# Patient Record
Sex: FEMALE | Race: BLACK OR AFRICAN AMERICAN | Employment: OTHER | URBAN - METROPOLITAN AREA
[De-identification: names, ages, dates, MRNs, and addresses within clinical notes are randomized per-mention and may not be internally consistent; named-entity substitution may affect disease eponyms.]

---

## 2017-07-31 ENCOUNTER — HOSPITAL ENCOUNTER (OUTPATIENT)
Dept: PHYSICAL THERAPY | Age: 72
Discharge: HOME | End: 2017-07-31
Attending: INTERNAL MEDICINE
Payer: MEDICARE

## 2017-07-31 DIAGNOSIS — G89.29 CHRONIC LOW BACK PAIN WITHOUT SCIATICA, UNSPECIFIED BACK PAIN LATERALITY: ICD-10-CM

## 2017-07-31 DIAGNOSIS — M54.50 CHRONIC LOW BACK PAIN WITHOUT SCIATICA, UNSPECIFIED BACK PAIN LATERALITY: ICD-10-CM

## 2017-07-31 PROCEDURE — G8979 MOBILITY GOAL STATUS: HCPCS

## 2017-07-31 PROCEDURE — 97161 PT EVAL LOW COMPLEX 20 MIN: CPT

## 2017-07-31 PROCEDURE — G8978 MOBILITY CURRENT STATUS: HCPCS

## 2017-07-31 NOTE — PROGRESS NOTES
Melba Nguyen  : 1945 [facility]  [address], [address], [place]  [phone]   [phone]          OUTPATIENT PHYSICAL THERAPY:Initial Assessment 2017    ICD-10: Treatment Diagnosis: Low back pain (M54.5)  Precautions/Allergies:   Latex, natural rubber; Acyclovir; Epinephrine; Other medication; Pravastatin; Tagamet [cimetidine]; and Valium [diazepam]   Fall Risk Score: 0 (? 5 = High Risk)  MD Orders: evaluate and treat MEDICAL/REFERRING DIAGNOSIS:  Chronic low back pain without sciatica, unspecified back pain laterality [M54.5, G89.29]   DATE OF ONSET: Chronic, worsening over past 1-2 months  REFERRING PHYSICIAN: José Manuel Paredes MD  RETURN PHYSICIAN APPOINTMENT: 2017     INITIAL ASSESSMENT:  Ms. Lopez Carolina presents in therapy today with chronic lower back pain. Her pain resembles a posterior derangement in the lumbar spine. She seemed hesitant to begin therapy before her x-ray imaging studies, so I suggested she wait to schedule follow-up therapy visits for after her x-ray. She is to call and schedule x-ray imaging. She will benefit from skilled PT in order to improve her lumbar motion and strength for returning to optimum level of function and ADL performance. PROBLEM LIST (Impacting functional limitations):  1. Decreased Strength  2. Decreased ADL/Functional Activities  3. Increased Pain  4. Decreased Activity Tolerance  5. Decreased Flexibility/Joint Mobility  6. Decreased Knowledge of Precautions  7. Decreased [place] with Home Exercise Program INTERVENTIONS PLANNED:  1. Cold  2. Electrical Stimulation  3. Heat  4. Home Exercise Program (HEP)  5. Manual Therapy  6. Neuromuscular Re-education/Strengthening  7. Range of Motion (ROM)  8. Therapeutic Activites  9. Therapeutic Exercise/Strengthening   TREATMENT PLAN:  Effective Dates: 17 TO 17.   Frequency/Duration: 2 times a week for 6 weeks  GOALS: (Goals have been discussed and agreed upon with patient.)     SHORT-TERM FUNCTIONAL GOALS: Time Frame: 3 weeks  1. Patient will be compliant with HEP focused on lumbar stabilization and strength/ROM. 2.  Patient will rate low back pain no greater than 4/10 at worst for improved tolerance to daily and work activities. DISCHARGE GOALS: Time Frame: 6 weeks  1. Patient will be independent with comprehensive HEP focused on lumbar stabilization and core strengthening. 2.  Patient will rate low back pain no greater than 2/10 and which does not significantly interfere with daily or work duties. 3.  Patient will demonstrate lumbar AROM to be [facility] for improved safety with functional mobility. Rehabilitation Potential For Stated Goals: Good  Regarding [place] therapy, I certify that the treatment plan above will be carried out by a therapist or under their direction. Thank you for this referral,  Guille Stoll, PT, DPT     Referring Physician Signature: Edwardo Torres MD              Date                    The information in this section was collected on 7-31-17 (except where otherwise noted). HISTORY:   History of Present Injury/Illness (Reason for Referral):  Patient reports a chronic history of lower back pain. She was receiving regular chiropractor treatments for her neck, which would help/resolve her lower back pain. She states over the past month, it has not seemed to help. She reports x-ray imaging studies were discussed, but never scheduled. Past Medical History/Comorbidities:   Ms. Kristopher Colmenares  has a past medical history of Anxiety; Atypical ductal hyperplasia of breast; Essential hypertension; GERD (gastroesophageal reflux disease); History of diverticulitis; Moderate mitral regurgitation (2015); and Obesity, Class I, BMI 30.0-34.9 (see actual BMI).   Ms. Kristopher Colmenares  has a past surgical history that includes us guided core breast biopsy (Left, 2014); cholecystectomy; breast lumpectomy (Left, 3/14/2014); breast biopsy (3/14/2014); hysterectomy;  section; other surgical (); and colonoscopy (). Social History/Living Environment:     Independent   Prior Level of Function/Work/Activity:  Retired RN  Personal Factors:          Sex:  female        Age:  67 y.o. Current Medications:       Current Outpatient Prescriptions:     losartan (COZAAR) 100 mg tablet, TAKE 1 TABLET BY MOUTH DAILY, Disp: 90 Tab, Rfl: 3    carvedilol (COREG) 6.25 mg tablet, TAKE 2 TABLETS BY MOUTH TWICE A DAY (2 TABS IN THE MORNING & 2 TABS IN THE EVENING), Disp: 360 Tab, Rfl: 1    amLODIPine (NORVASC) 5 mg tablet, TAKE 2 TABLETS BY MOUTH DAILY, Disp: 180 Tab, Rfl: 1    furosemide (LASIX) 40 mg tablet, TAKE 1 TABLET BY MOUTH EVERY DAY AS NEEDED FOR LEG SWELLING, Disp: 90 Tab, Rfl: 1    LORazepam (ATIVAN) 1 mg tablet, Take 1 mg by mouth every eight (8) hours as needed for Anxiety (not to exceeed no more than 3 times per day). , Disp: , Rfl:    Date Last Reviewed:  2017     Number of Personal Factors/Comorbidities that affect the Plan of Care: 1-2: MODERATE COMPLEXITY   EXAMINATION:   Observation/Orthostatic Postural Assessment:          Patient pushes through UEs to rise from chair. Ambulates with slower gait speed, but near normalized pattern and stride. Palpation:          Patient exhibits soreness at lower lumbar spinal segments and at PSIS bilaterally. ROM:          Lumbar flexion 75%, lumbar extension 50%, lumbar side-bend and rotation are WNL bilaterally   Strength:          WNL throughout B/L LEs  Special Tests:          Standing repetitive lumbar extension improved lumbar extension ROM, no change for pain        Body Structures Involved:  1. Nerves  2. Bones  3. Joints  4. Muscles  5. Ligaments Body Functions Affected:  1. Sensory/Pain  2. Movement Related Activities and Participation Affected:  1. General Tasks and Demands  2. Mobility  3. Domestic Life  4. Interpersonal Interactions and Relationships  5.  Community, Social and [place]   Number of elements (examined above) that affect the Plan of Care: 1-2: LOW COMPLEXITY   CLINICAL PRESENTATION:   Presentation: Stable and uncomplicated: LOW COMPLEXITY   CLINICAL DECISION MAKING:   Outcome Measure: Tool Used: Modified Oswestry Low Back Pain Questionnaire  Score:  Initial: 18/50  Most Recent: X/50 (Date: -- )   Interpretation of Score: Each section is scored on a 0-5 scale, 5 representing the greatest disability. The scores of each section are added together for a total score of 50. Score 0 1-10 11-20 21-30 31-40 41-49 50   Modifier CH CI CJ CK CL CM CN     ? Mobility - Walking and Moving Around:     - CURRENT STATUS: CJ - 20%-39% impaired, limited or restricted    - GOAL STATUS: CI - 1%-19% impaired, limited or restricted    - D/C STATUS:  ---------------To be determined---------------      Medical Necessity:   · Patient is expected to demonstrate progress in strength and range of motion to improve safety during functional mobility. Reason for Services/Other Comments:  · Patient continues to require skilled intervention due to not reaching long term goals. Use of outcome tool(s) and clinical judgement create a POC that gives a: Questionable prediction of patient's progress: MODERATE COMPLEXITY            TREATMENT:   (In addition to Assessment/Re-Assessment sessions the following treatments were rendered)  Pre-treatment Symptoms/Complaints:  Patient reports lower back pain, and buttocks pain, rated as 5/10 at worst. She states pain with prolonged walking and activity, while rest and chiropractor can alleviate pain. Pain: Initial:   3/10 Post Session:  0-1/10     THERAPEUTIC EXERCISE: (0 minutes):  Exercises per grid below to improve mobility and strength. Required minimal verbal cues to promote proper body alignment, promote proper body posture and promote proper body mechanics. Progressed complexity of movement as indicated.     Patient received IFC electrical stimulation intersecting at central lower lumbar spine, patient prone, intensity set to patient tolerance, 15 minutes. Patient reported no pain at end of treatment and session. (BILLED AS INDIRECT DUE TO INSURANCE RESTRICTION    Treatment/Session Assessment:    · Response to Treatment:  Patient verbalized understanding of plan of care and HEP consisting of standing lumbar extension. Patient reported no symptoms at end of session after treatment. · Compliance with Program/Exercises: Will assess as treatment progresses. · Recommendations/Intent for next treatment session: \"Next visit will focus on advancements to more challenging activities\".   Total Treatment Duration:  PT Patient Time In/Time Out  Time In: 1345  Time Out: Red 200, PT, DPT

## 2017-07-31 NOTE — PROGRESS NOTES
Ambulatory/Rehab Services H2 Model Falls Risk Assessment    Risk Factor Pts. ·   Confusion/Disorientation/Impulsivity  []    4 ·   Symptomatic Depression  []   2 ·   Altered Elimination  []   1 ·   Dizziness/Vertigo  []   1 ·   Gender (Male)  []   1 ·   Any administered antiepileptics (anticonvulsants):  []   2 ·   Any administered benzodiazepines:  []   1 ·   Visual Impairment (specify):  []   1 ·   Portable Oxygen Use  []   1 ·   Orthostatic ? BP  []   1 ·   History of Recent Falls (within 3 mos.)  []   5     Ability to Rise from Chair (choose one) Pts. ·   Ability to rise in a single movement  [x]   0 ·   Pushes up, successful in one attempt  []   1 ·   Multiple attempts, but successful  []   3 ·   Unable to rise without assistance  []   4   Total: (5 or greater = High Risk) 0     Falls Prevention Plan:   []                Physical Limitations to Exercise (specify):   []                Mobility Assistance Device (type):   []                Exercise/Equipment Adaptation (specify):    ©2010 [facility] [address] [place] [address] Patent #[id number].  Federal Law prohibits the replication, distribution or use without written permission from [facility] [employer]

## 2017-08-01 ENCOUNTER — HOSPITAL ENCOUNTER (OUTPATIENT)
Dept: GENERAL RADIOLOGY | Age: 72
Discharge: HOME | End: 2017-08-01
Payer: MEDICARE

## 2017-08-01 DIAGNOSIS — M54.50 CHRONIC LOW BACK PAIN WITHOUT SCIATICA, UNSPECIFIED BACK PAIN LATERALITY: ICD-10-CM

## 2017-08-01 DIAGNOSIS — G89.29 CHRONIC LOW BACK PAIN WITHOUT SCIATICA, UNSPECIFIED BACK PAIN LATERALITY: ICD-10-CM

## 2017-08-01 PROCEDURE — 72100 X-RAY EXAM L-S SPINE 2/3 VWS: CPT

## 2017-08-02 NOTE — PROGRESS NOTES
Let her know her xray of her back shows arthritis of the spine and also a probable abdominal aortic aneurysm-need to see if there is one by Ultrasound. Do not worry however!

## 2017-08-03 NOTE — PROGRESS NOTES
Spoke to patient, message was relayed and understanding expressed. Patient will go ahead with U/S as recommended.

## 2017-08-09 ENCOUNTER — HOSPITAL ENCOUNTER (OUTPATIENT)
Dept: ULTRASOUND IMAGING | Age: 72
Discharge: HOME | End: 2017-08-09
Attending: INTERNAL MEDICINE
Payer: MEDICARE

## 2017-08-09 DIAGNOSIS — I71.40 ABDOMINAL AORTIC ANEURYSM (AAA) WITHOUT RUPTURE: ICD-10-CM

## 2017-08-09 PROCEDURE — 76775 US EXAM ABDO BACK WALL LIM: CPT

## 2017-08-10 NOTE — PROGRESS NOTES
She does have a small 3 cm abdominal aortic aneurysm. Will need to rechecked in one year. Only when it gets to 5.5 cm is surgery done. May not be an issue for her lifetime?

## 2017-08-31 ENCOUNTER — HOSPITAL ENCOUNTER (OUTPATIENT)
Dept: PHYSICAL THERAPY | Age: 72
Discharge: HOME | End: 2017-08-31
Attending: INTERNAL MEDICINE
Payer: MEDICARE

## 2017-08-31 PROCEDURE — 97110 THERAPEUTIC EXERCISES: CPT

## 2017-08-31 NOTE — PROGRESS NOTES
Stephon Prater  : 1945 [facility]  [address], [address], [place]  [phone]   [phone]          OUTPATIENT PHYSICAL THERAPY:Daily Note 2017    ICD-10: Treatment Diagnosis: Low back pain (M54.5)  Precautions/Allergies:   Latex, natural rubber; Acyclovir; Epinephrine; Other medication; Pravastatin; Tagamet [cimetidine]; and Valium [diazepam]   Fall Risk Score: 0 (? 5 = High Risk)  MD Orders: evaluate and treat MEDICAL/REFERRING DIAGNOSIS:  Back  Pain   DATE OF ONSET: Chronic, worsening over past 1-2 months  REFERRING PHYSICIAN: Randy Jeffries MD  RETURN PHYSICIAN APPOINTMENT: 2017     INITIAL ASSESSMENT:  Ms. Julian Edward presents in therapy today with chronic lower back pain. Her pain resembles a posterior derangement in the lumbar spine. She seemed hesitant to begin therapy before her x-ray imaging studies, so I suggested she wait to schedule follow-up therapy visits for after her x-ray. She is to call and schedule x-ray imaging. She will benefit from skilled PT in order to improve her lumbar motion and strength for returning to optimum level of function and ADL performance. PROBLEM LIST (Impacting functional limitations):  1. Decreased Strength  2. Decreased ADL/Functional Activities  3. Increased Pain  4. Decreased Activity Tolerance  5. Decreased Flexibility/Joint Mobility  6. Decreased Knowledge of Precautions  7. Decreased [place] with Home Exercise Program INTERVENTIONS PLANNED:  1. Cold  2. Electrical Stimulation  3. Heat  4. Home Exercise Program (HEP)  5. Manual Therapy  6. Neuromuscular Re-education/Strengthening  7. Range of Motion (ROM)  8. Therapeutic Activites  9. Therapeutic Exercise/Strengthening   TREATMENT PLAN:  Effective Dates: 17 TO 17.   Frequency/Duration: 2 times a week for 6 weeks  GOALS: (Goals have been discussed and agreed upon with patient.)     SHORT-TERM FUNCTIONAL GOALS: Time Frame: 3 weeks  1. Patient will be compliant with HEP focused on lumbar stabilization and strength/ROM. 2.  Patient will rate low back pain no greater than 4/10 at worst for improved tolerance to daily and work activities. DISCHARGE GOALS: Time Frame: 6 weeks  1. Patient will be independent with comprehensive HEP focused on lumbar stabilization and core strengthening. 2.  Patient will rate low back pain no greater than 2/10 and which does not significantly interfere with daily or work duties. 3.  Patient will demonstrate lumbar AROM to be [facility] for improved safety with functional mobility. Rehabilitation Potential For Stated Goals: Good  Regarding [place] therapy, I certify that the treatment plan above will be carried out by a therapist or under their direction. Thank you for this referral,  Melvin Pizarro PT, DPT     Referring Physician Signature: Stephen Bloom MD              Date                    The information in this section was collected on 17 (except where otherwise noted). HISTORY:   History of Present Injury/Illness (Reason for Referral):  Patient reports a chronic history of lower back pain. She was receiving regular chiropractor treatments for her neck, which would help/resolve her lower back pain. She states over the past month, it has not seemed to help. She reports x-ray imaging studies were discussed, but never scheduled. Past Medical History/Comorbidities:   Ms. Norman Summers  has a past medical history of Anxiety; Atypical ductal hyperplasia of breast; Essential hypertension; GERD (gastroesophageal reflux disease); History of diverticulitis; Moderate mitral regurgitation (); and Obesity, Class I, BMI 30.0-34.9 (see actual BMI).   Ms. Norman Summers  has a past surgical history that includes us guided core breast biopsy (Left, ); cholecystectomy; breast lumpectomy (Left, 3/14/2014); breast biopsy (3/14/2014); hysterectomy;  section; other surgical (); and colonoscopy (2014). Social History/Living Environment:     Independent   Prior Level of Function/Work/Activity:  Retired RN  Personal Factors:          Sex:  female        Age:  67 y.o. Current Medications:       Current Outpatient Prescriptions:     losartan (COZAAR) 100 mg tablet, TAKE 1 TABLET BY MOUTH DAILY, Disp: 90 Tab, Rfl: 3    carvedilol (COREG) 6.25 mg tablet, TAKE 2 TABLETS BY MOUTH TWICE A DAY (2 TABS IN THE MORNING & 2 TABS IN THE EVENING), Disp: 360 Tab, Rfl: 1    amLODIPine (NORVASC) 5 mg tablet, TAKE 2 TABLETS BY MOUTH DAILY, Disp: 180 Tab, Rfl: 1    furosemide (LASIX) 40 mg tablet, TAKE 1 TABLET BY MOUTH EVERY DAY AS NEEDED FOR LEG SWELLING, Disp: 90 Tab, Rfl: 1    LORazepam (ATIVAN) 1 mg tablet, Take 1 mg by mouth every eight (8) hours as needed for Anxiety (not to exceeed no more than 3 times per day). , Disp: , Rfl:    Date Last Reviewed:  8/31/2017     Number of Personal Factors/Comorbidities that affect the Plan of Care: 1-2: MODERATE COMPLEXITY   EXAMINATION:   Observation/Orthostatic Postural Assessment:          Patient pushes through UEs to rise from chair. Ambulates with slower gait speed, but near normalized pattern and stride. Palpation:          Patient exhibits soreness at lower lumbar spinal segments and at PSIS bilaterally. ROM:          Lumbar flexion 75%, lumbar extension 50%, lumbar side-bend and rotation are WNL bilaterally   Strength:          WNL throughout B/L LEs  Special Tests:          Standing repetitive lumbar extension improved lumbar extension ROM, no change for pain        Body Structures Involved:  1. Nerves  2. Bones  3. Joints  4. Muscles  5. Ligaments Body Functions Affected:  1. Sensory/Pain  2. Movement Related Activities and Participation Affected:  1. General Tasks and Demands  2. Mobility  3. Domestic Life  4. Interpersonal Interactions and Relationships  5.  Community, Social and [place]   Number of elements (examined above) that affect the Plan of Care: 1-2: LOW COMPLEXITY   CLINICAL PRESENTATION:   Presentation: Stable and uncomplicated: LOW COMPLEXITY   CLINICAL DECISION MAKING:   Outcome Measure: Tool Used: Modified Oswestry Low Back Pain Questionnaire  Score:  Initial: 18/50  Most Recent: X/50 (Date: -- )   Interpretation of Score: Each section is scored on a 0-5 scale, 5 representing the greatest disability. The scores of each section are added together for a total score of 50. Score 0 1-10 11-20 21-30 31-40 41-49 50   Modifier CH CI CJ CK CL CM CN     ? Mobility - Walking and Moving Around:     - CURRENT STATUS: CJ - 20%-39% impaired, limited or restricted    - GOAL STATUS: CI - 1%-19% impaired, limited or restricted    - D/C STATUS:  ---------------To be determined---------------      Medical Necessity:   · Patient is expected to demonstrate progress in strength and range of motion to improve safety during functional mobility. Reason for Services/Other Comments:  · Patient continues to require skilled intervention due to not reaching long term goals. Use of outcome tool(s) and clinical judgement create a POC that gives a: Questionable prediction of patient's progress: MODERATE COMPLEXITY            TREATMENT:   (In addition to Assessment/Re-Assessment sessions the following treatments were rendered)  Pre-treatment Symptoms/Complaints:  Patient reports that she had x-ray imaging, which demonstrated multi-level DDD. She is here to begin PT now that she has had imaging. Pain: Initial:   3/10 Post Session:  0-1/10     THERAPEUTIC EXERCISE: (40 minutes):  Exercises per grid below to improve mobility and strength. Required minimal verbal cues to promote proper body alignment, promote proper body posture and promote proper body mechanics. Progressed complexity of movement as indicated.    Date:  8-31-17 Date:   Date:     Activity/Exercise Parameters Parameters Parameters   Stepper 10 minutes  Level 1     Long axis distraction Manually  B/L 3x20 sec     Bridge With TA activation  x10     Lower trunk rotation  x10     Hamstring stretch Manual SLR by clinician   3x20 seconds                       Patient received IFC electrical stimulation intersecting at central lower lumbar spine, patient prone, intensity set to patient tolerance, 15 minutes. Patient reported no pain at end of treatment and session. (not today)     Treatment/Session Assessment:    · Response to Treatment:  Patient and I discussed goal of beginning strength, endurance and balance work to progress to independent gym exercises. Will continue to work on this next session. · Compliance with Program/Exercises: Will assess as treatment progresses. · Recommendations/Intent for next treatment session: \"Next visit will focus on advancements to more challenging activities\".   Total Treatment Duration:  PT Patient Time In/Time Out  Time In: 1345  Time Out: Red 200, PT, DPT

## 2017-09-06 ENCOUNTER — HOSPITAL ENCOUNTER (OUTPATIENT)
Dept: PHYSICAL THERAPY | Age: 72
Discharge: HOME | End: 2017-09-06
Attending: INTERNAL MEDICINE
Payer: MEDICARE

## 2017-09-06 PROCEDURE — 97110 THERAPEUTIC EXERCISES: CPT

## 2017-09-06 NOTE — PROGRESS NOTES
Kal Sheikh  : 1945 [facility]  [address] , [address], [place] [address]  [phone]   [phone]          OUTPATIENT PHYSICAL THERAPY:Daily Note 2017    ICD-10: Treatment Diagnosis: Low back pain (M54.5)  Precautions/Allergies:   Latex, natural rubber; Acyclovir; Epinephrine; Other medication; Pravastatin; Tagamet [cimetidine]; and Valium [diazepam]   Fall Risk Score: 0 (? 5 = High Risk)  MD Orders: evaluate and treat MEDICAL/REFERRING DIAGNOSIS:  Back  Pain   DATE OF ONSET: Chronic, worsening over past 1-2 months  REFERRING PHYSICIAN: Edwardo Torres MD  RETURN PHYSICIAN APPOINTMENT: 2017     ASSESSMENT:  Ms. Kristopher Colmenares presents in therapy today with chronic lower back pain. Her pain resembles a posterior derangement in the lumbar spine. She seemed hesitant to begin therapy before her x-ray imaging studies, so I suggested she wait to schedule follow-up therapy visits for after her x-ray. She is to call and schedule x-ray imaging. She will benefit from skilled PT in order to improve her lumbar motion and strength for returning to optimum level of function and ADL performance. PROBLEM LIST (Impacting functional limitations):  1. Decreased Strength  2. Decreased ADL/Functional Activities  3. Increased Pain  4. Decreased Activity Tolerance  5. Decreased Flexibility/Joint Mobility  6. Decreased Knowledge of Precautions  7. Decreased [place] with Home Exercise Program INTERVENTIONS PLANNED:  1. Cold  2. Electrical Stimulation  3. Heat  4. Home Exercise Program (HEP)  5. Manual Therapy  6. Neuromuscular Re-education/Strengthening  7. Range of Motion (ROM)  8. Therapeutic Activites  9. Therapeutic Exercise/Strengthening   TREATMENT PLAN:  Effective Dates: 17 TO 17.   Frequency/Duration: 2 times a week for 6 weeks  GOALS: (Goals have been discussed and agreed upon with patient.)     SHORT-TERM FUNCTIONAL GOALS: Time Frame: 3 weeks  1. Patient will be compliant with HEP focused on lumbar stabilization and strength/ROM. 2.  Patient will rate low back pain no greater than 4/10 at worst for improved tolerance to daily and work activities. DISCHARGE GOALS: Time Frame: 6 weeks  1. Patient will be independent with comprehensive HEP focused on lumbar stabilization and core strengthening. 2.  Patient will rate low back pain no greater than 2/10 and which does not significantly interfere with daily or work duties. 3.  Patient will demonstrate lumbar AROM to be [facility] for improved safety with functional mobility. Rehabilitation Potential For Stated Goals: Good              The information in this section was collected on 17 (except where otherwise noted). HISTORY:   History of Present Injury/Illness (Reason for Referral):  Patient reports a chronic history of lower back pain. She was receiving regular chiropractor treatments for her neck, which would help/resolve her lower back pain. She states over the past month, it has not seemed to help. She reports x-ray imaging studies were discussed, but never scheduled. Past Medical History/Comorbidities:   Ms. Kelly Perera  has a past medical history of Anxiety; Atypical ductal hyperplasia of breast; Essential hypertension; GERD (gastroesophageal reflux disease); History of diverticulitis; Moderate mitral regurgitation (); and Obesity, Class I, BMI 30.0-34.9 (see actual BMI). Ms. Kelly Perera  has a past surgical history that includes us guided core breast biopsy (Left, ); cholecystectomy; breast lumpectomy (Left, 3/14/2014); breast biopsy (3/14/2014); hysterectomy;  section; other surgical (); and colonoscopy (). Social History/Living Environment:     Independent   Prior Level of Function/Work/Activity:  Retired RN  Personal Factors:          Sex:  female        Age:  67 y.o.     Current Medications:       Current Outpatient Prescriptions:     losartan (COZAAR) 100 mg tablet, TAKE 1 TABLET BY MOUTH DAILY, Disp: 90 Tab, Rfl: 3    carvedilol (COREG) 6.25 mg tablet, TAKE 2 TABLETS BY MOUTH TWICE A DAY (2 TABS IN THE MORNING & 2 TABS IN THE EVENING), Disp: 360 Tab, Rfl: 1    amLODIPine (NORVASC) 5 mg tablet, TAKE 2 TABLETS BY MOUTH DAILY, Disp: 180 Tab, Rfl: 1    furosemide (LASIX) 40 mg tablet, TAKE 1 TABLET BY MOUTH EVERY DAY AS NEEDED FOR LEG SWELLING, Disp: 90 Tab, Rfl: 1    LORazepam (ATIVAN) 1 mg tablet, Take 1 mg by mouth every eight (8) hours as needed for Anxiety (not to exceeed no more than 3 times per day). , Disp: , Rfl:    Date Last Reviewed:  9/6/2017     EXAMINATION:   Observation/Orthostatic Postural Assessment:          Patient pushes through UEs to rise from chair. Ambulates with slower gait speed, but near normalized pattern and stride. Palpation:          Patient exhibits soreness at lower lumbar spinal segments and at PSIS bilaterally. ROM:          Lumbar flexion 75%, lumbar extension 50%, lumbar side-bend and rotation are WNL bilaterally   Strength:          WNL throughout B/L LEs  Special Tests:          Standing repetitive lumbar extension improved lumbar extension ROM, no change for pain        Body Structures Involved:  1. Nerves  2. Bones  3. Joints  4. Muscles  5. Ligaments Body Functions Affected:  1. Sensory/Pain  2. Movement Related Activities and Participation Affected:  1. General Tasks and Demands  2. Mobility  3. Domestic Life  4. Interpersonal Interactions and Relationships  5. Community, Social and Civic Life   CLINICAL PRESENTATION:   CLINICAL DECISION MAKING:   Outcome Measure: Tool Used: Modified Oswestry Low Back Pain Questionnaire  Score:  Initial: 18/50  Most Recent: X/50 (Date: -- )   Interpretation of Score: Each section is scored on a 0-5 scale, 5 representing the greatest disability. The scores of each section are added together for a total score of 50.     Score 0 1-10 11-20 21-30 31-40 41-49 50   Modifier CH CI CJ CK CL CM CN ? Mobility - Walking and Moving Around:     - CURRENT STATUS: CJ - 20%-39% impaired, limited or restricted    - GOAL STATUS: CI - 1%-19% impaired, limited or restricted    - D/C STATUS:  ---------------To be determined---------------      Medical Necessity:   · Patient is expected to demonstrate progress in strength and range of motion to improve safety during functional mobility. Reason for Services/Other Comments:  · Patient continues to require skilled intervention due to not reaching long term goals. TREATMENT:   (In addition to Assessment/Re-Assessment sessions the following treatments were rendered)  Pre-treatment Symptoms/Complaints:  Patient reports her R hip is sore today and that she just feels stiff. Pain: Initial:   3/10 Post Session:  0/10     THERAPEUTIC EXERCISE: (45 minutes):  Exercises per grid below to improve mobility and strength. Required minimal verbal cues to promote proper body alignment, promote proper body posture and promote proper body mechanics. Progressed complexity of movement as indicated. Date:  8-31-17 Date:  9/6/17 Date:  9/6/17   Activity/Exercise Parameters Parameters Parameters   Stepper 10 minutes  Level 1 10 mins, level 1.5    Long axis distraction  Manually  B/L 3x20 sec Manually  B/L 3x20 sec    Bridge With TA activation  x10 With TA activation  x10    Lower trunk rotation  x10 x10 each direction    Hamstring stretch Manual SLR by clinician   3x20 seconds Manual SLR by clinician   3x20 seconds    TA march  10x each side    Prayer stretch  4x cramp in L foot    Cat camel  10x    Prone lying  3 minutes - decreased cramp in L foot and decreased pain          Patient received IFC electrical stimulation intersecting at central lower lumbar spine, patient prone, intensity set to patient tolerance, 15 minutes. Patient reported no pain at end of treatment and session.  (not today)     Treatment/Session Assessment:    · Response to Treatment: Patient very stiff and tender in R hip and back. She had L foot cramping w/ lumbar flexion and decreased pain and cramping following prone extension. · Compliance with Program/Exercises: Will assess as treatment progresses. · Recommendations/Intent for next treatment session: \"Next visit will focus on advancements to more challenging activities\".   Total Treatment Duration:  PT Patient Time In/Time Out  Time In: 1015  Time Out: 222 Dez Yeung, PT

## 2017-09-08 ENCOUNTER — HOSPITAL ENCOUNTER (OUTPATIENT)
Dept: PHYSICAL THERAPY | Age: 72
Discharge: HOME | End: 2017-09-08
Attending: INTERNAL MEDICINE
Payer: MEDICARE

## 2017-09-08 PROCEDURE — 97110 THERAPEUTIC EXERCISES: CPT

## 2017-09-08 NOTE — PROGRESS NOTES
Efrain Lizarraga  : 1945 [facility]  [address], [address], [place] [address]  [phone]   [phone]          OUTPATIENT PHYSICAL THERAPY:Daily Note 2017    ICD-10: Treatment Diagnosis: Low back pain (M54.5)  Precautions/Allergies:   Latex, natural rubber; Acyclovir; Epinephrine; Other medication; Pravastatin; Tagamet [cimetidine]; and Valium [diazepam]   Fall Risk Score: 0 (? 5 = High Risk)  MD Orders: evaluate and treat MEDICAL/REFERRING DIAGNOSIS:  Back  Pain   DATE OF ONSET: Chronic, worsening over past 1-2 months  REFERRING PHYSICIAN: Edilia Ponce MD  RETURN PHYSICIAN APPOINTMENT: 2017     ASSESSMENT:  Ms. Gil Hutchins presents in therapy today with chronic lower back pain. Her pain resembles a posterior derangement in the lumbar spine. She seemed hesitant to begin therapy before her x-ray imaging studies, so I suggested she wait to schedule follow-up therapy visits for after her x-ray. She is to call and schedule x-ray imaging. She will benefit from skilled PT in order to improve her lumbar motion and strength for returning to optimum level of function and ADL performance. PROBLEM LIST (Impacting functional limitations):  1. Decreased Strength  2. Decreased ADL/Functional Activities  3. Increased Pain  4. Decreased Activity Tolerance  5. Decreased Flexibility/Joint Mobility  6. Decreased Knowledge of Precautions  7. Decreased [place] with Home Exercise Program INTERVENTIONS PLANNED:  1. Cold  2. Electrical Stimulation  3. Heat  4. Home Exercise Program (HEP)  5. Manual Therapy  6. Neuromuscular Re-education/Strengthening  7. Range of Motion (ROM)  8. Therapeutic Activites  9. Therapeutic Exercise/Strengthening   TREATMENT PLAN:  Effective Dates: 17 TO 17.   Frequency/Duration: 2 times a week for 6 weeks  GOALS: (Goals have been discussed and agreed upon with patient.)     SHORT-TERM FUNCTIONAL GOALS: Time Frame: 3 weeks  1. Patient will be compliant with HEP focused on lumbar stabilization and strength/ROM. 2.  Patient will rate low back pain no greater than 4/10 at worst for improved tolerance to daily and work activities. DISCHARGE GOALS: Time Frame: 6 weeks  1. Patient will be independent with comprehensive HEP focused on lumbar stabilization and core strengthening. 2.  Patient will rate low back pain no greater than 2/10 and which does not significantly interfere with daily or work duties. 3.  Patient will demonstrate lumbar AROM to be [facility] for improved safety with functional mobility. Rehabilitation Potential For Stated Goals: Good              The information in this section was collected on 17 (except where otherwise noted). HISTORY:   History of Present Injury/Illness (Reason for Referral):  Patient reports a chronic history of lower back pain. She was receiving regular chiropractor treatments for her neck, which would help/resolve her lower back pain. She states over the past month, it has not seemed to help. She reports x-ray imaging studies were discussed, but never scheduled. Past Medical History/Comorbidities:   Ms. Anita Monroe  has a past medical history of Anxiety; Atypical ductal hyperplasia of breast; Essential hypertension; GERD (gastroesophageal reflux disease); History of diverticulitis; Moderate mitral regurgitation (); and Obesity, Class I, BMI 30.0-34.9 (see actual BMI). Ms. Anita Monroe  has a past surgical history that includes us guided core breast biopsy (Left, ); cholecystectomy; breast lumpectomy (Left, 3/14/2014); breast biopsy (3/14/2014); hysterectomy;  section; other surgical (); and colonoscopy (). Social History/Living Environment:     Independent   Prior Level of Function/Work/Activity:  Retired RN  Personal Factors:          Sex:  female        Age:  67 y.o.     Current Medications:       Current Outpatient Prescriptions:     losartan (COZAAR) 100 mg tablet, TAKE 1 TABLET BY MOUTH DAILY, Disp: 90 Tab, Rfl: 3    carvedilol (COREG) 6.25 mg tablet, TAKE 2 TABLETS BY MOUTH TWICE A DAY (2 TABS IN THE MORNING & 2 TABS IN THE EVENING), Disp: 360 Tab, Rfl: 1    amLODIPine (NORVASC) 5 mg tablet, TAKE 2 TABLETS BY MOUTH DAILY, Disp: 180 Tab, Rfl: 1    furosemide (LASIX) 40 mg tablet, TAKE 1 TABLET BY MOUTH EVERY DAY AS NEEDED FOR LEG SWELLING, Disp: 90 Tab, Rfl: 1    LORazepam (ATIVAN) 1 mg tablet, Take 1 mg by mouth every eight (8) hours as needed for Anxiety (not to exceeed no more than 3 times per day). , Disp: , Rfl:    Date Last Reviewed:  9/8/2017     EXAMINATION:   Observation/Orthostatic Postural Assessment:          Patient pushes through UEs to rise from chair. Ambulates with slower gait speed, but near normalized pattern and stride. Palpation:          Patient exhibits soreness at lower lumbar spinal segments and at PSIS bilaterally. ROM:          Lumbar flexion 75%, lumbar extension 50%, lumbar side-bend and rotation are WNL bilaterally   Strength:          WNL throughout B/L LEs  Special Tests:          Standing repetitive lumbar extension improved lumbar extension ROM, no change for pain        Body Structures Involved:  1. Nerves  2. Bones  3. Joints  4. Muscles  5. Ligaments Body Functions Affected:  1. Sensory/Pain  2. Movement Related Activities and Participation Affected:  1. General Tasks and Demands  2. Mobility  3. Domestic Life  4. Interpersonal Interactions and Relationships  5. Community, Social and Civic Life   CLINICAL PRESENTATION:   CLINICAL DECISION MAKING:   Outcome Measure: Tool Used: Modified Oswestry Low Back Pain Questionnaire  Score:  Initial: 18/50  Most Recent: X/50 (Date: -- )   Interpretation of Score: Each section is scored on a 0-5 scale, 5 representing the greatest disability. The scores of each section are added together for a total score of 50.     Score 0 1-10 11-20 21-30 31-40 41-49 50   Modifier CH CI CJ CK CL CM CN ? Mobility - Walking and Moving Around:     - CURRENT STATUS: CJ - 20%-39% impaired, limited or restricted    - GOAL STATUS: CI - 1%-19% impaired, limited or restricted    - D/C STATUS:  ---------------To be determined---------------      Medical Necessity:   · Patient is expected to demonstrate progress in strength and range of motion to improve safety during functional mobility. Reason for Services/Other Comments:  · Patient continues to require skilled intervention due to not reaching long term goals. TREATMENT:   (In addition to Assessment/Re-Assessment sessions the following treatments were rendered)  Pre-treatment Symptoms/Complaints:  Patient reports her R hip feels sore, but overall she does not have any pain. Pain: Initial:   0/10 Post Session:  0/10     THERAPEUTIC EXERCISE: (45 minutes):  Exercises per grid below to improve mobility and strength. Required minimal verbal cues to promote proper body alignment, promote proper body posture and promote proper body mechanics. Progressed complexity of movement as indicated.    Date:  8-31-17 Date:  9/6/17 Date:  9/8/17   Activity/Exercise Parameters Parameters Parameters   Stepper 10 minutes  Level 1 10 mins, level 1.5 10 mins, level 2.0   Long axis distraction  Manually  B/L 3x20 sec Manually  B/L 3x20 sec    Bridge With TA activation  x10 With TA activation  x10 With TA activation 2x10   Lower trunk rotation  x10 x10 each direction x10 each side   Hamstring stretch Manual SLR by clinician   3x20 seconds Manual SLR by clinician   3x20 seconds 3x30 sec each side   TA march  10x each side    Prayer stretch  4x cramp in L foot x2    Cat camel  10x 10x   Prone lying  3 minutes - decreased cramp in L foot and decreased pain 1 minute on forearms   Prone press ups   10x   IOANA   4x10   KTC   3x30 sec   Opp knee to chest   3x30 sec         Patient received IFC electrical stimulation intersecting at central lower lumbar spine, patient prone, intensity set to patient tolerance, 15 minutes. Patient reported no pain at end of treatment and session. (not today)     Treatment/Session Assessment:    · Response to Treatment:  Patient very stiff and tender in R hip and back. Pt's cramping decreased w/ extension. · Compliance with Program/Exercises: Will assess as treatment progresses. · Recommendations/Intent for next treatment session: \"Next visit will focus on advancements to more challenging activities\".   Total Treatment Duration:  PT Patient Time In/Time Out  Time In: 1113  Time Out: 200 [facility], PT

## 2017-09-12 ENCOUNTER — HOSPITAL ENCOUNTER (OUTPATIENT)
Dept: PHYSICAL THERAPY | Age: 72
Discharge: HOME | End: 2017-09-12
Attending: INTERNAL MEDICINE
Payer: MEDICARE

## 2017-09-12 PROCEDURE — 97110 THERAPEUTIC EXERCISES: CPT

## 2017-09-12 NOTE — PROGRESS NOTES
Adelina Snellen  : 1945 [facility]  [address], [address], [place]  [phone]   [phone]          OUTPATIENT PHYSICAL THERAPY:Daily Note 2017    ICD-10: Treatment Diagnosis: Low back pain (M54.5)  Precautions/Allergies:   Latex, natural rubber; Acyclovir; Epinephrine; Other medication; Pravastatin; Tagamet [cimetidine]; and Valium [diazepam]   Fall Risk Score: 0 (? 5 = High Risk)  MD Orders: evaluate and treat MEDICAL/REFERRING DIAGNOSIS:  Back  Pain   DATE OF ONSET: Chronic, worsening over past 1-2 months  REFERRING PHYSICIAN: Stephen Bloom MD  RETURN PHYSICIAN APPOINTMENT: 2017     ASSESSMENT:  Ms. Norman Summers presents in therapy today with chronic lower back pain. Her pain resembles a posterior derangement in the lumbar spine. She seemed hesitant to begin therapy before her x-ray imaging studies, so I suggested she wait to schedule follow-up therapy visits for after her x-ray. She is to call and schedule x-ray imaging. She will benefit from skilled PT in order to improve her lumbar motion and strength for returning to optimum level of function and ADL performance. PROBLEM LIST (Impacting functional limitations):  1. Decreased Strength  2. Decreased ADL/Functional Activities  3. Increased Pain  4. Decreased Activity Tolerance  5. Decreased Flexibility/Joint Mobility  6. Decreased Knowledge of Precautions  7. Decreased [place] with Home Exercise Program INTERVENTIONS PLANNED:  1. Cold  2. Electrical Stimulation  3. Heat  4. Home Exercise Program (HEP)  5. Manual Therapy  6. Neuromuscular Re-education/Strengthening  7. Range of Motion (ROM)  8. Therapeutic Activites  9. Therapeutic Exercise/Strengthening   TREATMENT PLAN:  Effective Dates: 17 TO 17.   Frequency/Duration: 2 times a week for 6 weeks  GOALS: (Goals have been discussed and agreed upon with patient.)     SHORT-TERM FUNCTIONAL GOALS: Time Frame: 3 weeks  1. Patient will be compliant with HEP focused on lumbar stabilization and strength/ROM. 2.  Patient will rate low back pain no greater than 4/10 at worst for improved tolerance to daily and work activities. DISCHARGE GOALS: Time Frame: 6 weeks  1. Patient will be independent with comprehensive HEP focused on lumbar stabilization and core strengthening. 2.  Patient will rate low back pain no greater than 2/10 and which does not significantly interfere with daily or work duties. 3.  Patient will demonstrate lumbar AROM to be [facility] for improved safety with functional mobility. Rehabilitation Potential For Stated Goals: Good              The information in this section was collected on 17 (except where otherwise noted). HISTORY:   History of Present Injury/Illness (Reason for Referral):  Patient reports a chronic history of lower back pain. She was receiving regular chiropractor treatments for her neck, which would help/resolve her lower back pain. She states over the past month, it has not seemed to help. She reports x-ray imaging studies were discussed, but never scheduled. Past Medical History/Comorbidities:   Ms. Gil Hutchins  has a past medical history of Anxiety; Atypical ductal hyperplasia of breast; Essential hypertension; GERD (gastroesophageal reflux disease); History of diverticulitis; Moderate mitral regurgitation (); and Obesity, Class I, BMI 30.0-34.9 (see actual BMI). Ms. Gil Hutchins  has a past surgical history that includes us guided core breast biopsy (Left, ); cholecystectomy; breast lumpectomy (Left, 3/14/2014); breast biopsy (3/14/2014); hysterectomy;  section; other surgical (); and colonoscopy (). Social History/Living Environment:     Independent   Prior Level of Function/Work/Activity:  Retired RN  Personal Factors:          Sex:  female        Age:  67 y.o.     Current Medications:       Current Outpatient Prescriptions:     losartan (COZAAR) 100 mg tablet, TAKE 1 TABLET BY MOUTH DAILY, Disp: 90 Tab, Rfl: 3    carvedilol (COREG) 6.25 mg tablet, TAKE 2 TABLETS BY MOUTH TWICE A DAY (2 TABS IN THE MORNING & 2 TABS IN THE EVENING), Disp: 360 Tab, Rfl: 1    amLODIPine (NORVASC) 5 mg tablet, TAKE 2 TABLETS BY MOUTH DAILY, Disp: 180 Tab, Rfl: 1    furosemide (LASIX) 40 mg tablet, TAKE 1 TABLET BY MOUTH EVERY DAY AS NEEDED FOR LEG SWELLING, Disp: 90 Tab, Rfl: 1    LORazepam (ATIVAN) 1 mg tablet, Take 1 mg by mouth every eight (8) hours as needed for Anxiety (not to exceeed no more than 3 times per day). , Disp: , Rfl:    Date Last Reviewed:  9/12/2017     EXAMINATION:   Observation/Orthostatic Postural Assessment:          Patient pushes through UEs to rise from chair. Ambulates with slower gait speed, but near normalized pattern and stride. Palpation:          Patient exhibits soreness at lower lumbar spinal segments and at PSIS bilaterally. ROM:          Lumbar flexion 75%, lumbar extension 50%, lumbar side-bend and rotation are WNL bilaterally   Strength:          WNL throughout B/L LEs  Special Tests:          Standing repetitive lumbar extension improved lumbar extension ROM, no change for pain        Body Structures Involved:  1. Nerves  2. Bones  3. Joints  4. Muscles  5. Ligaments Body Functions Affected:  1. Sensory/Pain  2. Movement Related Activities and Participation Affected:  1. General Tasks and Demands  2. Mobility  3. Domestic Life  4. Interpersonal Interactions and Relationships  5. Community, Social and Civic Life   CLINICAL PRESENTATION:   CLINICAL DECISION MAKING:   Outcome Measure: Tool Used: Modified Oswestry Low Back Pain Questionnaire  Score:  Initial: 18/50  Most Recent: X/50 (Date: -- )   Interpretation of Score: Each section is scored on a 0-5 scale, 5 representing the greatest disability. The scores of each section are added together for a total score of 50.     Score 0 1-10 11-20 21-30 31-40 41-49 50   Modifier CH CI CJ CK CL CM CN     ? Mobility - Walking and Moving Around:     - CURRENT STATUS: CJ - 20%-39% impaired, limited or restricted    - GOAL STATUS: CI - 1%-19% impaired, limited or restricted    - D/C STATUS:  ---------------To be determined---------------      Medical Necessity:   · Patient is expected to demonstrate progress in strength and range of motion to improve safety during functional mobility. Reason for Services/Other Comments:  · Patient continues to require skilled intervention due to not reaching long term goals. TREATMENT:   (In addition to Assessment/Re-Assessment sessions the following treatments were rendered)  Pre-treatment Symptoms/Complaints:  Patient reports her R hip still feels sore, slightly improved from last session, but she states her walking has greatly improved. Pain: Initial:   0/10 Post Session:  0/10     THERAPEUTIC EXERCISE: (45 minutes):  Exercises per grid below to improve mobility and strength. Required minimal verbal cues to promote proper body alignment, promote proper body posture and promote proper body mechanics. Progressed complexity of movement as indicated.    Date:  8-31-17 Date:  9/6/17 Date:  9/8/17 Date  9-12-17   Activity/Exercise Parameters Parameters Parameters    Stepper 10 minutes  Level 1 10 mins, level 1.5 10 mins, level 2.0 10 mins  Level 1   Long axis distraction  Manually  B/L 3x20 sec Manually  B/L 3x20 sec  Manually  B/L 3x20 sec   Bridge With TA activation  x10 With TA activation  x10 With TA activation 2x10 With TA activation 2x10   Lower trunk rotation  x10 x10 each direction x10 each side x10 each side   Piriformis stretch    Manual LE IR with knee to chest  3x30 seconds B/L   Hamstring stretch Manual SLR by clinician   3x20 seconds Manual SLR by clinician   3x20 seconds 3x30 sec each side Manual SLR by clinician   3x20 second   TA march  10x each side     Prayer stretch  4x cramp in L foot x2     Cat camel  10x 10x    Prone lying  3 minutes - decreased cramp in L foot and decreased pain 1 minute on forearms    Prone press ups   10x 10x   IOANA   4x10    KTC   3x30 sec    Opp knee to chest   3x30 sec          Patient received IFC electrical stimulation intersecting at central lower lumbar spine, patient prone, intensity set to patient tolerance, 15 minutes. Patient reported no pain at end of treatment and session. (not today)     Treatment/Session Assessment:    · Response to Treatment:  Patient is progressing well at this point, and reports she feels improvements in her functional mobility. · Compliance with Program/Exercises: Will assess as treatment progresses. · Recommendations/Intent for next treatment session: \"Next visit will focus on advancements to more challenging activities\".   Total Treatment Duration:  PT Patient Time In/Time Out  Time In: 1115  Time Out: Christina 61, PT, DPT

## 2017-09-13 ENCOUNTER — APPOINTMENT (OUTPATIENT)
Dept: PHYSICAL THERAPY | Age: 72
End: 2017-09-13
Attending: INTERNAL MEDICINE
Payer: MEDICARE

## 2017-09-14 ENCOUNTER — HOSPITAL ENCOUNTER (OUTPATIENT)
Dept: PHYSICAL THERAPY | Age: 72
Discharge: HOME | End: 2017-09-14
Attending: INTERNAL MEDICINE
Payer: MEDICARE

## 2017-09-14 PROCEDURE — 97110 THERAPEUTIC EXERCISES: CPT

## 2017-09-14 NOTE — PROGRESS NOTES
Suzanne Barber  : 1945 [facility]  [address], [address], [place]  [phone]   [phone]          OUTPATIENT PHYSICAL THERAPY:Daily Note 2017    ICD-10: Treatment Diagnosis: Low back pain (M54.5)  Precautions/Allergies:   Latex, natural rubber; Acyclovir; Epinephrine; Other medication; Pravastatin; Tagamet [cimetidine]; and Valium [diazepam]   Fall Risk Score: 0 (? 5 = High Risk)  MD Orders: evaluate and treat MEDICAL/REFERRING DIAGNOSIS:  Back  Pain   DATE OF ONSET: Chronic, worsening over past 1-2 months  REFERRING PHYSICIAN: Britney Brannon MD  RETURN PHYSICIAN APPOINTMENT: 2017     ASSESSMENT:  Ms. Chad Gleason presents in therapy today with chronic lower back pain. Her pain resembles a posterior derangement in the lumbar spine. She seemed hesitant to begin therapy before her x-ray imaging studies, so I suggested she wait to schedule follow-up therapy visits for after her x-ray. She is to call and schedule x-ray imaging. She will benefit from skilled PT in order to improve her lumbar motion and strength for returning to optimum level of function and ADL performance. PROBLEM LIST (Impacting functional limitations):  1. Decreased Strength  2. Decreased ADL/Functional Activities  3. Increased Pain  4. Decreased Activity Tolerance  5. Decreased Flexibility/Joint Mobility  6. Decreased Knowledge of Precautions  7. Decreased [place] with Home Exercise Program INTERVENTIONS PLANNED:  1. Cold  2. Electrical Stimulation  3. Heat  4. Home Exercise Program (HEP)  5. Manual Therapy  6. Neuromuscular Re-education/Strengthening  7. Range of Motion (ROM)  8. Therapeutic Activites  9. Therapeutic Exercise/Strengthening   TREATMENT PLAN:  Effective Dates: 17 TO 17.   Frequency/Duration: 2 times a week for 6 weeks  GOALS: (Goals have been discussed and agreed upon with patient.)     SHORT-TERM FUNCTIONAL GOALS: Time Frame: 3 weeks  1. Patient will be compliant with HEP focused on lumbar stabilization and strength/ROM. 2.  Patient will rate low back pain no greater than 4/10 at worst for improved tolerance to daily and work activities. DISCHARGE GOALS: Time Frame: 6 weeks  1. Patient will be independent with comprehensive HEP focused on lumbar stabilization and core strengthening. 2.  Patient will rate low back pain no greater than 2/10 and which does not significantly interfere with daily or work duties. 3.  Patient will demonstrate lumbar AROM to be [facility] for improved safety with functional mobility. Rehabilitation Potential For Stated Goals: Good              The information in this section was collected on 17 (except where otherwise noted). HISTORY:   History of Present Injury/Illness (Reason for Referral):  Patient reports a chronic history of lower back pain. She was receiving regular chiropractor treatments for her neck, which would help/resolve her lower back pain. She states over the past month, it has not seemed to help. She reports x-ray imaging studies were discussed, but never scheduled. Past Medical History/Comorbidities:   Ms. Olman Gibson  has a past medical history of Anxiety; Atypical ductal hyperplasia of breast; Essential hypertension; GERD (gastroesophageal reflux disease); History of diverticulitis; Moderate mitral regurgitation (); and Obesity, Class I, BMI 30.0-34.9 (see actual BMI). Ms. Olman Gibson  has a past surgical history that includes us guided core breast biopsy (Left, ); cholecystectomy; breast lumpectomy (Left, 3/14/2014); breast biopsy (3/14/2014); hysterectomy;  section; other surgical (); and colonoscopy (). Social History/Living Environment:     Independent   Prior Level of Function/Work/Activity:  Retired RN  Personal Factors:          Sex:  female        Age:  67 y.o.     Current Medications:       Current Outpatient Prescriptions:     losartan (COZAAR) 100 mg tablet, TAKE 1 TABLET BY MOUTH DAILY, Disp: 90 Tab, Rfl: 3    carvedilol (COREG) 6.25 mg tablet, TAKE 2 TABLETS BY MOUTH TWICE A DAY (2 TABS IN THE MORNING & 2 TABS IN THE EVENING), Disp: 360 Tab, Rfl: 1    amLODIPine (NORVASC) 5 mg tablet, TAKE 2 TABLETS BY MOUTH DAILY, Disp: 180 Tab, Rfl: 1    furosemide (LASIX) 40 mg tablet, TAKE 1 TABLET BY MOUTH EVERY DAY AS NEEDED FOR LEG SWELLING, Disp: 90 Tab, Rfl: 1    LORazepam (ATIVAN) 1 mg tablet, Take 1 mg by mouth every eight (8) hours as needed for Anxiety (not to exceeed no more than 3 times per day). , Disp: , Rfl:    Date Last Reviewed:  9/14/2017     EXAMINATION:   Observation/Orthostatic Postural Assessment:          Patient pushes through UEs to rise from chair. Ambulates with slower gait speed, but near normalized pattern and stride. Palpation:          Patient exhibits soreness at lower lumbar spinal segments and at PSIS bilaterally. ROM:          Lumbar flexion 75%, lumbar extension 50%, lumbar side-bend and rotation are WNL bilaterally   Strength:          WNL throughout B/L LEs  Special Tests:          Standing repetitive lumbar extension improved lumbar extension ROM, no change for pain        Body Structures Involved:  1. Nerves  2. Bones  3. Joints  4. Muscles  5. Ligaments Body Functions Affected:  1. Sensory/Pain  2. Movement Related Activities and Participation Affected:  1. General Tasks and Demands  2. Mobility  3. Domestic Life  4. Interpersonal Interactions and Relationships  5. Community, Social and Civic Life   CLINICAL PRESENTATION:   CLINICAL DECISION MAKING:   Outcome Measure: Tool Used: Modified Oswestry Low Back Pain Questionnaire  Score:  Initial: 18/50  Most Recent: X/50 (Date: -- )   Interpretation of Score: Each section is scored on a 0-5 scale, 5 representing the greatest disability. The scores of each section are added together for a total score of 50.     Score 0 1-10 11-20 21-30 31-40 41-49 50   Modifier CH CI CJ CK CL CM CN     ? Mobility - Walking and Moving Around:     - CURRENT STATUS: CJ - 20%-39% impaired, limited or restricted    - GOAL STATUS: CI - 1%-19% impaired, limited or restricted    - D/C STATUS:  ---------------To be determined---------------      Medical Necessity:   · Patient is expected to demonstrate progress in strength and range of motion to improve safety during functional mobility. Reason for Services/Other Comments:  · Patient continues to require skilled intervention due to not reaching long term goals. TREATMENT:   (In addition to Assessment/Re-Assessment sessions the following treatments were rendered)  Pre-treatment Symptoms/Complaints:  Patient reports she feels like she is progressing well, but not quite ready for independent exercise without formal PT. Pain: Initial:   0/10 Post Session:  0/10     THERAPEUTIC EXERCISE: (45 minutes):  Exercises per grid below to improve mobility and strength. Required minimal verbal cues to promote proper body alignment, promote proper body posture and promote proper body mechanics. Progressed complexity of movement as indicated.    Date:  8-31-17 Date:  9/6/17 Date:  9/8/17 Date  9-14-17   Activity/Exercise Parameters Parameters Parameters    Stepper 10 minutes  Level 1 10 mins, level 1.5 10 mins, level 2.0 10 mins  Level 1   Long axis distraction  Manually  B/L 3x20 sec Manually  B/L 3x20 sec  Manually  B/L 3x20 sec   Bridge With TA activation  x10 With TA activation  x10 With TA activation 2x10 With TA activation 2x10   Lower trunk rotation  x10 x10 each direction x10 each side x10 each side   Piriformis stretch    Manual LE IR with knee to chest  3x30 seconds B/L   Hamstring stretch Manual SLR by clinician   3x20 seconds Manual SLR by clinician   3x20 seconds 3x30 sec each side Manual SLR by clinician   3x20 second   TA march  10x each side     Prayer stretch  4x cramp in L foot x2     Cat camel  10x 10x    Prone lying  3 minutes - decreased cramp in L foot and decreased pain 1 minute on forearms    Prone press ups   10x 10x   IOANA   4x10    KTC   3x30 sec    Opp knee to chest   3x30 sec          Patient received IFC electrical stimulation intersecting at central lower lumbar spine, patient prone, intensity set to patient tolerance, 15 minutes. Patient reported no pain at end of treatment and session. (not today)     Treatment/Session Assessment:    · Response to Treatment:  Patient is progressing well at this point, and reports she feels improvements in her functional mobility. · Compliance with Program/Exercises: Will assess as treatment progresses. · Recommendations/Intent for next treatment session: \"Next visit will focus on advancements to more challenging activities\".   Total Treatment Duration:  PT Patient Time In/Time Out  Time In: 1345  Time Out: Red 200, PT, DPT

## 2017-09-18 ENCOUNTER — HOSPITAL ENCOUNTER (OUTPATIENT)
Dept: PHYSICAL THERAPY | Age: 72
Discharge: HOME | End: 2017-09-18
Attending: INTERNAL MEDICINE
Payer: MEDICARE

## 2017-09-18 PROCEDURE — 97110 THERAPEUTIC EXERCISES: CPT

## 2017-09-18 NOTE — PROGRESS NOTES
Davey Phelps  : 1945 [facility]  [address], [address], [place]  [phone]   [phone]          OUTPATIENT PHYSICAL THERAPY:Daily Note 2017    ICD-10: Treatment Diagnosis: Low back pain (M54.5)  Precautions/Allergies:   Latex, natural rubber; Acyclovir; Epinephrine; Other medication; Pravastatin; Tagamet [cimetidine]; and Valium [diazepam]   Fall Risk Score: 0 (? 5 = High Risk)  MD Orders: evaluate and treat MEDICAL/REFERRING DIAGNOSIS:  Back  Pain   DATE OF ONSET: Chronic, worsening over past 1-2 months  REFERRING PHYSICIAN: Lachelle Gongora MD  RETURN PHYSICIAN APPOINTMENT: 2017     ASSESSMENT:  Ms. Andressa Multani presents in therapy today with chronic lower back pain. Her pain resembles a posterior derangement in the lumbar spine. She seemed hesitant to begin therapy before her x-ray imaging studies, so I suggested she wait to schedule follow-up therapy visits for after her x-ray. She is to call and schedule x-ray imaging. She will benefit from skilled PT in order to improve her lumbar motion and strength for returning to optimum level of function and ADL performance. PROBLEM LIST (Impacting functional limitations):  1. Decreased Strength  2. Decreased ADL/Functional Activities  3. Increased Pain  4. Decreased Activity Tolerance  5. Decreased Flexibility/Joint Mobility  6. Decreased Knowledge of Precautions  7. Decreased [place] with Home Exercise Program INTERVENTIONS PLANNED:  1. Cold  2. Electrical Stimulation  3. Heat  4. Home Exercise Program (HEP)  5. Manual Therapy  6. Neuromuscular Re-education/Strengthening  7. Range of Motion (ROM)  8. Therapeutic Activites  9. Therapeutic Exercise/Strengthening   TREATMENT PLAN:  Effective Dates: 17 TO 17.   Frequency/Duration: 2 times a week for 6 weeks  GOALS: (Goals have been discussed and agreed upon with patient.)     SHORT-TERM FUNCTIONAL GOALS: Time Frame: 3 weeks  1. Patient will be compliant with HEP focused on lumbar stabilization and strength/ROM. 2.  Patient will rate low back pain no greater than 4/10 at worst for improved tolerance to daily and work activities. DISCHARGE GOALS: Time Frame: 6 weeks  1. Patient will be independent with comprehensive HEP focused on lumbar stabilization and core strengthening. 2.  Patient will rate low back pain no greater than 2/10 and which does not significantly interfere with daily or work duties. 3.  Patient will demonstrate lumbar AROM to be [facility] for improved safety with functional mobility. Rehabilitation Potential For Stated Goals: Good              The information in this section was collected on 17 (except where otherwise noted). HISTORY:   History of Present Injury/Illness (Reason for Referral):  Patient reports a chronic history of lower back pain. She was receiving regular chiropractor treatments for her neck, which would help/resolve her lower back pain. She states over the past month, it has not seemed to help. She reports x-ray imaging studies were discussed, but never scheduled. Past Medical History/Comorbidities:   Ms. Lise Jolly  has a past medical history of Anxiety; Atypical ductal hyperplasia of breast; Essential hypertension; GERD (gastroesophageal reflux disease); History of diverticulitis; Moderate mitral regurgitation (); and Obesity, Class I, BMI 30.0-34.9 (see actual BMI). Ms. Lise Jolly  has a past surgical history that includes us guided core breast biopsy (Left, ); cholecystectomy; breast lumpectomy (Left, 3/14/2014); breast biopsy (3/14/2014); hysterectomy;  section; other surgical (); and colonoscopy (). Social History/Living Environment:     Independent   Prior Level of Function/Work/Activity:  Retired RN  Personal Factors:          Sex:  female        Age:  67 y.o.     Current Medications:       Current Outpatient Prescriptions:     losartan (COZAAR) 100 mg tablet, TAKE 1 TABLET BY MOUTH DAILY, Disp: 90 Tab, Rfl: 3    carvedilol (COREG) 6.25 mg tablet, TAKE 2 TABLETS BY MOUTH TWICE A DAY (2 TABS IN THE MORNING & 2 TABS IN THE EVENING), Disp: 360 Tab, Rfl: 1    amLODIPine (NORVASC) 5 mg tablet, TAKE 2 TABLETS BY MOUTH DAILY, Disp: 180 Tab, Rfl: 1    furosemide (LASIX) 40 mg tablet, TAKE 1 TABLET BY MOUTH EVERY DAY AS NEEDED FOR LEG SWELLING, Disp: 90 Tab, Rfl: 1    LORazepam (ATIVAN) 1 mg tablet, Take 1 mg by mouth every eight (8) hours as needed for Anxiety (not to exceeed no more than 3 times per day). , Disp: , Rfl:    Date Last Reviewed:  9/18/2017     EXAMINATION:   Observation/Orthostatic Postural Assessment:          Patient pushes through UEs to rise from chair. Ambulates with slower gait speed, but near normalized pattern and stride. Palpation:          Patient exhibits soreness at lower lumbar spinal segments and at PSIS bilaterally. ROM:          Lumbar flexion 75%, lumbar extension 50%, lumbar side-bend and rotation are WNL bilaterally   Strength:          WNL throughout B/L LEs  Special Tests:          Standing repetitive lumbar extension improved lumbar extension ROM, no change for pain        Body Structures Involved:  1. Nerves  2. Bones  3. Joints  4. Muscles  5. Ligaments Body Functions Affected:  1. Sensory/Pain  2. Movement Related Activities and Participation Affected:  1. General Tasks and Demands  2. Mobility  3. Domestic Life  4. Interpersonal Interactions and Relationships  5. Community, Social and Civic Life   CLINICAL PRESENTATION:   CLINICAL DECISION MAKING:   Outcome Measure: Tool Used: Modified Oswestry Low Back Pain Questionnaire  Score:  Initial: 18/50  Most Recent: X/50 (Date: -- )   Interpretation of Score: Each section is scored on a 0-5 scale, 5 representing the greatest disability. The scores of each section are added together for a total score of 50.     Score 0 1-10 11-20 21-30 31-40 41-49 50   Modifier CH CI CJ CK CL CM CN     ? Mobility - Walking and Moving Around:     - CURRENT STATUS: CJ - 20%-39% impaired, limited or restricted    - GOAL STATUS: CI - 1%-19% impaired, limited or restricted    - D/C STATUS:  ---------------To be determined---------------      Medical Necessity:   · Patient is expected to demonstrate progress in strength and range of motion to improve safety during functional mobility. Reason for Services/Other Comments:  · Patient continues to require skilled intervention due to not reaching long term goals. TREATMENT:   (In addition to Assessment/Re-Assessment sessions the following treatments were rendered)  Pre-treatment Symptoms/Complaints:  Patient reports she feels like she is walking better and able to tolerate longer bouts of standing activities. Pain: Initial:   0/10 Post Session:  0/10     THERAPEUTIC EXERCISE: (45 minutes):  Exercises per grid below to improve mobility and strength. Required minimal verbal cues to promote proper body alignment, promote proper body posture and promote proper body mechanics. Progressed complexity of movement as indicated.    Date:  8-31-17 Date:  9/6/17 Date:  9/8/17 Date  9-18-17   Activity/Exercise Parameters Parameters Parameters    Stepper 10 minutes  Level 1 10 mins, level 1.5 10 mins, level 2.0 10 mins  Level 1   Long axis distraction  Manually  B/L 3x20 sec Manually  B/L 3x20 sec  Manually  B/L 3x20 sec   Bridge With TA activation  x10 With TA activation  x10 With TA activation 2x10 With TA activation 2x10   Lower trunk rotation  x10 x10 each direction x10 each side x10 each side   Piriformis stretch    Manual LE IR with knee to chest  3x30 seconds B/L   Hamstring stretch Manual SLR by clinician   3x20 seconds Manual SLR by clinician   3x20 seconds 3x30 sec each side Manual SLR by clinician   3x20 second   TA march  10x each side     Prayer stretch  4x cramp in L foot x2     Cat camel  10x 10x    Prone lying  3 minutes - decreased cramp in L foot and decreased pain 1 minute on forearms    Prone press ups   10x 10x   IOANA   4x10    KTC   3x30 sec    Opp knee to chest   3x30 sec          Patient received IFC electrical stimulation intersecting at central lower lumbar spine, patient prone, intensity set to patient tolerance, 15 minutes. Patient reported no pain at end of treatment and session. (not today)     Treatment/Session Assessment:    · Response to Treatment:  Patient is responding very well to current PT program.   · Compliance with Program/Exercises: Will assess as treatment progresses. · Recommendations/Intent for next treatment session: \"Next visit will focus on advancements to more challenging activities\".   Total Treatment Duration:  PT Patient Time In/Time Out  Time In: 1030  Time Out: 1115    Neelam Cancer, PT, DPT

## 2017-09-20 ENCOUNTER — HOSPITAL ENCOUNTER (OUTPATIENT)
Dept: PHYSICAL THERAPY | Age: 72
Discharge: HOME | End: 2017-09-20
Attending: INTERNAL MEDICINE
Payer: MEDICARE

## 2017-09-20 PROCEDURE — 97110 THERAPEUTIC EXERCISES: CPT

## 2017-09-20 NOTE — PROGRESS NOTES
Mamta Fisher  : 1945 [facility]  [address], [address], [place] [address]  [phone]   [phone]          OUTPATIENT PHYSICAL THERAPY:Daily Note 2017    ICD-10: Treatment Diagnosis: Low back pain (M54.5)  Precautions/Allergies:   Latex, natural rubber; Acyclovir; Epinephrine; Other medication; Pravastatin; Tagamet [cimetidine]; and Valium [diazepam]   Fall Risk Score: 0 (? 5 = High Risk)  MD Orders: evaluate and treat MEDICAL/REFERRING DIAGNOSIS:  Back  Pain   DATE OF ONSET: Chronic, worsening over past 1-2 months  REFERRING PHYSICIAN: Aparna Cowart MD  RETURN PHYSICIAN APPOINTMENT: 2017     ASSESSMENT:  Ms. Cristina Sanchez presents in therapy today with chronic lower back pain. Her pain resembles a posterior derangement in the lumbar spine. She seemed hesitant to begin therapy before her x-ray imaging studies, so I suggested she wait to schedule follow-up therapy visits for after her x-ray. She is to call and schedule x-ray imaging. She will benefit from skilled PT in order to improve her lumbar motion and strength for returning to optimum level of function and ADL performance. PROBLEM LIST (Impacting functional limitations):  1. Decreased Strength  2. Decreased ADL/Functional Activities  3. Increased Pain  4. Decreased Activity Tolerance  5. Decreased Flexibility/Joint Mobility  6. Decreased Knowledge of Precautions  7. Decreased [place] with Home Exercise Program INTERVENTIONS PLANNED:  1. Cold  2. Electrical Stimulation  3. Heat  4. Home Exercise Program (HEP)  5. Manual Therapy  6. Neuromuscular Re-education/Strengthening  7. Range of Motion (ROM)  8. Therapeutic Activites  9. Therapeutic Exercise/Strengthening   TREATMENT PLAN:  Effective Dates: 17 TO 17.   Frequency/Duration: 2 times a week for 6 weeks  GOALS: (Goals have been discussed and agreed upon with patient.)     SHORT-TERM FUNCTIONAL GOALS: Time Frame: 3 weeks  1. Patient will be compliant with HEP focused on lumbar stabilization and strength/ROM. 2.  Patient will rate low back pain no greater than 4/10 at worst for improved tolerance to daily and work activities. DISCHARGE GOALS: Time Frame: 6 weeks  1. Patient will be independent with comprehensive HEP focused on lumbar stabilization and core strengthening. 2.  Patient will rate low back pain no greater than 2/10 and which does not significantly interfere with daily or work duties. 3.  Patient will demonstrate lumbar AROM to be [facility] for improved safety with functional mobility. Rehabilitation Potential For Stated Goals: Good              The information in this section was collected on 17 (except where otherwise noted). HISTORY:   History of Present Injury/Illness (Reason for Referral):  Patient reports a chronic history of lower back pain. She was receiving regular chiropractor treatments for her neck, which would help/resolve her lower back pain. She states over the past month, it has not seemed to help. She reports x-ray imaging studies were discussed, but never scheduled. Past Medical History/Comorbidities:   Ms. Lise Jolly  has a past medical history of Anxiety; Atypical ductal hyperplasia of breast; Essential hypertension; GERD (gastroesophageal reflux disease); History of diverticulitis; Moderate mitral regurgitation (); and Obesity, Class I, BMI 30.0-34.9 (see actual BMI). Ms. Lise Jolly  has a past surgical history that includes us guided core breast biopsy (Left, ); cholecystectomy; breast lumpectomy (Left, 3/14/2014); breast biopsy (3/14/2014); hysterectomy;  section; other surgical (); and colonoscopy (). Social History/Living Environment:     Independent   Prior Level of Function/Work/Activity:  Retired RN  Personal Factors:          Sex:  female        Age:  [address] y.o.     Current Medications:       Current Outpatient Prescriptions:     losartan (COZAAR) 100 mg tablet, TAKE 1 TABLET BY MOUTH DAILY, Disp: 90 Tab, Rfl: 3    carvedilol (COREG) 6.25 mg tablet, TAKE 2 TABLETS BY MOUTH TWICE A DAY (2 TABS IN THE MORNING & 2 TABS IN THE EVENING), Disp: 360 Tab, Rfl: 1    amLODIPine (NORVASC) 5 mg tablet, TAKE 2 TABLETS BY MOUTH DAILY, Disp: 180 Tab, Rfl: 1    furosemide (LASIX) 40 mg tablet, TAKE 1 TABLET BY MOUTH EVERY DAY AS NEEDED FOR LEG SWELLING, Disp: 90 Tab, Rfl: 1    LORazepam (ATIVAN) 1 mg tablet, Take 1 mg by mouth every eight (8) hours as needed for Anxiety (not to exceeed no more than 3 times per day). , Disp: , Rfl:    Date Last Reviewed:  9/20/2017     EXAMINATION:   Observation/Orthostatic Postural Assessment:          Patient pushes through UEs to rise from chair. Ambulates with slower gait speed, but near normalized pattern and stride. Palpation:          Patient exhibits soreness at lower lumbar spinal segments and at PSIS bilaterally. ROM:          Lumbar flexion 75%, lumbar extension 50%, lumbar side-bend and rotation are WNL bilaterally   Strength:          WNL throughout B/L LEs  Special Tests:          Standing repetitive lumbar extension improved lumbar extension ROM, no change for pain        Body Structures Involved:  1. Nerves  2. Bones  3. Joints  4. Muscles  5. Ligaments Body Functions Affected:  1. Sensory/Pain  2. Movement Related Activities and Participation Affected:  1. General Tasks and Demands  2. Mobility  3. Domestic Life  4. Interpersonal Interactions and Relationships  5. Community, Social and Civic Life   CLINICAL PRESENTATION:   CLINICAL DECISION MAKING:   Outcome Measure: Tool Used: Modified Oswestry Low Back Pain Questionnaire  Score:  Initial: 18/50  Most Recent: X/50 (Date: -- )   Interpretation of Score: Each section is scored on a 0-5 scale, 5 representing the greatest disability. The scores of each section are added together for a total score of 50.     Score 0 1-10 11-20 21-30 31-40 41-49 50   Modifier CH CI CJ CK CL CM CN     ? Mobility - Walking and Moving Around:     - CURRENT STATUS: CJ - 20%-39% impaired, limited or restricted    - GOAL STATUS: CI - 1%-19% impaired, limited or restricted    - D/C STATUS:  ---------------To be determined---------------      Medical Necessity:   · Patient is expected to demonstrate progress in strength and range of motion to improve safety during functional mobility. Reason for Services/Other Comments:  · Patient continues to require skilled intervention due to not reaching long term goals. TREATMENT:   (In addition to Assessment/Re-Assessment sessions the following treatments were rendered)  Pre-treatment Symptoms/Complaints:  Patient reports she feels like she is walking better and able to tolerate longer bouts of standing activities. Pain: Initial:   0/10 Post Session:  0/10     THERAPEUTIC EXERCISE: (45 minutes):  Exercises per grid below to improve mobility and strength. Required minimal verbal cues to promote proper body alignment, promote proper body posture and promote proper body mechanics. Progressed complexity of movement as indicated.    Date:  8-31-17 Date:  9/6/17 Date:  9/8/17 Date  9-18-17   Activity/Exercise Parameters Parameters Parameters    Stepper 10 minutes  Level 1 10 mins, level 1.5 10 mins, level 2.0 10 mins  Level 1   Long axis distraction  Manually  B/L 3x20 sec Manually  B/L 3x20 sec  Manually  B/L 3x20 sec   Bridge With TA activation  x10 With TA activation  x10 With TA activation 2x10 With TA activation 2x10   Lower trunk rotation  x10 x10 each direction x10 each side x10 each side   Piriformis stretch    Manual LE IR with knee to chest  3x30 seconds B/L   Hamstring stretch Manual SLR by clinician   3x20 seconds Manual SLR by clinician   3x20 seconds 3x30 sec each side Manual SLR by clinician   3x20 second   TA march  10x each side     Prayer stretch  4x cramp in L foot x2     Cat camel  10x 10x    Prone lying  3 minutes - decreased cramp in L foot and decreased pain 1 minute on forearms    Prone press ups   10x 10x   IOANA   4x10    KTC   3x30 sec    Opp knee to chest   3x30 sec          Patient received IFC electrical stimulation intersecting at central lower lumbar spine, patient prone, intensity set to patient tolerance, 15 minutes. Patient reported no pain at end of treatment and session. (not today)     Treatment/Session Assessment:    · Response to Treatment:  Patient is responding very well to current PT program.   · Compliance with Program/Exercises: Will assess as treatment progresses. · Recommendations/Intent for next treatment session: \"Next visit will focus on advancements to more challenging activities\".   Total Treatment Duration:  PT Patient Time In/Time Out  Time In: 1030  Time Out: 1115    David Martin, PT, DPT

## 2017-09-26 ENCOUNTER — HOSPITAL ENCOUNTER (OUTPATIENT)
Dept: PHYSICAL THERAPY | Age: 72
Discharge: HOME | End: 2017-09-26
Attending: INTERNAL MEDICINE
Payer: MEDICARE

## 2017-09-26 PROCEDURE — 97110 THERAPEUTIC EXERCISES: CPT

## 2017-09-26 NOTE — PROGRESS NOTES
Eleno Landin  : 1945 [facility]  [address], [address], [place]  [phone]   [phone]          OUTPATIENT PHYSICAL THERAPY:Daily Note 2017    ICD-10: Treatment Diagnosis: Low back pain (M54.5)  Precautions/Allergies:   Latex, natural rubber; Acyclovir; Epinephrine; Other medication; Pravastatin; Tagamet [cimetidine]; and Valium [diazepam]   Fall Risk Score: 0 (? 5 = High Risk)  MD Orders: evaluate and treat MEDICAL/REFERRING DIAGNOSIS:  Back  Pain   DATE OF ONSET: Chronic, worsening over past 1-2 months  REFERRING PHYSICIAN: Otto Ruffin MD  RETURN PHYSICIAN APPOINTMENT: 2017     ASSESSMENT:  Ms. Oxana Biswas presents in therapy today with chronic lower back pain. Her pain resembles a posterior derangement in the lumbar spine. She seemed hesitant to begin therapy before her x-ray imaging studies, so I suggested she wait to schedule follow-up therapy visits for after her x-ray. She is to call and schedule x-ray imaging. She will benefit from skilled PT in order to improve her lumbar motion and strength for returning to optimum level of function and ADL performance. PROBLEM LIST (Impacting functional limitations):  1. Decreased Strength  2. Decreased ADL/Functional Activities  3. Increased Pain  4. Decreased Activity Tolerance  5. Decreased Flexibility/Joint Mobility  6. Decreased Knowledge of Precautions  7. Decreased [place] with Home Exercise Program INTERVENTIONS PLANNED:  1. Cold  2. Electrical Stimulation  3. Heat  4. Home Exercise Program (HEP)  5. Manual Therapy  6. Neuromuscular Re-education/Strengthening  7. Range of Motion (ROM)  8. Therapeutic Activites  9. Therapeutic Exercise/Strengthening   TREATMENT PLAN:  Effective Dates: 17 TO 17.   Frequency/Duration: 2 times a week for 6 weeks  GOALS: (Goals have been discussed and agreed upon with patient.)     SHORT-TERM FUNCTIONAL GOALS: Time Frame: 3 weeks  1. Patient will be compliant with HEP focused on lumbar stabilization and strength/ROM. 2.  Patient will rate low back pain no greater than 4/10 at worst for improved tolerance to daily and work activities. DISCHARGE GOALS: Time Frame: 6 weeks  1. Patient will be independent with comprehensive HEP focused on lumbar stabilization and core strengthening. 2.  Patient will rate low back pain no greater than 2/10 and which does not significantly interfere with daily or work duties. 3.  Patient will demonstrate lumbar AROM to be [facility] for improved safety with functional mobility. Rehabilitation Potential For Stated Goals: Good              The information in this section was collected on 17 (except where otherwise noted). HISTORY:   History of Present Injury/Illness (Reason for Referral):  Patient reports a chronic history of lower back pain. She was receiving regular chiropractor treatments for her neck, which would help/resolve her lower back pain. She states over the past month, it has not seemed to help. She reports x-ray imaging studies were discussed, but never scheduled. Past Medical History/Comorbidities:   Ms. Dionisio Bocanegra  has a past medical history of Anxiety; Atypical ductal hyperplasia of breast; Essential hypertension; GERD (gastroesophageal reflux disease); History of diverticulitis; Moderate mitral regurgitation (); and Obesity, Class I, BMI 30.0-34.9 (see actual BMI). Ms. Dionisio Bocanegra  has a past surgical history that includes us guided core breast biopsy (Left, ); cholecystectomy; breast lumpectomy (Left, 3/14/2014); breast biopsy (3/14/2014); hysterectomy;  section; other surgical (); and colonoscopy (). Social History/Living Environment:     Independent   Prior Level of Function/Work/Activity:  Retired RN  Personal Factors:          Sex:  female        Age:  67 y.o.     Current Medications:       Current Outpatient Prescriptions:     losartan (COZAAR) 100 mg tablet, TAKE 1 TABLET BY MOUTH DAILY, Disp: 90 Tab, Rfl: 3    carvedilol (COREG) 6.25 mg tablet, TAKE 2 TABLETS BY MOUTH TWICE A DAY (2 TABS IN THE MORNING & 2 TABS IN THE EVENING), Disp: 360 Tab, Rfl: 1    amLODIPine (NORVASC) 5 mg tablet, TAKE 2 TABLETS BY MOUTH DAILY, Disp: 180 Tab, Rfl: 1    furosemide (LASIX) 40 mg tablet, TAKE 1 TABLET BY MOUTH EVERY DAY AS NEEDED FOR LEG SWELLING, Disp: 90 Tab, Rfl: 1    LORazepam (ATIVAN) 1 mg tablet, Take 1 mg by mouth every eight (8) hours as needed for Anxiety (not to exceeed no more than 3 times per day). , Disp: , Rfl:    Date Last Reviewed:  9/26/2017     EXAMINATION:   Observation/Orthostatic Postural Assessment:          Patient pushes through UEs to rise from chair. Ambulates with slower gait speed, but near normalized pattern and stride. Palpation:          Patient exhibits soreness at lower lumbar spinal segments and at PSIS bilaterally. ROM:          Lumbar flexion 75%, lumbar extension 50%, lumbar side-bend and rotation are WNL bilaterally   Strength:          WNL throughout B/L LEs  Special Tests:          Standing repetitive lumbar extension improved lumbar extension ROM, no change for pain        Body Structures Involved:  1. Nerves  2. Bones  3. Joints  4. Muscles  5. Ligaments Body Functions Affected:  1. Sensory/Pain  2. Movement Related Activities and Participation Affected:  1. General Tasks and Demands  2. Mobility  3. Domestic Life  4. Interpersonal Interactions and Relationships  5. Community, Social and Civic Life   CLINICAL PRESENTATION:   CLINICAL DECISION MAKING:   Outcome Measure: Tool Used: Modified Oswestry Low Back Pain Questionnaire  Score:  Initial: 18/50  Most Recent: X/50 (Date: -- )   Interpretation of Score: Each section is scored on a 0-5 scale, 5 representing the greatest disability. The scores of each section are added together for a total score of 50.     Score 0 1-10 11-20 21-30 31-40 41-49 50   Modifier CH CI CJ CK CL CM CN     ? Mobility - Walking and Moving Around:     - CURRENT STATUS: CJ - 20%-39% impaired, limited or restricted    - GOAL STATUS: CI - 1%-19% impaired, limited or restricted    - D/C STATUS:  ---------------To be determined---------------      Medical Necessity:   · Patient is expected to demonstrate progress in strength and range of motion to improve safety during functional mobility. Reason for Services/Other Comments:  · Patient continues to require skilled intervention due to not reaching long term goals. TREATMENT:   (In addition to Assessment/Re-Assessment sessions the following treatments were rendered)  Pre-treatment Symptoms/Complaints:  Patient reports the manual stretching \"is really helping my pain. \"     Pain: Initial:   0/10 Post Session:  0/10     THERAPEUTIC EXERCISE: (45 minutes):  Exercises per grid below to improve mobility and strength. Required minimal verbal cues to promote proper body alignment, promote proper body posture and promote proper body mechanics. Progressed complexity of movement as indicated.    Date:  8-31-17 Date:  9/6/17 Date:  9/8/17 Date  9-18-17 Date  9-26-17   Activity/Exercise Parameters Parameters Parameters     Stepper 10 minutes  Level 1 10 mins, level 1.5 10 mins, level 2.0 10 mins  Level 1 10 mins  Level 1   Long axis distraction  Manually  B/L 3x20 sec Manually  B/L 3x20 sec  Manually  B/L 3x20 sec Manually  B/L 3x20 sec   Bridge With TA activation  x10 With TA activation  x10 With TA activation 2x10 With TA activation 2x10 With TA activation 2x10   Lower trunk rotation  x10 x10 each direction x10 each side x10 each side    Piriformis stretch    Manual LE IR with knee to chest  3x30 seconds B/L Manual LE IR with knee to chest  3x30 seconds B/L   Hamstring stretch Manual SLR by clinician   3x20 seconds Manual SLR by clinician   3x20 seconds 3x30 sec each side Manual SLR by clinician   3x20 second Manual SLR by clinician   3x20 second   TA march  10x each side      Prayer stretch  4x cramp in L foot x2      Cat camel  10x 10x     Prone lying  3 minutes - decreased cramp in L foot and decreased pain 1 minute on forearms     Prone press ups   10x 10x    IOANA   4x10     KTC   3x30 sec     Opp knee to chest   3x30 sec           Patient received IFC electrical stimulation intersecting at central lower lumbar spine, patient prone, intensity set to patient tolerance, 15 minutes. Patient reported no pain at end of treatment and session. (not today)     Treatment/Session Assessment:    · Response to Treatment:  Patient is responding very well to current PT program.   · Compliance with Program/Exercises: Will assess as treatment progresses. · Recommendations/Intent for next treatment session: \"Next visit will focus on advancements to more challenging activities\".   Total Treatment Duration:  PT Patient Time In/Time Out  Time In: 1030  Time Out: 1115    David Martin, PT, DPT

## 2017-09-28 ENCOUNTER — HOSPITAL ENCOUNTER (OUTPATIENT)
Dept: PHYSICAL THERAPY | Age: 72
Discharge: HOME | End: 2017-09-28
Attending: INTERNAL MEDICINE
Payer: MEDICARE

## 2017-09-28 PROCEDURE — G8978 MOBILITY CURRENT STATUS: HCPCS

## 2017-09-28 PROCEDURE — 97110 THERAPEUTIC EXERCISES: CPT

## 2017-09-28 PROCEDURE — G8979 MOBILITY GOAL STATUS: HCPCS

## 2017-09-28 PROCEDURE — G8980 MOBILITY D/C STATUS: HCPCS

## 2017-09-28 PROCEDURE — 97140 MANUAL THERAPY 1/> REGIONS: CPT

## 2017-09-28 NOTE — PROGRESS NOTES
[facility]  : 1945 [facility]  [address], [address], [place]  [phone]   [phone]          OUTPATIENT PHYSICAL THERAPY:Daily Note and Discharge 2017    ICD-10: Treatment Diagnosis: Low back pain (M54.5)  Precautions/Allergies:   Latex, natural rubber; Acyclovir; Epinephrine; Other medication; Pravastatin; Tagamet [cimetidine]; and Valium [diazepam]   Fall Risk Score: 0 (? 5 = High Risk)  MD Orders: evaluate and treat MEDICAL/REFERRING DIAGNOSIS:  Back  Pain   DATE OF ONSET: Chronic, worsening over past 1-2 months  REFERRING PHYSICIAN: Jose Trammell MD  RETURN PHYSICIAN APPOINTMENT: 2017     ASSESSMENT:  Ms. Rebekah Linn presented to PT eval with chronic lower back pain. She has improved her lumbar ROM, mobility, and pain levels. She is independent w/ her HEP and has met 5/5 PT goals at this time. She was introduced and shown around [facility] and desires to start attending classes there to continue her health journey. DC from outpatient PT at this time. TREATMENT PLAN:    GOALS: (Goals have been discussed and agreed upon with patient.)     SHORT-TERM FUNCTIONAL GOALS: Time Frame: 3 weeks  1. Patient will be compliant with HEP focused on lumbar stabilization and strength/ROM. MET  2. Patient will rate low back pain no greater than 4/10 at worst for improved tolerance to daily and work activities. MET  DISCHARGE GOALS: Time Frame: 6 weeks  1. Patient will be independent with comprehensive HEP focused on lumbar stabilization and core strengthening. MET  2. Patient will rate low back pain no greater than 2/10 and which does not significantly interfere with daily or work duties. MET  3. Patient will demonstrate lumbar AROM to be [facility] for improved safety with functional mobility.  MET     Rehabilitation Potential For Stated Goals: Good              The information in this section was collected on 17 (except where otherwise noted). HISTORY:   History of Present Injury/Illness (Reason for Referral):  Patient reports a chronic history of lower back pain. She was receiving regular chiropractor treatments for her neck, which would help/resolve her lower back pain. She states over the past month, it has not seemed to help. She reports x-ray imaging studies were discussed, but never scheduled. Past Medical History/Comorbidities:   Ms. Adele Retana  has a past medical history of Anxiety; Atypical ductal hyperplasia of breast; Essential hypertension; GERD (gastroesophageal reflux disease); History of diverticulitis; Moderate mitral regurgitation (); and Obesity, Class I, BMI 30.0-34.9 (see actual BMI). Ms. Adele Retana  has a past surgical history that includes us guided core breast biopsy (Left, ); cholecystectomy; breast lumpectomy (Left, 3/14/2014); breast biopsy (3/14/2014); hysterectomy;  section; other surgical (); and colonoscopy (). Social History/Living Environment:     Independent   Prior Level of Function/Work/Activity:  Retired RN  Personal Factors:          Sex:  female        Age:  67 y.o. Current Medications:       Current Outpatient Prescriptions:     losartan (COZAAR) 100 mg tablet, TAKE 1 TABLET BY MOUTH DAILY, Disp: 90 Tab, Rfl: 3    carvedilol (COREG) 6.25 mg tablet, TAKE 2 TABLETS BY MOUTH TWICE A DAY (2 TABS IN THE MORNING & 2 TABS IN THE EVENING), Disp: 360 Tab, Rfl: 1    amLODIPine (NORVASC) 5 mg tablet, TAKE 2 TABLETS BY MOUTH DAILY, Disp: 180 Tab, Rfl: 1    furosemide (LASIX) 40 mg tablet, TAKE 1 TABLET BY MOUTH EVERY DAY AS NEEDED FOR LEG SWELLING, Disp: 90 Tab, Rfl: 1    LORazepam (ATIVAN) 1 mg tablet, Take 1 mg by mouth every eight (8) hours as needed for Anxiety (not to exceeed no more than 3 times per day). , Disp: , Rfl:    Date Last Reviewed:  2017     EXAMINATION:   Observation/Orthostatic Postural Assessment:          Patient pushes through UEs to rise from chair. Ambulates with slower gait speed, but near normalized pattern and stride. Palpation:          Patient exhibits soreness at lower lumbar spinal segments and at PSIS bilaterally. ROM:          Lumbar flexion 75%, lumbar extension 50%, lumbar side-bend and rotation are WNL bilaterally   Strength:          WNL throughout B/L LEs  Special Tests:          Standing repetitive lumbar extension improved lumbar extension ROM, no change for pain        Body Structures Involved:  1. Nerves  2. Bones  3. Joints  4. Muscles  5. Ligaments Body Functions Affected:  1. Sensory/Pain  2. Movement Related Activities and Participation Affected:  1. General Tasks and Demands  2. Mobility  3. Domestic Life  4. Interpersonal Interactions and Relationships  5. Community, Social and Civic Life   CLINICAL PRESENTATION:   CLINICAL DECISION MAKING:   Outcome Measure: Tool Used: Modified Oswestry Low Back Pain Questionnaire  Score:  Initial: 18/50  Most Recent: 12/50 (Date: 9/28/17)   Interpretation of Score: Each section is scored on a 0-5 scale, 5 representing the greatest disability. The scores of each section are added together for a total score of 50. Score 0 1-10 11-20 21-30 31-40 41-49 50   Modifier CH CI CJ CK CL CM CN     ? Mobility - Walking and Moving Around:     - CURRENT STATUS: CJ - 20%-39% impaired, limited or restricted    - GOAL STATUS: CI - 1%-19% impaired, limited or restricted    - D/C STATUS:  CJ - 20%-39% impaired, limited or restricted              TREATMENT:   (In addition to Assessment/Re-Assessment sessions the following treatments were rendered)  Pre-treatment Symptoms/Complaints:  Patient reports a little discomfort in her R side of her LB. Pain: Initial:   2/10 Post Session:  0/10     THERAPEUTIC EXERCISE: (35 minutes):  Exercises per grid below to improve mobility and strength.   Required minimal verbal cues to promote proper body alignment, promote proper body posture and promote proper body mechanics. Progressed complexity of movement as indicated. Manual (10 minutes): long axis, lateral, and IR/ER w/ lateral glides to R hip w/ decreased pain and increased motion noted  Modalities: (10 minutes) - hot pack to R hip and LB area to decrease pain and increase mobility. Date:  8-31-17 Date:  9/6/17 Date:  9/8/17 Date  9-18-17 Date  9-26-17 Date:  9/28/17   Activity/Exercise Parameters Parameters Parameters   Parameters   Stepper 10 minutes  Level 1 10 mins, level 1.5 10 mins, level 2.0 10 mins  Level 1 10 mins  Level 1 10 mins, level 1   Long axis distraction  Manually  B/L 3x20 sec Manually  B/L 3x20 sec  Manually  B/L 3x20 sec Manually  B/L 3x20 sec    Bridge With TA activation  x10 With TA activation  x10 With TA activation 2x10 With TA activation 2x10 With TA activation 2x10 With TA activation 2x10   Lower trunk rotation  x10 x10 each direction x10 each side x10 each side  x10 each side   Piriformis stretch    Manual LE IR with knee to chest  3x30 seconds B/L Manual LE IR with knee to chest  3x30 seconds B/L Manual LE IR with knee to chest  3x30 seconds B/L   Hamstring stretch Manual SLR by clinician   3x20 seconds Manual SLR by clinician   3x20 seconds 3x30 sec each side Manual SLR by clinician   3x20 second Manual SLR by clinician   3x20 second Manual SLR by clinician 3x30 sec   TA march  10x each side       Prayer stretch  4x cramp in L foot x2       Cat camel  10x 10x      Prone lying  3 minutes - decreased cramp in L foot and decreased pain 1 minute on forearms      Prone press ups   10x 10x     IOANA   4x10      KTC   3x30 sec   3x30 sec each side   Opp knee to chest   3x30 sec            Patient received IFC electrical stimulation intersecting at central lower lumbar spine, patient prone, intensity set to patient tolerance, 0 minutes. Patient reported no pain at end of treatment and session.  (not today)     Treatment/Session Assessment:    · Response to Treatment:  Patient has met all of her PT goals. She is independent w/ her HEP.    · Compliance with Program/Exercises: Reports independence  Total Treatment Duration:  PT Patient Time In/Time Out  Time In: 1031  Time Out: 15 Danni Tovar PT

## 2017-10-25 ENCOUNTER — HOSPITAL ENCOUNTER (OUTPATIENT)
Dept: MAMMOGRAPHY | Age: 72
Discharge: HOME | End: 2017-10-25
Attending: INTERNAL MEDICINE
Payer: MEDICARE

## 2017-10-25 DIAGNOSIS — Z12.39 BREAST SCREENING: ICD-10-CM

## 2017-10-25 PROCEDURE — 77067 SCR MAMMO BI INCL CAD: CPT

## 2017-12-10 ENCOUNTER — HOSPITAL ENCOUNTER (EMERGENCY)
Age: 72
Discharge: HOME | End: 2017-12-10
Attending: EMERGENCY MEDICINE
Payer: MEDICARE

## 2017-12-10 VITALS
OXYGEN SATURATION: 98 % | BODY MASS INDEX: 31.07 KG/M2 | TEMPERATURE: 98.3 F | WEIGHT: 182 LBS | HEIGHT: 64 IN | RESPIRATION RATE: 16 BRPM | HEART RATE: 86 BPM | DIASTOLIC BLOOD PRESSURE: 71 MMHG | SYSTOLIC BLOOD PRESSURE: 143 MMHG

## 2017-12-10 DIAGNOSIS — F41.1 ANXIETY STATE: ICD-10-CM

## 2017-12-10 DIAGNOSIS — I10 ESSENTIAL HYPERTENSION: Primary | ICD-10-CM

## 2017-12-10 LAB
ALBUMIN SERPL-MCNC: 4 G/DL (ref 3.2–4.6)
ALBUMIN/GLOB SERPL: 0.8 {RATIO} (ref 1.2–3.5)
ALP SERPL-CCNC: 95 U/L (ref 50–136)
ALT SERPL-CCNC: 29 U/L (ref 12–65)
ANION GAP SERPL CALC-SCNC: 12 MMOL/L (ref 7–16)
AST SERPL-CCNC: 27 U/L (ref 15–37)
BASOPHILS # BLD: 0 K/UL (ref 0–0.2)
BASOPHILS NFR BLD: 0 % (ref 0–2)
BILIRUB SERPL-MCNC: 0.4 MG/DL (ref 0.2–1.1)
BUN SERPL-MCNC: 23 MG/DL (ref 8–23)
CALCIUM SERPL-MCNC: 9.1 MG/DL (ref 8.3–10.4)
CHLORIDE SERPL-SCNC: 89 MMOL/L (ref 98–107)
CO2 SERPL-SCNC: 24 MMOL/L (ref 21–32)
CREAT SERPL-MCNC: 1.49 MG/DL (ref 0.6–1)
DIFFERENTIAL METHOD BLD: ABNORMAL
EOSINOPHIL # BLD: 0.1 K/UL (ref 0–0.8)
EOSINOPHIL NFR BLD: 1 % (ref 0.5–7.8)
ERYTHROCYTE [DISTWIDTH] IN BLOOD BY AUTOMATED COUNT: 13.2 % (ref 11.9–14.6)
GLOBULIN SER CALC-MCNC: 5 G/DL (ref 2.3–3.5)
GLUCOSE SERPL-MCNC: 105 MG/DL (ref 65–100)
HCT VFR BLD AUTO: 35.9 % (ref 35.8–46.3)
HGB BLD-MCNC: 11.8 G/DL (ref 11.7–15.4)
IMM GRANULOCYTES # BLD: 0 K/UL (ref 0–0.5)
IMM GRANULOCYTES NFR BLD AUTO: 0 % (ref 0–5)
LYMPHOCYTES # BLD: 1.1 K/UL (ref 0.5–4.6)
LYMPHOCYTES NFR BLD: 15 % (ref 13–44)
MAGNESIUM SERPL-MCNC: 1.7 MG/DL (ref 1.8–2.4)
MCH RBC QN AUTO: 27.2 PG (ref 26.1–32.9)
MCHC RBC AUTO-ENTMCNC: 32.9 G/DL (ref 31.4–35)
MCV RBC AUTO: 82.7 FL (ref 79.6–97.8)
MONOCYTES # BLD: 1.1 K/UL (ref 0.1–1.3)
MONOCYTES NFR BLD: 15 % (ref 4–12)
NEUTS SEG # BLD: 4.9 K/UL (ref 1.7–8.2)
NEUTS SEG NFR BLD: 69 % (ref 43–78)
PLATELET # BLD AUTO: 161 K/UL (ref 150–450)
PMV BLD AUTO: 11.1 FL (ref 10.8–14.1)
POTASSIUM SERPL-SCNC: 4.2 MMOL/L (ref 3.5–5.1)
PROT SERPL-MCNC: 9 G/DL (ref 6.3–8.2)
RBC # BLD AUTO: 4.34 M/UL (ref 4.05–5.25)
SODIUM SERPL-SCNC: 125 MMOL/L (ref 136–145)
WBC # BLD AUTO: 7.1 K/UL (ref 4.3–11.1)

## 2017-12-10 PROCEDURE — 99284 EMERGENCY DEPT VISIT MOD MDM: CPT | Performed by: EMERGENCY MEDICINE

## 2017-12-10 PROCEDURE — 80053 COMPREHEN METABOLIC PANEL: CPT | Performed by: EMERGENCY MEDICINE

## 2017-12-10 PROCEDURE — 83735 ASSAY OF MAGNESIUM: CPT | Performed by: EMERGENCY MEDICINE

## 2017-12-10 PROCEDURE — 85025 COMPLETE CBC W/AUTO DIFF WBC: CPT | Performed by: EMERGENCY MEDICINE

## 2017-12-11 NOTE — ED NOTES
I have reviewed discharge instructions with the patient. The patient verbalized understanding. Patient left ED via Discharge Method: ambulatory to Home with daughter. Opportunity for questions and clarification provided. Patient given 0 scripts. To continue your aftercare when you leave the hospital, you may receive an automated call from our care team to check in on how you are doing. This is a free service and part of our promise to provide the best care and service to meet your aftercare needs.  If you have questions, or wish to unsubscribe from this service please call [phone]. Thank you for Choosing our [address] [place] [address] Emergency Department.

## 2017-12-11 NOTE — ED NOTES
Patient complaining of headache and muscle cramping x1day. Patient was put on diuretic on 12/4 in addition to her BP medication.

## 2017-12-11 NOTE — ED PROVIDER NOTES
HPI Comments: Patient states she has a history of hypertension and just started taking Maxide recently. She has been taking it for about 3 days so far. She states that today she has had a headache off and on all day long. She has has anxiety for which she takes lorazepam.  She took lorazepam this evening and she was feeling much better however she was concerned because her blood pressure was running higher than usual so she came here for evaluation. Elements of this note were created using speech recognition software. As such, errors of speech recognition may be present. Patient is a 67 y.o. female presenting with hypertension. The history is provided by the patient. Hypertension    Pertinent negatives include no nausea and no vomiting.         Past Medical History:   Diagnosis Date    AAA (abdominal aortic aneurysm) (HCC)     Anxiety     Atypical ductal hyperplasia of breast     Essential hypertension     GERD (gastroesophageal reflux disease)     History of diverticulitis     Menopause     Moderate mitral regurgitation     Mild-Moderate    Obesity, Class I, BMI 30.0-34.9 (see actual BMI)        Past Surgical History:   Procedure Laterality Date    HX BREAST BIOPSY  3/14/2014    HX BREAST LUMPECTOMY Left 3/14/2014    fibrocystic mastopathy, intraductal hyperplasia, apocrine metaplasia    HX  SECTION      x 2    HX CHOLECYSTECTOMY      HX COLONOSCOPY      HX HYSTERECTOMY      HX OTHER SURGICAL  2015    Renal artery scan-normal    US GUIDED CORE BREAST BIOPSY Left     Benign         Family History:   Problem Relation Age of Onset    Kidney Disease Mother     Stroke Father     Diabetes Father     Breast Cancer Sister      post 61 yrs old   [employer] Breast Cancer Sister      post 61 yrs old   [employer] Diabetes Sister     Diabetes Sister     Heart Disease Sister        Social History     Social History    Marital status:      Spouse name: N/A    Number of children: 1    Years of education: N/A     Occupational History    Retired Nurse, [email or web address]      Social History Main Topics    Smoking status: Former Smoker     Quit date: 8/17/1999    Smokeless tobacco: Never Used    Alcohol use No    Drug use: No    Sexual activity: Not Currently     Other Topics Concern    Not on file     Social History Narrative         ALLERGIES: Latex, natural rubber; Acyclovir; Epinephrine; Other medication; Pravastatin; Tagamet [cimetidine]; and Valium [diazepam]    Review of Systems   Constitutional: Negative for chills and fever. Gastrointestinal: Negative for nausea and vomiting. All other systems reviewed and are negative. Vitals:    12/10/17 1953 12/10/17 2033   BP: 165/78 157/74   Pulse: 100 93   Resp: 16 16   Temp: 98 °F (36.7 °C) 98.6 °F (37 °C)   SpO2: 100% 98%   Weight: 82.6 kg (182 lb)    Height: 5' 4\" (1.626 m)             Physical Exam   Constitutional: She is oriented to person, place, and time. She appears well-developed and well-nourished. HENT:   Head: Normocephalic and atraumatic. Eyes: Conjunctivae are normal. Pupils are equal, round, and reactive to light. Neck: Normal range of motion. Neck supple. Cardiovascular: Normal rate and regular rhythm. Murmur heard. Pulmonary/Chest: Effort normal and breath sounds normal.   Musculoskeletal: She exhibits no edema or tenderness. Neurological: She is alert and oriented to person, place, and time. Skin: Skin is warm and dry. Psychiatric: She has a normal mood and affect. Her behavior is normal.   Nursing note and vitals reviewed.        MDM  Number of Diagnoses or Management Options  Anxiety state: established and worsening  Essential hypertension:   Diagnosis management comments: differential diagnosis: uncontrolled hypertension, medication noncompliance,  Anxiety  9:47 PM blood pressure has been well controlled while here and she appears comfortable after taking lorazepam before coming here       Amount and/or Complexity of Data Reviewed  Clinical lab tests: ordered and reviewed    Risk of Complications, Morbidity, and/or Mortality  Presenting problems: moderate  Diagnostic procedures: moderate  Management options: moderate    Patient Progress  Patient progress: stable    ED Course       Procedures

## 2017-12-11 NOTE — DISCHARGE INSTRUCTIONS
High Blood Pressure: Care Instructions  Your Care Instructions    If your blood pressure is usually above 140/90, you have high blood pressure, or hypertension. That means the top number is 140 or higher or the bottom number is 90 or higher, or both. Despite what a lot of people think, high blood pressure usually doesn't cause headaches or make you feel dizzy or lightheaded. It usually has no symptoms. But it does increase your risk for heart attack, stroke, and kidney or eye damage. The higher your blood pressure, the more your risk increases. Your doctor will give you a goal for your blood pressure. Your goal will be based on your health and your age. An example of a goal is to keep your blood pressure below 140/90. Lifestyle changes, such as eating healthy and being active, are always important to help lower blood pressure. You might also take medicine to reach your blood pressure goal.  Follow-up care is a key part of your treatment and safety. Be sure to make and go to all appointments, and call your doctor if you are having problems. It's also a good idea to know your test results and keep a list of the medicines you take. How can you care for yourself at home? Medical treatment  · If you stop taking your medicine, your blood pressure will go back up. You may take one or more types of medicine to lower your blood pressure. Be safe with medicines. Take your medicine exactly as prescribed. Call your doctor if you think you are having a problem with your medicine. · Talk to your doctor before you start taking aspirin every day. Aspirin can help certain people lower their risk of a heart attack or stroke. But taking aspirin isn't right for everyone, because it can cause serious bleeding. · See your doctor regularly. You may need to see the doctor more often at first or until your blood pressure comes down.   · If you are taking blood pressure medicine, talk to your doctor before you take decongestants or anti-inflammatory medicine, such as ibuprofen. Some of these medicines can raise blood pressure. · Learn how to check your blood pressure at home. Lifestyle changes  · Stay at a healthy weight. This is especially important if you put on weight around the waist. Losing even 10 pounds can help you lower your blood pressure. · If your doctor recommends it, get more exercise. Walking is a good choice. Bit by bit, increase the amount you walk every day. Try for at least 30 minutes on most days of the week. You also may want to swim, bike, or do other activities. · Avoid or limit alcohol. Talk to your doctor about whether you can drink any alcohol. · Try to limit how much sodium you eat to less than 2,300 milligrams (mg) a day. Your doctor may ask you to try to eat less than 1,500 mg a day. · Eat plenty of fruits (such as bananas and oranges), vegetables, legumes, whole grains, and low-fat dairy products. · Lower the amount of saturated fat in your diet. Saturated fat is found in animal products such as milk, cheese, and meat. Limiting these foods may help you lose weight and also lower your risk for heart disease. · Do not smoke. Smoking increases your risk for heart attack and stroke. If you need help quitting, talk to your doctor about stop-smoking programs and medicines. These can increase your chances of quitting for good. When should you call for help? Call 911 anytime you think you may need emergency care. This may mean having symptoms that suggest that your blood pressure is causing a serious heart or blood vessel problem. Your blood pressure may be over 180/110. ? For example, call 911 if:  ? · You have symptoms of a heart attack. These may include:  ¨ Chest pain or pressure, or a strange feeling in the chest.  ¨ Sweating. ¨ Shortness of breath. ¨ Nausea or vomiting.   ¨ Pain, pressure, or a strange feeling in the back, neck, jaw, or upper belly or in one or both shoulders or arms.  ¨ Lightheadedness or sudden weakness. ¨ A fast or irregular heartbeat. ? · You have symptoms of a stroke. These may include:  ¨ Sudden numbness, tingling, weakness, or loss of movement in your face, arm, or leg, especially on only one side of your body. ¨ Sudden vision changes. ¨ Sudden trouble speaking. ¨ Sudden confusion or trouble understanding simple statements. ¨ Sudden problems with walking or balance. ¨ A sudden, severe headache that is different from past headaches. ? · You have severe back or belly pain. ?Do not wait until your blood pressure comes down on its own. Get help right away. ?Call your doctor now or seek immediate care if:  ? · Your blood pressure is much higher than normal (such as 180/110 or higher), but you don't have symptoms. ? · You think high blood pressure is causing symptoms, such as:  ¨ Severe headache. ¨ Blurry vision. ? Watch closely for changes in your health, and be sure to contact your doctor if:  ? · Your blood pressure measures 140/90 or higher at least 2 times. That means the top number is 140 or higher or the bottom number is 90 or higher, or both. ? · You think you may be having side effects from your blood pressure medicine. ? · Your blood pressure is usually normal, but it goes above normal at least 2 times. Where can you learn more? Go to http://alexandra-laureen.info/. Enter U844 in the search box to learn more about \"High Blood Pressure: Care Instructions. \"  Current as of: September 21, 2016  Content Version: 11.4  © 5815-2691 [employer]. Care instructions adapted under license by [employer] (which disclaims liability or warranty for this information). If you have questions about a medical condition or this instruction, always ask your healthcare professional. [place] [address] any warranty or liability for your use of this information.

## 2017-12-12 ENCOUNTER — HOSPITAL ENCOUNTER (OUTPATIENT)
Dept: PHYSICAL THERAPY | Age: 72
Discharge: HOME | End: 2017-12-12
Attending: INTERNAL MEDICINE
Payer: MEDICARE

## 2017-12-12 DIAGNOSIS — M54.50 CHRONIC LOW BACK PAIN WITHOUT SCIATICA, UNSPECIFIED BACK PAIN LATERALITY: ICD-10-CM

## 2017-12-12 DIAGNOSIS — G89.29 CHRONIC LOW BACK PAIN WITHOUT SCIATICA, UNSPECIFIED BACK PAIN LATERALITY: ICD-10-CM

## 2017-12-12 PROCEDURE — 97161 PT EVAL LOW COMPLEX 20 MIN: CPT

## 2017-12-12 PROCEDURE — G8979 MOBILITY GOAL STATUS: HCPCS

## 2017-12-12 PROCEDURE — G8978 MOBILITY CURRENT STATUS: HCPCS
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
## 2017-12-12 NOTE — PROGRESS NOTES
Ambulatory/Rehab Services H2 Model Falls Risk Assessment    Risk Factor Pts. ·   Confusion/Disorientation/Impulsivity  []    4 ·   Symptomatic Depression  []   2 ·   Altered Elimination  []   1 ·   Dizziness/Vertigo  []   1 ·   Gender (Male)  []   1 ·   Any administered antiepileptics (anticonvulsants):  []   2 ·   Any administered benzodiazepines:  []   1 ·   Visual Impairment (specify):  []   1 ·   Portable Oxygen Use  []   1 ·   Orthostatic ? BP  []   1 ·   History of Recent Falls (within 3 mos.)  []   5     Ability to Rise from Chair (choose one) Pts. ·   Ability to rise in a single movement  [x]   0 ·   Pushes up, successful in one attempt  []   1 ·   Multiple attempts, but successful  []   3 ·   Unable to rise without assistance  []   4   Total: (5 or greater = High Risk) 0     Falls Prevention Plan:   []                Physical Limitations to Exercise (specify):   []                Mobility Assistance Device (type):   []                Exercise/Equipment Adaptation (specify):    ©2010 [facility] [address] Patent #[id number].  Federal Law prohibits the replication, distribution or use without written permission from [facility] [place] [email or web address]

## 2017-12-18 ENCOUNTER — HOSPITAL ENCOUNTER (OUTPATIENT)
Dept: PHYSICAL THERAPY | Age: 72
Discharge: HOME | End: 2017-12-18
Attending: INTERNAL MEDICINE
Payer: MEDICARE

## 2017-12-18 PROCEDURE — 97110 THERAPEUTIC EXERCISES: CPT
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
## 2017-12-20 ENCOUNTER — HOSPITAL ENCOUNTER (OUTPATIENT)
Dept: PHYSICAL THERAPY | Age: 72
Discharge: HOME | End: 2017-12-20
Attending: INTERNAL MEDICINE
Payer: MEDICARE

## 2017-12-20 PROCEDURE — 97110 THERAPEUTIC EXERCISES: CPT
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
## 2017-12-22 ENCOUNTER — HOSPITAL ENCOUNTER (OUTPATIENT)
Dept: PHYSICAL THERAPY | Age: 72
Discharge: HOME | End: 2017-12-22
Attending: INTERNAL MEDICINE
Payer: MEDICARE

## 2017-12-22 PROCEDURE — 97110 THERAPEUTIC EXERCISES: CPT
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
## 2017-12-27 ENCOUNTER — HOSPITAL ENCOUNTER (OUTPATIENT)
Dept: PHYSICAL THERAPY | Age: 72
Discharge: HOME | End: 2017-12-27
Attending: INTERNAL MEDICINE
Payer: MEDICARE

## 2017-12-27 PROCEDURE — 97110 THERAPEUTIC EXERCISES: CPT
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
## 2017-12-29 ENCOUNTER — HOSPITAL ENCOUNTER (OUTPATIENT)
Dept: PHYSICAL THERAPY | Age: 72
Discharge: HOME | End: 2017-12-29
Attending: INTERNAL MEDICINE
Payer: MEDICARE

## 2017-12-29 PROCEDURE — 97110 THERAPEUTIC EXERCISES: CPT
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
## 2018-01-02 ENCOUNTER — HOSPITAL ENCOUNTER (OUTPATIENT)
Dept: PHYSICAL THERAPY | Age: 73
Discharge: HOME | End: 2018-01-02
Attending: INTERNAL MEDICINE
Payer: MEDICARE

## 2018-01-02 PROCEDURE — 97110 THERAPEUTIC EXERCISES: CPT
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
## 2018-01-04 ENCOUNTER — HOSPITAL ENCOUNTER (OUTPATIENT)
Dept: PHYSICAL THERAPY | Age: 73
Discharge: HOME | End: 2018-01-04
Attending: INTERNAL MEDICINE
Payer: MEDICARE

## 2018-01-04 NOTE — PROGRESS NOTES
Radha Mercado   (:1945) [address]  at   [facility]  [address], [address], [place] [address]  [phone]   [phone]       OUTPATIENT DAILY NOTE    NAME/AGE/GENDER: Radha Mercado is a 67 y.o. female. DATE: 2018    Patient called to cancel appointment today. Will plan to follow up on next scheduled visit.     BRENNON [place]

## 2018-01-08 ENCOUNTER — HOSPITAL ENCOUNTER (OUTPATIENT)
Dept: PHYSICAL THERAPY | Age: 73
Discharge: HOME | End: 2018-01-08
Attending: INTERNAL MEDICINE
Payer: MEDICARE

## 2018-01-08 PROCEDURE — 97110 THERAPEUTIC EXERCISES: CPT
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
## 2018-01-10 ENCOUNTER — HOSPITAL ENCOUNTER (OUTPATIENT)
Dept: PHYSICAL THERAPY | Age: 73
Discharge: HOME | End: 2018-01-10
Attending: INTERNAL MEDICINE
Payer: MEDICARE

## 2018-01-10 PROCEDURE — 97110 THERAPEUTIC EXERCISES: CPT
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
## 2018-03-12 ENCOUNTER — HOSPITAL ENCOUNTER (OUTPATIENT)
Dept: NON INVASIVE DIAGNOSTICS | Age: 73
Discharge: HOME | End: 2018-03-12
Attending: INTERNAL MEDICINE
Payer: MEDICARE

## 2018-03-12 DIAGNOSIS — R01.1 SYSTOLIC MURMUR: ICD-10-CM

## 2018-03-12 DIAGNOSIS — I34.0 MITRAL VALVE INSUFFICIENCY, UNSPECIFIED ETIOLOGY: ICD-10-CM

## 2018-03-12 PROCEDURE — 93306 TTE W/DOPPLER COMPLETE: CPT

## 2018-04-18 NOTE — PROGRESS NOTES
Rani Bravo  : 1945  [place] / [place] / Product Type: Medicare /  [address]  at [address]  [address], [address], [place] [address]  [phone]   [phone]            OUTPATIENT PHYSICAL THERAPY:Discontinuation Summary 2018    ICD-10: Treatment Diagnosis: Low back pain (M54.5)  Precautions/Allergies:   Latex, natural rubber; Maxzide [triamterene-hydrochlorothiazid]; Acyclovir; Epinephrine; Hydrochlorothiazide; Maxidex [dexamethasone]; Other medication; Pravastatin; Tagamet [cimetidine]; and Valium [diazepam]   Fall Risk Score: 0 (? 5 = High Risk)  MD Orders: Evaluate and Treat MEDICAL/REFERRING DIAGNOSIS:  Low back pain    DATE OF ONSET: Chronic  REFERRING PHYSICIAN: Bob Vee MD  RETURN PHYSICIAN APPOINTMENT: 2017     Rani Bravo has been seen in physical therapy from 17 to 1-10-18 for 10 visits. Treatment has been discontinued at this time due to patient failing to return for additional treatment. Thank you for this referral.        GOALS: (Goals have been discussed and agreed upon with patient.)  Short Term Goals Goals Not Met 2018    1. Pt will be independent with comprehensive HEP to centralize and reduce low back pain  2. Pt will participate in LE stretching program to increase flexibility  3. Pt will participate in core stabilization exercises to help with stabilization during ADLs  4. Pt will participate in LE strengthening program with weights as appropriate to help with gait and elevations  5. Pt will participate in static and dynamic balance activities to decrease the risk for falls  6. Pt will tolerate manual therapy/joint mobilizations/soft tissue to increase ROM and decrease pain  7. Pt will report being able to stand and walk for >10 minutes without increased tightness in low back  8.  Pt will demonstrate a 5 point decreased on the Modified Oswestry Low Back Disability Questionnaire to show improvement in function  Long Term Goals  1. Pt will demonstrate a 10 point decrease in the Modified Oswestry Low Back Disability Questionnaire to show improvement in function  2. Pt will report being able to stand and walk for >30 minutes without increased tightness in low back  3.  Pt will be able to flex and extend lumbar spine without peripheralization of symptoms to demonstrate reduced derangement     Signed By: Ifeoma Manriquez DPT     April 18, 2018

## 2018-11-07 ENCOUNTER — HOSPITAL ENCOUNTER (OUTPATIENT)
Dept: MAMMOGRAPHY | Age: 73
Discharge: HOME | End: 2018-11-07
Attending: INTERNAL MEDICINE
Payer: MEDICARE

## 2018-11-07 DIAGNOSIS — Z12.31 VISIT FOR SCREENING MAMMOGRAM: ICD-10-CM

## 2018-11-07 PROCEDURE — 77067 SCR MAMMO BI INCL CAD: CPT

## 2018-11-15 ENCOUNTER — HOSPITAL ENCOUNTER (OUTPATIENT)
Dept: CT IMAGING | Age: 73
Discharge: HOME | End: 2018-11-15
Attending: INTERNAL MEDICINE
Payer: MEDICARE

## 2018-11-15 DIAGNOSIS — R10.31 BILATERAL LOWER ABDOMINAL DISCOMFORT: ICD-10-CM

## 2018-11-15 DIAGNOSIS — R10.32 BILATERAL LOWER ABDOMINAL DISCOMFORT: ICD-10-CM

## 2018-11-15 PROCEDURE — 74011000258 HC RX REV CODE- 258

## 2018-11-15 PROCEDURE — 74011636320 HC RX REV CODE- 636/320

## 2018-11-15 PROCEDURE — 74177 CT ABD & PELVIS W/CONTRAST: CPT

## 2018-11-15 RX ORDER — SODIUM CHLORIDE 0.9 % (FLUSH) 0.9 %
10 SYRINGE (ML) INJECTION
Status: COMPLETED | OUTPATIENT
Start: 2018-11-15 | End: 2018-11-15

## 2018-11-15 RX ADMIN — SODIUM CHLORIDE 100 ML: 900 INJECTION, SOLUTION INTRAVENOUS at 11:14

## 2018-11-15 RX ADMIN — Medication 10 ML: at 11:14

## 2018-11-15 RX ADMIN — DIATRIZOATE MEGLUMINE AND DIATRIZOATE SODIUM 15 ML: 660; 100 LIQUID ORAL; RECTAL at 11:14

## 2018-11-15 RX ADMIN — IOPAMIDOL 100 ML: 755 INJECTION, SOLUTION INTRAVENOUS at 11:14

## 2018-11-15 NOTE — PROGRESS NOTES
CT scan is normal other than some mildly enlarged lymph nodes of right inguinal region and the right external iliac region. The radiologist says these may be reactive or possibly abnormal. I would like Mrs. Roni webber to return for a blood count (CBC). I will likely refer her to a lymph node specialist (Hematology-Oncology) for their assistance.  Her findings may not be anything but it would be precautious to get input from a specialist.

## 2018-11-26 NOTE — PROGRESS NOTES
Spoke to patient, detailed message was relayed and understanding expressed. Patient is having surgery tomorrow and will not be able to return for a CBC before her appointment on Dec.6th with oncology-hematology.

## 2018-12-04 ENCOUNTER — HOSPITAL ENCOUNTER (OUTPATIENT)
Dept: LAB | Age: 73
Discharge: HOME | End: 2018-12-04
Payer: MEDICARE

## 2018-12-04 DIAGNOSIS — R59.0 INGUINAL ADENOPATHY: ICD-10-CM

## 2018-12-04 LAB
ALBUMIN SERPL-MCNC: 3.8 G/DL (ref 3.2–4.6)
ALBUMIN/GLOB SERPL: 0.7 {RATIO} (ref 1.2–3.5)
ALP SERPL-CCNC: 89 U/L (ref 50–136)
ALT SERPL-CCNC: 48 U/L (ref 12–65)
ANION GAP SERPL CALC-SCNC: 3 MMOL/L (ref 7–16)
AST SERPL-CCNC: 25 U/L (ref 15–37)
BASOPHILS # BLD: 0 K/UL (ref 0–0.2)
BASOPHILS NFR BLD: 0 % (ref 0–2)
BILIRUB SERPL-MCNC: 0.2 MG/DL (ref 0.2–1.1)
BUN SERPL-MCNC: 10 MG/DL (ref 8–23)
CALCIUM SERPL-MCNC: 9.8 MG/DL (ref 8.3–10.4)
CHLORIDE SERPL-SCNC: 107 MMOL/L (ref 98–107)
CO2 SERPL-SCNC: 26 MMOL/L (ref 21–32)
CREAT SERPL-MCNC: 0.83 MG/DL (ref 0.6–1)
DIFFERENTIAL METHOD BLD: ABNORMAL
EOSINOPHIL # BLD: 0.1 K/UL (ref 0–0.8)
EOSINOPHIL NFR BLD: 1 % (ref 0.5–7.8)
ERYTHROCYTE [DISTWIDTH] IN BLOOD BY AUTOMATED COUNT: 13.4 % (ref 11.9–14.6)
GLOBULIN SER CALC-MCNC: 5.2 G/DL (ref 2.3–3.5)
GLUCOSE SERPL-MCNC: 101 MG/DL (ref 65–100)
HCT VFR BLD AUTO: 35.8 % (ref 35.8–46.3)
HGB BLD-MCNC: 11.2 G/DL (ref 11.7–15.4)
IMM GRANULOCYTES # BLD: 0 K/UL (ref 0–0.5)
IMM GRANULOCYTES NFR BLD AUTO: 0 % (ref 0–5)
LDH SERPL L TO P-CCNC: 164 U/L (ref 110–210)
LYMPHOCYTES # BLD: 1.2 K/UL (ref 0.5–4.6)
LYMPHOCYTES NFR BLD: 29 % (ref 13–44)
MCH RBC QN AUTO: 27.1 PG (ref 26.1–32.9)
MCHC RBC AUTO-ENTMCNC: 31.3 G/DL (ref 31.4–35)
MCV RBC AUTO: 86.7 FL (ref 79.6–97.8)
MONOCYTES # BLD: 0.7 K/UL (ref 0.1–1.3)
MONOCYTES NFR BLD: 16 % (ref 4–12)
NEUTS SEG # BLD: 2.3 K/UL (ref 1.7–8.2)
NEUTS SEG NFR BLD: 54 % (ref 43–78)
NRBC # BLD: 0 K/UL (ref 0–0.2)
PLATELET # BLD AUTO: 149 K/UL (ref 150–450)
PMV BLD AUTO: 11 FL (ref 9.4–12.3)
POTASSIUM SERPL-SCNC: 4.1 MMOL/L (ref 3.5–5.1)
PROT SERPL-MCNC: 9 G/DL (ref 6.3–8.2)
RBC # BLD AUTO: 4.13 M/UL (ref 4.05–5.25)
SODIUM SERPL-SCNC: 136 MMOL/L (ref 136–145)
URATE SERPL-MCNC: 4.1 MG/DL (ref 2.6–6)
WBC # BLD AUTO: 4.2 K/UL (ref 4.3–11.1)

## 2018-12-04 PROCEDURE — 86334 IMMUNOFIX E-PHORESIS SERUM: CPT

## 2018-12-04 PROCEDURE — 83615 LACTATE (LD) (LDH) ENZYME: CPT

## 2018-12-04 PROCEDURE — 36415 COLL VENOUS BLD VENIPUNCTURE: CPT

## 2018-12-04 PROCEDURE — 85025 COMPLETE CBC W/AUTO DIFF WBC: CPT

## 2018-12-04 PROCEDURE — 84550 ASSAY OF BLOOD/URIC ACID: CPT

## 2018-12-04 PROCEDURE — 80053 COMPREHEN METABOLIC PANEL: CPT

## 2018-12-04 PROCEDURE — 82784 ASSAY IGA/IGD/IGG/IGM EACH: CPT

## 2018-12-05 LAB
ALBUMIN SERPL ELPH-MCNC: 3.97 G/DL (ref 3.2–5.6)
ALBUMIN/GLOB SERPL: 0.9 {RATIO}
ALPHA1 GLOB SERPL ELPH-MCNC: 0.22 G/DL (ref 0.1–0.4)
ALPHA2 GLOB SERPL ELPH-MCNC: 0.92 G/DL (ref 0.4–1.2)
B-GLOBULIN SERPL QL ELPH: 1.27 G/DL (ref 0.6–1.3)
GAMMA GLOB MFR SERPL ELPH: 2.22 G/DL (ref 0.5–1.6)
IGA SERPL-MCNC: 410 MG/DL (ref 85–499)
IGG SERPL-MCNC: 1953 MG/DL (ref 610–1616)
IGM SERPL-MCNC: 161 MG/DL (ref 35–242)
M PROTEIN SERPL ELPH-MCNC: ABNORMAL G/DL
PATH REV BLD -IMP: NORMAL
PROT PATTERN SERPL ELPH-IMP: ABNORMAL
PROT PATTERN SPEC IFE-IMP: ABNORMAL
PROT SERPL-MCNC: 8.6 G/DL (ref 6.3–8.2)

## 2019-02-11 ENCOUNTER — HOSPITAL ENCOUNTER (OUTPATIENT)
Dept: CT IMAGING | Age: 74
Discharge: HOME | End: 2019-02-11
Attending: INTERNAL MEDICINE
Payer: MEDICARE

## 2019-02-11 DIAGNOSIS — R59.0 INGUINAL ADENOPATHY: ICD-10-CM

## 2019-02-11 LAB — CREAT BLD-MCNC: 0.7 MG/DL (ref 0.8–1.5)

## 2019-02-11 PROCEDURE — 82565 ASSAY OF CREATININE: CPT

## 2019-02-11 RX ORDER — SODIUM CHLORIDE 0.9 % (FLUSH) 0.9 %
10 SYRINGE (ML) INJECTION
Status: COMPLETED | OUTPATIENT
Start: 2019-02-11 | End: 2019-02-11

## 2019-02-11 RX ADMIN — Medication 10 ML: at 11:42

## 2019-02-12 ENCOUNTER — HOSPITAL ENCOUNTER (OUTPATIENT)
Dept: LAB | Age: 74
Discharge: HOME | End: 2019-02-12
Payer: MEDICARE

## 2019-02-12 DIAGNOSIS — R59.1 LYMPHADENOPATHY: ICD-10-CM

## 2019-02-12 LAB
ALBUMIN SERPL-MCNC: 4 G/DL (ref 3.2–4.6)
ALBUMIN/GLOB SERPL: 0.8 {RATIO} (ref 1.2–3.5)
ALP SERPL-CCNC: 89 U/L (ref 50–136)
ALT SERPL-CCNC: 39 U/L (ref 12–65)
ANION GAP SERPL CALC-SCNC: 3 MMOL/L (ref 7–16)
AST SERPL-CCNC: 17 U/L (ref 15–37)
BASOPHILS # BLD: 0 K/UL (ref 0–0.2)
BASOPHILS NFR BLD: 0 % (ref 0–2)
BILIRUB SERPL-MCNC: 0.1 MG/DL (ref 0.2–1.1)
BUN SERPL-MCNC: 12 MG/DL (ref 8–23)
CALCIUM SERPL-MCNC: 9.3 MG/DL (ref 8.3–10.4)
CHLORIDE SERPL-SCNC: 103 MMOL/L (ref 98–107)
CO2 SERPL-SCNC: 28 MMOL/L (ref 21–32)
CREAT SERPL-MCNC: 0.82 MG/DL (ref 0.6–1)
DIFFERENTIAL METHOD BLD: ABNORMAL
EOSINOPHIL # BLD: 0.4 K/UL (ref 0–0.8)
EOSINOPHIL NFR BLD: 8 % (ref 0.5–7.8)
ERYTHROCYTE [DISTWIDTH] IN BLOOD BY AUTOMATED COUNT: 13.9 % (ref 11.9–14.6)
GLOBULIN SER CALC-MCNC: 5.3 G/DL (ref 2.3–3.5)
GLUCOSE SERPL-MCNC: 86 MG/DL (ref 65–100)
HCT VFR BLD AUTO: 36.8 % (ref 35.8–46.3)
HGB BLD-MCNC: 11.7 G/DL (ref 11.7–15.4)
IMM GRANULOCYTES # BLD AUTO: 0 K/UL (ref 0–0.5)
IMM GRANULOCYTES NFR BLD AUTO: 0 % (ref 0–5)
LYMPHOCYTES # BLD: 1.3 K/UL (ref 0.5–4.6)
LYMPHOCYTES NFR BLD: 26 % (ref 13–44)
MCH RBC QN AUTO: 27.6 PG (ref 26.1–32.9)
MCHC RBC AUTO-ENTMCNC: 31.8 G/DL (ref 31.4–35)
MCV RBC AUTO: 86.8 FL (ref 79.6–97.8)
MONOCYTES # BLD: 0.7 K/UL (ref 0.1–1.3)
MONOCYTES NFR BLD: 15 % (ref 4–12)
NEUTS SEG # BLD: 2.4 K/UL (ref 1.7–8.2)
NEUTS SEG NFR BLD: 50 % (ref 43–78)
NRBC # BLD: 0 K/UL (ref 0–0.2)
PLATELET # BLD AUTO: 141 K/UL (ref 150–450)
PMV BLD AUTO: 11.5 FL (ref 9.4–12.3)
POTASSIUM SERPL-SCNC: 3.9 MMOL/L (ref 3.5–5.1)
PROT SERPL-MCNC: 9.3 G/DL (ref 6.3–8.2)
RBC # BLD AUTO: 4.24 M/UL (ref 4.05–5.25)
SODIUM SERPL-SCNC: 134 MMOL/L (ref 136–145)
WBC # BLD AUTO: 4.9 K/UL (ref 4.3–11.1)

## 2019-02-12 PROCEDURE — 36415 COLL VENOUS BLD VENIPUNCTURE: CPT

## 2019-02-12 PROCEDURE — 80053 COMPREHEN METABOLIC PANEL: CPT

## 2019-02-12 PROCEDURE — 85025 COMPLETE CBC W/AUTO DIFF WBC: CPT

## 2019-07-23 PROCEDURE — 88305 TISSUE EXAM BY PATHOLOGIST: CPT

## 2019-07-23 PROCEDURE — 88312 SPECIAL STAINS GROUP 1: CPT

## 2019-07-24 ENCOUNTER — HOSPITAL ENCOUNTER (OUTPATIENT)
Dept: LAB | Age: 74
Discharge: HOME | End: 2019-07-24

## 2020-03-11 ENCOUNTER — HOSPITAL ENCOUNTER (OUTPATIENT)
Dept: MAMMOGRAPHY | Age: 75
Discharge: HOME | End: 2020-03-11
Attending: INTERNAL MEDICINE
Payer: MEDICARE

## 2020-03-11 DIAGNOSIS — Z12.31 VISIT FOR SCREENING MAMMOGRAM: ICD-10-CM

## 2020-03-11 PROCEDURE — 77067 SCR MAMMO BI INCL CAD: CPT

## 2020-12-07 ENCOUNTER — HOSPITAL ENCOUNTER (EMERGENCY)
Age: 75
Discharge: HOME | End: 2020-12-07
Attending: EMERGENCY MEDICINE
Payer: MEDICARE

## 2020-12-07 VITALS
RESPIRATION RATE: 16 BRPM | OXYGEN SATURATION: 99 % | SYSTOLIC BLOOD PRESSURE: 144 MMHG | BODY MASS INDEX: 32.78 KG/M2 | WEIGHT: 185 LBS | HEIGHT: 63 IN | DIASTOLIC BLOOD PRESSURE: 77 MMHG | TEMPERATURE: 97.8 F | HEART RATE: 79 BPM

## 2020-12-07 DIAGNOSIS — L03.213 PRESEPTAL CELLULITIS OF LEFT UPPER EYELID: Primary | ICD-10-CM

## 2020-12-07 PROCEDURE — 99283 EMERGENCY DEPT VISIT LOW MDM: CPT

## 2020-12-07 PROCEDURE — 74011000250 HC RX REV CODE- 250: Performed by: EMERGENCY MEDICINE

## 2020-12-07 RX ORDER — CEPHALEXIN 500 MG/1
500 CAPSULE ORAL 4 TIMES DAILY
Qty: 40 CAP | Refills: 0 | Status: SHIPPED | OUTPATIENT
Start: 2020-12-07 | End: 2020-12-17

## 2020-12-07 RX ORDER — FLUCONAZOLE 150 MG/1
150 TABLET ORAL DAILY
Qty: 2 TAB | Refills: 0 | Status: SHIPPED | OUTPATIENT
Start: 2020-12-07 | End: 2021-06-10 | Stop reason: DRUGHIGH

## 2020-12-07 RX ADMIN — FLUORESCEIN SODIUM 1 STRIP: 1 STRIP OPHTHALMIC at 16:46

## 2020-12-07 NOTE — ED PROVIDER NOTES
60-year-old female presents with left eye pain. States that she scratched her with a mascara brush several days ago. Has been using hydrating eyedrops and cool compresses with some relief but her symptoms have really been persistent and she has noticed increased swelling to the upper eyelid. She has had no fever nausea vomiting or diarrhea no similar episodes in the past.  States that her vision is normal    The history is provided by the patient. Eye Pain    This is a new problem. The current episode started more than 2 days ago. The problem occurs constantly. The problem has been gradually worsening. The left eye is affected. The injury mechanism was a direct trauma. The pain is moderate. There is history of trauma to the eye. There is no known exposure to pink eye. She does not wear contacts. Associated symptoms include foreign body sensation, eye redness and pain. Pertinent negatives include no discharge, no photophobia, no vomiting, no tingling, no weakness and no fever. She has tried eye drops for the symptoms. The treatment provided mild relief.         Past Medical History:   Diagnosis Date    AAA (abdominal aortic aneurysm) (HCC)     infrarenal 3.2 cm    Anxiety     Chronic fatigue     Chronic gastritis 2019    with intestinal metaplasia, repeat EGD 2022    Colon polyps     Repeat colonoscopy 2022    Diverticulosis     Essential hypertension     GERD (gastroesophageal reflux disease)     Mild aortic stenosis     Obesity, Class I, BMI 30.0-34.9 (see actual BMI)     Prediabetes        Past Surgical History:   Procedure Laterality Date    HX BREAST BIOPSY  3/14/2014    HX BREAST LUMPECTOMY Left 3/14/2014    fibrocystic mastopathy, intraductal hyperplasia, apocrine metaplasia    HX CATARACT REMOVAL Bilateral 2018    HX CATARACT REMOVAL Bilateral 2018    HX  SECTION      x 2    HX CHOLECYSTECTOMY      HX COLONOSCOPY  2019    HX HYSTERECTOMY      HX OTHER SURGICAL  2015    Renal artery scan-normal    US GUIDED CORE BREAST BIOPSY Left 2014    Benign         Family History:   Problem Relation Age of Onset    Kidney Disease Mother     Stroke Father     Diabetes Father     Breast Cancer Sister 68    Breast Cancer Sister 76    Diabetes Sister     Diabetes Sister     Heart Disease Sister        Social History     Socioeconomic History    Marital status:      Spouse name: Not on file    Number of children: 1    Years of education: Not on file    Highest education level: Not on file   Occupational History    Occupation: Retired Nurse, [employer]   Social Needs    Financial resource strain: Not on file    Food insecurity     Worry: Not on file     Inability: Not on file   [employer] needs     Medical: Not on file     Non-medical: Not on file   Tobacco Use    Smoking status: Former Smoker     Packs/day: 0.75     Years: 10.00     Pack years: 7.50     Last attempt to quit: 1999     Years since quittin.3    Smokeless tobacco: Never Used   Substance and Sexual Activity    Alcohol use: No     Alcohol/week: 0.0 standard drinks    Drug use: No    Sexual activity: Not Currently   Lifestyle    Physical activity     Days per week: Not on file     Minutes per session: Not on file    Stress: Not on file   Relationships    Social connections     Talks on phone: Not on file     Gets together: Not on file     Attends Episcopal service: Not on file     Active member of club or organization: Not on file     Attends meetings of clubs or organizations: Not on file     Relationship status: Not on file    Intimate partner violence     Fear of current or ex partner: Not on file     Emotionally abused: Not on file     Physically abused: Not on file     Forced sexual activity: Not on file   Other Topics Concern    Not on file   Social History Narrative    Lives with daughter. ALLERGIES: Latex;  Latex, natural rubber; Maxzide [triamterene-hydrochlorothiazid]; Acyclovir; Citalopram hydrobromide; Epinephrine; Hydrochlorothiazide; Maxidex [dexamethasone]; Other medication; Pravastatin; Statins-hmg-coa reductase inhibitors; Tagamet [cimetidine]; and Valium [diazepam]    Review of Systems   Constitutional: Negative for chills and fever. HENT: Negative for congestion, ear pain and rhinorrhea. Eyes: Positive for pain and redness. Negative for photophobia and discharge. Respiratory: Negative for cough and shortness of breath. Cardiovascular: Negative for chest pain and palpitations. Gastrointestinal: Negative for abdominal pain, constipation, diarrhea and vomiting. Endocrine: Negative for cold intolerance and heat intolerance. Genitourinary: Negative for dysuria and flank pain. Musculoskeletal: Negative for arthralgias, myalgias and neck pain. Skin: Negative for rash and wound. Allergic/Immunologic: Negative for environmental allergies and food allergies. Neurological: Negative for tingling, syncope, weakness and headaches. Hematological: Negative for adenopathy. Does not bruise/bleed easily. Psychiatric/Behavioral: Negative for dysphoric mood. The patient is not nervous/anxious. All other systems reviewed and are negative. Vitals:    12/07/20 1449   BP: (!) 144/77   Pulse: 79   Resp: 16   Temp: 97.8 °F (36.6 °C)   SpO2: 99%   Weight: 83.9 kg (185 lb)   Height: 5' 3\" (1.6 m)            Physical Exam  Vitals signs and nursing note reviewed. Constitutional:       General: She is in acute distress. Appearance: Normal appearance. She is well-developed and normal weight. HENT:      Head: Normocephalic and atraumatic. Right Ear: External ear normal.      Left Ear: External ear normal.      Nose: Nose normal.      Mouth/Throat:      Pharynx: No oropharyngeal exudate. Eyes:      General:         Right eye: No discharge. Left eye: No discharge.       Extraocular Movements: Extraocular movements intact. Right eye: Normal extraocular motion. Left eye: Normal extraocular motion. Conjunctiva/sclera: Conjunctivae normal.      Pupils: Pupils are equal, round, and reactive to light. Neck:      Musculoskeletal: Normal range of motion and neck supple. Vascular: No JVD. Cardiovascular:      Rate and Rhythm: Normal rate and regular rhythm. Heart sounds: Normal heart sounds. No murmur. No friction rub. No gallop. Pulmonary:      Effort: Pulmonary effort is normal.      Breath sounds: Normal breath sounds. Abdominal:      General: Bowel sounds are normal. There is no distension. Palpations: Abdomen is soft. There is no mass. Tenderness: There is no abdominal tenderness. Musculoskeletal: Normal range of motion. General: No deformity. Skin:     General: Skin is warm and dry. Capillary Refill: Capillary refill takes less than 2 seconds. Findings: No rash. Neurological:      Mental Status: She is alert and oriented to person, place, and time. Cranial Nerves: No cranial nerve deficit. Sensory: No sensory deficit. Gait: Gait normal.   Psychiatric:         Speech: Speech normal.         Behavior: Behavior normal.         Thought Content: Thought content normal.         Judgment: Judgment normal.          MDM  Number of Diagnoses or Management Options  Preseptal cellulitis of left upper eyelid: new and requires workup  Diagnosis management comments: Visual acuities reveal left eye at 20/40 right eye at 20/20           Amount and/or Complexity of Data Reviewed  Tests in the medicine section of CPT®: reviewed and ordered  Review and summarize past medical records: yes    Risk of Complications, Morbidity, and/or Mortality  Presenting problems: moderate  Diagnostic procedures: low  Management options: low  General comments: Elements of this note have been dictated via voice recognition software.   Text and phrases may be limited by the accuracy of the software. The chart has been reviewed, but errors may still be present. Patient Progress  Patient progress: stable         Eye Stain      Date/Time: 12/7/2020 5:11 PM    Performed by: attending        Corneal abrasion was not present on eyelid eversion. Cornea is clear. Patient tolerance: Patient tolerated the procedure well with no immediate complications  My total time at bedside, performing this procedure was 1-15 minutes.

## 2020-12-07 NOTE — ED TRIAGE NOTES
Pt c/o left eye pain x2 days after hitting inner eye with paras laureano. Pt states she has been using eye drops for dry eyes and cold compresses but continues to have pain. Pt states she had some blurry vision last night. Pt denies any blurry vision today. Pt masked upon arrival to the ED.

## 2020-12-07 NOTE — ED NOTES
I have reviewed discharge instructions with the patient. The patient verbalized understanding. Patient left ED via Discharge Method: ambulatory to Home with self. Opportunity for questions and clarification provided. Patient given 2 scripts. To continue your aftercare when you leave the hospital, you may receive an automated call from our care team to check in on how you are doing. This is a free service and part of our promise to provide the best care and service to meet your aftercare needs.  If you have questions, or wish to unsubscribe from this service please call [phone]. Thank you for Choosing our [facility].

## 2020-12-07 NOTE — DISCHARGE INSTRUCTIONS
Complete all antibiotics  Use Diflucan tablet as we discussed  Call the ophthalmologist listed for follow-up visit  Return to ER for any worsening symptoms or new problems which may arise
178

## 2021-05-11 PROBLEM — E78.5 DYSLIPIDEMIA: Status: ACTIVE | Noted: 2021-05-11

## 2021-05-17 ENCOUNTER — HOSPITAL ENCOUNTER (OUTPATIENT)
Dept: CT IMAGING | Age: 76
Discharge: HOME | End: 2021-05-17
Attending: NURSE PRACTITIONER
Payer: MEDICARE

## 2021-05-17 DIAGNOSIS — I71.40 ABDOMINAL AORTIC ANEURYSM (AAA) WITHOUT RUPTURE: ICD-10-CM

## 2021-05-17 PROCEDURE — 74176 CT ABD & PELVIS W/O CONTRAST: CPT

## 2021-05-17 NOTE — PROGRESS NOTES
CT abd showed slight increase of infrarenal abdominal aortic aneurysm from 3.2 to 3.6 cm. Ensure to have good BP control, continue BP meds as prescribed and monitor BP at home. Advised to monitor and report to ER for any chest pains, palpitations or shortness of breath.

## 2021-06-17 ENCOUNTER — HOSPITAL ENCOUNTER (OUTPATIENT)
Dept: MAMMOGRAPHY | Age: 76
Discharge: HOME | End: 2021-06-17
Attending: NURSE PRACTITIONER
Payer: MEDICARE

## 2021-06-17 DIAGNOSIS — Z12.31 ENCOUNTER FOR SCREENING MAMMOGRAM FOR MALIGNANT NEOPLASM OF BREAST: ICD-10-CM

## 2021-06-17 DIAGNOSIS — M81.8 OTHER OSTEOPOROSIS WITHOUT CURRENT PATHOLOGICAL FRACTURE: ICD-10-CM

## 2021-06-17 PROCEDURE — 77067 SCR MAMMO BI INCL CAD: CPT

## 2021-06-17 PROCEDURE — 77080 DXA BONE DENSITY AXIAL: CPT

## 2021-10-11 PROBLEM — F41.1 GAD (GENERALIZED ANXIETY DISORDER): Status: ACTIVE | Noted: 2021-10-11

## 2022-01-31 PROBLEM — M25.512 ACUTE PAIN OF LEFT SHOULDER: Status: ACTIVE | Noted: 2022-01-31

## 2022-02-07 ENCOUNTER — HOSPITAL ENCOUNTER (OUTPATIENT)
Dept: PHYSICAL THERAPY | Age: 77
Discharge: HOME | End: 2022-02-07
Payer: MEDICARE

## 2022-02-07 DIAGNOSIS — M25.512 ACUTE PAIN OF LEFT SHOULDER: ICD-10-CM

## 2022-02-07 PROCEDURE — 97161 PT EVAL LOW COMPLEX 20 MIN: CPT

## 2022-02-07 PROCEDURE — 97140 MANUAL THERAPY 1/> REGIONS: CPT

## 2022-02-07 NOTE — PROGRESS NOTES
Mindy Angelucci  : 1945  [place]  Secondary: [address] [facility] at [facility]  [facility] [address], [place] [address]  [phone]   [phone]      Visit Count:  1    OUTPATIENT PHYSICAL THERAPY: Daily Treatment Note 2022  ICD-10: Treatment Diagnosis: Pain in left shoulder (M25.512), Pain in right shoulder (M25.511)  Precautions/Allergies:   Latex; Latex, natural rubber; Maxzide [triamterene-hydrochlorothiazid]; Acyclovir; Citalopram hydrobromide; Epinephrine; Hydrochlorothiazide; Maxidex [dexamethasone]; Other medication; Pravastatin; Statins-hmg-coa reductase inhibitors; Tagamet [cimetidine]; and Valium [diazepam]   TREATMENT PLAN:  Effective Dates: 2022 TO 2022 (90 days). Frequency/Duration: 2 times a week for 90 Day(s) MEDICAL/REFERRING DIAGNOSIS:  Acute pain of left shoulder [M25.512]   DATE OF ONSET: a few weeks prior  REFERRING PHYSICIAN: Noman Lyle NP MD Orders: evaluate and treat  Return MD Appointment: --       Pre-treatment Subjective Report: Moderate pain with movement  Pain: Initial:     moderate/10 Post Session:   Pain:  moderate/10  Other:    Medications Last Reviewed:  2022    Updated Objective Findings:  See evaluation note from today   TREATMENT:   THERAPEUTIC EXERCISE: ( minutes):     Date:   Date:   Date:     Activity/Exercise Parameters Parameters Parameters         B ER otb x 10     Wand flexion Standing x 10                                 MANUAL THERAPY: ( 10 minutes):     Manual Therapy technique and location Date:   Date:   Date:      Parameters Parameters Parameters    - PROM to B shoulders  - inferior GH mobilizations B       MODALITIES ( minutes):    [employer] Portal    Treatment/Session Summary:    · Response to Treatment: Pt reported understanding of exercises, reported soreness with exercises but no increased pain.    · Communication/Consultation:  None today  · Equipment provided today:  None today  · Recommendations/Intent for next treatment session: Next visit will focus on shoulder motion, strength, pain reduction.   Total Treatment Billable Duration:  20 min eval, 10 min manual therapy  PT Patient Time In/Time Out  Time In: 1125  Time Out: 1205    Future Appointments   Date Time Provider Bang Villareal   2/9/2022  1:45 PM Deepthi Contreras, PT [facility]   2/14/2022  1:00 PM Shante Mendes, PT [facility]   2/16/2022  1:45 PM Kale Whitmore, PT, DPT [facility]   2/21/2022  1:45 PM Kale Whitmore, PT, DPT [facility]   2/23/2022  1:45 PM Paola Mendes, PT [facility]   2/28/2022  1:45 PM Kale Whitmore, PT, DPT [facility]   3/2/2022  1:45 PM Kale Blas, PT, DPT [facility]   3/7/2022  1:45 PM Kale Blas, PT, DPT [facility]   3/9/2022  1:45 PM Kale Whitmore, PT, DPT [facility]   4/1/2022  9:40 AM Ana Hoover NP [facility]       Tami Baldwin, PT, DPT

## 2022-02-07 NOTE — THERAPY EVALUATION
Lida Liam  : 1945  [place] / [place] / Product Type: Medicare /  [address]  at [facility]  [address] , [address], [place] [address]  [phone]   [phone]             Marc 52 Assessment 2022   ICD-10: Treatment Diagnosis: Pain in left shoulder (M25.512), Pain in right shoulder (M25.511)  Precautions/Allergies:   Latex; Latex, natural rubber; Maxzide [triamterene-hydrochlorothiazid]; Acyclovir; Citalopram hydrobromide; Epinephrine; Hydrochlorothiazide; Maxidex [dexamethasone]; Other medication; Pravastatin; Statins-hmg-coa reductase inhibitors; Tagamet [cimetidine]; and Valium [diazepam]   TREATMENT PLAN:  Effective Dates: 2022 TO 2022 (90 days). Frequency/Duration: 2 times a week for 90 Day(s) MEDICAL/REFERRING DIAGNOSIS:  Acute pain of left shoulder [M25.512]   DATE OF ONSET: a few weeks prior  REFERRING PHYSICIAN: Lily Peterson NP MD Orders: evaluate and treat  Return MD Appointment: --     INITIAL ASSESSMENT:  Ms. Peggye Cabot presents with bilateral shoulder pain, worse on the left, and associated motion, strength, functional deficits. Pt will benefit from skilled physical therapy to address pain and dysfunctions. PROBLEM LIST (Impacting functional limitations):  1. Decreased Strength  2. Decreased ADL/Functional Activities  3. Increased Pain  4. Decreased Flexibility/Joint Mobility  5. Decreased [place] with Home Exercise Program INTERVENTIONS PLANNED: (Treatment may consist of any combination of the following)  1. Home Exercise Program (HEP)  2. Manual Therapy including soft tissue and spinal manipulation and mobilization; and dry needling  3. Neuromuscular Re-education/Strengthening  4. Range of Motion (ROM)  5. Therapeutic Activites  6. Therapeutic Exercise/Strengthening  7.  Modalities including heat/cold application, electrical stimulation, vasopneumatic compression, ultrasound     GOALS: (Goals have been discussed and agreed upon with patient.)  Short-Term Functional Goals: Time Frame: 5 weeks     1. Pt will demonstrate independence with > or = 2 exercises in HEP. 2. Pt will demonstrate functional AROM B shoulders. Discharge Goals: Time Frame: 10 weeks  1. Pt will demonstrate independence with > or = 4 exercises in HEP. 2. Pt will demonstrate functional strength B shoulders. 3. Pt will be able to comb hair normally. OUTCOME MEASURE:   Tool Used: Disabilities of the Arm, Shoulder and Hand (DASH) Questionnaire - Quick Version  Score:  Initial: 30/55  Most Recent: X/55 (Date: -- )   Interpretation of Score: The DASH is designed to measure the activities of daily living in person's with upper extremity dysfunction or pain. Each section is scored on a 1-5 scale, 5 representing the greatest disability. The scores of each section are added together for a total score of 55. MEDICAL NECESSITY:   · Skilled intervention continues to be required due to decreased function. REASON FOR SERVICES/OTHER COMMENTS:  · Patient continues to require skilled intervention due to decreased function. Total Duration:  PT Patient Time In/Time Out  Time In: 1125  Time Out: 1205    Rehabilitation Potential For Stated Goals: Good  Regarding Conchis Duran's therapy, I certify that the treatment plan above will be carried out by a therapist or under their direction. Thank you for this referral,  Neelam Kiran, PT, DPT     Referring Physician Signature: Ariella Rothman NP _______________________________ Date _____________     HISTORY:   History of Injury/Illness (Reason for Referral):  Pt reports shoulder pain began acutely a few weeks ago, unknown onset. Went to doctor, x-ray revealed OA. Difficulty sleeping, combing hair. Anti-inflammatories help feel better.    Past Medical History/Comorbidities:   Ms. Martinez Silverman  has a past medical history of AAA (abdominal aortic aneurysm) ([facility] [address] Anxiety, Chronic fatigue, Chronic gastritis (2019), Colon polyps, Diverticulosis, Essential hypertension, GERD (gastroesophageal reflux disease), Hyperlipidemia, Mild aortic stenosis, Prediabetes, and Vitamin D deficiency. Ms. Yumiko Desai  has a past surgical history that includes us guided core breast biopsy (Left, ); hx cholecystectomy; hx breast biopsy (3/14/2014); hx hysterectomy; hx  section; hx other surgical (); hx cataract removal (Bilateral, 2018); hx cataract removal (Bilateral, 2018); hx colonoscopy (2019); and hx breast lumpectomy (Left, 3/14/2014). Social History/Living Environment:     house  Prior Level of Function/Work/Activity:  Not working  Dominant Side:         RIGHT   Ambulatory/Rehab Services H2 Model Falls Risk Assessment   Risk Factors:       No Risk Factors Identified Ability to Rise from Chair:       (0)  Ability to rise in a single movement   Falls Prevention Plan:       No modifications necessary   Total: (5 or greater = High Risk): 0    [facility] of [employer]. All Rights Reserved. [employer] Patent #[id number]. Federal Law prohibits the replication, distribution or use without written permission from [facility] [employer]   Current Medications:       Current Outpatient Medications:     diclofenac potassium (CATAFLAM) 50 mg tablet, Take 1 Tablet by mouth two (2) times daily as needed for Pain., Disp: 30 Tablet, Rfl: 1    carvediloL (COREG) 6.25 mg tablet, TAKE 2 TABLETS BY MOUTH IN THE MORNING AND 2 TABLETS BY MOUTH IN THE EVENING, Disp: 360 Tablet, Rfl: 1    losartan (COZAAR) 100 mg tablet, TAKE 1 TABLET BY MOUTH DAILY, Disp: 90 Tablet, Rfl: 1    amLODIPine (NORVASC) 5 mg tablet, Take 1 Tablet by mouth daily. , Disp: 90 Tablet, Rfl: 1    LORazepam (ATIVAN) 1 mg tablet, Take 1 mg by mouth every twelve (12) hours as needed for Anxiety (not to exceeed no more than 3 times per day). , Disp: , Rfl:    Date Last Reviewed:  2022 Number of Personal Factors/Comorbidities that affect the Plan of Care: 0: LOW COMPLEXITY   EXAMINATION: from Initial Evaluation unless otherwise noted   Observation:       Standing- no significant deficits       Gait- no deficits    Strength:  Muscle/Movement Strength at Eval Reassessment Date:    Shoulder flexion R 3-/5  L 3-/5    Shoulder abduction R 3-/5  L 3-/5    Shoulder ER R 3-/5  L 3/5    Shoulder IR R 3/5  L 3/5                  Range of Motion:   Movement/Joint ROM at eval Reassessment Date:    Shoulder flexion R 75% of normal  L 75% of normal    Shoulder abduction R 75% of normal  L 75% of normal    Shoulder flex/ER R unable to touch back of head  L able to touch side of head                     Palpation:  Increased tone and/or tenderness in B shoulders all soft tissue structures. Body Structures Involved:  1. Bones  2. Joints  3. Muscles Body Functions Affected:  1. Sensory/Pain  2. Neuromusculoskeletal  3. Movement Related Activities and Participation Affected:  1. General Tasks and Demands  2. Domestic Life  3.  Community, Social and [place]   Number of elements (examined above) that affect the Plan of Care: 1-2: LOW COMPLEXITY   CLINICAL PRESENTATION:   Presentation: Stable and uncomplicated: LOW COMPLEXITY   CLINICAL DECISION MAKING:   Use of outcome tool(s) and clinical judgement create a POC that gives a: Clear prediction of patient's progress: LOW COMPLEXITY       Total Evaluation Duration:  PT Patient Time In/Time Out  Time In: 1125  Time Out: 1205    Future Appointments   Date Time Provider Bang Villareal   2/9/2022  1:45 PM Heber Lopez, PT [facility]   2/14/2022  1:00 PM Heber Lopez, PT [facility]   2/16/2022  1:45 PM Radha Whitmore, PT, DPT [facility]   2/21/2022  1:45 PM Radha Whitmore, PT, DPT [facility]   2/23/2022  1:45 PM Deion Mendes PT [facility]   2/28/2022  1:45 PM Liban Yoon H, PT, DPT [facility]   3/2/2022  1:45 PM Lito Whitmore, PT, DPT [facility]   3/7/2022  1:45 PM Sal Prasad, Lito Gonzalez, PT, DPT [facility]   3/9/2022  1:45 PM Sal Backbone, Lito Gonzalez, PT, DPT [facility]   4/1/2022  9:40 AM Tamra Bravo NP [facility]       Esther Skinner, PT, DPT

## 2022-02-09 ENCOUNTER — HOSPITAL ENCOUNTER (OUTPATIENT)
Dept: PHYSICAL THERAPY | Age: 77
Discharge: HOME | End: 2022-02-09
Payer: MEDICARE

## 2022-02-09 NOTE — PROGRESS NOTES
Rudy Galvez  : 1945  [place]  Secondary: [address] at [facility]  [address], [address], [place] [address]  [phone]   [phone]      OUTPATIENT DAILY NOTE    NAME/AGE/GENDER: Rudy Galvez is a 67 y.o. female. DATE: 2022    Ms. Singleton Ran for today's appointment due to illness.     Lacie Hidalgo, PT   Future Appointments   Date Time Provider Bang Bradyi   2022  1:45 PM Ayaz Ceron, PT [facility]   2022  1:00 PM Shante Mendes, PT [facility]   2022  1:45 PM Chana Whitmore Girt, PT, DPT [facility]   2022  1:45 PM Chana Whitmore Girt, PT, DPT [facility]   2022  1:45 PM Alexia Mendes, PT [facility]   2022  1:45 PM Chana Whitmore Girt, PT, DPT [facility]   3/2/2022  1:45 PM Chana Solis Girt, PT, DPT [facility]   3/7/2022  1:45 PM Chana Solis Girt, PT, DPT [facility]   3/9/2022  1:45 PM Chana Solis Girt, PT, DPT [facility]   2022  9:40 AM Candido Perez NP [facility]

## 2022-02-14 ENCOUNTER — HOSPITAL ENCOUNTER (OUTPATIENT)
Dept: PHYSICAL THERAPY | Age: 77
Discharge: HOME | End: 2022-02-14
Payer: MEDICARE

## 2022-02-14 PROCEDURE — 97110 THERAPEUTIC EXERCISES: CPT

## 2022-02-16 ENCOUNTER — HOSPITAL ENCOUNTER (OUTPATIENT)
Dept: PHYSICAL THERAPY | Age: 77
Discharge: HOME | End: 2022-02-16
Payer: MEDICARE

## 2022-02-16 PROCEDURE — 97110 THERAPEUTIC EXERCISES: CPT

## 2022-02-16 PROCEDURE — 97140 MANUAL THERAPY 1/> REGIONS: CPT

## 2022-02-16 NOTE — PROGRESS NOTES
Jennifer Graff  : 1945  [place]  Secondary: [address] [facility] at [facility], [address], [place] [address]  [phone]   [phone]      Visit Count:  3    OUTPATIENT PHYSICAL THERAPY: Daily Treatment Note 2022  ICD-10: Treatment Diagnosis: Pain in left shoulder (M25.512), Pain in right shoulder (M25.511)  Precautions/Allergies:   Latex; Latex, natural rubber; Maxzide [triamterene-hydrochlorothiazid]; Acyclovir; Citalopram hydrobromide; Epinephrine; Hydrochlorothiazide; Maxidex [dexamethasone]; Other medication; Pravastatin; Statins-hmg-coa reductase inhibitors; Tagamet [cimetidine]; and Valium [diazepam]   TREATMENT PLAN:  Effective Dates: 2022 TO 2022 (90 days). Frequency/Duration: 2 times a week for 90 Day(s) MEDICAL/REFERRING DIAGNOSIS:  Pain in left shoulder [M25.512]   DATE OF ONSET: a few weeks prior  REFERRING PHYSICIAN: Roslyn Beatty NP MD Orders: evaluate and treat  Return MD Appointment: --       Pre-treatment Subjective Report: Moderate pain with reaching. Reported she felt very good after ice from last treatment.    Pain: Initial:    /10 Post Session:   Pain:  1/10  Other:    Medications Last Reviewed:  2022    Updated Objective Findings:  None Today   TREATMENT:   THERAPEUTIC EXERCISE: (25 minutes):     Date:   Date:   Date:     Activity/Exercise Parameters Parameters Parameters         B ER otb x 10     Wand flexion Standing x 10 Supine x 10 Supine x 10   Recumbent stepper   6 min level 1   UBE  3' L1 fwd    PROM  Flex, ER    Rhythmic stabilization  Flex at 90  ER/IR neutral    Shoulder ER  SL 10x ytb standing x 10   Shoulder IR   ytb standing x 10   Shoulder abd   SL short arc 10x: L Standing x 10   Pendulums  15x; B    Scapular retraction  Isometric walk out by PT 10x RTB        MANUAL THERAPY: ( 15 minutes):     Manual Therapy technique and location Date:   Date:  2/14 Date:  2/16    Parameters Parameters Parameters    - PROM to B shoulders  - inferior GH mobilizations B -PROM to left shoulder  -Inferior, lateral and posterior glides grade III  -gentle subscapular release left -PROM to left shoulder  -Inferior, lateral and posterior glides grade III  -gentle subscapular release left     MODALITIES (10 minutes): vaso pnuematic compression of B for pain and analgesia(no charge)    [employer] Portal    Treatment/Session Summary:    · Response to Treatment: Pt had increased soreness with exercises. · Communication/Consultation:  None today  · Equipment provided today:  None today  · Recommendations/Intent for next treatment session: Next visit will focus on shoulder motion, strength, pain reduction.   Total Treatment Billable Duration:  25 min TE, 15 min manual therapy  PT Patient Time In/Time Out  Time In: 1345  Time Out: 1435    Future Appointments   Date Time Provider Bang Villareal   2/21/2022  1:45 PM Silvia Bass, PT, DPT [facility]   2/23/2022  1:45 PM Shante Mendes, PT [facility]   2/28/2022  1:45 PM Ezekiel Whitmore, PT, DPT [facility]   3/2/2022  1:45 PM Ezekiel Banda, PT, DPT [facility]   3/7/2022  1:45 PM Ezekiel Banda, PT, DPT [facility]   3/9/2022  1:45 PM Ezekiel Whitmore, PT, DPT [facility]   4/1/2022  9:40 AM Virgil Galo NP [facility]       Christina Andrews, PT, DPT

## 2022-02-21 ENCOUNTER — HOSPITAL ENCOUNTER (OUTPATIENT)
Dept: PHYSICAL THERAPY | Age: 77
Discharge: HOME | End: 2022-02-21
Payer: MEDICARE

## 2022-02-21 PROCEDURE — 97140 MANUAL THERAPY 1/> REGIONS: CPT

## 2022-02-21 PROCEDURE — 97110 THERAPEUTIC EXERCISES: CPT

## 2022-02-21 NOTE — PROGRESS NOTES
Rudy Galvez  : 1945  [place]  Secondary: [address] at [facility]  [address], [address], [place] [address]  [phone]   [phone]      Visit Count:  4    OUTPATIENT PHYSICAL THERAPY: Daily Treatment Note 2022  ICD-10: Treatment Diagnosis: Pain in left shoulder (M25.512), Pain in right shoulder (M25.511)  Precautions/Allergies:   Latex; Latex, natural rubber; Maxzide [triamterene-hydrochlorothiazid]; Acyclovir; Citalopram hydrobromide; Epinephrine; Hydrochlorothiazide; Maxidex [dexamethasone]; Other medication; Pravastatin; Statins-hmg-coa reductase inhibitors; Tagamet [cimetidine]; and Valium [diazepam]   TREATMENT PLAN:  Effective Dates: 2022 TO 2022 (90 days). Frequency/Duration: 2 times a week for 90 Day(s) MEDICAL/REFERRING DIAGNOSIS:  Pain in left shoulder [M25.512]   DATE OF ONSET: a few weeks prior  REFERRING PHYSICIAN: Candido Perez NP MD Orders: evaluate and treat  Return MD Appointment: --       Pre-treatment Subjective Report:  Pt reports able to reach things in her cupboard, but doesn't have the strength to lift them. But definitely improving.    Pain: Initial:    /10 Post Session:   Pain:  1/10  Other:    Medications Last Reviewed:  2022    Updated Objective Findings:  None Today   TREATMENT:   THERAPEUTIC EXERCISE: (25 minutes):     Date:   Date:   Date:     Activity/Exercise Parameters Parameters Parameters           B ER otb x 10      Wand flexion Standing x 10 Supine x 10 Supine x 10 Supine x 10  Standing x 10   Recumbent stepper   6 min level 1 8 min level 1   UBE  3' L1 fwd     PROM  Flex, ER     Rhythmic stabilization  Flex at 90  ER/IR neutral     Shoulder ER  SL 10x ytb standing x 10 gtb standing x 10   Shoulder IR   ytb standing x 10    Shoulder abd   SL short arc 10x: L Standing x 10    Shoulder scaption    1# x 10   Pendulums  15x; B     Scapular retraction  Isometric walk out by PT 10x RTB     Low row    gtb x 10   row    gtb x 10              MANUAL THERAPY: ( 15 minutes):     Manual Therapy technique and location Date:  2/7 Date:  2/14 Date:  2/16 2/21    Parameters Parameters Parameters     - PROM to B shoulders  - inferior GH mobilizations B -PROM to left shoulder  -Inferior, lateral and posterior glides grade III  -gentle subscapular release left -PROM to left shoulder  -Inferior, lateral and posterior glides grade III  -gentle subscapular release left -PROM to left shoulder  -Inferior, lateral and posterior glides grade III     MODALITIES (10 minutes): vaso pnuematic compression of B for pain and analgesia(no charge)    [employer] Portal    Treatment/Session Summary:    · Response to Treatment: Pt demonstrates good AAROM into flexion, began working on strengthening  · Communication/Consultation:  None today  · Equipment provided today:  None today  · Recommendations/Intent for next treatment session: Next visit will focus on shoulder motion, strength, pain reduction.   Total Treatment Billable Duration:  25 min TE, 15 min manual therapy  PT Patient Time In/Time Out  Time In: 1345  Time Out: 1435    Future Appointments   Date Time Provider Bang Villareal   2/23/2022  1:45 PM Kenneth Burk, PT [facility]   2/28/2022  1:45 PM Zoila Whitmore, PT, DPT [facility]   3/2/2022  1:45 PM Zoila Whitmore, PT, DPT [facility]   3/7/2022  1:45 PM Zoila Gallegos, PT, DPT [facility]   3/9/2022  1:45 PM Zoila Whitmore, PT, DPT [facility]   4/1/2022  9:40 AM Tere Becerril NP [facility]       Juan Pavon, PT, DPT

## 2022-02-23 ENCOUNTER — HOSPITAL ENCOUNTER (OUTPATIENT)
Dept: PHYSICAL THERAPY | Age: 77
Discharge: HOME | End: 2022-02-23
Payer: MEDICARE

## 2022-02-23 NOTE — PROGRESS NOTES
Vinnie Garza  : 1945  [place]  Secondary: [address] at [facility]  [address], [address], [place] [address]  [phone]   [phone]      OUTPATIENT DAILY NOTE    NAME/AGE/GENDER: Vinnie Garza is a 67 y.o. female. DATE: 2022    Ms. Lauran Pallas for today's appointment due to sick.     Dee Poon, PT   Future Appointments   Date Time Provider Bang Villareal   2022  1:45 PM Paty Escobedo, [place] [facility]   2022  1:45 PM Ajay Chance PT, DPT [facility]   3/2/2022  1:45 PM Mariana Howard, PT, DPT [facility]   3/7/2022  1:45 PM Mariana Howard PT, DPT [facility]   3/9/2022  1:45 PM Mariana Howard PT, DPT [facility]   2022  9:40 AM Sridhar Fermin NP [facility]

## 2022-02-28 ENCOUNTER — HOSPITAL ENCOUNTER (OUTPATIENT)
Dept: PHYSICAL THERAPY | Age: 77
Discharge: HOME | End: 2022-02-28
Payer: MEDICARE

## 2022-02-28 PROCEDURE — 97110 THERAPEUTIC EXERCISES: CPT

## 2022-02-28 PROCEDURE — 97140 MANUAL THERAPY 1/> REGIONS: CPT

## 2022-02-28 NOTE — PROGRESS NOTES
Neal [facility]  : 1945  [place]  Secondary: [address] [facility] at [facility]  [facility] [address], [place] [address]  [phone]   [phone]      Visit Count:  5    OUTPATIENT PHYSICAL THERAPY: Daily Treatment Note 2022  ICD-10: Treatment Diagnosis: Pain in left shoulder (M25.512), Pain in right shoulder (M25.511)  Precautions/Allergies:   Latex; Latex, natural rubber; Maxzide [triamterene-hydrochlorothiazid]; Acyclovir; Citalopram hydrobromide; Epinephrine; Hydrochlorothiazide; Maxidex [dexamethasone]; Other medication; Pravastatin; Statins-hmg-coa reductase inhibitors; Tagamet [cimetidine]; and Valium [diazepam]   TREATMENT PLAN:  Effective Dates: 2022 TO 2022 (90 days). Frequency/Duration: 2 times a week for 90 Day(s) MEDICAL/REFERRING DIAGNOSIS:  Pain in left shoulder [M25.512]   DATE OF ONSET: a few weeks prior  REFERRING PHYSICIAN: Anu Santiago NP MD Orders: evaluate and treat  Return MD Appointment: --       Pre-treatment Subjective Report:  Pt arrives late to appointment due to oversleeping a nap. Continuing to improve movement, still lacking strength to get items from cupboard.     Pain: Initial:    /10 Post Session:   Pain:  1/10  Other:    Medications Last Reviewed:  2022    Updated Objective Findings:  None Today   TREATMENT:   THERAPEUTIC EXERCISE: (25 minutes):     Date:   Date:   Date:     Activity/Exercise Parameters Parameters Parameters             B ER otb x 10       Wand flexion Standing x 10 Supine x 10 Supine x 10 Supine x 10  Standing x 10 Standing x 10   Recumbent stepper   6 min level 1 8 min level 1    UBE  3' L1 fwd   3/3 level 1   PROM  Flex, ER      Rhythmic stabilization  Flex at 90  ER/IR neutral      Shoulder ER  SL 10x ytb standing x 10 gtb standing x 10 gtb standing x 10   Shoulder IR   ytb standing x 10     Shoulder abd   SL short arc 10x: L Standing x 10     Shoulder scaption    1# x 10    Pendulums  15x; B      Scapular retraction  Isometric walk out by PT 10x RTB      Low row    gtb x 10 gtb x 10   row    gtb x 10 gtb x 10   scaption     1# x 10   abduction     1# x 10                       MANUAL THERAPY: ( 15 minutes):     Manual Therapy technique and location Date:  2/7 Date:  2/14 Date:  2/16 2/21 2/28    Parameters Parameters Parameters      - PROM to B shoulders  - inferior GH mobilizations B -PROM to left shoulder  -Inferior, lateral and posterior glides grade III  -gentle subscapular release left -PROM to left shoulder  -Inferior, lateral and posterior glides grade III  -gentle subscapular release left -PROM to left shoulder  -Inferior, lateral and posterior glides grade III -PROM to left shoulder  -Inferior, lateral and posterior glides grade III     MODALITIES (10 minutes): vaso pnuematic compression of B for pain and analgesia(no charge)    [employer] Portal    Treatment/Session Summary:    · Response to Treatment: Pt demonstrates good AAROM into flexion, working on strengthening  · Communication/Consultation:  None today  · Equipment provided today:  None today  · Recommendations/Intent for next treatment session: Next visit will focus on shoulder motion, strength, pain reduction.   Total Treatment Billable Duration:  25 min TE, 15 min manual therapy  PT Patient Time In/Time Out  Time In: (P) 1410  Time Out: (P) 1500    Future Appointments   Date Time Provider Bang Villareal   3/2/2022  1:45 PM Ana Luisa Fragoso, PT, DPT [facility]   3/7/2022  1:45 PM Anurag Whitmore, PT, DPT [facility]   3/9/2022  1:45 PM Anurag Whitmore, PT, DPT [facility]   4/1/2022  9:40 AM Casey Maciel NP [facility]       Douglas Barba, PT, DPT

## 2022-03-02 ENCOUNTER — HOSPITAL ENCOUNTER (OUTPATIENT)
Dept: PHYSICAL THERAPY | Age: 77
Discharge: HOME | End: 2022-03-02
Payer: MEDICARE

## 2022-03-02 PROCEDURE — 97140 MANUAL THERAPY 1/> REGIONS: CPT

## 2022-03-02 PROCEDURE — 97110 THERAPEUTIC EXERCISES: CPT

## 2022-03-02 NOTE — PROGRESS NOTES
Perri To  : 1945  [place]  Secondary: [address] [facility] at [facility]  [facility], [place] [address]  [phone]   [phone]      Visit Count:  6    OUTPATIENT PHYSICAL THERAPY: Daily Treatment Note 3/2/2022  ICD-10: Treatment Diagnosis: Pain in left shoulder (M25.512), Pain in right shoulder (M25.511)  Precautions/Allergies:   Latex; Latex, natural rubber; Maxzide [triamterene-hydrochlorothiazid]; Acyclovir; Citalopram hydrobromide; Epinephrine; Hydrochlorothiazide; Maxidex [dexamethasone]; Other medication; Pravastatin; Statins-hmg-coa reductase inhibitors; Tagamet [cimetidine]; and Valium [diazepam]   TREATMENT PLAN:  Effective Dates: 2022 TO 2022 (90 days). Frequency/Duration: 2 times a week for 90 Day(s) MEDICAL/REFERRING DIAGNOSIS:  Pain in left shoulder [M25.512]   DATE OF ONSET: a few weeks prior  REFERRING PHYSICIAN: Shira Suarez NP MD Orders: evaluate and treat  Return MD Appointment: --       Pre-treatment Subjective Report:  Pt reports continuing improvements in ROM, but still trouble lifting items.    Pain: Initial:    /10 Post Session:   Pain:  1/10  Other:    Medications Last Reviewed:  3/2/2022    Updated Objective Findings:  None Today   TREATMENT:   THERAPEUTIC EXERCISE: (30 minutes):     Date:   Date:   Date:  2/16 2/21 2/28 3/2   Activity/Exercise Parameters Parameters Parameters               B ER otb x 10        Wand flexion Standing x 10 Supine x 10 Supine x 10 Supine x 10  Standing x 10 Standing x 10 Standing x 10   Recumbent stepper   6 min level 1 8 min level 1     UBE  3' L1 fwd   3/3 level 1 4/4 level 1   PROM  Flex, ER       Rhythmic stabilization  Flex at 90  ER/IR neutral       Shoulder ER  SL 10x ytb standing x 10 gtb standing x 10 gtb standing x 10 gtb standing x 10   Shoulder IR   ytb standing x 10   Gtb standing x 10   Shoulder abd   SL short arc 10x: L Standing x 10      Shoulder scaption    1# x 10     Pendulums  15x; B       Scapular retraction  Isometric walk out by PT 10x RTB       Low row    gtb x 10 gtb x 10 gb x 10   row    gtb x 10 gtb x 10 gtb x 10   scaption     1# x 10 1# x 10   abduction     1# x 10 1# x 10   Forward punch      gtb x 10                MANUAL THERAPY: ( 15 minutes):     Manual Therapy technique and location Date:  2/14 Date:  2/16 2/21 2/28 3/2    Parameters Parameters       -PROM to left shoulder  -Inferior, lateral and posterior glides grade III  -gentle subscapular release left -PROM to left shoulder  -Inferior, lateral and posterior glides grade III  -gentle subscapular release left -PROM to left shoulder  -Inferior, lateral and posterior glides grade III -PROM to left shoulder  -Inferior, lateral and posterior glides grade III -PROM to left shoulder  -Inferior, lateral and posterior glides grade III     MODALITIES (10 minutes): vaso pnuematic compression of B for pain and analgesia(no charge)    [employer] Portal    Treatment/Session Summary:    · Response to Treatment: Pt demonstrates good AAROM into flexion, working on strengthening  · Communication/Consultation:  None today  · Equipment provided today:  None today  · Recommendations/Intent for next treatment session: Next visit will focus on shoulder motion, strength, pain reduction.   Total Treatment Billable Duration:  30 min TE, 15 min manual therapy  PT Patient Time In/Time Out  Time In: 9889  Time Out: 1445    Future Appointments   Date Time Provider Bang Villareal   3/7/2022  1:45 PM [facility]   3/9/2022  1:45 PM [facility]   4/1/2022  9:40 AM Candido Perez NP [facility]       Cristobal Crain, PT, DPT

## 2022-03-07 ENCOUNTER — HOSPITAL ENCOUNTER (OUTPATIENT)
Dept: PHYSICAL THERAPY | Age: 77
Discharge: HOME | End: 2022-03-07
Payer: MEDICARE

## 2022-03-07 PROCEDURE — 97110 THERAPEUTIC EXERCISES: CPT

## 2022-03-07 PROCEDURE — 97140 MANUAL THERAPY 1/> REGIONS: CPT

## 2022-03-07 NOTE — PROGRESS NOTES
Shawn Faster  : 1945  [place]  Secondary: [address] [facility] at [facility]  [facility] [address], [place] [address]  [phone]   [phone]      Visit Count:  7    OUTPATIENT PHYSICAL THERAPY: Daily Treatment Note 3/7/2022  ICD-10: Treatment Diagnosis: Pain in left shoulder (M25.512), Pain in right shoulder (M25.511)  Precautions/Allergies:   Latex; Latex, natural rubber; Maxzide [triamterene-hydrochlorothiazid]; Acyclovir; Citalopram hydrobromide; Epinephrine; Hydrochlorothiazide; Maxidex [dexamethasone]; Other medication; Pravastatin; Statins-hmg-coa reductase inhibitors; Tagamet [cimetidine]; and Valium [diazepam]   TREATMENT PLAN:  Effective Dates: 2022 TO 2022 (90 days). Frequency/Duration: 2 times a week for 90 Day(s) MEDICAL/REFERRING DIAGNOSIS:  Pain in left shoulder [M25.512]   DATE OF ONSET: a few weeks prior  REFERRING PHYSICIAN: Matthew Joya NP MD Orders: evaluate and treat  Return MD Appointment: --       Pre-treatment Subjective Report:  Pt reports continuing improvements in ROM, but still trouble lifting items.    Pain: Initial:    /10 Post Session:   Pain:  1/10  Other:    Medications Last Reviewed:  3/7/2022    Updated Objective Findings:  None Today   TREATMENT:   THERAPEUTIC EXERCISE: (30 minutes):     Date:   Date:  2/16 2/21 2/28 3/2 3/7   Activity/Exercise Parameters Parameters                B ER         Wand flexion Supine x 10 Supine x 10 Supine x 10  Standing x 10 Standing x 10 Standing x 10 Standing x 10   Recumbent stepper  6 min level 1 8 min level 1      UBE 3' L1 fwd   3/3 level 1 4/4 level 1 4/4 level 1   PROM Flex, ER        Rhythmic stabilization Flex at 90  ER/IR neutral        Shoulder ER SL 10x ytb standing x 10 gtb standing x 10 gtb standing x 10 gtb standing x 10 gtb standing x 10   Shoulder IR  ytb standing x 10   Gtb standing x 10 gtb standing x 10   Shoulder abd  SL short arc 10x: L Standing x 10       Shoulder scaption   1# x 10      Pendulums 15x; B        Scapular retraction Isometric walk out by PT 10x RTB        Low row   gtb x 10 gtb x 10 gb x 10 gtb x 10   row   gtb x 10 gtb x 10 gtb x 10 gtb x 10   scaption    1# x 10 1# x 10    abduction    1# x 10 1# x 10    Forward punch     gtb x 10    Supine shoulder flexion      X 10 2#   Supine shoulder rhythmic stab      5 sec hold x 10                         MANUAL THERAPY: ( 15 minutes):     Manual Therapy technique and location Date:  2/16 2/21 2/28 3/2 3/7    Parameters        -PROM to left shoulder  -Inferior, lateral and posterior glides grade III  -gentle subscapular release left -PROM to left shoulder  -Inferior, lateral and posterior glides grade III -PROM to left shoulder  -Inferior, lateral and posterior glides grade III -PROM to left shoulder  -Inferior, lateral and posterior glides grade III -PROM to left shoulder  -Inferior, lateral and posterior glides grade III  - lat release L     MODALITIES ( minutes): vaso pnuematic compression of B for pain and analgesia(not today)    [employer] Portal    Treatment/Session Summary:    · Response to Treatment: Pt continues to improve with ROM, strength slower to come. Trigger point and tightness in L lat. · Communication/Consultation:  None today  · Equipment provided today:  None today  · Recommendations/Intent for next treatment session: Next visit will focus on shoulder motion, strength, pain reduction.   Total Treatment Billable Duration:  30 min TE, 15 min manual therapy  PT Patient Time In/Time Out  Time In: 1345  Time Out: 1430    Future Appointments   Date Time Provider Bang Villareal   3/9/2022  1:45 PM Tacos Rondon PT, DPT [facility]   3/15/2022  4:00 PM Shante Mendes PT [facility]   3/21/2022  3:15 PM Shante Mendes PT [facility]   3/23/2022  3:15 PM Sh[facility] Mendes [facility] 3/28/2022  1:45 PM Vinny Whitomre, PT, DPT [facility]   3/31/2022  4:30 PM Vinny Vo PT, DPT [facility]   4/1/2022  9:40 AM Alina Weber NP [facility]   4/4/2022  1:45 PM Vinny Vo PT, DPT [facility]   4/6/2022  1:45 PM Mathew Neri PT, DPT [facility]   4/11/2022  1:00 PM Mathew Neri PT, DPT [facility]   4/13/2022  1:00 PM Vinny Whitmore, PT, DPT [facility]       Brennan Hurtado, PT, DPT

## 2022-03-09 ENCOUNTER — HOSPITAL ENCOUNTER (OUTPATIENT)
Dept: PHYSICAL THERAPY | Age: 77
Discharge: HOME | End: 2022-03-09
Payer: MEDICARE

## 2022-03-09 PROCEDURE — 97110 THERAPEUTIC EXERCISES: CPT

## 2022-03-09 PROCEDURE — 97140 MANUAL THERAPY 1/> REGIONS: CPT

## 2022-03-09 NOTE — PROGRESS NOTES
Joselyn First  : 1945  [place]  Secondary: [address] [facility] at [facility]  [facility] [address], [place] [address]  [phone]   [phone]      Visit Count:  8    OUTPATIENT PHYSICAL THERAPY: Daily Treatment Note 3/9/2022  ICD-10: Treatment Diagnosis: Pain in left shoulder (M25.512), Pain in right shoulder (M25.511)  Precautions/Allergies:   Latex; Latex, natural rubber; Maxzide [triamterene-hydrochlorothiazid]; Acyclovir; Citalopram hydrobromide; Epinephrine; Hydrochlorothiazide; Maxidex [dexamethasone]; Other medication; Pravastatin; Statins-hmg-coa reductase inhibitors; Tagamet [cimetidine]; and Valium [diazepam]   TREATMENT PLAN:  Effective Dates: 2022 TO 2022 (90 days). Frequency/Duration: 2 times a week for 90 Day(s) MEDICAL/REFERRING DIAGNOSIS:  Pain in left shoulder [M25.512]   DATE OF ONSET: a few weeks prior  REFERRING PHYSICIAN: Garrick Weber NP MD Orders: evaluate and treat  Return MD Appointment: --       Pre-treatment Subjective Report:  Pt reports continuing improvements in ROM, but still trouble lifting items.    Pain: Initial:    /10 Post Session:   Pain:  1/10  Other:    Medications Last Reviewed:  3/9/2022    Updated Objective Findings:  None Today   TREATMENT:   THERAPEUTIC EXERCISE: (30 minutes):     2/21 2/28 3/2 3/7 3   Activity/Exercise                B ER        Wand flexion Supine x 10  Standing x 10 Standing x 10 Standing x 10 Standing x 10 Standing x 10   Recumbent stepper 8 min level 1       UBE  3/3 level 1 4/4 level 1 4/4 level 1 4/4 level 1   PROM        Rhythmic stabilization        Shoulder ER gtb standing x 10 gtb standing x 10 gtb standing x 10 gtb standing x 10 ytb standing x 10   Shoulder IR   Gtb standing x 10 gtb standing x 10 gtb standing x 10   Shoulder abd         Shoulder scaption 1# x 10       Pendulums        Scapular retraction        Low row gtb x 10 gtb x 10 gb x 10 gtb x 10 gtb x 10   row gtb x 10 gtb x 10 gtb x 10 gtb x 10 gtb x 10   scaption  1# x 10 1# x 10  1# x 10   abduction  1# x 10 1# x 10  1# x 10   Forward punch   gtb x 10  gtb x 10   Supine shoulder flexion    X 10 2# X 10   Supine shoulder rhythmic stab    5 sec hold x 10 5 sec hold x 10                       MANUAL THERAPY: ( 15 minutes):     Manual Therapy technique and location Date:  2/16 2/21 2/28 3/2 3/7 3/9    Parameters         -PROM to left shoulder  -Inferior, lateral and posterior glides grade III  -gentle subscapular release left -PROM to left shoulder  -Inferior, lateral and posterior glides grade III -PROM to left shoulder  -Inferior, lateral and posterior glides grade III -PROM to left shoulder  -Inferior, lateral and posterior glides grade III -PROM to left shoulder  -Inferior, lateral and posterior glides grade III  - lat release L - mfr to L lat and infraspinatus  - prom to L shoulder with glides     MODALITIES ( 15 minutes): vaso pnuematic compression of B for pain and analgesia    [facility] Portal    Treatment/Session Summary:    · Response to Treatment: Pt continues to improve with ROM, strength slower to come. Trigger point and tightness in L lat. · Communication/Consultation:  None today  · Equipment provided today:  None today  · Recommendations/Intent for next treatment session: Next visit will focus on shoulder motion, strength, pain reduction.   Total Treatment Billable Duration:  30 min TE, 15 min manual therapy  PT Patient Time In/Time Out  Time In: 9490  Time Out: 1445    Future Appointments   Date Time Provider Bang Villareal   3/15/2022  4:00 PM Saul Villasenor, PT [facility]   3/21/2022  3:15 PM [facility]   3/23/2022  3:15 PM [facility]   3/28/2022  1:45 PM Devin Whitmore, PT, DPT [facility]   3/31/2022  4:30 PM Devin Parsons, PT, DPT [facility]   4/1/2022 9:40 AM Richard Mayo NP [facility]   4/4/2022  1:45 PM Everett Whitmore, PT, [facility]   4/6/2022  1:45 PM Danny Bobby, PT, DPT [facility]   4/11/2022  1:00 PM Danny Bobby, PT, [facility]   4/13/2022  1:00 PM Everett Whitmore, PT, [facility]       Mickey Mar, PT, DPT

## 2022-03-15 ENCOUNTER — HOSPITAL ENCOUNTER (OUTPATIENT)
Dept: PHYSICAL THERAPY | Age: 77
Discharge: HOME | End: 2022-03-15
Payer: MEDICARE

## 2022-03-15 PROCEDURE — 97110 THERAPEUTIC EXERCISES: CPT

## 2022-03-15 PROCEDURE — 97140 MANUAL THERAPY 1/> REGIONS: CPT

## 2022-03-15 NOTE — PROGRESS NOTES
Mk John  : 1945  [place]  Secondary: [facility]  [address] , [address], [place] [address]  [phone]   [phone]      Visit Count:  9    OUTPATIENT PHYSICAL THERAPY: Daily Treatment Note 3/15/2022  ICD-10: Treatment Diagnosis: Pain in left shoulder (M25.512), Pain in right shoulder (M25.511)  Precautions/Allergies:   Latex; Latex, natural rubber; Maxzide [triamterene-hydrochlorothiazid]; Acyclovir; Citalopram hydrobromide; Epinephrine; Hydrochlorothiazide; Maxidex [dexamethasone]; Other medication; Pravastatin; Statins-hmg-coa reductase inhibitors; Tagamet [cimetidine]; and Valium [diazepam]   TREATMENT PLAN:  Effective Dates: 2022 TO 2022 (90 days).   Frequency/Duration: 2 times a week for 90 Day(s) MEDICAL/REFERRING DIAGNOSIS:  Pain in left shoulder [M25.512]   DATE OF ONSET: a few weeks prior  REFERRING PHYSICIAN: Jovita Chen NP MD Orders: evaluate and treat  Return MD Appointment: --       Pre-treatment Subjective Report:  Pt reports her shoulder feels better today  Pain: Initial:   Pain Intensity 1: 0/10 Post Session:   Pain:  1/10  Other:    Medications Last Reviewed:  3/15/2022    Updated Objective Findings:  None Today   TREATMENT:   THERAPEUTIC EXERCISE: (30 minutes):     2/21 2/28 3/2 3/7 3/9 3/15   Activity/Exercise                  B ER         Wand flexion Supine x 10  Standing x 10 Standing x 10 Standing x 10 Standing x 10 Standing x 10 standing 5 x   Recumbent stepper 8 min level 1        UBE  3/3 level 1 4/4 level 1 4/4 level 1 4/4 level 1 4/4 level 1   PROM         Rhythmic stabilization         Shoulder ER gtb standing x 10 gtb standing x 10 gtb standing x 10 gtb standing x 10 ytb standing x 10 gtb standing x 10   Shoulder IR   Gtb standing x 10 gtb standing x 10 gtb standing x 10 gtb standing x 10   Shoulder abd          Shoulder scaption 1# x 10        Pendulums Scapular retraction         Low row gtb x 10 gtb x 10 gb x 10 gtb x 10 gtb x 10 gtb x 10   row gtb x 10 gtb x 10 gtb x 10 gtb x 10 gtb x 10 gtb x 10   scaption  1# x 10 1# x 10  1# x 10 10x    abduction  1# x 10 1# x 10  1# x 10 10x to 90 deg   Forward punch   gtb x 10  gtb x 10 gtb x 10   Supine shoulder flexion    X 10 2# X 10 x5   Supine shoulder rhythmic stab    5 sec hold x 10 5 sec hold x 10 5 sec hold x 10                         MANUAL THERAPY: ( 15 minutes):     Manual Therapy technique and location Date:  2/16 2/21 2/28 3/2 3/7 3/9    Parameters         -PROM to left shoulder  -Inferior, lateral and posterior glides grade III  -gentle subscapular release left -PROM to left shoulder  -Inferior, lateral and posterior glides grade III -PROM to left shoulder  -Inferior, lateral and posterior glides grade III -PROM to left shoulder  -Inferior, lateral and posterior glides grade III -PROM to left shoulder  -Inferior, lateral and posterior glides grade III  - lat release L - mfr to L lat and infraspinatus  - prom to L shoulder with glides     MODALITIES ( 10 minutes): vaso pnuematic compression of L for pain and analgesia (no charge)    [employer] Portal    Treatment/Session Summary:    · Response to Treatment: Pt did well with exercise activity   but towards the end of session began to have some shoulder soreness   · Communication/Consultation:  None today  · Equipment provided today:  None today  · Recommendations/Intent for next treatment session: Next visit will focus on shoulder motion, strength, pain reduction.   Total Treatment Billable Duration:  30 min TE, 15 min manual therapy  PT Patient Time In/Time Out  Time In: 1600  Time Out: 1700    Future Appointments   Date Time Provider Bang Villareal   3/21/2022  3:15 PM Geo Mcclelland PT [facility]   3/23/2022  3:15 PM Geo Mcclelland PT [facility]   3/28/2022  1:45 PM Grisel Whitmore, PT, DPT [facility] 3/31/2022  4:30 PM Zoila Whitmore, PT, DPT [facility]   4/1/2022  9:40 AM Tere Becerril NP [facility]   4/4/2022  1:45 PM Zoila Whitmore, PT, DPT [facility]   4/6/2022  1:45 PM Zoila Gallegos PT, DPT [facility]   4/11/2022  1:00 PM Mekhi Preston PT, DPT [facility]   4/13/2022  1:00 PM Zoila Whitmore, PT, DPT [facility]       Naif Arshad PT

## 2022-03-19 PROBLEM — F41.1 GAD (GENERALIZED ANXIETY DISORDER): Status: ACTIVE | Noted: 2021-10-11

## 2022-03-19 PROBLEM — E78.5 DYSLIPIDEMIA: Status: ACTIVE | Noted: 2021-05-11

## 2022-03-20 PROBLEM — M25.512 ACUTE PAIN OF LEFT SHOULDER: Status: ACTIVE | Noted: 2022-01-31

## 2022-03-21 ENCOUNTER — HOSPITAL ENCOUNTER (OUTPATIENT)
Dept: PHYSICAL THERAPY | Age: 77
Discharge: HOME | End: 2022-03-21
Payer: MEDICARE

## 2022-03-21 PROCEDURE — 97140 MANUAL THERAPY 1/> REGIONS: CPT

## 2022-03-21 PROCEDURE — 97110 THERAPEUTIC EXERCISES: CPT

## 2022-03-21 NOTE — THERAPY RECERTIFICATION
Shawn Genao  : 1945  [place] / Product Type: Medicare /  [address]  at [facility]  [address], [address], [place] [address]  [phone]   [phone]             OUTPATIENT PHYSICAL THERAPY:Recertification    ICD-10: Treatment Diagnosis: Pain in left shoulder (M25.512), Pain in right shoulder (M25.511)  Precautions/Allergies:   Latex; Latex, natural rubber; Maxzide [triamterene-hydrochlorothiazid]; Acyclovir; Citalopram hydrobromide; Epinephrine; Hydrochlorothiazide; Maxidex [dexamethasone]; Other medication; Pravastatin; Statins-hmg-coa reductase inhibitors; Tagamet [cimetidine]; and Valium [diazepam]   TREATMENT PLAN:  Effective Dates: 2022 TO 2022 (90 days). Frequency/Duration: 2 times a week for 90 Day(s) MEDICAL/REFERRING DIAGNOSIS:  Pain in left shoulder [M25.512]   DATE OF ONSET: a few weeks prior  REFERRING PHYSICIAN: Matthew Joya NP MD Orders: evaluate and treat  Return MD Appointment: 22   As of 3/21/2022, Shawn Genao has attended 10 out of 12 scheduled visits, with 2 cancellation(s) and 0 no shows. CURRENT ASSESSMENT:  Ms. Divina Olvera presents now with complaints of left  shoulder pain and stiffness, worse than right dififculty combing her hair and getting dress and carrying or putting away groceries. Reports improvement overall especially her posture, and on examination has shown improvement in ROM and strength, but functional deficits remain. Pt will benefit from skilled physical therapy to address pain and dysfunctions. Pain in right shoulder  Is 5-6/10,  left 3/10. PROBLEM LIST (Impacting functional limitations):  1. Decreased Strength  2. Decreased ADL/Functional Activities  3. Increased Pain  4. Decreased Flexibility/Joint Mobility  5.  Decreased Scheller with Home Exercise Program INTERVENTIONS PLANNED: (Treatment may consist of any combination of the following)  1. Home Exercise Program (HEP)  2. Manual Therapy including soft tissue and spinal manipulation and mobilization; and dry needling  3. Neuromuscular Re-education/Strengthening  4. Range of Motion (ROM)  5. Therapeutic Activites  6. Therapeutic Exercise/Strengthening  7. Modalities including heat/cold application, electrical stimulation, vasopneumatic compression, ultrasound     GOALS: (Goals have been discussed and agreed upon with patient.)  Short-Term Functional Goals: Time Frame: 5 weeks     1. Pt will demonstrate independence with > or = 2 exercises in HEP.(MET)   2. Pt will demonstrate functional AROM B shoulders. (ONGOING)  Discharge Goals: Time Frame: 10 weeks  1. Pt will demonstrate independence with > or = 4 exercises in HEP.(ONGOING)  2. Pt will demonstrate functional strength B shoulders. (ONGOING)  3. Pt will be able to comb hair normally. (ONGOING)    OUTCOME MEASURE:   Tool Used: Disabilities of the Arm, Shoulder and Hand (DASH) Questionnaire - Quick Version  Score:  Initial: 30/55  Most Recent: 31/55 (3/21/22)   Interpretation of Score: The DASH is designed to measure the activities of daily living in person's with upper extremity dysfunction or pain. Each section is scored on a 1-5 scale, 5 representing the greatest disability. The scores of each section are added together for a total score of 55.       UPDATED OBJECTIVE FINDINGS:    Observation:       Standing- no significant deficits       Gait- no deficits    Strength:  Muscle/Movement Strength at Eval Reassessment Date:3/21/22   Shoulder flexion R 3-/5  L 3-/5 R: 4-  L 4-/5   Shoulder abduction R 3-/5  L 3-/5 R: 4-  L:4-   Shoulder ER R 3-/5  L 3/5 R3-/5 *  L:4-/5   Shoulder IR R 3/5  L 3/5 R: 4-  L 4-/5          * pain with resistance       Range of Motion:   Movement/Joint ROM at eval Reassessment Date: 3.21.22   Shoulder flexion R 75% of normal  L 75% of normal R: 158  L: 155   Shoulder abduction R 75% of normal  L 75% of normal R; 140  L; 145   Shoulder flex/ER R unable to touch back of head  L able to touch side of head R: 10   Reaches back of head in stages  L; 35 reaches back of head with ease                    Palpation:   Good tone and mild tenderness in B shoulders all soft tissue structures. MEDICAL NECESSITY:   · Skilled intervention continues to be required due to decreased function. REASON FOR SERVICES/OTHER COMMENTS:  · Patient continues to require skilled intervention due to decreased function. ·   Total Duration: 5 minutes measurement  PT Patient Time In/Time Out  Time In: 1515  Time Out: 1600    Rehabilitation Potential For Stated Goals: Good  Regarding Halina Duran's therapy, I certify that the treatment plan above will be carried out by a therapist or under their direction.   Thank you for this referral,  Liberty Najera, PT     Referring Physician Signature: Tamra Bravo NP _______________________________ Date _____________

## 2022-03-21 NOTE — PROGRESS NOTES
Ky Done  : 1945  [place]  Secondary: [address] [facility] at [facility]  [facility] [address], [place] [address]  [phone]   [phone]      Visit Count:  10    OUTPATIENT PHYSICAL THERAPY: Daily Treatment Note 3/22/2022  ICD-10: Treatment Diagnosis: Pain in left shoulder (M25.512), Pain in right shoulder (M25.511)  Precautions/Allergies:   Latex; Latex, natural rubber; Maxzide [triamterene-hydrochlorothiazid]; Acyclovir; Citalopram hydrobromide; Epinephrine; Hydrochlorothiazide; Maxidex [dexamethasone]; Other medication; Pravastatin; Statins-hmg-coa reductase inhibitors; Tagamet [cimetidine]; and Valium [diazepam]   TREATMENT PLAN:  Effective Dates: 2022 TO 2022 (90 days).   Frequency/Duration: 2 times a week for 90 Day(s) MEDICAL/REFERRING DIAGNOSIS:  Pain in left shoulder [M25.512]   DATE OF ONSET: a few weeks prior  REFERRING PHYSICIAN: Brenden Tatum NP MD Orders: evaluate and treat  Return MD Appointment: --       Pre-treatment Subjective Report:  Pt reports her right shoulder has been more painful and difficulty combing her hair as a result  Pain: Initial:   Pain Intensity 1: 4  Pain Location 1: Shoulder  Pain Orientation 1: Right/10 Post Session:   Pain:  3/10  Other:    Medications Last Reviewed:  3/21/2022    Updated Objective Findings:  See evaluation note from today   TREATMENT:   THERAPEUTIC EXERCISE: (30 minutes):      3 3 3/9 3/15 3/21/22   Activity/Exercise                    B ER          Wand flexion Supine x 10  Standing x 10 Standing x 10 Standing x 10 Standing x 10 Standing x 10 standing 5 x    Recumbent stepper 8 min level 1         UBE  3/3 level 1 4/4 level 1 4/4 level 1 4/4 level 1 4/4 level 1 4/4/level 1   PROM          Rhythmic stabilization          Shoulder ER gtb standing x 10 gtb standing x 10 gtb standing x 10 gtb standing x 10 ytb standing x 10 gtb standing x 10 rtb standing x 10   Shoulder IR   Gtb standing x 10 gtb standing x 10 gtb standing x 10 gtb standing x 10 rtb standing x 10   Shoulder abd           Shoulder scaption 1# x 10         Pendulums          Scapular retraction          Low row gtb x 10 gtb x 10 gb x 10 gtb x 10 gtb x 10 gtb x 10 gtb x 10   row gtb x 10 gtb x 10 gtb x 10 gtb x 10 gtb x 10 gtb x 10 gtb x 10   scaption  1# x 10 1# x 10  1# x 10 10x  10x   abduction  1# x 10 1# x 10  1# x 10 10x to 90 deg    Forward punch   gtb x 10  gtb x 10 gtb x 10 Supine 10x: B   Supine shoulder flexion    X 10 2# X 10 x5    Supine shoulder rhythmic stab    5 sec hold x 10 5 sec hold x 10 5 sec hold x 10 5 sec hold x 10                           MANUAL THERAPY: ( 10 minutes)  Manual Therapy technique and location 2/28 3/2 3/7 3/9 3/21            -PROM to left shoulder  -Inferior, lateral and posterior glides grade III -PROM to left shoulder   -PROM to left shoulder  -Inferior, lateral and posterior glides grade III  - lat release L - mfr to L lat and infraspinatus  - prom to L shoulder with glides -Inferior, lateral and posterior glides grade II-III  - prom to L shoulder with glides   -Inferior, lateral and posterior glides grade III  MODALITIES ( 10 minutes): vaso pnuematic compression of L for pain and analgesia (no charge)    [facility] Portal    Treatment/Session Summary:    · Response to Treatment: Tolerated treatment fairly well today, though did have more complaints of right shoulder pain and difficulty with ADL's as result  · Communication/Consultation:  None today  · Equipment provided today:  None today  · Recommendations/Intent for next treatment session: Next visit will focus on shoulder motion, strength, pain reduction.   Total Treatment Billable Duration:  30 min TE, 10 min manual therapy 5 minutes measurements  PT Patient Time In/Time Out  Time In: 1515  Time Out: 1600    Future Appointments   Date Time Provider Bang Villareal   3/23/2022  3:15 PM Deepthi Contreras, INES [facility]   3/28/2022  1:45 PM Kale Whitmore, PT, DPT [facility]   3/31/2022  4:30 PM Nadir Rubio, PT, DPT [facility]   4/1/2022  9:40 AM Ana Hoover NP [facility]   4/4/2022  1:45 PM Kale Blas, PT, DPT [facility]   4/6/2022  1:45 PM Pedro Andre PT, DPT [facility]   4/11/2022  1:00 PM Pedro Andre PT, DPT [facility]   4/13/2022  1:00 PM Kale Whitmore, PT, DPT [facility]       Miquel Mirza, PT

## 2022-03-23 ENCOUNTER — HOSPITAL ENCOUNTER (OUTPATIENT)
Dept: PHYSICAL THERAPY | Age: 77
Discharge: HOME | End: 2022-03-23
Payer: MEDICARE

## 2022-03-23 PROCEDURE — 97110 THERAPEUTIC EXERCISES: CPT

## 2022-03-23 PROCEDURE — 97140 MANUAL THERAPY 1/> REGIONS: CPT

## 2022-03-23 NOTE — PROGRESS NOTES
Jermaine Ramirez  : 1945  [place]  Secondary: [address] [facility] at [facility]  [facility], [address], [place] [address]  [phone]   [phone]      Visit Count:  11    OUTPATIENT PHYSICAL THERAPY: Daily Treatment Note 3/23/2022  ICD-10: Treatment Diagnosis: Pain in left shoulder (M25.512), Pain in right shoulder (M25.511)  Precautions/Allergies:   Latex; Latex, natural rubber; Maxzide [triamterene-hydrochlorothiazid]; Acyclovir; Citalopram hydrobromide; Epinephrine; Hydrochlorothiazide; Maxidex [dexamethasone]; Other medication; Pravastatin; Statins-hmg-coa reductase inhibitors; Tagamet [cimetidine]; and Valium [diazepam]   TREATMENT PLAN:  Effective Dates: 2022 TO 2022 (90 days).   Frequency/Duration: 2 times a week for 90 Day(s) MEDICAL/REFERRING DIAGNOSIS:  Pain in left shoulder [M25.512]   DATE OF ONSET: a few weeks prior  REFERRING PHYSICIAN: Crissy Lozano NP MD Orders: evaluate and treat  Return MD Appointment: TBD       Pre-treatment Subjective Report:  Pt reports having a lot of neck shoulder tightness on right, but pain in both shoulders  Pain: Initial:   Pain Intensity 1: 2  Pain Location 1: Shoulder  Pain Orientation 1: Right/10 Post Session:   Pain:  3/10  Other:    Medications Last Reviewed:  3/23/2022    Updated Objective Findings:  See evaluation note from today   TREATMENT:   THERAPEUTIC EXERCISE: (30 minutes):     2/28 3/2 3/7 3/9 3/15 3/21/22 3/23/22   Activity/Exercise                    B ER          Wand flexion Standing x 10 Standing x 10 Standing x 10 Standing x 10 standing 5 x  Supine 10x 1#   Recumbent stepper          UBE 3/3 level 1 4/4 level 1 4/4 level 1 4/4 level 1 4/4 level 1 4/4/level 1 4/4 level 1   PROM          Shoulder ER gtb standing x 10 gtb standing x 10 gtb standing x 10 ytb standing x 10 gtb standing x 10 rtb standing x 10 Supine LTB bilat 10x   Shoulder IR  Gtb standing x 10 gtb standing x 10 gtb standing x 10 gtb standing x 10 rtb standing x 10    Shoulder abd           Shoulder scaption          Pendulums          Scapular retraction          Low row gtb x 10 gb x 10 gtb x 10 gtb x 10 gtb x 10 gtb x 10 gtb x 20   row gtb x 10 gtb x 10 gtb x 10 gtb x 10 gtb x 10 gtb x 10 rtb x 20   scaption 1# x 10 1# x 10  1# x 10 10x  10x 10x 1#; B   abduction 1# x 10 1# x 10  1# x 10 10x to 90 deg     Forward punch  gtb x 10  gtb x 10 gtb x 10 Supine 10x: B    Supine shoulder flexion   X 10 2# X 10 x5     Supine shoulder rhythmic stab   5 sec hold x 10 5 sec hold x 10 5 sec hold x 10 5 sec hold x 10 ER/IR , flex at 90 Bilat 5'                           MANUAL THERAPY: ( 15 minutes)  Manual Therapy technique and location 2/28 3/2 3/7 3/9 3/21 3/23             -PROM to left shoulder  -Inferior, lateral and posterior glides grade III -PROM to left shoulder   -PROM to left shoulder  -Inferior, lateral and posterior glides grade III  - lat release L - mfr to L lat and infraspinatus  - prom to L shoulder with glides -Inferior, lateral and posterior glides grade II-III  - prom to L shoulder with glides STM/MFR B upper traps, right subscap releases  PROM B shoulder   -Inferior, lateral and posterior glides grade III  MODALITIES ( 0 minutes): vaso pnuematic compression of L for pain and analgesia (no charge)    [facility] Portal    Treatment/Session Summary:    · Response to Treatment: Tolerated treatment fairly well today; able to increase reps with scapular stabilization exercises without dificulty  · Communication/Consultation:  None today  · Equipment provided today:  None today  · Recommendations/Intent for next treatment session: Next visit will focus on shoulder motion, strength, pain reduction.   Total Treatment Billable Duration:  30 min TE, 15 min manual therapy  PT Patient Time In/Time Out  Time In: 1515  Time Out: 1600    Future Appointments   Date Time Provider Bang Villareal   3/28/2022  1:45 PM Anrdeia Whitmore, PT, DPT [facility]   3/31/2022  4:30 PM Drew Epley Sarina Heaps, PT, DPT [facility]   4/1/2022  9:40 AM Shira Suarez NP [facility]   4/4/2022  1:45 PM Andreia Gaming, PT, DPT [facility]   4/6/2022  1:45 PM Andreia Gaming, PT, DPT [facility]   4/11/2022  1:00 PM Tatiana Sevilla, PT, DPT [facility]   4/13/2022  1:00 PM Andreia Whitmore, PT, DPT [facility]       Hiren Ha, PT

## 2022-03-28 ENCOUNTER — HOSPITAL ENCOUNTER (OUTPATIENT)
Dept: PHYSICAL THERAPY | Age: 77
Discharge: HOME | End: 2022-03-28
Payer: MEDICARE

## 2022-03-28 PROCEDURE — 97140 MANUAL THERAPY 1/> REGIONS: CPT

## 2022-03-28 PROCEDURE — 97110 THERAPEUTIC EXERCISES: CPT

## 2022-03-28 NOTE — PROGRESS NOTES
Piper Ruff  : 1945  [place]  Secondary: [address] [facility]-[address] at [facility]  [facility] [address], [place] [address]  [phone]   [phone]      Visit Count:  12    OUTPATIENT PHYSICAL THERAPY: Daily Treatment Note 3/28/2022  ICD-10: Treatment Diagnosis: Pain in left shoulder (M25.512), Pain in right shoulder (M25.511)  Precautions/Allergies:   Latex; Latex, natural rubber; Maxzide [triamterene-hydrochlorothiazid]; Acyclovir; Citalopram hydrobromide; Epinephrine; Hydrochlorothiazide; Maxidex [dexamethasone]; Other medication; Pravastatin; Statins-hmg-coa reductase inhibitors; Tagamet [cimetidine]; and Valium [diazepam]   TREATMENT PLAN:  Effective Dates: 2022 TO 2022 (90 days).   Frequency/Duration: 2 times a week for 90 Day(s) MEDICAL/REFERRING DIAGNOSIS:  Pain in left shoulder [M25.512]   DATE OF ONSET: a few weeks prior  REFERRING PHYSICIAN: Renate Cano NP MD Orders: evaluate and treat  Return MD Appointment: TBD       Pre-treatment Subjective Report:  Pt reports improving pain, still having difficulty reaching things in cupboards  Pain: Initial:    /10 Post Session:   Pain:  310  Other:    Medications Last Reviewed:  3/28/2022    Updated Objective Findings:  None Today   TREATMENT:   THERAPEUTIC EXERCISE: (30 minutes):     3/7 3/9 3/15 3/21/22 3/23/22 3/28   Activity/Exercise                  B ER         Wand flexion Standing x 10 Standing x 10 standing 5 x  Supine 10x 1# Supine 10x 1#   Recumbent stepper         UBE 4/4 level 1 4/4 level 1 4/4 level 1 4/4/level 1 4/4 level 1 4/4 level 1   PROM         Shoulder ER gtb standing x 10 ytb standing x 10 gtb standing x 10 rtb standing x 10 Supine LTB bilat 10x ytb standing x 10   Shoulder IR gtb standing x 10 gtb standing x 10 gtb standing x 10 rtb standing x 10  ytb standing x 10   Shoulder abd       1# x 10   Shoulder scaption      1# x 10   Pendulums Scapular retraction         Low row gtb x 10 gtb x 10 gtb x 10 gtb x 10 gtb x 20 gtb x 10   row gtb x 10 gtb x 10 gtb x 10 gtb x 10 rtb x 20 gtb x 10   scaption  1# x 10 10x  10x 10x 1#; B    abduction  1# x 10 10x to 90 deg      Forward punch  gtb x 10 gtb x 10 Supine 10x: B     Supine shoulder flexion X 10 2# X 10 x5      Supine shoulder rhythmic stab 5 sec hold x 10 5 sec hold x 10 5 sec hold x 10 5 sec hold x 10 ER/IR , flex at 90 Bilat 5'    Shoulder press      1# x 10                MANUAL THERAPY: ( 15 minutes)  Manual Therapy technique and location 3/7 3/9 3/21 3/23 3/28            -PROM to left shoulder  -Inferior, lateral and posterior glides grade III  - lat release L - mfr to L lat and infraspinatus  - prom to L shoulder with glides -Inferior, lateral and posterior glides grade II-III  - prom to L shoulder with glides STM/MFR B upper traps, right subscap releases  PROM B shoulder - PROM B shoulder  - GH joint glides B shoulder     MODALITIES ( 10 minutes): vaso pnuematic compression of L for pain and analgesia (no charge)     [email or web address] Portal    Treatment/Session Summary:    · Response to Treatment: Pt demonstrated improved ROM, difficulty lifting weight above 90 deg without shoulder shrug. · Communication/Consultation:  None today  · Equipment provided today:  None today  · Recommendations/Intent for next treatment session: Next visit will focus on shoulder motion, strength, pain reduction.   Total Treatment Billable Duration:  30 min TE, 15 min manual therapy  PT Patient Time In/Time Out  Time In: 1335  Time Out: 1435    Future Appointments   Date Time Provider Bang Villareal   3/31/2022  4:30 PM Zoe Watts, PT, [facility]PT [facility]   4/1/2022  9:40 AM Renate Cano NP [facility]   4/4/2022  1:45 PM Nelson Whitmore, PT, DPT [facility]   4/6/2022  1:45 PM Nelson Whitmore, PT, DPT [facility]   4/11/2022  2:30 PM Shante Mendes, PT [facility]   4/13/2022 1:00 PM Horace Whitmore, PT, DPT [facility]       Danie Valladares PT, DPT

## 2022-03-31 ENCOUNTER — APPOINTMENT (OUTPATIENT)
Dept: PHYSICAL THERAPY | Age: 77
End: 2022-03-31
Payer: MEDICARE

## 2022-04-04 ENCOUNTER — HOSPITAL ENCOUNTER (OUTPATIENT)
Dept: PHYSICAL THERAPY | Age: 77
Discharge: HOME | End: 2022-04-04
Payer: MEDICARE

## 2022-04-04 PROCEDURE — 97110 THERAPEUTIC EXERCISES: CPT

## 2022-04-04 PROCEDURE — 97140 MANUAL THERAPY 1/> REGIONS: CPT

## 2022-04-04 NOTE — PROGRESS NOTES
Diego Beauchamp  : 1945  [place]  Secondary: [address] [facility]-[address] at [facility]  [facility], [address], [place] [address]  [phone]   [phone]      Visit Count:  13    OUTPATIENT PHYSICAL THERAPY: Daily Treatment Note 2022  ICD-10: Treatment Diagnosis: Pain in left shoulder (M25.512), Pain in right shoulder (M25.511)  Precautions/Allergies:   Latex; Latex, natural rubber; Maxzide [triamterene-hydrochlorothiazid]; Acyclovir; Citalopram hydrobromide; Epinephrine; Hydrochlorothiazide; Maxidex [dexamethasone]; Other medication; Pravastatin; Statins-hmg-coa reductase inhibitors; Tagamet [cimetidine]; and Valium [diazepam]   TREATMENT PLAN:  Effective Dates: 2022 TO 2022 (90 days). Frequency/Duration: 2 times a week for 90 Day(s) MEDICAL/REFERRING DIAGNOSIS:  Pain in left shoulder [M25.512]   DATE OF ONSET: a few weeks prior  REFERRING PHYSICIAN: Calos Abel NP MD Orders: evaluate and treat  Return MD Appointment: TBD       Pre-treatment Subjective Report:  Pt reports improving pain, still having difficulty taking things out of cupboards, but can reach cupboards easier.    Pain: Initial:    /10 Post Session:   Pain:  310  Other:    Medications Last Reviewed:  2022    Updated Objective Findings:  None Today   TREATMENT:   THERAPEUTIC EXERCISE: (25 minutes):     3/9 3/15 3/21/22 3/23/22 3/28 4/4   Activity/Exercise                  B ER         Wand flexion Standing x 10 standing 5 x  Supine 10x 1# Supine 10x 1#    Supine flexion      X 10   Recumbent stepper         UBE  level 1 / level 1 /4/level 1  level 1  level 1  level 1   PROM         Shoulder ER ytb standing x 10 gtb standing x 10 rtb standing x 10 Supine LTB bilat 10x ytb standing x 10 ytb standing x 10   Shoulder IR gtb standing x 10 gtb standing x 10 rtb standing x 10  ytb standing x 10 ytb standing x 10   Shoulder abd      1# x 10 2# x 10   Shoulder scaption     1# x 10 2# x 10   Pendulums         Scapular retraction         Low row gtb x 10 gtb x 10 gtb x 10 gtb x 20 gtb x 10 gtb x 10   row gtb x 10 gtb x 10 gtb x 10 rtb x 20 gtb x 10 gtb x 10   scaption 1# x 10 10x  10x 10x 1#; B     abduction 1# x 10 10x to 90 deg       Forward punch gtb x 10 gtb x 10 Supine 10x: B      Supine shoulder flexion X 10 x5       Supine shoulder rhythmic stab 5 sec hold x 10 5 sec hold x 10 5 sec hold x 10 ER/IR , flex at 90 Bilat 5'     Shoulder press     1# x 10    Reach up to cupboard height      2# x 10 B   Wall slide      X 10   Wall [place]      X 10                                  MANUAL THERAPY: ( 15 minutes)  Manual Therapy technique and location 3/9 3/21 3/23 3/28 4/4            - mfr to L lat and infraspinatus  - prom to L shoulder with glides -Inferior, lateral and posterior glides grade II-III  - prom to L shoulder with glides STM/MFR B upper traps, right subscap releases  PROM B shoulder - PROM B shoulder  - GH joint glides B shoulder - PROM B shoulder  - GH joint glides B shoulder  - gentle pressure to lat     MODALITIES ( 10 minutes): vaso pnuematic compression of L for pain and analgesia (no charge)     [facility] Portal    Treatment/Session Summary:    · Response to Treatment: Pt demonstrated improvements in strength and motion. · Communication/Consultation:  None today  · Equipment provided today:  None today  · Recommendations/Intent for next treatment session: Next visit will focus on shoulder motion, strength, pain reduction.   Total Treatment Billable Duration:  25 min TE, 15 min manual therapy  PT Patient Time In/Time Out  Time In: 6753  Time Out: 1435    Future Appointments   Date Time Provider Bang Villareal   4/6/2022  1:45 PM Karlynn Hammans, PT, DPT [facility]   4/11/2022  2:30 PM Olvin Paulino, INES [facility]   4/12/2022 11:20 AM Cyndee Abraham, [facility]   4/13/2022  1:00 PM Michelle Whitmore, PT, DPT [address], PT, DPT

## 2022-04-06 ENCOUNTER — HOSPITAL ENCOUNTER (OUTPATIENT)
Dept: PHYSICAL THERAPY | Age: 77
Discharge: HOME | End: 2022-04-06
Payer: MEDICARE

## 2022-04-06 PROCEDURE — 97140 MANUAL THERAPY 1/> REGIONS: CPT

## 2022-04-06 PROCEDURE — 97110 THERAPEUTIC EXERCISES: CPT

## 2022-04-06 NOTE — PROGRESS NOTES
Shreya Mullen  : 1945  [place]  Secondary: [address] [facility] at [facility]  [facility], [address], [place] [address]  [phone]   [phone]      Visit Count:  14    OUTPATIENT PHYSICAL THERAPY: Daily Treatment Note 2022  ICD-10: Treatment Diagnosis: Pain in left shoulder (M25.512), Pain in right shoulder (M25.511)  Precautions/Allergies:   Latex; Latex, natural rubber; Maxzide [triamterene-hydrochlorothiazid]; Acyclovir; Citalopram hydrobromide; Epinephrine; Hydrochlorothiazide; Maxidex [dexamethasone]; Other medication; Pravastatin; Statins-hmg-coa reductase inhibitors; Tagamet [cimetidine]; and Valium [diazepam]   TREATMENT PLAN:  Effective Dates: 2022 TO 2022 (90 days). Frequency/Duration: 2 times a week for 90 Day(s) MEDICAL/REFERRING DIAGNOSIS:  Pain in left shoulder [M25.512]   DATE OF ONSET: a few weeks prior  REFERRING PHYSICIAN: Catarina Mallory NP MD Orders: evaluate and treat  Return MD Appointment: TBD       Pre-treatment Subjective Report:  Pt reports continuing improvements in pain and motion, strength still less than desired.     Pain: Initial:    10 Post Session:   Pain:  3/10  Other:    Medications Last Reviewed:  2022    Updated Objective Findings:  None Today   TREATMENT:   THERAPEUTIC EXERCISE: (30 minutes):     3/15 3/21/22 3/23/22 3/28 4/4 4/6   Activity/Exercise                  B ER         Wand flexion standing 5 x  Supine 10x 1# Supine 10x 1#     Supine flexion     X 10 Supine x 10   2#   Recumbent stepper         UBE 4/4 level 1 4/4/level 1 4/4 level 1 4/4 level 1 /4 level 1 4/4 level 1   PROM         Shoulder ER gtb standing x 10 rtb standing x 10 Supine LTB bilat 10x ytb standing x 10 ytb standing x 10 ytb standing x 10   Shoulder IR gtb standing x 10 rtb standing x 10  ytb standing x 10 ytb standing x 10 ytb standing x 10    Shoulder abd     1# x 10 2# x 10 2# x 10   Shoulder scaption    1# x 10 2# x 10 2# x 10   Pendulums         Scapular retraction         Low row gtb x 10 gtb x 10 gtb x 20 gtb x 10 gtb x 10 gtb x 10   row gtb x 10 gtb x 10 rtb x 20 gtb x 10 gtb x 10 gtb x 10   scaption 10x  10x 10x 1#; B      abduction 10x to 90 deg        Forward punch gtb x 10 Supine 10x: B       Supine shoulder flexion x5        Supine shoulder rhythmic stab 5 sec hold x 10 5 sec hold x 10 ER/IR , flex at 90 Bilat 5'      Shoulder press    1# x 10     Reach up to cupboard height     2# x 10 B 2# x 10 B   Wall slide     X 10 X 10   Wall [place]     X 10                                   MANUAL THERAPY: ( 15 minutes)  Manual Therapy technique and location 3/21 3/23 3/28 4/4 4/6            -Inferior, lateral and posterior glides grade II-III  - prom to L shoulder with glides STM/MFR B upper traps, right subscap releases  PROM B shoulder - PROM B shoulder  - GH joint glides B shoulder - PROM B shoulder  - GH joint glides B shoulder  - gentle pressure to lat - PROM B shoulder  - GH joint glides B shoulder  - gentle pressure to lat     MODALITIES (  minutes): vaso pnuematic compression of L for pain and analgesia (no charge)     [employer] Portal    Treatment/Session Summary:    · Response to Treatment: Worked more on lifting strength today. Tolerated well with fatigue. · Communication/Consultation:  None today  · Equipment provided today:  None today  · Recommendations/Intent for next treatment session: Next visit will focus on shoulder motion, strength, pain reduction.   Total Treatment Billable Duration:  30 min TE, 15 min manual therapy  PT Patient Time In/Time Out  Time In: 1345  Time Out: 1430    Future Appointments   Date Time Provider Bang Villareal   4/11/2022  2:30 PM [facility]ook, [facility]   4/12/2022 11:20 AM Amado Hart NP [facility]   4/13/2022  1:00 PM Florecita Mckinnon, PT, DPT [facility]   4/18/2022  2:30 PM Roberta Whitmore, PT, DPT [facility]   4/20/2022  1:45 PM Horace Whitmore, PT, DPT [facility]   4/25/2022  2:30 PM Maree Boxer, Merriam Chambers, PT, DPKWABENA [facility]   4/27/2022  1:45 PM Maree Boxer, Merriam Chambers, PT, DPT [facility]   5/2/2022  1:45 PM Marlena Tao, PT, DPT [facility]   5/4/2022  1:45 PM Horace Whitmore, PT, DPT [facility]       Danie Valladares, PT, DPT

## 2022-04-11 ENCOUNTER — HOSPITAL ENCOUNTER (OUTPATIENT)
Dept: PHYSICAL THERAPY | Age: 77
Discharge: HOME | End: 2022-04-11
Payer: MEDICARE

## 2022-04-11 PROCEDURE — 97110 THERAPEUTIC EXERCISES: CPT

## 2022-04-11 PROCEDURE — 97140 MANUAL THERAPY 1/> REGIONS: CPT

## 2022-04-11 NOTE — PROGRESS NOTES
Luis Angel Dorantes  : 1945  [place]  Secondary: [facility] [place] [address] at [facility], [address], [place] [address]  [phone]   [phone]      Visit Count:  15    OUTPATIENT PHYSICAL THERAPY: Daily Treatment Note 2022  ICD-10: Treatment Diagnosis: Pain in left shoulder (M25.512), Pain in right shoulder (M25.511)  Precautions/Allergies:   Latex; Latex, natural rubber; Maxzide [triamterene-hydrochlorothiazid]; Acyclovir; Citalopram hydrobromide; Epinephrine; Hydrochlorothiazide; Maxidex [dexamethasone]; Other medication; Pravastatin; Statins-hmg-coa reductase inhibitors; Tagamet [cimetidine]; and Valium [diazepam]   TREATMENT PLAN:  Effective Dates: 2022 TO 2022 (90 days).   Frequency/Duration: 2 times a week for 90 Day(s) MEDICAL/REFERRING DIAGNOSIS:  Pain in left shoulder [M25.512]   DATE OF ONSET: a few weeks prior  REFERRING PHYSICIAN: Lacy Keith NP MD Orders: evaluate and treat  Return MD Appointment: TBD       Pre-treatment Subjective Report:  Pt reports continued stiffness of her shoulder and general soreness  Pain: Initial:   Pain Intensity 1: 4/10 Post Session:   Pain:  2/10  Other:    Medications Last Reviewed:  2022    Updated Objective Findings:  None Today   TREATMENT:   THERAPEUTIC EXERCISE: (30 minutes):     3/15 3/21/22 3/23/22 3/28 4/4 4/6 4/11/22   Activity/Exercise                    B ER          Wand flexion standing 5 x  Supine 10x 1# Supine 10x 1#   Supine 10x UE [place]   Supine flexion     X 10 Supine x 10   2# Supine x 5 w/o wgt   Recumbent stepper          UBE / level 1 /4/level 1  level 1  level 1  level 1  level 1 Level 1 8'    PROM          Shoulder ER gtb standing x 10 rtb standing x 10 Supine LTB bilat 10x ytb standing x 10 ytb standing x 10 ytb standing x 10 ytb standing x 10   Shoulder IR gtb standing x 10 rtb standing x 10  ytb standing x 10 ytb standing x 10 ytb standing x 10  ytb standing x 10   Shoulder abd     1# x 10 2# x 10 2# x 10 10x w/o wgt   Shoulder scaption    1# x 10 2# x 10 2# x 10 10x with cane   Pendulums          Scapular retraction          Low row gtb x 10 gtb x 10 gtb x 20 gtb x 10 gtb x 10 gtb x 10 2 x 10 gtb   row gtb x 10 gtb x 10 rtb x 20 gtb x 10 gtb x 10 gtb x 10 2 x 10 rtb   scaption 10x  10x 10x 1#; B       abduction 10x to 90 deg         Forward punch gtb x 10 Supine 10x: B        Supine shoulder flexion x5         Supine shoulder rhythmic stab 5 sec hold x 10 5 sec hold x 10 ER/IR , flex at 90 Bilat 5'       Shoulder press    1# x 10      Reach up to cupboard height     2# x 10 B 2# x 10 B 1# x 5; B   Wall slide     X 10 X 10    Wall [place]     X 10                                       MANUAL THERAPY: ( 15 minutes)  Manual Therapy technique and location 3/21 3/23 3/28 4/4 4/6 4/11             -Inferior, lateral and posterior glides grade II-III  - prom to L shoulder with glides STM/MFR B upper traps, right subscap releases  PROM B shoulder - PROM B shoulder  - GH joint glides B shoulder - PROM B shoulder  - GH joint glides B shoulder  - gentle pressure to lat - PROM B shoulder  - GH joint glides B shoulder  - gentle pressure to lat - PROM B shoulder  - GH joint glides B shoulder  _gentle subscap release     MODALITIES (  minutes): vaso pnuematic compression of L for pain and analgesia (no charge)     [facility] Portal    Treatment/Session Summary:    · Response to Treatment: tolerated treatment, though had some difficulty with weighted supine flexion  · Communication/Consultation:  None today  · Equipment provided today:  None today  · Recommendations/Intent for next treatment session: Next visit will focus on shoulder motion, strength, pain reduction.   Total Treatment Billable Duration:  30 min TE, 15 min manual therapy  PT Patient Time In/Time Out  Time In: 1430  Time Out: 1335    Future Appointments   Date Time Provider Bang Villareal   4/12/2022 11:20 AM Karyn Dinero NP [facility]   4/13/2022  1:00 PM Hami, Mónica Onofre, PT, DPT [facility]   4/18/2022  2:30 PM Irina Mt, Cindia Fass, PT, DPT [facility]   4/20/2022  1:45 PM Irina Mt, Cinkailasha Fass, PT, DPT [facility]   4/25/2022  2:30 PM Irina Mt, Cinkailasha Fass, PT, DPT [facility]   4/27/2022  1:45 PM Irina Mt, Evelynea Jemima, PT, DPT [facility]   5/2/2022  1:45 PM Irina Mt, Cindia Fass, PT, DPT [facility]   5/4/2022  1:45 PM Mónica Whitmore, PT, DPT [facility]       Shelby Rey PT

## 2022-04-13 ENCOUNTER — HOSPITAL ENCOUNTER (OUTPATIENT)
Dept: PHYSICAL THERAPY | Age: 77
Discharge: HOME | End: 2022-04-13
Payer: MEDICARE

## 2022-04-13 PROCEDURE — 97140 MANUAL THERAPY 1/> REGIONS: CPT

## 2022-04-13 PROCEDURE — 97110 THERAPEUTIC EXERCISES: CPT

## 2022-04-13 NOTE — PROGRESS NOTES
Georgia Hurt  : 1945  [place]  Secondary: [facility] at [facility]  [facility], [address], [place] [address]  [phone]   [phone]      Visit Count:  16    OUTPATIENT PHYSICAL THERAPY: Daily Treatment Note 2022  ICD-10: Treatment Diagnosis: Pain in left shoulder (M25.512), Pain in right shoulder (M25.511)  Precautions/Allergies:   Latex; Latex, natural rubber; Maxzide [triamterene-hydrochlorothiazid]; Acyclovir; Citalopram hydrobromide; Epinephrine; Hydrochlorothiazide; Maxidex [dexamethasone]; Other medication; Pravastatin; Statins-hmg-coa reductase inhibitors; Tagamet [cimetidine]; and Valium [diazepam]   TREATMENT PLAN:  Effective Dates: 2022 TO 2022 (90 days). Frequency/Duration: 2 times a week for 90 Day(s) MEDICAL/REFERRING DIAGNOSIS:  Pain in left shoulder [M25.512]   DATE OF ONSET: a few weeks prior  REFERRING PHYSICIAN: Zoë Hogan NP MD Orders: evaluate and treat  Return MD Appointment: TBD       Pre-treatment Subjective Report:  Pt reports improvements, able to lift things off of top cabinet, but not top shelf of fridge.    Pain: Initial:    /10 Post Session:   Pain:  2/10  Other:    Medications Last Reviewed:  2022    Updated Objective Findings:  None Today   TREATMENT:   THERAPEUTIC EXERCISE: (30 minutes):     3/28 4/4 4/6 4/11/22 4/13   Activity/Exercise                B ER        Wand flexion Supine 10x 1#   Supine 10x UE [place]    Supine flexion  X 10 Supine x 10   2# Supine x 5 w/o wgt Supine x 10, 1#    Recumbent stepper        UBE  level 1  level 1  level 1 Level 1  Level 1    PROM        Shoulder ER ytb standing x 10 ytb standing x 10 ytb standing x 10 ytb standing x 10 ytb standing x 10   Shoulder IR ytb standing x 10 ytb standing x 10 ytb standing x 10  ytb standing x 10 ytb standing x 10   Shoulder abd  1# x 10 2# x 10 2# x 10 10x w/o wgt Shoulder scaption 1# x 10 2# x 10 2# x 10 10x with cane    Pendulums        Scapular retraction        Low row gtb x 10 gtb x 10 gtb x 10 2 x 10 gtb X 20 gtb   row gtb x 10 gtb x 10 gtb x 10 2 x 10 rtb X 20 gtb   scaption        abduction        Forward punch        Supine shoulder flexion        Supine shoulder rhythmic stab        Shoulder press 1# x 10       Reach up to cupboard height  2# x 10 B 2# x 10 B 1# x 5; B 2# x 10 B   Wall slide  X 10 X 10  X 10   Wall [place]  X 10                                  MANUAL THERAPY: ( 15 minutes)  Manual Therapy technique and location 3/28 4/4 4/6 4/11 4/13            - PROM B shoulder  - GH joint glides B shoulder - PROM B shoulder  - GH joint glides B shoulder  - gentle pressure to lat - PROM B shoulder  - GH joint glides B shoulder  - gentle pressure to lat - PROM B shoulder  - GH joint glides B shoulder  _gentle subscap release - PROM B shoulder  - GH joint glides B shoulder  - gentle pressure to lat     MODALITIES ( 10  minutes): vaso pnuematic compression of L for pain and analgesia (no charge)     [facility] Portal    Treatment/Session Summary:    · Response to Treatment: pt still has difficulty reaching back of head with right hand, is something she wants to get back into doing  · Communication/Consultation:  None today  · Equipment provided today:  None today  · Recommendations/Intent for next treatment session: Next visit will focus on shoulder motion, strength, pain reduction.   Total Treatment Billable Duration:  30 min TE, 15 min manual therapy  PT Patient Time In/Time Out  Time In: 1300  Time Out: 4450    Future Appointments   Date Time Provider Bang Villareal   4/18/2022  2:30 PM Amalia Channel, PT, DPT [facility]   4/20/2022  1:45 PM Cuffey, Cierra Gearing, PT, DPT [facility]   4/25/2022  2:30 PM Cuffey, Cierra Gearing, PT, DPT [facility]   4/27/2022  1:45 PM Joyice Ring, Cierra Gearing, PT, DPT [facility]   5/2/2022  8:00 AM [facility]   5/2/2022  1:45 PM [facility] Whitmore[facility]   5/4/2022  1:45 PM Jia Fuchs, PT, [facility]   10/11/2022  1:20 PM Amira Sargent NP [facility]       Francois Sawant, PT, DPT

## 2022-04-18 ENCOUNTER — HOSPITAL ENCOUNTER (OUTPATIENT)
Dept: PHYSICAL THERAPY | Age: 77
Discharge: HOME | End: 2022-04-18
Payer: MEDICARE

## 2022-04-18 NOTE — PROGRESS NOTES
Truharry Webb  : 1945  [place]  Secondary: [facility] [place] [address] at [place] [address], [address], [place] [address]  [phone]   [phone]      Tru Webb cancelled PT appointment today due to taxes.      Jossie Henry, PT, DPT

## 2022-04-20 ENCOUNTER — HOSPITAL ENCOUNTER (OUTPATIENT)
Dept: PHYSICAL THERAPY | Age: 77
Discharge: HOME | End: 2022-04-20
Payer: MEDICARE

## 2022-04-20 PROCEDURE — 97110 THERAPEUTIC EXERCISES: CPT

## 2022-04-20 PROCEDURE — 97140 MANUAL THERAPY 1/> REGIONS: CPT

## 2022-04-20 NOTE — PROGRESS NOTES
[facility] 1945  [place]  Secondary: [facility] at [facility]  [facility], [address], [place] [address]  [phone]   [phone]      Visit Count:  17    OUTPATIENT PHYSICAL THERAPY: Daily Treatment Note 2022  ICD-10: Treatment Diagnosis: Pain in left shoulder (M25.512), Pain in right shoulder (M25.511)  Precautions/Allergies:   Latex; Latex, natural rubber; Maxzide [triamterene-hydrochlorothiazid]; Acyclovir; Citalopram hydrobromide; Epinephrine; Hydrochlorothiazide; Maxidex [dexamethasone]; Other medication; Pravastatin; Statins-hmg-coa reductase inhibitors; Tagamet [cimetidine]; and Valium [diazepam]   TREATMENT PLAN:  Effective Dates: 2022 TO 2022 (90 days).   Frequency/Duration: 2 times a week for 90 Day(s) MEDICAL/REFERRING DIAGNOSIS:  Pain in left shoulder [M25.512]   DATE OF ONSET: a few weeks prior  REFERRING PHYSICIAN: Opal Romero NP MD Orders: evaluate and treat  Return MD Appointment: TBD       Pre-treatment Subjective Report:  Pt reports improving, but slowly, and some increased soreness in L shoulder   Pain: Initial:    /10 Post Session:   Pain:  2/10  Other:    Medications Last Reviewed:  2022    Updated Objective Findings:  None Today   TREATMENT:   THERAPEUTIC EXERCISE: (15 minutes):     3/28 4/4 4/6 4/11/22 4/13 4/20   Activity/Exercise                  B ER         Wand flexion Supine 10x 1#   Supine 10x UE [place]     Supine flexion  X 10 Supine x 10   2# Supine x 5 w/o wgt Supine x 10, 1#     Recumbent stepper         UBE  level 1  level 1  level 1 Level 1 8'  Level 1  Level 2,    PROM         Shoulder ER ytb standing x 10 ytb standing x 10 ytb standing x 10 ytb standing x 10 ytb standing x 10    Shoulder IR ytb standing x 10 ytb standing x 10 ytb standing x 10  ytb standing x 10 ytb standing x 10    Shoulder abd  1# x 10 2# x 10 2# x 10 10x w/o wgt Shoulder scaption 1# x 10 2# x 10 2# x 10 10x with cane     Pendulums         Scapular retraction         Low row gtb x 10 gtb x 10 gtb x 10 2 x 10 gtb X 20 gtb    row gtb x 10 gtb x 10 gtb x 10 2 x 10 rtb X 20 gtb    scaption         abduction         Forward punch         Supine shoulder flexion         Supine shoulder rhythmic stab         Shoulder press 1# x 10        Reach up to cupboard height  2# x 10 B 2# x 10 B 1# x 5; B 2# x 10 B    Wall slide  X 10 X 10  X 10 X 10   Wall [place]  X 10                                      MANUAL THERAPY: ( 25 minutes)  Manual Therapy technique and location 4/4 4/6 4/11 4/13 4/20            - PROM B shoulder  - GH joint glides B shoulder  - gentle pressure to lat - PROM B shoulder  - GH joint glides B shoulder  - gentle pressure to lat - PROM B shoulder  - GH joint glides B shoulder  _gentle subscap release - PROM B shoulder  - GH joint glides B shoulder  - gentle pressure to lat - PROM B shoulder  - GH joint glides B shoulder  - gentle pressure to lat     MODALITIES (   minutes): vaso pnuematic compression of L for pain and analgesia (no charge)     Dry Needling (5 min):  - trigger point needling to L latissimus, L infraspinatus; verbal consent given  [facility] Portal    Treatment/Session Summary:    · Response to Treatment: pt agreeable to dry needling, assess for long term effects. · Communication/Consultation:  None today  · Equipment provided today:  None today  · Recommendations/Intent for next treatment session: Next visit will focus on shoulder motion, strength, pain reduction.   Total Treatment Billable Duration:  15 min TE, 25 min manual therapy  PT Patient Time In/Time Out  Time In: 1345  Time Out: 1430    Future Appointments   Date Time Provider Bang Villareal   4/25/2022  2:30 PM Jer[facility]imbrough, PT, [facility]   4/27/2022  1:45 PM Mariaa Whitmore, PT, [facility]   5/2/2022  8:00 AM [facility] LAB [facility]   5/2/2022  1:45 PM Haim Vipul Sanchez, PT, DPT [facility]   5/4/2022  1:45 PM Vipul Landry, PT, DPT [facility]   10/11/2022  1:20 PM Areli Barajas NP [facility]       Gurdeep Salazar, PT, DPT

## 2022-04-25 ENCOUNTER — HOSPITAL ENCOUNTER (OUTPATIENT)
Dept: PHYSICAL THERAPY | Age: 77
Discharge: HOME | End: 2022-04-25
Payer: MEDICARE

## 2022-04-25 PROCEDURE — 97110 THERAPEUTIC EXERCISES: CPT

## 2022-04-25 PROCEDURE — 97140 MANUAL THERAPY 1/> REGIONS: CPT

## 2022-04-25 NOTE — PROGRESS NOTES
Lorri Adames  : 1945  [place]  Secondary: [address] [facility] at [facility]  [facility], [address], [place] [address]  [phone]   [phone]      Visit Count:  18    OUTPATIENT PHYSICAL THERAPY: Daily Treatment Note 2022  ICD-10: Treatment Diagnosis: Pain in left shoulder (M25.512), Pain in right shoulder (M25.511)  Precautions/Allergies:   Latex; Latex, natural rubber; Maxzide [triamterene-hydrochlorothiazid]; Acyclovir; Citalopram hydrobromide; Epinephrine; Hydrochlorothiazide; Maxidex [dexamethasone]; Other medication; Pravastatin; Statins-hmg-coa reductase inhibitors; Tagamet [cimetidine]; and Valium [diazepam]   TREATMENT PLAN:  Effective Dates: 2022 TO 2022 (90 days). Frequency/Duration: 2 times a week for 90 Day(s) MEDICAL/REFERRING DIAGNOSIS:  Pain in left shoulder [M25.512]   DATE OF ONSET: a few weeks prior  REFERRING PHYSICIAN: Joselito Wilson NP MD Orders: evaluate and treat  Return MD Appointment: TBD       Pre-treatment Subjective Report:  Pt reports improvements since last treatment, felt some pain Saturday night otherwise pain levels much reduced.    Pain: Initial:    /10 Post Session:   Pain:  2/10  Other:    Medications Last Reviewed:  2022    Updated Objective Findings:  None Today   TREATMENT:   THERAPEUTIC EXERCISE: (25 minutes):     22   Activity/Exercise                B ER        Wand flexion  Supine 10x UE [place]      Supine flexion Supine x 10   2# Supine x 5 w/o wgt Supine x 10, 1#   Supine x 10, 2#   Recumbent stepper        UBE  level 1 Level 1 8 Level 1  Level 2,  Level 2,    PROM        Shoulder ER ytb standing x 10 ytb standing x 10 ytb standing x 10  gtb standing x 10   Shoulder IR ytb standing x 10  ytb standing x 10 ytb standing x 10  gtb standing x 10   Shoulder abd  2# x 10 10x w/o wgt      Shoulder scaption 2# x 10 10x with cane      Pendulums        Scapular retraction        Low row gtb x 10 2 x 10 gtb X 20 gtb  X 15 gtb   row gtb x 10 2 x 10 rtb X 20 gtb  X 15 gtb   scaption        abduction        Forward punch        Supine shoulder flexion        Supine shoulder rhythmic stab        Shoulder press        Reach up to cupboard height 2# x 10 B 1# x 5; B 2# x 10 B  2# x 10 B   Wall slide X 10  X 10 X 10 X 10   Wall [place]     X 10                               MANUAL THERAPY: ( 10 minutes)  Manual Therapy technique and location 4/6 4/11 4/13 4/20 4/25            - PROM B shoulder  - GH joint glides B shoulder  - gentle pressure to lat - PROM B shoulder  - GH joint glides B shoulder  _gentle subscap release - PROM B shoulder  - GH joint glides B shoulder  - gentle pressure to lat - PROM B shoulder  - GH joint glides B shoulder  - gentle pressure to lat - PROM B shoulder  - GH joint glides B shoulder  - gentle pressure to lat     MODALITIES (  10  minutes): vaso pnuematic compression of L for pain and analgesia (no charge)     Dry Needling ( min):  - trigger point needling to L latissimus, L infraspinatus; verbal consent given  [facility] Portal    Treatment/Session Summary:    · Response to Treatment: pt tolerated activities better than previously, improving strength. · Communication/Consultation:  None today  · Equipment provided today:  None today  · Recommendations/Intent for next treatment session: Next visit will focus on shoulder motion, strength, pain reduction.   Total Treatment Billable Duration:  25 min TE, 10 min manual therapy  PT Patient Time In/Time Out  Time In: 1430  Time Out: 1520    Future Appointments   Date Time Provider Bang Villareal   4/27/2022  1:45 PM Cherie Amaro PT, DPT [facility]   5/2/2022  8:00 AM [facility]   5/2/2022  1:45 PM Alex Whitmorep, PT, D[facility]   5/4/2022  1:45 PM Alex Whitmore [place], PT, DPT [facility]   10/11/2022  1:20 PM Cande Jenkins NP [facility] Jona Belle [address], PT, DPT

## 2022-04-27 ENCOUNTER — HOSPITAL ENCOUNTER (OUTPATIENT)
Dept: PHYSICAL THERAPY | Age: 77
Discharge: HOME | End: 2022-04-27
Payer: MEDICARE

## 2022-04-27 PROCEDURE — 97110 THERAPEUTIC EXERCISES: CPT

## 2022-04-27 PROCEDURE — 97140 MANUAL THERAPY 1/> REGIONS: CPT

## 2022-04-27 NOTE — PROGRESS NOTES
Lorri Adames  : 1945  [place]  Secondary: [address] [facility] at [facility]  [place] , [address], [place] [address]  [phone]   [phone]      Visit Count:  19    OUTPATIENT PHYSICAL THERAPY: Daily Treatment Note 2022  ICD-10: Treatment Diagnosis: Pain in left shoulder (M25.512), Pain in right shoulder (M25.511)  Precautions/Allergies:   Latex; Latex, natural rubber; Maxzide [triamterene-hydrochlorothiazid]; Acyclovir; Citalopram hydrobromide; Epinephrine; Hydrochlorothiazide; Maxidex [dexamethasone]; Other medication; Pravastatin; Statins-hmg-coa reductase inhibitors; Tagamet [cimetidine]; and Valium [diazepam]   TREATMENT PLAN:  Effective Dates: 2022 TO 2022 (90 days). Frequency/Duration: 2 times a week for 90 Day(s) MEDICAL/REFERRING DIAGNOSIS:  Pain in left shoulder [M25.512]   DATE OF ONSET: a few weeks prior  REFERRING PHYSICIAN: Joselito Wilson NP MD Orders: evaluate and treat  Return MD Appointment: TBD       Pre-treatment Subjective Report:  Pt reports was able to do hair today without pain, pleased with progress.    Pain: Initial:    10 Post Session:   Pain:  210  Other:    Medications Last Reviewed:  2022    Updated Objective Findings:  None Today   TREATMENT:   THERAPEUTIC EXERCISE: (15 minutes):     22   Activity/Exercise                  B ER         Wand flexion  Supine 10x UE [place]       Supine flexion Supine x 10   2# Supine x 5 w/o wgt Supine x 10, 1#   Supine x 10, 2#    Recumbent stepper         UBE  level 1 Level 1 8 Level 1  Level 2,  Level 2,  Level 2,    PROM         Shoulder ER ytb standing x 10 ytb standing x 10 ytb standing x 10  gtb standing x 10    Shoulder IR ytb standing x 10  ytb standing x 10 ytb standing x 10  gtb standing x 10    Shoulder abd  2# x 10 10x w/o wgt       Shoulder scaption 2# x 10 10x with cane Pendulums         Scapular retraction         Low row gtb x 10 2 x 10 gtb X 20 gtb  X 15 gtb    row gtb x 10 2 x 10 rtb X 20 gtb  X 15 gtb    scaption         abduction         Forward punch         Supine shoulder flexion         Supine shoulder rhythmic stab         Shoulder press         Reach up to cupboard height 2# x 10 B 1# x 5; B 2# x 10 B  2# x 10 B    Wall slide X 10  X 10 X 10 X 10 X 10   Wall [place]     X 10 X 10                                  MANUAL THERAPY: ( 25 minutes)  Manual Therapy technique and location 4/6 4/11 4/13 4/20 4/25 4/27             - PROM B shoulder  - GH joint glides B shoulder  - gentle pressure to lat - PROM B shoulder  - GH joint glides B shoulder  _gentle subscap release - PROM B shoulder  - GH joint glides B shoulder  - gentle pressure to lat - PROM B shoulder  - GH joint glides B shoulder  - gentle pressure to lat - PROM B shoulder  - GH joint glides B shoulder  - gentle pressure to lat - PROM B shoulder  - GH joint glides B shoulder  - gentle pr     MODALITIES (  5  minutes):   - estim in conjunction with dry needling     Dry Needling ( 5 min):  - trigger point needling to B latissimus, B infraspinatus, B [place]; verbal consent given  [facility] Portal    Treatment/Session Summary:    · Response to Treatment: pt tolerated activities better than previously, improving strength. · Communication/Consultation:  None today  · Equipment provided today:  None today  · Recommendations/Intent for next treatment session: Next visit will focus on shoulder motion, strength, pain reduction.   Total Treatment Billable Duration:  15 min TE, 25 min manual therapy  PT Patient Time In/Time Out  Time In: 1345  Time Out: 1430    Future Appointments   Date Time Provider Bang Villareal   5/2/2022  8:00 AM [facility] LAB [facility]   5/2/2022  1:45 PM Nelson Whitmore, PT, DPT [facility]   5/4/2022  1:45 PM Nelson Capps, PT, DPT [facility]   5/9/2022  2:30 PM Nelson Whitmore, PT, DPT [facility]   5/11/2022  2:30 PM Mirna Whitmore, PT, [facility]   5/16/2022  2:30 PM Camilo Cross, [facility]   5/18/2022  2:30 PM Анна Gonzalez, PT, [facility]   10/11/2022  1:20 PM Haylee Green NP [facility]       Robyn Batres, PT, DPT

## 2022-05-02 ENCOUNTER — HOSPITAL ENCOUNTER (OUTPATIENT)
Dept: PHYSICAL THERAPY | Age: 77
Discharge: HOME | End: 2022-05-02
Payer: MEDICARE

## 2022-05-02 ENCOUNTER — HOSPITAL ENCOUNTER (OUTPATIENT)
Dept: PHYSICAL THERAPY | Age: 77
Setting detail: RECURRING SERIES
Discharge: HOME | End: 2022-05-05
Payer: MEDICARE

## 2022-05-02 PROCEDURE — 97110 THERAPEUTIC EXERCISES: CPT

## 2022-05-02 PROCEDURE — 97140 MANUAL THERAPY 1/> REGIONS: CPT

## 2022-05-02 PROCEDURE — 9990 CHARGE CONVERSION

## 2022-05-02 NOTE — PROGRESS NOTES
Guero Bustillo  : 1945  [place]  Secondary: [address] at [facility]  [address] , [address], [place] [address]  [phone]   [phone]      Visit Count:  20    OUTPATIENT PHYSICAL THERAPY: Daily Treatment Note 2022  ICD-10: Treatment Diagnosis: Pain in left shoulder (M25.512), Pain in right shoulder (M25.511)  Precautions/Allergies:   Latex; Latex, natural rubber; Maxzide [triamterene-hydrochlorothiazid]; Acyclovir; Citalopram hydrobromide; Epinephrine; Hydrochlorothiazide; Maxidex [dexamethasone]; Other medication; Pravastatin; Statins-hmg-coa reductase inhibitors; Tagamet [cimetidine]; and Valium [diazepam]   TREATMENT PLAN:  Effective Dates: 2022 TO 2022 (90 days). Frequency/Duration: 2 times a week for 90 Day(s) MEDICAL/REFERRING DIAGNOSIS:  Pain in left shoulder [M25.512]   DATE OF ONSET: a few weeks prior  REFERRING PHYSICIAN: Jennifer Logan NP MD Orders: evaluate and treat  Return MD Appointment: TBD       Pre-treatment Subjective Report:  Pt reports continuing improvements.  .   Pain: Initial:    /10 Post Session:   Pain:  2/10  Other:    Medications Last Reviewed:  2022    Updated Objective Findings:  None Today   TREATMENT:   THERAPEUTIC EXERCISE: (30 minutes):     22   Activity/Exercise                  B ER         Wand flexion Supine 10x UE [place]        Supine flexion Supine x 5 w/o wgt Supine x 10, 1#   Supine x 10, 2#  Supine x 10, 2#   Recumbent stepper         UBE Level 1 8'  Level 1 8'  Level 2,  Level 2,  Level 2,  Level 2, 4.4   PROM         Shoulder ER ytb standing x 10 ytb standing x 10  gtb standing x 10  ptb standing x 10   Shoulder IR ytb standing x 10 ytb standing x 10  gtb standing x 10  ptb standing x 10   Shoulder abd  10x w/o wgt     ytb x 10   Shoulder scaption 10x with cane        Pendulums         Scapular retraction Low row 2 x 10 gtb X 20 gtb  X 15 gtb  X 15 gtb   row 2 x 10 rtb X 20 gtb  X 15 gtb  X 15 gtb   scaption         abduction         Forward punch         Supine shoulder flexion         Supine shoulder rhythmic stab         Shoulder press         Reach up to cupboard height 1# x 5; B 2# x 10 B  2# x 10 B  2# x 10 B   Wall slide  X 10 X 10 X 10 X 10 X 10   Wall [place]    X 10 X 10 X 10   Bicep curl      4# x 10                         MANUAL THERAPY: ( 15 minutes)  Manual Therapy technique and location 4/20 4/25 4/27 5/2           - PROM B shoulder  - GH joint glides B shoulder  - gentle pressure to lat - PROM B shoulder  - GH joint glides B shoulder  - gentle pressure to lat - PROM B shoulder  - GH joint glides B shoulder  - gentle pr - PROM B shoulder  - GH joint glides B shoulder     MODALITIES (    minutes):   - estim in conjunction with dry needling     Dry Needling (  min):  - trigger point needling to B latissimus, B infraspinatus, B UT; verbal consent given  [employer] Portal    Treatment/Session Summary:    · Response to Treatment: pt tolerated activities better than previously, improving strength. · Communication/Consultation:  None today  · Equipment provided today:  None today  · Recommendations/Intent for next treatment session: Next visit will focus on shoulder motion, strength, pain reduction.   Total Treatment Billable Duration:  30 min TE, 15 min manual therapy  PT Patient Time In/Time Out  Time In: 1345  Time Out: 1445    Future Appointments   Date Time Provider Bang Villareal   5/4/2022  1:45 PM Flor Wilson, PT, DPT [facility]   5/9/2022  2:30 PM Graham Whitmore, PT, DPT [facility]   5/11/2022  2:30 PM Graham Whitmore, PT, DPT [facility]   5/16/2022  2:30 PM Jitendra Mosher, PT [facility]   5/18/2022  2:30 PM Graham Doshi, PT, DPT [facility]   10/11/2022  1:20 PM Harpal Nurse, NP [facility]       Chela Allen, PT, DPT

## 2022-05-03 PROBLEM — D69.6 THROMBOCYTOPENIA (HCC): Status: ACTIVE | Noted: 2022-05-03

## 2022-05-03 PROCEDURE — 96374 THER/PROPH/DIAG INJ IV PUSH: CPT

## 2022-05-03 PROCEDURE — 99284 EMERGENCY DEPT VISIT MOD MDM: CPT

## 2022-05-04 ENCOUNTER — HOSPITAL ENCOUNTER (EMERGENCY)
Age: 77
Discharge: HOME | End: 2022-05-04
Attending: EMERGENCY MEDICINE
Payer: MEDICARE

## 2022-05-04 ENCOUNTER — HOSPITAL ENCOUNTER (OUTPATIENT)
Dept: PHYSICAL THERAPY | Age: 77
Discharge: HOME | End: 2022-05-04
Payer: MEDICARE

## 2022-05-04 VITALS
OXYGEN SATURATION: 99 % | HEART RATE: 88 BPM | HEIGHT: 63 IN | TEMPERATURE: 98.6 F | BODY MASS INDEX: 31.71 KG/M2 | WEIGHT: 179 LBS | RESPIRATION RATE: 17 BRPM | DIASTOLIC BLOOD PRESSURE: 80 MMHG | SYSTOLIC BLOOD PRESSURE: 168 MMHG

## 2022-05-04 DIAGNOSIS — T67.9XXA HEAT EXPOSURE, INITIAL ENCOUNTER: ICD-10-CM

## 2022-05-04 DIAGNOSIS — F41.1 ANXIETY STATE: ICD-10-CM

## 2022-05-04 DIAGNOSIS — G44.209 ACUTE NON INTRACTABLE TENSION-TYPE HEADACHE: Primary | ICD-10-CM

## 2022-05-04 LAB
ANION GAP SERPL CALC-SCNC: 7 MMOL/L (ref 7–16)
BUN SERPL-MCNC: 17 MG/DL (ref 8–23)
CALCIUM SERPL-MCNC: 9.5 MG/DL (ref 8.3–10.4)
CHLORIDE SERPL-SCNC: 99 MMOL/L (ref 98–107)
CK SERPL-CCNC: 99 U/L (ref 21–215)
CO2 SERPL-SCNC: 28 MMOL/L (ref 21–32)
CREAT SERPL-MCNC: 0.73 MG/DL (ref 0.6–1)
GLUCOSE SERPL-MCNC: 123 MG/DL (ref 65–100)
MAGNESIUM SERPL-MCNC: 2 MG/DL (ref 1.8–2.4)
POTASSIUM SERPL-SCNC: 3.8 MMOL/L (ref 3.5–5.1)
SODIUM SERPL-SCNC: 134 MMOL/L (ref 136–145)

## 2022-05-04 PROCEDURE — 74011250637 HC RX REV CODE- 250/637: Performed by: EMERGENCY MEDICINE

## 2022-05-04 PROCEDURE — 97140 MANUAL THERAPY 1/> REGIONS: CPT

## 2022-05-04 PROCEDURE — 80048 BASIC METABOLIC PNL TOTAL CA: CPT

## 2022-05-04 PROCEDURE — 74011250636 HC RX REV CODE- 250/636: Performed by: EMERGENCY MEDICINE

## 2022-05-04 PROCEDURE — 83735 ASSAY OF MAGNESIUM: CPT

## 2022-05-04 PROCEDURE — 82550 ASSAY OF CK (CPK): CPT

## 2022-05-04 PROCEDURE — 9990 CHARGE CONVERSION

## 2022-05-04 RX ORDER — HYDRALAZINE HYDROCHLORIDE 20 MG/ML
20 INJECTION INTRAMUSCULAR; INTRAVENOUS ONCE
Status: COMPLETED | OUTPATIENT
Start: 2022-05-04 | End: 2022-05-04

## 2022-05-04 RX ORDER — ACETAMINOPHEN 500 MG
1000 TABLET ORAL
Status: COMPLETED | OUTPATIENT
Start: 2022-05-04 | End: 2022-05-04

## 2022-05-04 RX ADMIN — HYDRALAZINE HYDROCHLORIDE 20 MG: 20 INJECTION INTRAMUSCULAR; INTRAVENOUS at 00:47

## 2022-05-04 RX ADMIN — ACETAMINOPHEN 500 MG: 500 TABLET, FILM COATED ORAL at 00:47

## 2022-05-04 NOTE — PROGRESS NOTES
Reji Hernandez  : 1945  [place]  Secondary: [address] [facility] at [facility]  [address] , [address], [place] [address]  [phone]   [phone]      Visit Count:  21    OUTPATIENT PHYSICAL THERAPY: Daily Treatment Note 2022  ICD-10: Treatment Diagnosis: Pain in left shoulder (M25.512), Pain in right shoulder (M25.511)  Precautions/Allergies:   Latex; Latex, natural rubber; Maxzide [triamterene-hydrochlorothiazid]; Acyclovir; Citalopram hydrobromide; Epinephrine; Hydrochlorothiazide; Maxidex [dexamethasone]; Other medication; Pravastatin; Statins-hmg-coa reductase inhibitors; Tagamet [cimetidine]; and Valium [diazepam]   TREATMENT PLAN:  Effective Dates: 2022 TO 2022 (90 days). Frequency/Duration: 2 times a week for 90 Day(s) MEDICAL/REFERRING DIAGNOSIS:  Pain in left shoulder [M25.512]   DATE OF ONSET: a few weeks prior  REFERRING PHYSICIAN: Gus Jackson NP MD Orders: evaluate and treat  Return MD Appointment: TBD       Pre-treatment Subjective Report:  Pt spent a lot of last night in ER, was overheated from [facility] unit going out at home. Today feeling fatigued.     Pain: Initial:    /10 Post Session:   Pain:  2/10  Other:    Medications Last Reviewed:  2022    Updated Objective Findings:  None Today   TREATMENT:   THERAPEUTIC EXERCISE: ( minutes):     22 5   Activity/Exercise                  B ER         Wand flexion Supine 10x UE [place]        Supine flexion Supine x 5 w/o wgt Supine x 10, 1#   Supine x 10, 2#  Supine x 10, 2#   Recumbent stepper         UBE Level 1 8'  Level 1 8'  Level 2,  Level 2,  Level 2,  Level 2, 4.4   PROM         Shoulder ER ytb standing x 10 ytb standing x 10  gtb standing x 10  ptb standing x 10   Shoulder IR ytb standing x 10 ytb standing x 10  gtb standing x 10  ptb standing x 10   Shoulder abd  10x w/o wgt     ytb x 10   Shoulder scaption 10x with cane        Pendulums         Scapular retraction         Low row 2 x 10 gtb X 20 gtb  X 15 gtb  X 15 gtb   row 2 x 10 rtb X 20 gtb  X 15 gtb  X 15 gtb   scaption         abduction         Forward punch         Supine shoulder flexion         Supine shoulder rhythmic stab         Shoulder press         Reach up to cupboard height 1# x 5; B 2# x 10 B  2# x 10 B  2# x 10 B   Wall slide  X 10 X 10 X 10 X 10 X 10   Wall [place]    X 10 X 10 X 10   Bicep curl      4# x 10                         MANUAL THERAPY: ( 30 minutes)  Manual Therapy technique and location 4/20 4/25 4/27 5/2 5/4            - PROM B shoulder  - GH joint glides B shoulder  - gentle pressure to lat - PROM B shoulder  - GH joint glides B shoulder  - gentle pressure to lat - PROM B shoulder  - GH joint glides B shoulder  - gentle pr - PROM B shoulder  - GH joint glides B shoulder - PROM B shoulder  - GH joint glides B shoulder     MODALITIES (    minutes):   - estim in conjunction with dry needling     Dry Needling (  min):  - trigger point needling to B latissimus, B infraspinatus, B UT; verbal consent given  [facility] Portal    Treatment/Session Summary:    · Response to Treatment: pt was too tired for exercises today, focused on passive stretches. · Communication/Consultation:  None today  · Equipment provided today:  None today  · Recommendations/Intent for next treatment session: Next visit will focus on shoulder motion, strength, pain reduction.   Total Treatment Billable Duration:  30  manual therapy  PT Patient Time In/Time Out  Time In: 1350  Time Out: 1052    Future Appointments   Date Time Provider Bang Villareal   5/9/2022  2:30 PM [facility]   5/11/2022  2:30 PM Cuffey, Severiano Roch, PT, [facility]   5/16/2022  2:30 PM [facility]   5/17/2022  3:00 PM Roberto Mcguire NP [facility]   5/18/2022  2:30 PM Cuffey, Severiano Roch, PT, DPT [facility]   6/7/2022  1:45 PM Alvarez Rosales MD [facility]   10/11/2022  1:20 PM [facility]arden [facility]

## 2022-05-04 NOTE — ED NOTES
I have reviewed discharge instructions with the patient. The patient verbalized understanding. Patient left ED via Discharge Method: ambulatory to Home with self. Opportunity for questions and clarification provided. Patient given 0 scripts. To continue your aftercare when you leave the hospital, you may receive an automated call from our care team to check in on how you are doing. This is a free service and part of our promise to provide the best care and service to meet your aftercare needs.  If you have questions, or wish to unsubscribe from this service please call [phone]. Thank you for Choosing our [address] [place] [address] Emergency Department.

## 2022-05-04 NOTE — ED TRIAGE NOTES
Pt states she started experiencing a headache while at home after her [facility] went out today and the thermostat read 88 degrees in her home and she states she felt \"overheated\".

## 2022-05-04 NOTE — DISCHARGE INSTRUCTIONS
Stay somewhere cool tonight  Take your Ativan once you are done driving for the evening  Follow-up with your family doctor  Return to the ER if worse in any way

## 2022-05-04 NOTE — ED PROVIDER NOTES
Chief complaint : Headache    HISTORY OF PRESENT ILLNESS :  Location : Diffuse    Quality : Throbbing    Quantity : Constant    Timing : 1 to 2 hours ago    Severity : Nearly resolved since arriving here    Context : Laid down to go to sleep, close the windows in her house and trying to run the air conditioner but it was not working  Patient awoke to house temperature of 88 degrees which worried her    Alleviating / exacerbating factors : Heat  Patient is already taken her blood pressure pills for the day    Associated Symptoms : Anxious but no chest pain or dyspnea             Past Medical History:   Diagnosis Date    AAA (abdominal aortic aneurysm) (HCC)     infrarenal 3.2 cm    Anxiety     Chronic fatigue     Chronic gastritis 2019    with intestinal metaplasia, repeat EGD 2022    Colon polyps     Repeat colonoscopy 2022    Diverticulosis     Essential hypertension     GERD (gastroesophageal reflux disease)     Hyperlipidemia     Mild aortic stenosis     Prediabetes     Vitamin D deficiency        Past Surgical History:   Procedure Laterality Date    HX BREAST BIOPSY  3/14/2014    HX BREAST LUMPECTOMY Left 3/14/2014    fibrocystic mastopathy, intraductal hyperplasia, apocrine metaplasia    HX CATARACT REMOVAL Bilateral 2018    HX CATARACT REMOVAL Bilateral 2018    HX  SECTION      x 2    HX CHOLECYSTECTOMY      HX COLONOSCOPY  2019    HX HYSTERECTOMY      HX OTHER SURGICAL  2015    Renal artery scan-normal    US GUIDED CORE BREAST BIOPSY Left     Benign         Family History:   Problem Relation Age of Onset    Kidney Disease Mother     Stroke Father     Diabetes Father     Breast Cancer Sister 68    Breast Cancer Sister 76    Diabetes Sister     Diabetes Sister     Heart Disease Sister        Social History     Socioeconomic History    Marital status:      Spouse name: Not on file    Number of children: 1    Years of education: Not on file  Highest education level: Not on file   Occupational History    Occupation: Retired Nurse, [employer]   Tobacco Use    Smoking status: Former Smoker     Packs/day: 0.75     Years: 10.00     Pack years: 7.50     Types: Cigarettes     Quit date: 1999     Years since quittin.7    Smokeless tobacco: Never Used   Vaping Use    Vaping Use: Never used   Substance and Sexual Activity    Alcohol use: No     Alcohol/week: 0.0 standard drinks    Drug use: No    Sexual activity: Not Currently   Other Topics Concern    Not on file   Social History Narrative    Currently lives alone (daughter moved to [place], professor at [employer], 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 Social Determinants of Health     Financial Resource Strain:     Difficulty of Paying Living Expenses: Not on file   Food Insecurity:     Worried About Running Out of Food in the Last Year: Not on file    Claire of Food in the Last Year: Not on file   Transportation Needs:     Lack of Transportation (Medical): Not on file    Lack of Transportation (Non-Medical):  Not on file   Physical Activity:     Days of Exercise per Week: Not on file    Minutes of Exercise per Session: Not on file   Stress:     Feeling of Stress : Not on file   Social Connections:     Frequency of Communication with Friends and Family: Not on file    Frequency of Social Gatherings with Friends and Family: Not on file    Attends Jehovah's witness Services: Not on file    Active Member of [address] [place] [address] or Organizations: Not on file    Attends Club or Organization Meetings: Not on file    Marital Status: Not on file   Intimate Partner Violence:     Fear of Current or Ex-Partner: Not on file    Emotionally Abused: Not on file    Physically Abused: Not on file    Sexually Abused: Not on file   Housing Stability:     Unable to Pay for Housing in the Last Year: Not on file    Number of [place] in the Last Year: Not on file    Unstable Housing in the Last Year: Not on file ALLERGIES: Latex; Latex, natural rubber; Maxzide [triamterene-hydrochlorothiazid]; Acyclovir; Citalopram hydrobromide; Epinephrine; Hydrochlorothiazide; Maxidex [dexamethasone]; Other medication; Pravastatin; Statins-hmg-coa reductase inhibitors; Tagamet [cimetidine]; and Valium [diazepam]    Review of Systems   Constitutional: Negative for chills and fever. HENT: Negative for rhinorrhea and sore throat. Eyes: Negative for discharge and redness. Respiratory: Negative for cough and shortness of breath. Cardiovascular: Negative for chest pain and palpitations. Gastrointestinal: Negative for abdominal pain, diarrhea, nausea and vomiting. Genitourinary: Negative for difficulty urinating and dysuria. Musculoskeletal: Negative for arthralgias and back pain. Skin: Negative for rash. Neurological: Positive for headaches. Negative for dizziness. Psychiatric/Behavioral: The patient is nervous/anxious. All other systems reviewed and are negative. Vitals:    05/04/22 0006 05/04/22 0009 05/04/22 0049 05/04/22 0110   BP: (!) 196/93  (!) 182/83 (!) 168/73   Pulse: 85  76 91   Resp: 17      Temp: 98.6 °F (37 °C)      SpO2: 100% 99% 98% 100%   Weight: 81.2 kg (179 lb)      Height: 5' 3\" (1.6 m)               Physical Exam  Vitals and nursing note reviewed. Constitutional:       General: She is not in acute distress. Appearance: Normal appearance. She is well-developed. She is not ill-appearing, toxic-appearing or diaphoretic. HENT:      Head: Normocephalic and atraumatic. Right Ear: External ear normal.      Left Ear: External ear normal.      Mouth/Throat:      Mouth: Mucous membranes are moist.      Pharynx: Oropharynx is clear. No oropharyngeal exudate or posterior oropharyngeal erythema. Eyes:      General: No scleral icterus. Right eye: No discharge. Left eye: No discharge. Extraocular Movements: Extraocular movements intact.       Conjunctiva/sclera: Conjunctivae normal.      Pupils: Pupils are equal, round, and reactive to light. Neck:      Thyroid: No thyromegaly. Trachea: Trachea normal.   Cardiovascular:      Rate and Rhythm: Normal rate and regular rhythm. Heart sounds: Murmur heard. No gallop. Pulmonary:      Effort: Pulmonary effort is normal. No respiratory distress. Breath sounds: Normal breath sounds. No wheezing or rales. Abdominal:      General: Bowel sounds are normal.      Palpations: Abdomen is soft. There is no hepatomegaly, splenomegaly or pulsatile mass. Tenderness: There is no abdominal tenderness. There is no guarding. Musculoskeletal:         General: Normal range of motion. Cervical back: Normal range of motion and neck supple. Normal range of motion. Lymphadenopathy:      Cervical: No cervical adenopathy. Skin:     General: Skin is warm and dry. Neurological:      General: No focal deficit present. Mental Status: She is alert and oriented to person, place, and time. Mental status is at baseline. Motor: No abnormal muscle tone. Comments: cni 2-12 grossly   Psychiatric:         Mood and Affect: Mood normal.         Behavior: Behavior normal.          MDM  Number of Diagnoses or Management Options  Acute non intractable tension-type headache  Anxiety state  Heat exposure, initial encounter  Diagnosis management comments: Medical decision making note:  Initially improving headache after an anxiety spell triggered by her hot house with a dysfunctional air-conditioning  Feeling better since arriving to the ER, labs look good to include CPK, electrolytes, kidney functions  Patient is going to get a hotel room tonight and take her Ativan for the evening once she gets there  This concludes the \"medical decision making note\" part of this emergency department visit note.          Amount and/or Complexity of Data Reviewed  Clinical lab tests: reviewed and ordered (Results Include:    Recent Results (from the past 24 hour(s))  -MAGNESIUM:   Collection Time: 05/04/22 12:19 AM       Result                      Value             Ref Range           Magnesium                   2.0               1.8 - 2.4 mg*  -METABOLIC PANEL, BASIC:   Collection Time: 05/04/22 12:19 AM       Result                      Value             Ref Range           Sodium                      134 (L)           136 - 145 mm*       Potassium                   3.8               3.5 - 5.1 mm*       Chloride                    99                98 - 107 mmo*       CO2                         28                21 - 32 mmol*       Anion gap                   7                 7 - 16 mmol/L       Glucose                     123 (H)           65 - 100 mg/*       BUN                         17                8 - 23 MG/DL        Creatinine                  0.73              0.6 - 1.0 MG*       GFR est AA                  >60               >60 ml/min/1*       GFR est non-AA              >60               >60 ml/min/1*       Calcium                     9.5               8.3 - 10.4 M*  -CK:   Collection Time: 05/04/22 12:19 AM       Result                      Value             Ref Range           CK                          99                21 - 215 U/L   )  Decide to obtain previous medical records or to obtain history from someone other than the patient: yes    Risk of Complications, Morbidity, and/or Mortality  Presenting problems: moderate  Diagnostic procedures: low  Management options: low    Patient Progress  Patient progress: improved         Procedures

## 2022-05-09 ENCOUNTER — HOSPITAL ENCOUNTER (OUTPATIENT)
Dept: PHYSICAL THERAPY | Age: 77
Discharge: HOME | End: 2022-05-09
Payer: MEDICARE

## 2022-05-09 PROCEDURE — 97140 MANUAL THERAPY 1/> REGIONS: CPT

## 2022-05-09 PROCEDURE — 97110 THERAPEUTIC EXERCISES: CPT

## 2022-05-09 PROCEDURE — 9990 CHARGE CONVERSION

## 2022-05-09 NOTE — PROGRESS NOTES
Diego Beauchamp  : 1945  [place]  Secondary: [facility] at [address] , [address], [place] [address]  [phone]   [phone]      Visit Count:  22    OUTPATIENT PHYSICAL THERAPY: Daily Treatment Note 2022  ICD-10: Treatment Diagnosis: Pain in left shoulder (M25.512), Pain in right shoulder (M25.511)  Precautions/Allergies:   Latex; Latex, natural rubber; Maxzide [triamterene-hydrochlorothiazid]; Acyclovir; Citalopram hydrobromide; Epinephrine; Hydrochlorothiazide; Maxidex [dexamethasone]; Other medication; Pravastatin; Statins-hmg-coa reductase inhibitors; Tagamet [cimetidine]; and Valium [diazepam]   TREATMENT PLAN:  Effective Dates: 2022 TO 2022 (90 days). Frequency/Duration: 2 times a week for 90 Day(s) MEDICAL/REFERRING DIAGNOSIS:  Pain in left shoulder [M25.512]   DATE OF ONSET: a few weeks prior  REFERRING PHYSICIAN: Calos Abel NP MD Orders: evaluate and treat  Return MD Appointment: TBD       Pre-treatment Subjective Report:  Pt continues to improve symptoms.    Pain: Initial:    /10 Post Session:   Pain:  2/10  Other:    Medications Last Reviewed:  2022    Updated Objective Findings:  None Today   TREATMENT:   THERAPEUTIC EXERCISE: (30 minutes):        Activity/Exercise                  B ER         Wand flexion         Supine flexion Supine x 10, 1#   Supine x 10, 2#  Supine x 10, 2# Supine x 10, 2#   Recumbent stepper         UBE Level 1 8'  Level 2, / Level 2,  Level 2,  Level 2, 4.4 Level 2,    PROM         Shoulder ER ytb standing x 10  gtb standing x 10  ptb standing x 10 ptb standing x 20   Shoulder IR ytb standing x 10  gtb standing x 10  ptb standing x 10 ptb standing x 20   Shoulder abd      ytb x 10    Shoulder scaption         Pendulums         Scapular retraction         Low row X 20 gtb  X 15 gtb  X 15 gtb X 20 gtb   row X 20 gtb  X 15 gtb  X 15 gtb X 20 gtb   scaption         abduction         Forward punch         Supine shoulder flexion         Supine shoulder rhythmic stab         Shoulder press         Reach up to cupboard height 2# x 10 B  2# x 10 B  2# x 10 B 2# x 5 B   Wall slide X 10 X 10 X 10 X 10 X 10 X 10   Wall [place]   X 10 X 10 X 10 X 10   Bicep curl     4# x 10 3# x 10                         MANUAL THERAPY: ( 15 minutes)  Manual Therapy technique and location 4/25 4/27 5/2 5/4 5/9            - PROM B shoulder  - GH joint glides B shoulder  - gentle pressure to lat - PROM B shoulder  - GH joint glides B shoulder  - gentle pr - PROM B shoulder  - GH joint glides B shoulder - PROM B shoulder  - GH joint glides B shoulder - PROM B shoulder  - GH joint glides B shoulder     MODALITIES (    minutes):   - estim in conjunction with dry needling     Dry Needling (  min):  - trigger point needling to B latissimus, B infraspinatus, B UT; verbal consent given  [facility] Portal    Treatment/Session Summary:    · Response to Treatment: pt is improving in activities tolerated. · Communication/Consultation:  None today  · Equipment provided today:  None today  · Recommendations/Intent for next treatment session: Next visit will focus on shoulder motion, strength, pain reduction.   Total Treatment Billable Duration:  15  manual therapy, 30 min therex  PT Patient Time In/Time Out  Time In: 1430  Time Out: 9740    Future Appointments   Date Time Provider Bang Villareal   5/11/2022  2:30 PM [facility]   5/16/2022  2:30 PM Bret [facility]ulton, [facility]   5/17/2022  3:00 PM Lacy Keith NP [facility]   5/18/2022  2:30 PM Braydon Whitmore, PT, [facility]       Harshad Gandara, PT, DPT

## 2022-05-11 ENCOUNTER — HOSPITAL ENCOUNTER (OUTPATIENT)
Dept: PHYSICAL THERAPY | Age: 77
Discharge: HOME | End: 2022-05-11
Payer: MEDICARE

## 2022-05-11 NOTE — PROGRESS NOTES
Kashmirjohnson Rosangela  : 1945  [place]  Secondary: [address] at [facility] [address], [address] [address], [facility] [address]  [phone]   [phone]      Lorri Adames cancelled PT appointment today.     Destinee Leo, PT, DPT

## 2022-05-14 ENCOUNTER — HOSPITAL ENCOUNTER (EMERGENCY)
Age: 77
Discharge: HOME | End: 2022-05-14
Attending: EMERGENCY MEDICINE
Payer: MEDICARE

## 2022-05-14 VITALS
DIASTOLIC BLOOD PRESSURE: 74 MMHG | HEIGHT: 62 IN | SYSTOLIC BLOOD PRESSURE: 149 MMHG | RESPIRATION RATE: 16 BRPM | TEMPERATURE: 97.3 F | OXYGEN SATURATION: 99 % | HEART RATE: 77 BPM | BODY MASS INDEX: 32.94 KG/M2 | WEIGHT: 179 LBS

## 2022-05-14 DIAGNOSIS — L50.9 URTICARIA: Primary | ICD-10-CM

## 2022-05-14 PROCEDURE — 96374 THER/PROPH/DIAG INJ IV PUSH: CPT

## 2022-05-14 PROCEDURE — 96375 TX/PRO/DX INJ NEW DRUG ADDON: CPT

## 2022-05-14 PROCEDURE — 74011250636 HC RX REV CODE- 250/636

## 2022-05-14 PROCEDURE — 74011000250 HC RX REV CODE- 250

## 2022-05-14 PROCEDURE — 99284 EMERGENCY DEPT VISIT MOD MDM: CPT

## 2022-05-14 RX ORDER — HYDROCORTISONE 25 MG/ML
LOTION TOPICAL 2 TIMES DAILY
Qty: 60 ML | Refills: 0 | Status: SHIPPED | OUTPATIENT
Start: 2022-05-14 | End: 2022-05-21

## 2022-05-14 RX ORDER — SODIUM CHLORIDE 0.9 % (FLUSH) 0.9 %
5-40 SYRINGE (ML) INJECTION EVERY 8 HOURS
Status: DISCONTINUED | OUTPATIENT
Start: 2022-05-14 | End: 2022-05-15 | Stop reason: HOSPADM

## 2022-05-14 RX ORDER — DIPHENHYDRAMINE HYDROCHLORIDE 50 MG/ML
12.5 INJECTION, SOLUTION INTRAMUSCULAR; INTRAVENOUS
Status: COMPLETED | OUTPATIENT
Start: 2022-05-14 | End: 2022-05-14

## 2022-05-14 RX ORDER — SODIUM CHLORIDE 0.9 % (FLUSH) 0.9 %
5-40 SYRINGE (ML) INJECTION AS NEEDED
Status: DISCONTINUED | OUTPATIENT
Start: 2022-05-14 | End: 2022-05-15 | Stop reason: HOSPADM

## 2022-05-14 RX ORDER — LABETALOL HYDROCHLORIDE 5 MG/ML
10 INJECTION, SOLUTION INTRAVENOUS ONCE
Status: COMPLETED | OUTPATIENT
Start: 2022-05-14 | End: 2022-05-14

## 2022-05-14 RX ADMIN — FAMOTIDINE 20 MG: 10 INJECTION, SOLUTION INTRAVENOUS at 18:03

## 2022-05-14 RX ADMIN — DIPHENHYDRAMINE HYDROCHLORIDE 12.5 MG: 50 INJECTION, SOLUTION INTRAMUSCULAR; INTRAVENOUS at 18:03

## 2022-05-14 RX ADMIN — SODIUM CHLORIDE, PRESERVATIVE FREE 5 ML: 5 INJECTION INTRAVENOUS at 17:50

## 2022-05-14 RX ADMIN — LABETALOL HYDROCHLORIDE 10 MG: 5 INJECTION INTRAVENOUS at 20:37

## 2022-05-14 RX ADMIN — SODIUM CHLORIDE 1000 ML: 900 INJECTION, SOLUTION INTRAVENOUS at 18:07

## 2022-05-14 NOTE — ED PROVIDER NOTES
69-year-old female history of anxiety, hypertension, AAA, dyslipidemia presents emergency department with chief complaint of itching all over that began at 0530 this morning. States she woke up this morning and had itching that started upper arms and back and spread all over her body. Patient has been rubbing topical Benadryl on itchy spots with 0 relief. Patient denies swelling of tongue, lips, face. Patient denies shortness of breath, chest pain, wheezing, stridor, nausea/vomiting/diarrhea. Patient states she has not had any changes to laundry detergent, shampoos, lotions. States she did eat at a different restaurant yesterday and had some fried cod. The history is provided by the patient. No  was used. Allergic Reaction  This is a new problem. The current episode started 6 to 12 hours ago. The problem occurs constantly. The problem has not changed since onset. Pertinent negatives include no chest pain, no abdominal pain, no headaches and no shortness of breath. Nothing aggravates the symptoms. Nothing relieves the symptoms. Treatments tried: Topical Benadryl. The treatment provided no relief.         Past Medical History:   Diagnosis Date    AAA (abdominal aortic aneurysm) (HCC)     infrarenal 3.2 cm    Anxiety     Chronic fatigue     Chronic gastritis 2019    with intestinal metaplasia, repeat EGD 2022    Colon polyps     Repeat colonoscopy 2022    Diverticulosis     Essential hypertension     GERD (gastroesophageal reflux disease)     Hyperlipidemia     Mild aortic stenosis     Prediabetes     Vitamin D deficiency        Past Surgical History:   Procedure Laterality Date    HX BREAST BIOPSY  3/14/2014    HX BREAST LUMPECTOMY Left 3/14/2014    fibrocystic mastopathy, intraductal hyperplasia, apocrine metaplasia    HX CATARACT REMOVAL Bilateral 2018    HX CATARACT REMOVAL Bilateral 2018    HX  SECTION      x 2    HX CHOLECYSTECTOMY      HX COLONOSCOPY  2019    HX HYSTERECTOMY      HX OTHER SURGICAL  2015    Renal artery scan-normal    US GUIDED CORE BREAST BIOPSY Left 2014    Benign         Family History:   Problem Relation Age of Onset    Kidney Disease Mother     Stroke Father     Diabetes Father     Breast Cancer Sister 68    Breast Cancer Sister 76    Diabetes Sister     Diabetes Sister     Heart Disease Sister        Social History     Socioeconomic History    Marital status:      Spouse name: Not on file    Number of children: 1    Years of education: Not on file    Highest education level: Not on file   Occupational History    Occupation: Retired Nurse, [employer]   Tobacco Use    Smoking status: Former Smoker     Packs/day: 0.75     Years: 10.00     Pack years: 7.50     Types: Cigarettes     Quit date: 1999     Years since quittin.7    Smokeless tobacco: Never Used   Vaping Use    Vaping Use: Never used   Substance and Sexual Activity    Alcohol use: No     Alcohol/week: 0.0 standard drinks    Drug use: No    Sexual activity: Not Currently   Other Topics Concern    Not on file   Social History Narrative    Currently lives alone (daughter moved to [place], professor at [employer], teaching [employer] students) 2021. Social Determinants of Health     Financial Resource Strain:     Difficulty of Paying Living Expenses: Not on file   Food Insecurity:     Worried About Running Out of Food in the Last Year: Not on file    Claire of Food in the Last Year: Not on file   Transportation Needs:     Lack of Transportation (Medical): Not on file    Lack of Transportation (Non-Medical):  Not on file   Physical Activity:     Days of Exercise per Week: Not on file    Minutes of Exercise per Session: Not on file   Stress:     Feeling of Stress : Not on file   Social Connections:     Frequency of Communication with Friends and Family: Not on file    Frequency of Social Gatherings with Friends and Family: Not on file    Attends Gnosticism Services: Not on file    Active Member of Clubs or Organizations: Not on file    Attends Club or Organization Meetings: Not on file    Marital Status: Not on file   Intimate Partner Violence:     Fear of Current or Ex-Partner: Not on file    Emotionally Abused: Not on file    Physically Abused: Not on file    Sexually Abused: Not on file   Housing Stability:     Unable to Pay for Housing in the Last Year: Not on file    Number of [place] in the Last Year: Not on file    Unstable Housing in the Last Year: Not on file         ALLERGIES: Latex; Latex, natural rubber; Maxzide [triamterene-hydrochlorothiazid]; Acyclovir; Citalopram hydrobromide; Epinephrine; Hydrochlorothiazide; Maxidex [dexamethasone]; Other medication; Pravastatin; Statins-hmg-coa reductase inhibitors; Tagamet [cimetidine]; and Valium [diazepam]    Review of Systems   Constitutional: Negative for chills and fever. Respiratory: Negative for shortness of breath. Cardiovascular: Negative for chest pain. Gastrointestinal: Negative for abdominal pain, diarrhea, nausea and vomiting. Genitourinary: Negative for genital sores. Skin: Positive for rash. Itching all over body   Neurological: Negative for headaches. All other systems reviewed and are negative. Vitals:    05/14/22 1712   BP: (!) 182/96   Pulse: 74   Resp: 16   Temp: 97.3 °F (36.3 °C)   SpO2: 100%   Weight: 81.2 kg (179 lb)   Height: 5' 2\" (1.575 m)            Physical Exam  Vitals and nursing note reviewed. Constitutional:       General: She is not in acute distress. Appearance: Normal appearance. She is obese. She is not ill-appearing, toxic-appearing or diaphoretic. Comments: Patient is anxious   HENT:      Head: Normocephalic and atraumatic. Nose: Nose normal.      Mouth/Throat:      Lips: Pink. Mouth: Mucous membranes are moist. No angioedema. Pharynx: Oropharynx is clear. Uvula midline.  No pharyngeal swelling, oropharyngeal exudate, posterior oropharyngeal erythema or uvula swelling. Eyes:      General: No scleral icterus. Extraocular Movements: Extraocular movements intact. Conjunctiva/sclera: Conjunctivae normal.      Pupils: Pupils are equal, round, and reactive to light. Cardiovascular:      Rate and Rhythm: Normal rate. Pulses: Normal pulses. Pulmonary:      Effort: Pulmonary effort is normal.      Breath sounds: Normal breath sounds. Abdominal:      General: Bowel sounds are normal.      Palpations: Abdomen is soft. Tenderness: There is no abdominal tenderness. Musculoskeletal:         General: No swelling, tenderness, deformity or signs of injury. Normal range of motion. Cervical back: Normal range of motion and neck supple. Skin:     General: Skin is warm and dry. Capillary Refill: Capillary refill takes less than 2 seconds. Coloration: Skin is not jaundiced or pale. Findings: Rash (Diffuse urticaria upper extremities, back abdomen, thighs and buttocks) present. No bruising, erythema or lesion. Neurological:      General: No focal deficit present. Mental Status: She is alert and oriented to person, place, and time. Psychiatric:         Mood and Affect: Mood normal.         Behavior: Behavior normal.         Thought Content: Thought content normal.         Judgment: Judgment normal.          MDM  Number of Diagnoses or Management Options  Urticaria  Diagnosis management comments: Vital signs reviewed, patient stable, NAD, afebrile, O2 saturation 100% on room air    On physical exam patient has diffuse urticaria over her back, chest bilateral upper extremities, bilateral thighs and buttocks  No swelling of lips or tongue, no stridor or wheezing, lung sounds clear to auscultation bilaterally    Based on history and physical exam patient likely having allergic reaction. States that she did not change any detergent or soaps.   Patient states she did eat at a new restaurant yesterday evening for dinner and then woke up this morning at 530 with diffuse itching. Patient was administered IV Benadryl and Pepcid. Itching completely resolved. There is still some wheals present although less prominent. Patient's blood pressures been elevated throughout her course at the ER. Patient very concerned about her blood pressure due to AAA. Patient took her regular blood pressure medication at 7 PM and blood pressure still elevated at 177/78 at 8:32 PM administered 10 mg of labetalol and recheck blood pressure. Recheck of blood pressure 149/74. Patient states that she is feeling much improved and is ready to go home at this time. Her son is going to drive her home    I discussed physical exam findings, treatment options and follow-up with the patient. I answered any questions that the patient had. Patient verbalized that she understood and was in agreement with treatment plan. Discussed with patient the signs and symptoms that would warrant a prompt return to the emergency department. I included these signs and symptoms on patient's discharge paperwork. Pt Verbalized that theyunderstood. Patient discharged home in improved condition. Her son is going to drive her home since she is groggy due to IV Benadryl. To follow-up with her primary care provider. Wrote patient a prescription for topical hydrocortisone for any possible residual itching that may occur. Informed patient that if she has repeat diffuse itching she can take oral Benadryl. Samantha Medina; 5/14/2022 @8:51 PM Voice dictation software was used during the making of this note. This software is not perfect and grammatical and other typographical errors may be present.   This note has not been proofread for errors.  ===================================================================          Risk of Complications, Morbidity, and/or Mortality  Presenting problems: low  Diagnostic procedures: low  Management options: low    Patient Progress  Patient progress: improved    ED Course as of 05/14/22 2049   Sat May 14, 2022   1840 Patient states itching has subsided completely. States she is groggy after administration of Benadryl [JG]   1940 Patient states she took 12.5 mg of carvedilol and 5 mg of Norvasc, her normal blood pressure medication at 7:00. Elevated at this time and is worried about this. Inform patient that I would like to give her medication a little time to work before giving her anything for her blood pressure at this time in the ER. Patient states she is agreeable to this. Patient's son and granddaughter are currently accompanying her in the ER and her son will drive her home today. [JG]   2016 Blood pressure still elevated at 177/78. Patient still concerned about the elevated blood pressure. Instructed patient I will administer 10 mg of labetalol and we will recheck blood pressure. Patient is happy at that time we will discharge her home. Her son is going to drive the patient home. [JG]   2049 Patient's blood pressure greatly improved. Currently 149/74. Patient ready to go home at this time.  [JG]      ED Course User Index  [JG] Logan Christensen, [address]       Procedures

## 2022-05-14 NOTE — ED TRIAGE NOTES
Pt c/o hives to trunk, both legs and back that started today. Pt states she has used benadryl cream, denies taking oral benadryl. Pt states she slept in a new gown last night, pt states she washed it prior to wearing it. Pt denies new detergent, soap or lotion.

## 2022-05-15 NOTE — DISCHARGE INSTRUCTIONS
You were evaluated in the emergency department today for diffuse itching (hives)  This is likely a reaction to something that you ate. Is encouraging that itching was relieved after IV Benadryl and Pepcid. I have written you prescription for hydrocortisone cream for any areas of itching that may occur over the next day or 2.     Please follow-up with your primary care provider    Emergency department if you have swelling of your lips, tongue, wheezing, shortness of breath, chest pain, general worsening of your condition, signs and symptoms of stroke as listed in your discharge paperwork

## 2022-05-15 NOTE — ED NOTES
I have reviewed discharge instructions with the patient. The patient verbalized understanding. Patient left ED via Discharge Method: ambulatory to Home with son. Opportunity for questions and clarification provided. Patient given 1 scripts. To continue your aftercare when you leave the hospital, you may receive an automated call from our care team to check in on how you are doing. This is a free service and part of our promise to provide the best care and service to meet your aftercare needs.  If you have questions, or wish to unsubscribe from this service please call [phone]. Thank you for Choosing our [facility] Emergency Department.

## 2022-05-16 ENCOUNTER — HOSPITAL ENCOUNTER (OUTPATIENT)
Dept: PHYSICAL THERAPY | Age: 77
Discharge: HOME | End: 2022-05-16
Payer: MEDICARE

## 2022-05-16 NOTE — PROGRESS NOTES
Georgia Hurt  : 1945  [place]  Secondary: [address] [facility] [place] [address], [address], [address]  [phone]   [phone]      OUTPATIENT DAILY NOTE    NAME/AGE/GENDER: [place] Hurt is a 67 y.o. female. DATE: 2022    Ms. Albina Fields for today's appointment due to having AC fixed.     Florence Fiore, PT   Future Appointments   Date Time Provider [place]   2022  2:30 PM Alexei Vasquez, [facility]   2022  3:00 PM Zoë Hogan NP [facility]   2022  2:30 PM Vinny Whitmore, PT, DPT [facility]

## 2022-05-17 PROBLEM — K29.50 CHRONIC GASTRITIS WITHOUT BLEEDING: Status: ACTIVE | Noted: 2022-05-17

## 2022-05-25 NOTE — PROGRESS NOTES
Tamiko Blocker  : 1945  Primary: Medicare Part [place]  [facility]  [address] [facility] [address]  Phone: [phone]  Fax: [phone] No data recorded  No data recorded    PT Visit Info:    No data recorded    OUTPATIENT PHYSICAL THERAPY:OP NOTE TYPE: Treatment Note 2022       Episode   Appt Desk       Treatment Diagnosis:  Pain in Left Shoulder (M25.512)  Pain in Right Shoulder (M25.511)  Medical/Referring Diagnosis:  Pain in left shoulder [M25.512]  Referring Physician:  CYNTHIA Crawley MD Orders:  PT Eval and Treat   Date of Onset:  No data recorded   Allergies:  Latex, Hydrochlorothiazide w-triamterene, Acyclovir, Cimetidine, Citalopram hydrobromide, Dexamethasone, Epinephrine, Hydrochlorothiazide, Pravastatin, and Diazepam  Restrictions/Precautions:    No data recordedNo data recorded   Interventions Planned (Treatment may consist of any combination of the following):    No data recorded   Subjective Comments: pt reports low levels of pain recently but shoulders have felt stiff. Difficult combing hair with R UE. Unable to attend PT recently due to air conditioning being out at the house. Initial:      /10 Post Session:      /10  Medications Last Reviewed:  2022  Updated Objective Findings:  None Today  Treatment   THERAPEUTIC EXERCISE: (20 minutes):    Exercises per grid below to improve mobility and strength. Date:   Date:   Date:     Activity/Exercise Parameters Parameters Parameters   ube 4/4 level 1     Supine flexion 2# x 10     row gtb x 10     Low row Gtb x 10     t's gtb x 10     Bicep curl 5# x 10     Wall slide into flexion X 10     Wall circles X 10     Supine hand behind head X 5             MANUAL THERAPY: (25 minutes):   Joint mobilization and Soft tissue mobilization was utilized and necessary because of the patient's restricted joint motion and restricted motion of soft tissue.    - prom B shoulder  - inferior and posterior GH glides B     Modalities (10 min): for pain reduction  - Gameready L shoulder medium compression    Treatment/Session Summary:    · Treatment Assessment: pt demonstrated improved movement following manual therapy     · Communication/Consultation:  None today  · Equipment provided today:  None  · Recommendations/Intent for next treatment session: Next visit will focus on shoulder stretching, strengthening. Total Treatment Billable Duration:  55 minutes  Time In: 6854  Time Out: 420 Song Hayes PT       Post Session Pain  Charge Capture  [employer] Portal  MD Guidelines  Scanned Media  Benefits  MyChart

## 2022-05-26 ENCOUNTER — HOSPITAL ENCOUNTER (OUTPATIENT)
Dept: PHYSICAL THERAPY | Age: 77
Setting detail: RECURRING SERIES
Discharge: HOME | End: 2022-05-29
Payer: MEDICARE

## 2022-05-26 PROCEDURE — 97110 THERAPEUTIC EXERCISES: CPT

## 2022-05-26 PROCEDURE — 97140 MANUAL THERAPY 1/> REGIONS: CPT

## 2022-05-27 NOTE — PROGRESS NOTES
I am accessing Ms. Sanchez's chart as a part of our department's internal chart auditing process. I certify that Ms. Andrew Abbasi is, or was, a patient in our department.   Thank you,  Chalo Abad, PT  5/27/2022

## 2022-05-31 ENCOUNTER — HOSPITAL ENCOUNTER (OUTPATIENT)
Dept: PHYSICAL THERAPY | Age: 77
Setting detail: RECURRING SERIES
Discharge: HOME | End: 2022-06-03
Payer: MEDICARE

## 2022-05-31 PROCEDURE — 97140 MANUAL THERAPY 1/> REGIONS: CPT

## 2022-05-31 PROCEDURE — 97110 THERAPEUTIC EXERCISES: CPT

## 2022-05-31 ASSESSMENT — PAIN SCALES - GENERAL: PAINLEVEL_OUTOF10: 1

## 2022-05-31 NOTE — PROGRESS NOTES
10: B    Wall slide into flexion X 10 10x; B    Wall circles X 10 10x    Supine hand behind head X 5 5x left    shrugs  5# x 10; B    Isometric ER/IR  -Manual 10x  -GTB walkout ER 10x; B      MANUAL THERAPY: (10 minutes):   Joint mobilization and Soft tissue mobilization was utilized and necessary because of the patient's restricted joint motion and restricted motion of soft tissue. - prom B shoulder  - inferior and posterior GH glides B     Modalities (15 min): for pain reduction  - Gameready L shoulder medium compression    Treatment/Session Summary:    · Treatment Assessment:   Patient unable to reach hand behind head with RUE, pain persists in her left shoulder. Decreased strength noted with right ER > left with theraband walkouts. Discussed following up with MD and benefits of an orthopedic consult  · Communication/Consultation:  None today  · Equipment provided today:  None  · Recommendations/Intent for next treatment session: Next visit will focus on shoulder stretching, strengthening.     Total Treatment Billable Duration:  40 minutes (30 minutes TE, 10 minutes manual)  Time In: 1115  Time Out: 7532    Laila Gil PT       Post Session Pain  Charge Capture  [employer] Portal  MD Guidelines  Scanned Media  Benefits  MyChart

## 2022-06-02 ENCOUNTER — HOSPITAL ENCOUNTER (OUTPATIENT)
Dept: PHYSICAL THERAPY | Age: 77
Setting detail: RECURRING SERIES
Discharge: HOME | End: 2022-06-05
Payer: MEDICARE

## 2022-06-02 PROCEDURE — 97110 THERAPEUTIC EXERCISES: CPT

## 2022-06-02 PROCEDURE — 97140 MANUAL THERAPY 1/> REGIONS: CPT

## 2022-06-02 NOTE — PROGRESS NOTES
Kaye Morales  : 1945  Primary: Medicare Part [place] B  Secondary: [address]  [facility]  [address] [facility] [place] [address]  Phone: [phone]  Fax: [phone] No data recorded  No data recorded    PT Visit Info:    No data recorded    OUTPATIENT PHYSICAL THERAPY:OP NOTE TYPE: Treatment Note 2022       Episode   Appt Desk       Treatment Diagnosis:  Pain in Left Shoulder (M25.512)  Pain in Right Shoulder (M25.511)  Medical/Referring Diagnosis:  Pain in left shoulder [M25.512]  Referring Physician:  CYNTHIA Gomez MD Orders:  PT Eval and Treat   Date of Onset:  No data recorded   Allergies:  Latex, Hydrochlorothiazide w-triamterene, Acyclovir, Cimetidine, Citalopram hydrobromide, Dexamethasone, Epinephrine, Hydrochlorothiazide, Pravastatin, and Diazepam  Restrictions/Precautions:    No data recordedNo data recorded   Interventions Planned (Treatment may consist of any combination of the following):    No data recorded   Subjective Comments: pt reports that she feels like she lost some ground being out from PT due to [facility] being out, most trouble lifting and carrying groceries. Turned in bed and felt a pop in L shoulder which relieved some pain. Initial:      /10 Post Session:      /10  Medications Last Reviewed:  2022  Updated Objective Findings:  None Today  Treatment   THERAPEUTIC EXERCISE: (30 minutes):    Exercises per grid below to improve mobility and strength. Date:   Date:  22 Date:     Activity/Exercise Parameters Parameters Parameters   ube 4/4 level 1 4/4 level 1 44 level 2   Supine flexion 2# x 10 10x GTB 2# x 10   row gtb x 10 10x GTB gtb x 10   Low row Gtb x 10 10x GTB gtb x 10   t's gtb x 10 10x GTB gtb x 10   Bicep curl 5# x 10 5# x 10: B 5# x 10, B   Wall slide into flexion X 10 10x; B X 10 B   Wall circles X 10 10x X 10 B   Supine hand behind head X 5 5x left X 5 R   shrugs  5# x 10;  B    Isometric ER/IR  -Manual 10x  -GTB walkout ER 10x; B - gtb walkout x 10 B     MANUAL THERAPY: (15 minutes):   Joint mobilization and Soft tissue mobilization was utilized and necessary because of the patient's restricted joint motion and restricted motion of soft tissue. - prom B shoulder  - inferior and posterior GH glides B   - pressure to B lats    Modalities ( min): for pain reduction  - Gameready L shoulder medium compression    Treatment/Session Summary:    · Treatment Assessment: pt demonstrated improved ability to touch R hand behind head after stretches. · Communication/Consultation:  None today  · Equipment provided today:  None  · Recommendations/Intent for next treatment session: Next visit will focus on shoulder stretching, strengthening. Total Treatment Billable Duration:  45 minutes (30 minutes TE, 15 minutes manual)  Time In: 9265  Time Out: [address] [facility].  KERWIN Hayes       Post Session Pain  Charge Capture  [employer] Portal  MD Guidelines  Scanned Media  Benefits  MyChart

## 2022-06-06 ENCOUNTER — HOSPITAL ENCOUNTER (OUTPATIENT)
Dept: PHYSICAL THERAPY | Age: 77
Setting detail: RECURRING SERIES
Discharge: HOME | End: 2022-06-09
Payer: MEDICARE

## 2022-06-06 PROCEDURE — 97140 MANUAL THERAPY 1/> REGIONS: CPT

## 2022-06-06 PROCEDURE — 97110 THERAPEUTIC EXERCISES: CPT

## 2022-06-06 ASSESSMENT — PAIN SCALES - GENERAL: PAINLEVEL_OUTOF10: 0

## 2022-06-06 NOTE — PROGRESS NOTES
[address] [place] [address] Hematology and Oncology: Office Visit New Patient H & P    Chief Complaint:    Chief Complaint   Patient presents with    New Patient         History of Present Illness:  Ms. Monica May is a 67 y.o. female who presents today for evaluation regarding thrombocytopenia. This was noted by her PCP, gradualy decreasing since 2019, most recently 127k in May 2022. That said, she did have a platelet count down to 123k as far back at 2009, with a few values occasionally below 150k since that time. Currently, she has mild anemia at 11.4 and her WBC is normal, with a normal differential.  She endorses a strong family history of anemia, including her son (who has ESRD) and her daughter (who received infusions, presumably iron). She is fatigued and believes this may be related to her Hgb. No bleeding noted. Of note, we also saw her in 2019 for inguinal adenopathy, serial imaging showed this was stable and biopsy was not attempted. Review of Systems:  Constitutional: Positive for fatigue. HENT: Negative. Eyes: Negative. Respiratory: Negative. Cardiovascular: Negative. Gastrointestinal: Negative. Genitourinary: Negative. Musculoskeletal: Negative. Skin: Negative. Neurological: Negative. Endo/Heme/Allergies: Negative. Psychiatric/Behavioral: Negative. All other systems reviewed and are negative.      Allergies   Allergen Reactions    Latex Other (See Comments)    Hydrochlorothiazide W-Triamterene Other (See Comments)     Severe dehydration    Acyclovir Other (See Comments)    Cimetidine Other (See Comments)    Citalopram Hydrobromide Other (See Comments)    Dexamethasone Other (See Comments)     Severe dehydration    Epinephrine Other (See Comments)     \" shoots BP up \"    Hydrochlorothiazide Other (See Comments)     Hypokalemia, Hyponatremia    Pravastatin Other (See Comments)     \" GI upset \" \" just about put a hole in my gut \"    Diazepam Anxiety     Past Medical History: Diagnosis Date    AAA (abdominal aortic aneurysm) (HCC)     infrarenal 3.2 cm    Anxiety     Chronic fatigue     Chronic gastritis 2019    with intestinal metaplasia, repeat EGD 2022    Colon polyps     Repeat colonoscopy 2022    Diverticulosis     Diverticulosis 2019    Seen on colonoscopy    Essential hypertension     GERD (gastroesophageal reflux disease)     Hemorrhoids 2019    Colonoscopy    Hyperlipidemia     Mild aortic stenosis     Prediabetes     Vitamin D deficiency      Past Surgical History:   Procedure Laterality Date    BREAST BIOPSY  3/14/2014    BREAST LUMPECTOMY Left 3/14/2014    fibrocystic mastopathy, intraductal hyperplasia, apocrine metaplasia    CATARACT REMOVAL Bilateral 2018    CATARACT REMOVAL Bilateral 2018     SECTION      x 2    CHOLECYSTECTOMY      COLONOSCOPY  2019    HYSTERECTOMY      OTHER SURGICAL HISTORY      Renal artery scan-normal    US GUIDED CORE BREAST BIOPSY Left     Benign     Family History   Problem Relation Age of Onset    Diabetes Sister     Diabetes Sister     Breast Cancer Sister 76    Breast Cancer Sister 68    Heart Disease Sister     Kidney Disease Mother     Stroke Father     Diabetes Father      Social History     Socioeconomic History    Marital status:       Spouse name: Not on file    Number of children: Not on file    Years of education: Not on file    Highest education level: Not on file   Occupational History    Not on file   Tobacco Use    Smoking status: Former Smoker     Packs/day: 0.75     Quit date: 1999     Years since quittin.8    Smokeless tobacco: Never Used   Substance and Sexual Activity    Alcohol use: No     Alcohol/week: 0.0 standard drinks    Drug use: No    Sexual activity: Not on file   Other Topics Concern    Not on file   Social History Narrative    Currently lives alone (daughter moved to [place], professor at [employer], teaching international students) 4/13/2021. Social Determinants of Health     Financial Resource Strain:     Difficulty of Paying Living Expenses: Not on file   Food Insecurity:     Worried About Running Out of Food in the Last Year: Not on file    Isamar of Food in the Last Year: Not on file   Transportation Needs:     Lack of Transportation (Medical): Not on file    Lack of Transportation (Non-Medical): Not on file   Physical Activity:     Days of Exercise per Week: Not on file    Minutes of Exercise per Session: Not on file   Stress:     Feeling of Stress : Not on file   Social Connections:     Frequency of Communication with Friends and Family: Not on file    Frequency of Social Gatherings with Friends and Family: Not on file    Attends Jainism Services: Not on file    Active Member of [address] [place] [address] [employer] or Organizations: Not on file    Attends Club or Organization Meetings: Not on file    Marital Status: Not on file   Intimate Partner Violence:     Fear of Current or Ex-Partner: Not on file    Emotionally Abused: Not on file    Physically Abused: Not on file    Sexually Abused: Not on file   Housing Stability:     Unable to Pay for Housing in the Last Year: Not on file    Number of [place] in the Last Year: Not on file    Unstable Housing in the Last Year: Not on file     Current Outpatient Medications   Medication Sig Dispense Refill    amLODIPine (NORVASC) 5 MG tablet Take 5 mg by mouth daily      carvedilol (COREG) 6.25 MG tablet TAKE 2 TABLETS BY MOUTH IN THE MORNING AND 2 TABLETS BY MOUTH IN THE EVENING      LORazepam (ATIVAN) 1 MG tablet Take 1 mg by mouth.  losartan (COZAAR) 100 MG tablet TAKE 1 TABLET BY MOUTH DAILY      diclofenac (CATAFLAM) 50 MG tablet Take 50 mg by mouth 2 times daily as needed (Patient not taking: Reported on 6/7/2022)       No current facility-administered medications for this visit.        OBJECTIVE:  BP (!) 160/78   Pulse 70   Temp 99.5 °F (37.5 °C) (Oral)   Resp 14 Ht 5' 2\" (1.575 m)   Wt 177 lb (80.3 kg)   SpO2 99%   BMI 32.37 kg/m²     Physical Exam:  Constitutional: Well developed, well nourished female in no acute distress, sitting comfortably on the examination table. HEENT: Normocephalic and atraumatic. Sclerae anicteric. Neck supple without JVD. No thyromegaly present. Lymph node   No palpable submandibular, cervical, supraclavicular, axillary lymph nodes. Skin Warm and dry. No bruising and no rash noted. No erythema. No pallor. Respiratory Lungs are clear to auscultation bilaterally without wheezes, rales or rhonchi, normal air exchange without accessory muscle use. CVS Normal rate, regular rhythm and normal S1 and S2. No murmurs, gallops, or rubs. Abdomen Soft, nontender and nondistended, normoactive bowel sounds. No palpable mass. No hepatosplenomegaly. Neuro Grossly nonfocal with no obvious sensory or motor deficits. MSK Normal range of motion in general.  No edema and no tenderness. Psych Appropriate mood and affect. Labs:  No results found for this or any previous visit (from the past 24 hour(s)). Imaging:  No results found. ASSESSMENT:   Diagnosis Orders   1. Thrombocytopenia (HCC)  CBC with Auto Differential    Ferritin    Transferrin Saturation    Miscellaneous Sendout    Lactate Dehydrogenase    Reticulocytes    Vitamin B12    Folate    Hemoglobinopathy Evaluation    Path Review, Smear    Platelet Count   2. Anemia, unspecified type       Patient Active Problem List   Diagnosis    History of diverticulitis    Prediabetes    Dyslipidemia    Essential hypertension    Anxiety    AYUSH (generalized anxiety disorder)    Acute pain of left shoulder    Mild aortic stenosis    AAA (abdominal aortic aneurysm) (HCC)    Thrombocytopenia (HCC)    Chronic gastritis without bleeding           PLAN:  Lab studies were personally reviewed. Pertinent old records were reviewed.     Thrombocytopenia: mild with platelets 887R, Hgb is low normal, MCV normal, WBC low normal as well with normal diff. We will check citrated platelet count, B80/GECAAU, LDH, retic count, IPF and peripheral smear, but in all likelihood we will end up observing since her thrombocytopenia is mild and she is asymptomatic. For her anemia, we will check iron studies and Hb fractionation as well, she thinks this could be contributing to her symptoms but with the mild degree and the stability of the counts I doubt this is the primary reason for her fatigue. All questions were asked and answered to the best of my ability. F/u in a few weeks to review labs and develop a plan of care.             Jeri Yuan MD, MD  [facility]  [address] [place] [address]  Office : [phone]  Fax : [phone]

## 2022-06-06 NOTE — PROGRESS NOTES
Katey Юлия  : 1945  Primary: Medicare Part A And B  Secondary: [employer] [facility]  [address] [facility] [address]  Phone: [phone]  Fax: [phone] No data recorded  No data recorded    PT Visit Info:    No data recorded    OUTPATIENT PHYSICAL THERAPY:OP NOTE TYPE: Treatment Note 2022       Episode   Appt Desk       Treatment Diagnosis:  Pain in Left Shoulder (M25.512)  Pain in Right Shoulder (M25.511)  Medical/Referring Diagnosis:  Pain in left shoulder [M25.512]  Referring Physician:  CYNTHIA Mendez MD Orders:  PT Eval and Treat   Date of Onset:  No data recorded   Allergies:  Latex, Hydrochlorothiazide w-triamterene, Acyclovir, Cimetidine, Citalopram hydrobromide, Dexamethasone, Epinephrine, Hydrochlorothiazide, Pravastatin, and Diazepam  Restrictions/Precautions:    No data recordedNo data recorded   Interventions Planned (Treatment may consist of any combination of the following):    No data recorded   Subjective Comments:   Patient reprots sorenss and stiffness witha ctivities persists. States that she scheduled appointment with her chiropractor. Also with intermittent  numbness/tingling in her fingers  Initial:     0/10 Post Session:     1/10  Medications Last Reviewed:  2022  Updated Objective Findings:  None Today  Treatment   THERAPEUTIC EXERCISE: (30 minutes):    Exercises per grid below to improve mobility and strength. Date:   Date:  22 Date:   Date:  22   Activity/Exercise Parameters Parameters Parameters    ube 4/4 level 1 4/4 level 1 4/4 level 2 4/4 level 2   Supine flexion 2# x 10 10x GTB 2# x 10 2# x 10; B   row gtb x 10 10x GTB gtb x 10 gtb x 10   Low row Gtb x 10 10x GTB gtb x 10 gtb x 10   t's gtb x 10 10x GTB gtb x 10 gtb x 10   Bicep curl 5# x 10 5# x 10: B 5# x 10, B 5# x 10:  B   Wall slide into flexion X 10 10x; B X 10 B    Wall circles X 10 10x X 10 B    Supine hand behind head X 5 5x left X 5 R    shrugs  5# x 10; B     Isometric ER/IR  -Manual 10x  -GTB walkout ER 10x; B - gtb walkout x 10 B -manual 10 x  -gtb walkout ER   Supine punch    2# x 10; B     MANUAL THERAPY: (15 minutes):   Joint mobilization and Soft tissue mobilization was utilized and necessary because of the patient's restricted joint motion and restricted motion of soft tissue. - prom B shoulder  - inferior and posterior GH glides B   - pressure to B subscap    Modalities (15 min): for pain reduction (no charge)  - Gameready L shoulder medium compression    Treatment/Session Summary:    · Treatment Assessment:   tolerated treatment well and resisitance with exercises. Some difficulty initially reported with supine punches  · Communication/Consultation:  None today  · Equipment provided today:  None  · Recommendations/Intent for next treatment session: Next visit will focus on shoulder stretching, strengthening.     Total Treatment Billable Duration:  45 minutes (30 minutes TE, 15 minutes manual)  Time In: 1345  Time Out: 1445    Laila Croft PT       Post Session Pain  Charge Capture  [employer] Portal  MD Guidelines  Scanned Media  Benefits  MyChart

## 2022-06-07 ENCOUNTER — OFFICE VISIT (OUTPATIENT)
Dept: ONCOLOGY | Age: 77
End: 2022-06-07
Payer: MEDICARE

## 2022-06-07 ENCOUNTER — HOSPITAL ENCOUNTER (OUTPATIENT)
Dept: LAB | Age: 77
Discharge: HOME | End: 2022-06-10
Payer: MEDICARE

## 2022-06-07 VITALS
OXYGEN SATURATION: 99 % | HEART RATE: 70 BPM | SYSTOLIC BLOOD PRESSURE: 160 MMHG | RESPIRATION RATE: 14 BRPM | DIASTOLIC BLOOD PRESSURE: 78 MMHG | TEMPERATURE: 99.5 F | HEIGHT: 62 IN | BODY MASS INDEX: 32.57 KG/M2 | WEIGHT: 177 LBS

## 2022-06-07 DIAGNOSIS — D69.6 THROMBOCYTOPENIA (HCC): ICD-10-CM

## 2022-06-07 DIAGNOSIS — D69.6 THROMBOCYTOPENIA (HCC): Primary | ICD-10-CM

## 2022-06-07 DIAGNOSIS — D64.9 ANEMIA, UNSPECIFIED TYPE: ICD-10-CM

## 2022-06-07 LAB
BASOPHILS # BLD: 0 K/UL (ref 0–0.2)
BASOPHILS NFR BLD: 1 % (ref 0–2)
DIFFERENTIAL METHOD BLD: ABNORMAL
EOSINOPHIL # BLD: 0.1 K/UL (ref 0–0.8)
EOSINOPHIL NFR BLD: 3 % (ref 0.5–7.8)
ERYTHROCYTE [DISTWIDTH] IN BLOOD BY AUTOMATED COUNT: 13.5 % (ref 11.9–14.6)
FERRITIN SERPL-MCNC: 88 NG/ML (ref 8–388)
FOLATE SERPL-MCNC: 8.2 NG/ML (ref 3.1–17.5)
HCT VFR BLD AUTO: 36.4 % (ref 35.8–46.3)
HGB BLD-MCNC: 11.4 G/DL (ref 11.7–15.4)
HGB RETIC QN AUTO: 32 PG (ref 29–35)
IMM GRANULOCYTES # BLD AUTO: 0 K/UL (ref 0–0.5)
IMM GRANULOCYTES NFR BLD AUTO: 0 % (ref 0–5)
IMM RETICS NFR: 10.5 % (ref 3–15.9)
IRON SATN MFR SERPL: 36 %
IRON SERPL-MCNC: 108 UG/DL (ref 35–150)
LDH SERPL L TO P-CCNC: 165 U/L (ref 110–210)
LYMPHOCYTES # BLD: 1.5 K/UL (ref 0.5–4.6)
LYMPHOCYTES NFR BLD: 34 % (ref 13–44)
MCH RBC QN AUTO: 27.5 PG (ref 26.1–32.9)
MCHC RBC AUTO-ENTMCNC: 31.3 G/DL (ref 31.4–35)
MCV RBC AUTO: 87.7 FL (ref 79.6–97.8)
MONOCYTES # BLD: 0.6 K/UL (ref 0.1–1.3)
MONOCYTES NFR BLD: 13 % (ref 4–12)
NEUTS SEG # BLD: 2.1 K/UL (ref 1.7–8.2)
NEUTS SEG NFR BLD: 49 % (ref 43–78)
NRBC # BLD: 0 K/UL (ref 0–0.2)
PLATELET # BLD AUTO: 113 K/UL (ref 150–450)
PLATELET # BLD AUTO: 126 K/UL (ref 150–450)
PLATELETS.RETICULATED NFR BLD AUTO: 8.3 % (ref 1.8–7.9)
PMV BLD AUTO: 12.5 FL (ref 9.4–12.3)
RBC # BLD AUTO: 4.15 M/UL (ref 4.05–5.2)
RETICS # AUTO: 0.06 M/UL (ref 0.03–0.1)
RETICS/RBC NFR AUTO: 1.4 % (ref 0.3–2)
TIBC SERPL-MCNC: 297 UG/DL (ref 250–450)
VIT B12 SERPL-MCNC: 583 PG/ML (ref 193–986)
WBC # BLD AUTO: 4.3 K/UL (ref 4.3–11.1)

## 2022-06-07 PROCEDURE — 99204 OFFICE O/P NEW MOD 45 MIN: CPT | Performed by: INTERNAL MEDICINE

## 2022-06-07 PROCEDURE — 82607 VITAMIN B-12: CPT

## 2022-06-07 PROCEDURE — 82728 ASSAY OF FERRITIN: CPT

## 2022-06-07 PROCEDURE — G8428 CUR MEDS NOT DOCUMENT: HCPCS | Performed by: INTERNAL MEDICINE

## 2022-06-07 PROCEDURE — 83615 LACTATE (LD) (LDH) ENZYME: CPT

## 2022-06-07 PROCEDURE — 85046 RETICYTE/HGB CONCENTRATE: CPT

## 2022-06-07 PROCEDURE — 1123F ACP DISCUSS/DSCN MKR DOCD: CPT | Performed by: INTERNAL MEDICINE

## 2022-06-07 PROCEDURE — 83540 ASSAY OF IRON: CPT

## 2022-06-07 PROCEDURE — G8399 PT W/DXA RESULTS DOCUMENT: HCPCS | Performed by: INTERNAL MEDICINE

## 2022-06-07 PROCEDURE — 1036F TOBACCO NON-USER: CPT | Performed by: INTERNAL MEDICINE

## 2022-06-07 PROCEDURE — 83020 HEMOGLOBIN ELECTROPHORESIS: CPT

## 2022-06-07 PROCEDURE — 1090F PRES/ABSN URINE INCON ASSESS: CPT | Performed by: INTERNAL MEDICINE

## 2022-06-07 PROCEDURE — 85055 RETICULATED PLATELET ASSAY: CPT

## 2022-06-07 PROCEDURE — 85025 COMPLETE CBC W/AUTO DIFF WBC: CPT

## 2022-06-07 PROCEDURE — 85049 AUTOMATED PLATELET COUNT: CPT

## 2022-06-07 PROCEDURE — G8417 CALC BMI ABV UP PARAM F/U: HCPCS | Performed by: INTERNAL MEDICINE

## 2022-06-07 PROCEDURE — 82746 ASSAY OF FOLIC ACID SERUM: CPT

## 2022-06-07 PROCEDURE — 36415 COLL VENOUS BLD VENIPUNCTURE: CPT

## 2022-06-07 NOTE — PATIENT INSTRUCTIONS
Patient Instructions from Today's Visit    Reason for Visit:  New Patient Visit - Thrombocytopenia      Plan:  - Review of your records indicate that there is documentation of mildly low platelets going back to 2009. - Reviewed platelets and what they do, your platelets are not at a dangerous level or concerning.  - we will get some labs today to rule out hematologic causes of your fatigue and look for a cause for the thrombocytopenia. Follow Up:  - 3-4 weeks    Treatment Summary has been discussed and given to patient: n/a        -------------------------------------------------------------------------------------------------------------------  Please call our office at [phone] if you have any  of the following symptoms:   · Fever of 100.5 or greater  · Chills  · Shortness of breath  · Swelling or pain in one leg    After office hours an answering service is available and will contact a provider for emergencies or if you are experiencing any of the above symptoms.  Patient has My Chart. My Chart log in information explained on the after visit summary printout at the [address] [address] desk.     Zully Candelario RN

## 2022-06-07 NOTE — PROGRESS NOTES
Toño Chapin Abstract      Reason for Referral:  Thrombocytopenia    Referring Provider:  Ryan Soni NP, [facility]     Primary Care Provider:  Ryan Soni NP, [facility]    Family History of Cancer/Hematologic Disorders: Two sisters with breast cancer - age 68 and 76. Presenting Symptoms:  Persistent thrombocytopenia on labs    Narrative with recent with Results/Procedures/Biopsies and Dates completed: Ms. Toño Chapin is a 57-year-old [facility] female with a medical history of HTN, GERD, hyperlipidemia, AAA, diverticulosis, colon polyps, and chronic gastritis. Surgical history includes bilateral cataract removal,  section x 2, hysterectomy, and left breast lumpectomy (intraductal hyperplasia). Family history of heart disease, stroke, and diabetes. She is a former smoker but quit in [address] [place] [address]. Denies alcohol or drug use. She is a retired nurse. In May patient had 2 visits to [facility] ED. One for headache and elevated blood pressure and the other for an allergic reaction. On 22 patient was seen by PCP for routine follow up. Platelet count was 750, 6 months prior was 148. No documented easy bruising or bleeding issues. Referral to hematology for evaluation and recommendations. 2022 [facility]              Notes from Referring Provider:  22 progress note PCP  Thrombocytopenia [facility])  Assessment & Plan:  Has upcoming appt with hematologist Dr. Anali Weinberg    Other Pertinent Information:  Covid vaccination 10/23/21 and 21    Seen by Dr. Anali Weinberg 19 for inguinal adenopathy. Presented at Tumor Board:   No

## 2022-06-08 ENCOUNTER — HOSPITAL ENCOUNTER (OUTPATIENT)
Dept: PHYSICAL THERAPY | Age: 77
Setting detail: RECURRING SERIES
End: 2022-06-08
Payer: MEDICARE

## 2022-06-08 LAB — PATH REV BLD -IMP: NORMAL

## 2022-06-08 NOTE — PROGRESS NOTES
Nano Hilary  : 1945  Primary: Medicare Part A And B  Secondary: [address] [facility] [address] [place] [address], [address], [facility]  [phone]   [phone]      Nano Hilary cancelled PT appointment this afternoon. Joanna Barbosa.  Abigail, PT

## 2022-06-09 LAB
HGB A MFR BLD: 97.7 % (ref 96.4–98.8)
HGB A2 MFR BLD COLUMN CHROM: 2.3 % (ref 1.8–3.2)
HGB F MFR BLD: 0 % (ref 0–2)
HGB FRACT BLD-IMP: NORMAL
HGB S MFR BLD: 0 %

## 2022-06-10 ENCOUNTER — APPOINTMENT (OUTPATIENT)
Dept: PHYSICAL THERAPY | Age: 77
End: 2022-06-10
Payer: MEDICARE

## 2022-06-13 ENCOUNTER — HOSPITAL ENCOUNTER (OUTPATIENT)
Dept: PHYSICAL THERAPY | Age: 77
Setting detail: RECURRING SERIES
Discharge: HOME | End: 2022-06-16
Payer: MEDICARE

## 2022-06-13 PROCEDURE — 97140 MANUAL THERAPY 1/> REGIONS: CPT

## 2022-06-13 PROCEDURE — 97110 THERAPEUTIC EXERCISES: CPT

## 2022-06-13 NOTE — PROGRESS NOTES
Kerri Estrada  : 1945  Primary: Medicare Part A And B  Secondary: [employer]  [address]    [address] [facility] [address]  Phone: [phone]  Fax: [phone] No data recorded  No data recorded    PT Visit Info:    No data recorded    OUTPATIENT PHYSICAL THERAPY:OP NOTE TYPE: Treatment Note 2022       Episode   Appt Desk       Treatment Diagnosis:  Pain in Left Shoulder (M25.512)  Pain in Right Shoulder (M25.511)  Medical/Referring Diagnosis:  Pain in left shoulder [M25.512]  Referring Physician:  CYNTHIA Gomez MD Orders:  PT Eval and Treat   Date of Onset:  No data recorded   Allergies:  Latex, Hydrochlorothiazide w-triamterene, Acyclovir, Cimetidine, Citalopram hydrobromide, Dexamethasone, Epinephrine, Hydrochlorothiazide, Pravastatin, and Diazepam  Restrictions/Precautions:    No data recordedNo data recorded   Interventions Planned (Treatment may consist of any combination of the following):    No data recorded   Subjective Comments: pt continues to report most difficulty with groceries- getting them off of shelf, carrying them in bags; and putting in fridge. But movement continues to improve. Initial:      /10 Post Session:      /10  Medications Last Reviewed:  2022  Updated Objective Findings:  None Today  Treatment   THERAPEUTIC EXERCISE: (30 minutes):    Exercises per grid below to improve mobility and strength.        Date:   Date:  22 Date:   Date:  22   Activity/Exercise Parameters Parameters Parameters     ube 4/4 level 1 4/4 level 1 4/4 level 2 4/4 level 2 4/4 level 2   Supine flexion 2# x 10 10x GTB 2# x 10 2# x 10; B 2# x 10, B   row gtb x 10 10x GTB gtb x 10 gtb x 10 gtb x 10   Low row Gtb x 10 10x GTB gtb x 10 gtb x 10 gtb x 10   t's gtb x 10 10x GTB gtb x 10 gtb x 10    Bicep curl 5# x 10 5# x 10: B 5# x 10, B 5# x 10: B 5# 2 x 10, B   Wall slide into flexion X 10 10x; B X 10 B     Wall circles X 10 10x X 10 B     Supine hand behind head X 5 5x left X 5 R     shrugs  5# x 10; B      Isometric ER/IR  -Manual 10x  -GTB walkout ER 10x; B - gtb walkout x 10 B -manual 10 x  -gtb walkout ER    ER/IR     [place] TB x 10 B   scaption     ytb x 10   Supine punch    2# x 10; B      MANUAL THERAPY: (15 minutes):   Joint mobilization and Soft tissue mobilization was utilized and necessary because of the patient's restricted joint motion and restricted motion of soft tissue. - prom B shoulder  - inferior and posterior GH glides B   - pressure to B subscap    Modalities ( min): for pain reduction (not today)  - Gameready L shoulder medium compression    Treatment/Session Summary:    · Treatment Assessment: pt reported fatigue with exercises. Encouraged her to use cans of soup for bicep curl and shoulder elevation with resistance activities. · Communication/Consultation:  None today  · Equipment provided today:  None  · Recommendations/Intent for next treatment session: Next visit will focus on shoulder stretching, strengthening. Total Treatment Billable Duration:  45 minutes (30 minutes TE, 15 minutes manual)  Time In: 3262  Time Out: 104 [address]  KERWIN Hayes       Post Session Pain  Charge Capture  [employer] Portal  MD Guidelines  Scanned Media  Benefits  MyChart

## 2022-06-15 ENCOUNTER — HOSPITAL ENCOUNTER (OUTPATIENT)
Dept: PHYSICAL THERAPY | Age: 77
Setting detail: RECURRING SERIES
Discharge: HOME | End: 2022-06-18
Payer: MEDICARE

## 2022-06-15 PROCEDURE — 97110 THERAPEUTIC EXERCISES: CPT

## 2022-06-15 PROCEDURE — 97140 MANUAL THERAPY 1/> REGIONS: CPT

## 2022-06-15 NOTE — PROGRESS NOTES
Lilly Wray  : 1945  Primary: Medicare Part A And B  [facility]  [address] [place] [address]  Phone: [phone]  Fax: [phone] No data recorded  No data recorded    PT Visit Info:    No data recorded    OUTPATIENT PHYSICAL THERAPY:OP NOTE TYPE: Treatment Note 6/15/2022       Episode   Appt Desk       Treatment Diagnosis:  Pain in Left Shoulder (M25.512)  Pain in Right Shoulder (M25.511)  Medical/Referring Diagnosis:  Pain in left shoulder [M25.512]  Referring Physician:  CYNTHIA Short MD Orders:  PT Eval and Treat   Date of Onset:  No data recorded   Allergies:  Latex, Hydrochlorothiazide w-triamterene, Acyclovir, Cimetidine, Citalopram hydrobromide, Dexamethasone, Epinephrine, Hydrochlorothiazide, Pravastatin, and Diazepam  Restrictions/Precautions:    No data recordedNo data recorded   Interventions Planned (Treatment may consist of any combination of the following):    No data recorded   Subjective Comments: pt did her hair today without shoulder pain, pt pleased, shoulders moving better. Initial:      /10 Post Session:      /10  Medications Last Reviewed:  6/15/2022  Updated Objective Findings:  None Today  Treatment   THERAPEUTIC EXERCISE: (30 minutes):    Exercises per grid below to improve mobility and strength.        Date:   Date:  22 Date:   Date:  6/6/22 6/13 6/15   Activity/Exercise Parameters Parameters Parameters      ube 4/4 level 1 4/4 level 1 4/4 level 2 4/4 level 2 4/4 level 2 4/4 level 2   Supine flexion 2# x 10 10x GTB 2# x 10 2# x 10; B 2# x 10, B 2# x 10 B   row gtb x 10 10x GTB gtb x 10 gtb x 10 gtb x 10 gtb x 20   Low row Gtb x 10 10x GTB gtb x 10 gtb x 10 gtb x 10 gtb x 20   t's gtb x 10 10x GTB gtb x 10 gtb x 10     Bicep curl 5# x 10 5# x 10: B 5# x 10, B 5# x 10: B 5# 2 x 10, B 5# x 15, B   Wall slide into flexion X 10 10x; B X 10 B      Wall circles X 10 10x X 10 B      Supine hand behind head X 5 5x left X 5 R      shrugs  5# x 10; B       Isometric ER/IR  -Manual 10x  -GTB walkout ER 10x; B - gtb walkout x 10 B -manual 10 x  -gtb walkout ER     ER/IR     [place] TB x 10 B Pink TB x 10 B   scaption     ytb x 10 ytb x 10 B   Supine punch    2# x 10; B       MANUAL THERAPY: (15 minutes):   Joint mobilization and Soft tissue mobilization was utilized and necessary because of the patient's restricted joint motion and restricted motion of soft tissue. - prom B shoulder  - inferior and posterior GH glides B   - pressure to B subscap    Modalities ( min): for pain reduction (not today)  - Gameready L shoulder medium compression    Treatment/Session Summary:    · Treatment Assessment: pt reported fatigue with exercises, but tolerated better than previous treatments. · Communication/Consultation:  None today  · Equipment provided today:  None  · Recommendations/Intent for next treatment session: Next visit will focus on shoulder stretching, strengthening. Total Treatment Billable Duration:  45 minutes (30 minutes TE, 15 minutes manual)  Time In: 1520  Time Out: DENZEL [address].  KERWIN Hayes       Post Session Pain  Charge Capture  [employer] Portal  MD Guidelines  Scanned Media  Benefits  MyChart

## 2022-06-20 ENCOUNTER — HOSPITAL ENCOUNTER (OUTPATIENT)
Dept: PHYSICAL THERAPY | Age: 77
Setting detail: RECURRING SERIES
Discharge: HOME | End: 2022-06-23
Payer: MEDICARE

## 2022-06-20 PROCEDURE — 97140 MANUAL THERAPY 1/> REGIONS: CPT

## 2022-06-20 PROCEDURE — 97110 THERAPEUTIC EXERCISES: CPT

## 2022-06-20 NOTE — PROGRESS NOTES
Oscar Diane  : 1945  Primary: Medicare Part A And B  [facility]: [facility]  [address]    [address] [place] [address]  Phone: [phone]  Fax: [phone] No data recorded  No data recorded    PT Visit Info:    No data recorded    OUTPATIENT PHYSICAL THERAPY:OP NOTE TYPE: Treatment Note 2022       Episode   Appt Desk       Treatment Diagnosis:  Pain in Left Shoulder (M25.512)  Pain in Right Shoulder (M25.511)  Medical/Referring Diagnosis:  Pain in left shoulder [M25.512]  Referring Physician:  CYNTHIA Moseley MD Orders:  PT Eval and Treat   Date of Onset:  No data recorded   Allergies:  Latex, Hydrochlorothiazide w-triamterene, Acyclovir, Cimetidine, Citalopram hydrobromide, Dexamethasone, Epinephrine, Hydrochlorothiazide, Pravastatin, and Diazepam  Restrictions/Precautions:    No data recordedNo data recorded   Interventions Planned (Treatment may consist of any combination of the following):    No data recorded   Subjective Comments: pt reports continuing to improve in movement, still difficulty with strength activities. Initial:      /10 Post Session:      /10  Medications Last Reviewed:  2022  Updated Objective Findings:  None Today  Treatment   THERAPEUTIC EXERCISE: (30 minutes):    Exercises per grid below to improve mobility and strength.        Date:  22 Date:   Date:  6/6/22 6/13 6/15 6/20   Activity/Exercise Parameters Parameters       ube 4/4 level 1 4/4 level 2 4/4 level 2 4/4 level 2 4/4 level 2 4/4 level 2   Supine flexion 10x GTB 2# x 10 2# x 10; B 2# x 10, B 2# x 10 B 2# x 10 B   row 10x GTB gtb x 10 gtb x 10 gtb x 10 gtb x 20 gtb x 20   Low row 10x GTB gtb x 10 gtb x 10 gtb x 10 gtb x 20 gtb x 20   t's 10x GTB gtb x 10 gtb x 10      Bicep curl 5# x 10: B 5# x 10, B 5# x 10: B 5# 2 x 10, B 5# x 15, B 4# x 15, B   Wall slide into flexion 10x; B X 10 B    X 10   Wall circles 10x X 10 B    X 10   Supine hand behind head 5x left X 5 R       shrugs 5# x 10; B        Isometric ER/IR -Manual 10x  -GTB walkout ER 10x; B - gtb walkout x 10 B -manual 10 x  -gtb walkout ER      ER/IR    [employer] TB x 10 B Pink TB x 10 B Pink tb x 10 B   scaption    ytb x 10 ytb x 10 B    Supine punch   2# x 10; B        MANUAL THERAPY: (15 minutes):   Joint mobilization and Soft tissue mobilization was utilized and necessary because of the patient's restricted joint motion and restricted motion of soft tissue. - prom B shoulder  - inferior and posterior GH glides B   - pressure to B subscap    Modalities ( min): for pain reduction (not today)  - Gameready L shoulder medium compression    Treatment/Session Summary:    · Treatment Assessment: pt reported fatigue with exercises, but continues to improve. · Communication/Consultation:  None today  · Equipment provided today:  None  · Recommendations/Intent for next treatment session: Next visit will focus on shoulder stretching, strengthening. Total Treatment Billable Duration:  45 minutes (30 minutes TE, 15 minutes manual)  Time In: 1515  Time Out: [facility].  KERWIN Hayes       Post Session Pain  Charge Capture  [employer] Portal  MD Guidelines  Scanned Media  Benefits  MyChart

## 2022-06-21 ENCOUNTER — HOSPITAL ENCOUNTER (OUTPATIENT)
Dept: MAMMOGRAPHY | Age: 77
Discharge: HOME | End: 2022-06-24
Payer: MEDICARE

## 2022-06-21 DIAGNOSIS — Z12.31 VISIT FOR SCREENING MAMMOGRAM: ICD-10-CM

## 2022-06-21 PROCEDURE — 77067 SCR MAMMO BI INCL CAD: CPT

## 2022-06-22 ENCOUNTER — HOSPITAL ENCOUNTER (OUTPATIENT)
Dept: PHYSICAL THERAPY | Age: 77
Setting detail: RECURRING SERIES
End: 2022-06-22
Payer: MEDICARE

## 2022-06-22 NOTE — PROGRESS NOTES
Patito Gordon  : 1945  Primary: Medicare Part A And B  Secondary: [address] [facility]  [address] [place] [address]  Phone: [phone]  Fax: [phone] No data recorded  No data recorded    PT Visit Info:  No data recorded       OUTPATIENT PHYSICAL THERAPY 2022     Appt Desk   Episode   MyChart      Ms. Kam Apo was a cancellation today  Ludy Mcgowan, PT    Future Appointments   Date Time Provider Kylah Green   2022  7:00 PM Ludy Mcgowan, PT [facility]   2022  7:00 PM Laila Croft, PT [facility]   2022 10:15 AM Johan Menezes MD [facility]   2022  7:00 PM Laila Croft, PT [facility]   2022 10:15 AM Simran Villalpando, PT [facility]   10/11/2022  1:20 PM Jesi Addison, APRN - CNP [address] [facility] AMB   2023  2:00 PM Betty Johnston APRN - CNP [facility] AMB

## 2022-06-24 NOTE — PATIENT INSTRUCTIONS
Patient Instructions from Today's Visit    Reason for Visit:  Follow up- Thrombocytopenia      Plan:  - reviewed previous labs, no concerns noted. - we will continue to monitor    Follow Up:  - 1 year      Treatment Summary has been discussed and given to patient: n/a        -------------------------------------------------------------------------------------------------------------------  Please call our office at [phone] if you have any  of the following symptoms:   · Fever of 100.5 or greater  · Chills  · Shortness of breath  · Swelling or pain in one leg    After office hours an answering service is available and will contact a provider for emergencies or if you are experiencing any of the above symptoms.  Patient has My Chart. My Chart log in information explained on the after visit summary printout at the [facility] desk.     Anitha Mendez RN

## 2022-06-27 ENCOUNTER — APPOINTMENT (OUTPATIENT)
Dept: PHYSICAL THERAPY | Age: 77
End: 2022-06-27
Payer: MEDICARE

## 2022-06-28 ENCOUNTER — OFFICE VISIT (OUTPATIENT)
Dept: ONCOLOGY | Age: 77
End: 2022-06-28
Payer: MEDICARE

## 2022-06-28 VITALS
HEART RATE: 80 BPM | OXYGEN SATURATION: 99 % | TEMPERATURE: 98.3 F | BODY MASS INDEX: 32.44 KG/M2 | WEIGHT: 176.3 LBS | SYSTOLIC BLOOD PRESSURE: 148 MMHG | RESPIRATION RATE: 16 BRPM | HEIGHT: 62 IN | DIASTOLIC BLOOD PRESSURE: 77 MMHG

## 2022-06-28 DIAGNOSIS — D64.9 ANEMIA, UNSPECIFIED TYPE: ICD-10-CM

## 2022-06-28 DIAGNOSIS — D69.6 THROMBOCYTOPENIA (HCC): Primary | ICD-10-CM

## 2022-06-28 PROCEDURE — 1123F ACP DISCUSS/DSCN MKR DOCD: CPT | Performed by: INTERNAL MEDICINE

## 2022-06-28 PROCEDURE — 99213 OFFICE O/P EST LOW 20 MIN: CPT | Performed by: INTERNAL MEDICINE

## 2022-06-28 PROCEDURE — G8417 CALC BMI ABV UP PARAM F/U: HCPCS | Performed by: INTERNAL MEDICINE

## 2022-06-28 PROCEDURE — 1036F TOBACCO NON-USER: CPT | Performed by: INTERNAL MEDICINE

## 2022-06-28 PROCEDURE — 1090F PRES/ABSN URINE INCON ASSESS: CPT | Performed by: INTERNAL MEDICINE

## 2022-06-28 PROCEDURE — G8399 PT W/DXA RESULTS DOCUMENT: HCPCS | Performed by: INTERNAL MEDICINE

## 2022-06-28 PROCEDURE — G8428 CUR MEDS NOT DOCUMENT: HCPCS | Performed by: INTERNAL MEDICINE

## 2022-06-28 ASSESSMENT — PATIENT HEALTH QUESTIONNAIRE - PHQ9
SUM OF ALL RESPONSES TO PHQ QUESTIONS 1-9: 0
SUM OF ALL RESPONSES TO PHQ QUESTIONS 1-9: 0
1. LITTLE INTEREST OR PLEASURE IN DOING THINGS: 0
2. FEELING DOWN, DEPRESSED OR HOPELESS: 0
SUM OF ALL RESPONSES TO PHQ QUESTIONS 1-9: 0
SUM OF ALL RESPONSES TO PHQ QUESTIONS 1-9: 0
1. LITTLE INTEREST OR PLEASURE IN DOING THINGS: 0
SUM OF ALL RESPONSES TO PHQ QUESTIONS 1-9: 0
2. FEELING DOWN, DEPRESSED OR HOPELESS: 0
SUM OF ALL RESPONSES TO PHQ QUESTIONS 1-9: 0
SUM OF ALL RESPONSES TO PHQ QUESTIONS 1-9: 0
SUM OF ALL RESPONSES TO PHQ9 QUESTIONS 1 & 2: 0
SUM OF ALL RESPONSES TO PHQ QUESTIONS 1-9: 0
SUM OF ALL RESPONSES TO PHQ9 QUESTIONS 1 & 2: 0

## 2022-06-28 NOTE — PROGRESS NOTES
[facility] Hematology and Oncology: Office Visit Established Patient    Chief Complaint:    Chief Complaint   Patient presents with    Follow-up         History of Present Illness:  Ms. Jose Carlos Frost is a 65 y.o. female who returns today for management of  thrombocytopenia. This was noted by her PCP, gradualy decreasing since 2019, most recently 127k in May 2022. That said, she did have a platelet count down to 123k as far back at 2009, with a few values occasionally below 150k since that time. Currently, she has mild anemia at 11.4 and her WBC is normal, with a normal differential.  She endorses a strong family history of anemia, including her son (who has ESRD) and her daughter (who received infusions, presumably iron). She is fatigued and believes this may be related to her Hgb. No bleeding noted. Of note, we also saw her in 2019 for inguinal adenopathy, serial imaging showed this was stable and biopsy was not attempted. We recommended serologies for evaluation and she returns for results. No new symptoms. Review of Systems:  Constitutional: Negative. HENT: Negative. Eyes: Negative. Respiratory: Negative. Cardiovascular: Negative. Gastrointestinal: Negative. Genitourinary: Negative. Musculoskeletal: Negative. Skin: Negative. Neurological: Negative. Endo/Heme/Allergies: Negative. Psychiatric/Behavioral: Negative. All other systems reviewed and are negative.        Allergies   Allergen Reactions    Latex Other (See Comments)    Hydrochlorothiazide W-Triamterene Other (See Comments)     Severe dehydration    Acyclovir Other (See Comments)    Cimetidine Other (See Comments)    Citalopram Hydrobromide Other (See Comments)    Dexamethasone Other (See Comments)     Severe dehydration    Epinephrine Other (See Comments)     \" shoots BP up \"    Hydrochlorothiazide Other (See Comments)     Hypokalemia, Hyponatremia    Pravastatin Other (See Comments)     \" GI upset \" \" just about put a hole in my gut \"    Diazepam Anxiety     Past Medical History:   Diagnosis Date    AAA (abdominal aortic aneurysm) (HCC)     infrarenal 3.2 cm    Anxiety     Chronic fatigue     Chronic gastritis 2019    with intestinal metaplasia, repeat EGD 2022    Colon polyps     Repeat colonoscopy 2022    Diverticulosis     Diverticulosis 2019    Seen on colonoscopy    Essential hypertension     GERD (gastroesophageal reflux disease)     Hemorrhoids 2019    Colonoscopy    Hyperlipidemia     Mild aortic stenosis     Prediabetes     Vitamin D deficiency      Past Surgical History:   Procedure Laterality Date    BREAST BIOPSY  3/14/2014    BREAST LUMPECTOMY Left 3/14/2014    fibrocystic mastopathy, intraductal hyperplasia, apocrine metaplasia    CATARACT REMOVAL Bilateral 2018    CATARACT REMOVAL Bilateral 2018     SECTION      x 2    CHOLECYSTECTOMY      COLONOSCOPY  2019    HYSTERECTOMY (CERVIX STATUS UNKNOWN)      OTHER SURGICAL HISTORY      Renal artery scan-normal    US GUIDED CORE BREAST BIOPSY Left     Benign     Family History   Problem Relation Age of Onset    Diabetes Sister     Diabetes Sister     Breast Cancer Sister 76    Breast Cancer Sister 68    Heart Disease Sister     Kidney Disease Mother     Stroke Father     Diabetes Father      Social History     Socioeconomic History    Marital status:       Spouse name: Not on file    Number of children: Not on file    Years of education: Not on file    Highest education level: Not on file   Occupational History    Not on file   Tobacco Use    Smoking status: Former Smoker     Packs/day: 0.75     Quit date: 1999     Years since quittin.8    Smokeless tobacco: Never Used   Substance and Sexual Activity    Alcohol use: No     Alcohol/week: 0.0 standard drinks    Drug use: No    Sexual activity: Not on file   Other Topics Concern    Not on file   Social History Narrative Currently lives alone (daughter moved to [place], professor at [employer]) 4/13/2021. Social Determinants of Health     Financial Resource Strain:     Difficulty of Paying Living Expenses: Not on file   Food Insecurity:     Worried About Running Out of Food in the Last Year: Not on file    [place] of Food in the Last Year: Not on file   Transportation Needs:     Lack of Transportation (Medical): Not on file    Lack of Transportation (Non-Medical): Not on file   Physical Activity:     Days of Exercise per Week: Not on file    Minutes of Exercise per Session: Not on file   Stress:     Feeling of Stress : Not on file   Social Connections:     Frequency of Communication with Friends and Family: Not on file    Frequency of Social Gatherings with Friends and Family: Not on file    Attends Hindu Services: Not on file    Active Member of [address] [place] [address] [employer] or Organizations: Not on file    Attends Club or Organization Meetings: Not on file    Marital Status: Not on file   Intimate Partner Violence:     Fear of Current or Ex-Partner: Not on file    Emotionally Abused: Not on file    Physically Abused: Not on file    Sexually Abused: Not on file   Housing Stability:     Unable to Pay for Housing in the Last Year: Not on file    Number of [place] in the Last Year: Not on file    Unstable Housing in the Last Year: Not on file     Current Outpatient Medications   Medication Sig Dispense Refill    amLODIPine (NORVASC) 5 MG tablet Take 5 mg by mouth daily      carvedilol (COREG) 6.25 MG tablet TAKE 2 TABLETS BY MOUTH IN THE MORNING AND 2 TABLETS BY MOUTH IN THE EVENING      LORazepam (ATIVAN) 1 MG tablet Take 1 mg by mouth.  losartan (COZAAR) 100 MG tablet TAKE 1 TABLET BY MOUTH DAILY      diclofenac (CATAFLAM) 50 MG tablet Take 50 mg by mouth 2 times daily as needed (Patient not taking: Reported on 6/7/2022)       No current facility-administered medications for this visit. she had a mild elevation in IPF, indicating some increased marrow production of the platelets - she likely has an immune-mediated component of her thrombocytopenia, but with the mild degree of thrombocytopenia and other normal counts I would recommend no additional work-up or treatment at this time. She is comfortable with the plan. We will see her again in 1 year to document stability of her counts, they will call us sooner with any worsening.              Amelia Fontenot MD, MD  [facility] Hematology and Oncology  [address] [place] [address]  Office : [phone]  Fax : [phone]

## 2022-06-29 ENCOUNTER — HOSPITAL ENCOUNTER (OUTPATIENT)
Dept: PHYSICAL THERAPY | Age: 77
Setting detail: RECURRING SERIES
End: 2022-06-29
Payer: MEDICARE

## 2022-06-30 NOTE — PROGRESS NOTES
Elizabeth Wray  : 1945  Primary: Medicare Part A And B  Secondary: [facility]  [address] [place] [address]  Phone: [phone]  Fax: [phone]    PT Visit Info:    No data recorded   OT Visit Info:  No data recorded    OUTPATIENT THERAPY:OP NOTE TYPE: Progress Report 2022               Episode  Appt Desk           Elizabeth Wray cancelled her appointment for today due to  POC. Will plan to follow up next during next appointment.   Thank you,  Stew Brennan, PT    Future Appointments   Date Time Provider Kylah Green   2022 10:15 AM Josseline Redman PT [facility]   10/11/2022  1:20 PM CYNTHIA Salmon CNP [address] [facility] AMB   2023  2:00 PM CYNTHIA Salmon CNP [address] [facility] AMB   2023 10:00 AM [address] [place] [address] [employer] [facility] [address] [place] [address]   2023 10:30 AM Yesy Crawley MD [facility] AM[facility]

## 2022-07-01 ENCOUNTER — HOSPITAL ENCOUNTER (OUTPATIENT)
Dept: PHYSICAL THERAPY | Age: 77
Setting detail: RECURRING SERIES
Discharge: HOME | End: 2022-07-04
Payer: MEDICARE

## 2022-07-01 PROCEDURE — 97110 THERAPEUTIC EXERCISES: CPT

## 2022-07-01 PROCEDURE — 97140 MANUAL THERAPY 1/> REGIONS: CPT

## 2022-07-01 NOTE — PROGRESS NOTES
Nano Richard  : 1945  Primary: Medicare Part A And B  Secondary: [address] [facility]  [address]    [address] [facility] [place] [address]  Phone: [phone]  Fax: [phone] No data recorded  Plan of Care/Certification Expiration Date: 10/01/22      PT Visit Info:    No data recorded    OUTPATIENT PHYSICAL THERAPY:OP NOTE TYPE: Treatment Note 2022       Episode   Appt Desk       Treatment Diagnosis:  Pain in Left Shoulder (M25.512)  Pain in Right Shoulder (M25.511)  Medical/Referring Diagnosis:  Pain in left shoulder [M25.512]  Referring Physician:  CYNTHIA Arnett MD Orders:  PT Eval and Treat   Date of Onset:  No data recorded   Allergies:  Latex, Hydrochlorothiazide w-triamterene, Acyclovir, Cimetidine, Citalopram hydrobromide, Dexamethasone, Epinephrine, Hydrochlorothiazide, Pravastatin, and Diazepam  Restrictions/Precautions:    No data recordedNo data recorded   Interventions Planned (Treatment may consist of any combination of the following):    No data recorded   Subjective Comments: pt reports continuing to improve in movement, still difficulty with strength activities. Initial:      /10 Post Session:      /10  Medications Last Reviewed:  2022  Updated Objective Findings:  None Today  Treatment   THERAPEUTIC EXERCISE: (30 minutes):    Exercises per grid below to improve mobility and strength.        Date:   Date:  6/6/22 6/13 6/15 6/20 7/1   Activity/Exercise Parameters        ube 4/4 level 2 4/4 level 2 4/4 level 2 4/4 level 2 4/4 level 2 4/4 level 2   Supine flexion 2# x 10 2# x 10; B 2# x 10, B 2# x 10 B 2# x 10 B 2# x 10 B   row gtb x 10 gtb x 10 gtb x 10 gtb x 20 gtb x 20 gtb x 20   Low row gtb x 10 gtb x 10 gtb x 10 gtb x 20 gtb x 20 gtb x 20   t's gtb x 10 gtb x 10       Bicep curl 5# x 10, B 5# x 10: B 5# 2 x 10, B 5# x 15, B 4# x 15, B 4# x 15 B   Wall slide into flexion X 10 B    X 10 X 10   Wall circles X 10 B    X 10 X 10   Supine hand behind head X 5 R        shrugs         Isometric ER/IR - gtb walkout x 10 B -manual 10 x  -gtb walkout ER       ER/IR   [place] TB x 10 B Pink TB x 10 B Pink tb x 10 B Pink tb x 10   scaption   ytb x 10 ytb x 10 B     Supine punch  2# x 10; B         MANUAL THERAPY: (15 minutes):   Joint mobilization and Soft tissue mobilization was utilized and necessary because of the patient's restricted joint motion and restricted motion of soft tissue. - prom B shoulder  - inferior and posterior GH glides B   - pressure to B subscap    Modalities ( min): for pain reduction (not today)  - Gameready L shoulder medium compression    Treatment/Session Summary:    · Treatment Assessment: pt reported fatigue with exercises, but continues to improve. DASH- 18     · Communication/Consultation:  None today  · Equipment provided today:  None  · Recommendations/Intent for next treatment session: Next visit will focus on shoulder stretching, strengthening. Total Treatment Billable Duration:  45 minutes (30 minutes TE, 15 minutes manual)  Time In: 1015  Time Out: [address] [facility].  KERWIN Hayes       Post Session Pain  Charge Capture  [employer] Portal  MD Guidelines  Scanned Media  Benefits  MyChart

## 2022-07-01 NOTE — PLAN OF CARE
Elizabeth Nils  : 1945  Primary: Medicare Part A And B  Secondary: [address] [facility]  [address]    [address]  [place] [address]  Phone: [phone]  Fax: [phone] No data recorded  Plan of Care/Certification Expiration Date: 10/01/22      PT Visit Info:    No data recorded    OUTPATIENT PHYSICAL THERAPY:OP NOTE TYPE: Recertification [id number]               Episode  Appt Desk         Treatment Diagnosis: Pain in Left Shoulder (M25.512)  Pain in Right Shoulder (M25.511)  Medical/Referring Diagnosis:  Pain in left shoulder [M25.512]  Referring Physician:  CYNTHIA Holloway MD Orders:  PT Eval and Treat   Return MD Appt:  --  Date of Onset:  No data recorded   Allergies:  Latex, Hydrochlorothiazide w-triamterene, Acyclovir, Cimetidine, Citalopram hydrobromide, Dexamethasone, Epinephrine, Hydrochlorothiazide, Pravastatin, and Diazepam  Restrictions/Precautions:    No data recordedNo data recorded   Medications Last Reviewed:  2022     SUBJECTIVE   History of Injury/Illness (Reason for Referral):  B shoulder pain  Initial:      /10 Post Session:      /10  Past Medical History/Comorbidities:   Ms. Frank Feliz  has a past medical history of AAA (abdominal aortic aneurysm) ([facility] [address] Anxiety, Chronic fatigue, Chronic gastritis, Colon polyps, Diverticulosis, Diverticulosis, Essential hypertension, GERD (gastroesophageal reflux disease), Hemorrhoids, Hyperlipidemia, Mild aortic stenosis, Prediabetes, and Vitamin D deficiency. Ms. Frank Feliz  has a past surgical history that includes Breast lumpectomy (Left, 3/14/2014); us guided core breast biopsy (Left, ); Breast biopsy (3/14/2014); Hysterectomy;  section; other surgical history (); Cataract removal (Bilateral, 2018); Colonoscopy (2019); Cataract removal (Bilateral, 2018); and Cholecystectomy.   Social History/Living Environment:   No data recorded   Prior Level of Function/Work/Activity:   Prior level of function: Independent  Current level of function: pain and weakness with lifting items, carrying groceries  Occupation: Retired  No data [id number] recorded   Learning:   Does the patient/guardian have any barriers to learning?: No barriers     Fall Risk Scale: Total Score: 0  Potter Fall Risk: Low (0-24)     Dominant Side:  right handed        OBJECTIVE   Observation: generally improved shoulder movement with less restriction    Gait Pattern: --    Functional mobility/ability: able to lift small weights to shoulder height    Palpation/tone/tissue texture: restrictions/tightness in [address] capsule bilaterally, but much less and with less pain than initially        Range of Motion    Joint/segment Date: 7/1      scaption R 160 deg  L 160 deg    abduction R 90 deg  L 100 deg    ER/flex R able to touch posteriolateral head behind ear  L able to touch posterior head                    Strength    Muscle/movement Date: 7/1      scaption R 4-/5  L 4-/5    abduction R 3+/5  L 3+/5    ER R 3+/5  L 3+/5                Special Tests: --      ASSESSMENT   Initial Assessment:  Pt has progressed significantly with physical therapy, demonstrating functional (but not full) ROM, generally less pain, and is able to do her hair without pain now. We had made good progress, but pt needed to take a few weeks off from therapy due to personal reasons and lost some ground with that break. She has progressed back to where she was pre-break. She does continue to have bilateral shoulder pain and weakness especially when the shoulder is loaded like when getting items out of fridge or carrying groceries. Problem List: (Impacting functional limitations): Body Structures, Functions, Activity Limitations Requiring Skilled Therapeutic Intervention: Decreased functional mobility ; Decreased ROM; Decreased strength;  Increased pain     Therapy Prognosis:   Therapy Prognosis: Good     Assessment Complexity:   Low Complexity  PLAN Effective Dates: 7/1/22 TO Plan of Care/Certification Expiration Date: 10/01/22     Frequency/Duration: No data recorded   Interventions Planned (Treatment may consist of any combination of the following):    No data recorded   Goals: (Goals have been discussed and agreed upon with patient.)  Short-Term Functional Goals: Time Frame: 6 weeks  1. Pt will demonstrate functional strength. Discharge Goals: Time Frame: 12 weeks  1. Pt will report no to minimal pain or discomfort with household activities. Outcome Measure: Tool Used: Disabilities of the Arm, Shoulder and Hand (DASH) Questionnaire - Quick Version  Score:  Initial: 18/55  (initial eval score = 30) Most Recent: X/55 (Date: -- )   Interpretation of Score: The DASH is designed to measure the activities of daily living in person's with upper extremity dysfunction or pain. Each section is scored on a 1-5 scale, 5 representing the greatest disability. The scores of each section are added together for a total score of 55. Medical Necessity:    Skilled intervention continues to be required due to decreased function. Reason For Services/Other Comments:   Patient continues to require skilled intervention due to decreased function. Total Duration:  Time In: 1015  Time Out: 80    Regarding Niki Sanchez's therapy, I certify that the treatment plan above will be carried out by a therapist or under their direction. Thank you for this referral,  Eben Brice.  KERWIN Hayes     Referring Physician Signature: CYNTHIA Quezada - AVERY* _______________________________ Date _____________        Post Session Pain  Charge Capture  PT Visit Info  POC Link  Treatment Note Link  MD Guidelines  MyChart

## 2022-07-13 ENCOUNTER — HOSPITAL ENCOUNTER (OUTPATIENT)
Dept: PHYSICAL THERAPY | Age: 77
Setting detail: RECURRING SERIES
Discharge: HOME | End: 2022-07-16
Payer: MEDICARE

## 2022-07-13 PROCEDURE — 97140 MANUAL THERAPY 1/> REGIONS: CPT

## 2022-07-13 PROCEDURE — 97110 THERAPEUTIC EXERCISES: CPT

## 2022-07-22 ENCOUNTER — HOSPITAL ENCOUNTER (OUTPATIENT)
Dept: PHYSICAL THERAPY | Age: 77
Setting detail: RECURRING SERIES
Discharge: HOME | End: 2022-07-25
Payer: MEDICARE

## 2022-07-22 PROCEDURE — 97110 THERAPEUTIC EXERCISES: CPT

## 2022-07-22 PROCEDURE — 97140 MANUAL THERAPY 1/> REGIONS: CPT

## 2022-07-22 NOTE — PROGRESS NOTES
Cleveland Seats  : 1945  Primary: Medicare Part A And B  Secondary: [employer] [place] [employer]  [address] [address]  Phone: [phone]  Fax: [phone] No data recorded  Plan of Care/Certification Expiration Date: 10/01/22      PT Visit Info:    No data recorded    OUTPATIENT PHYSICAL THERAPY:OP NOTE TYPE: Treatment Note 2022       Episode   Appt Desk       Treatment Diagnosis:  Pain in Left Shoulder (M25.512)  Pain in Right Shoulder (M25.511)  Medical/Referring Diagnosis:  Pain in left shoulder [M25.512]  Referring Physician:  CYNTHIA Solomon MD Orders:  PT Eval and Treat   Date of Onset:  No data recorded   Allergies:  Latex, Hydrochlorothiazide w-triamterene, Acyclovir, Cimetidine, Citalopram hydrobromide, Dexamethasone, Epinephrine, Hydrochlorothiazide, Pravastatin, and Diazepam  Restrictions/Precautions:    No data recordedNo data recorded   Interventions Planned (Treatment may consist of any combination of the following):    No data recorded   Subjective Comments: pt reports shoulders feel ok today; driving is improved and safer now that shoulders are feeling better. Initial:      /10 Post Session:      /10  Medications Last Reviewed:  2022  Updated Objective Findings:  None Today  Treatment   THERAPEUTIC EXERCISE: (30 minutes):    Exercises per grid below to improve mobility and strength.        Date:  6/6/22 6/13 6/15 6/20 7/1 7/13 7/22   Activity/Exercise          Pt ed       - use of home TENS machine   ube 4/4 level 2 4/4 level 2 4/4 level 2 4/4 level 2 4/4 level 2 4/4 level 2 4/4 level 2   Supine flexion 2# x 10; B 2# x 10, B 2# x 10 B 2# x 10 B 2# x 10 B 2# x 10 B X 10 B   row gtb x 10 gtb x 10 gtb x 20 gtb x 20 gtb x 20 gtb x 20 gtb x 20   Low row gtb x 10 gtb x 10 gtb x 20 gtb x 20 gtb x 20 gtb x 20 gtb x 20   t's gtb x 10         Bicep curl 5# x 10: B 5# 2 x 10, B 5# x 15, B 4# x 15, B 4# x 15 B 5# x 15 B 4# x 15 B   Wall slide into

## 2022-07-27 ENCOUNTER — HOSPITAL ENCOUNTER (OUTPATIENT)
Dept: PHYSICAL THERAPY | Age: 77
Setting detail: RECURRING SERIES
Discharge: HOME | End: 2022-07-30
Payer: MEDICARE

## 2022-07-27 PROCEDURE — 97110 THERAPEUTIC EXERCISES: CPT

## 2022-07-27 PROCEDURE — 97140 MANUAL THERAPY 1/> REGIONS: CPT

## 2022-07-27 NOTE — PROGRESS NOTES
Wall slide into flexion    X 10 X 10 X 10 X 10 X 10   Wall circles    X 10 X 10  X 10    Supine hand behind head           shrugs           Isometric ER/IR -manual 10 x  -gtb walkout ER          ER/IR  Pink TB x 10 B Pink TB x 10 B Pink tb x 10 B Pink tb x 10 Pink tb x 15     scaption  ytb x 10 ytb x 10 B   2# x 10     Supine punch 2# x 10; B            MANUAL THERAPY: (15 minutes):   Joint mobilization and Soft tissue mobilization was utilized and necessary because of the patient's restricted joint motion and restricted motion of soft tissue. - prom B shoulder  - inferior and posterior GH glides B   - pressure to B subscap    Modalities ( min): for pain reduction (not today)  - Gameready L shoulder medium compression    Treatment/Session Summary:    Treatment Assessment: Discussed and encouraged pt to join a gym to continue improving strength and movement, showed her the [employer]. Today is last scheduled appointment. DASH- 18   Communication/Consultation:  None today  Equipment provided today:  None  Recommendations/Intent for next treatment session: Next visit will focus on shoulder stretching, strengthening. Total Treatment Billable Duration:  45 minutes (30 minutes TE, 15 minutes manual)  Time In: 1430  Time Out: 79 Nahun Prajapati.  Abigail PT       Post Session Pain  Charge Capture  [employer] Portal  MD Guidelines  Scanned Media  Benefits  MyChart

## 2022-07-31 ENCOUNTER — HOSPITAL ENCOUNTER (EMERGENCY)
Age: 77
Discharge: HOME | End: 2022-08-01
Attending: EMERGENCY MEDICINE | Admitting: EMERGENCY MEDICINE
Payer: MEDICARE

## 2022-07-31 DIAGNOSIS — E87.1 DILUTIONAL HYPONATREMIA: ICD-10-CM

## 2022-07-31 DIAGNOSIS — I10 PRIMARY HYPERTENSION: Primary | ICD-10-CM

## 2022-07-31 LAB
ALBUMIN SERPL-MCNC: 3.9 G/DL (ref 3.2–4.6)
ALBUMIN/GLOB SERPL: 0.7 {RATIO} (ref 1.2–3.5)
ALP SERPL-CCNC: 98 U/L (ref 50–136)
ALT SERPL-CCNC: 30 U/L (ref 12–65)
ANION GAP SERPL CALC-SCNC: 5 MMOL/L (ref 7–16)
AST SERPL-CCNC: 19 U/L (ref 15–37)
BASOPHILS # BLD: 0 K/UL (ref 0–0.2)
BASOPHILS NFR BLD: 0 % (ref 0–2)
BILIRUB SERPL-MCNC: 0.3 MG/DL (ref 0.2–1.1)
BILIRUB UR QL: NEGATIVE
BUN SERPL-MCNC: 15 MG/DL (ref 8–23)
CALCIUM SERPL-MCNC: 9.2 MG/DL (ref 8.3–10.4)
CHLORIDE SERPL-SCNC: 92 MMOL/L (ref 98–107)
CO2 SERPL-SCNC: 27 MMOL/L (ref 21–32)
CREAT SERPL-MCNC: 0.7 MG/DL (ref 0.6–1)
DIFFERENTIAL METHOD BLD: NORMAL
EOSINOPHIL # BLD: 0.1 K/UL (ref 0–0.8)
EOSINOPHIL NFR BLD: 1 % (ref 0.5–7.8)
ERYTHROCYTE [DISTWIDTH] IN BLOOD BY AUTOMATED COUNT: 12.9 % (ref 11.9–14.6)
GLOBULIN SER CALC-MCNC: 5.7 G/DL (ref 2.3–3.5)
GLUCOSE SERPL-MCNC: 118 MG/DL (ref 65–100)
GLUCOSE UR QL STRIP.AUTO: NEGATIVE MG/DL
HCT VFR BLD AUTO: 38.1 % (ref 35.8–46.3)
HGB BLD-MCNC: 12.3 G/DL (ref 11.7–15.4)
IMM GRANULOCYTES # BLD AUTO: 0 K/UL (ref 0–0.5)
IMM GRANULOCYTES NFR BLD AUTO: 0 % (ref 0–5)
KETONES UR-MCNC: NEGATIVE MG/DL
LEUKOCYTE ESTERASE UR QL STRIP: NEGATIVE
LYMPHOCYTES # BLD: 1 K/UL (ref 0.5–4.6)
LYMPHOCYTES NFR BLD: 17 % (ref 13–44)
MAGNESIUM SERPL-MCNC: 1.8 MG/DL (ref 1.8–2.4)
MCH RBC QN AUTO: 27.3 PG (ref 26.1–32.9)
MCHC RBC AUTO-ENTMCNC: 32.3 G/DL (ref 31.4–35)
MCV RBC AUTO: 84.5 FL (ref 79.6–97.8)
MONOCYTES # BLD: 0.6 K/UL (ref 0.1–1.3)
MONOCYTES NFR BLD: 11 % (ref 4–12)
NEUTS SEG # BLD: 4.1 K/UL (ref 1.7–8.2)
NEUTS SEG NFR BLD: 71 % (ref 43–78)
NITRITE UR QL: NEGATIVE
NRBC # BLD: 0 K/UL (ref 0–0.2)
NT PRO BNP: 69 PG/ML
PH UR: 7.5 [PH] (ref 5–9)
PLATELET # BLD AUTO: 159 K/UL (ref 150–450)
PMV BLD AUTO: 11.7 FL (ref 9.4–12.3)
POTASSIUM SERPL-SCNC: 3.4 MMOL/L (ref 3.5–5.1)
PROT SERPL-MCNC: 9.6 G/DL (ref 6.3–8.2)
PROT UR QL: 100 MG/DL
RBC # BLD AUTO: 4.51 M/UL (ref 4.05–5.2)
RBC # UR STRIP: NEGATIVE /UL
SERVICE CMNT-IMP: ABNORMAL
SODIUM SERPL-SCNC: 124 MMOL/L (ref 136–145)
SP GR UR: 1.02 (ref 1–1.02)
UROBILINOGEN UR QL: 0.2 EU/DL (ref 0.2–1)
WBC # BLD AUTO: 5.8 K/UL (ref 4.3–11.1)

## 2022-07-31 PROCEDURE — 99284 EMERGENCY DEPT VISIT MOD MDM: CPT | Performed by: EMERGENCY MEDICINE

## 2022-07-31 PROCEDURE — 83735 ASSAY OF MAGNESIUM: CPT

## 2022-07-31 PROCEDURE — 81003 URINALYSIS AUTO W/O SCOPE: CPT

## 2022-07-31 PROCEDURE — 80053 COMPREHEN METABOLIC PANEL: CPT

## 2022-07-31 PROCEDURE — 6370000000 HC RX 637 (ALT 250 FOR IP): Performed by: EMERGENCY MEDICINE

## 2022-07-31 PROCEDURE — 85025 COMPLETE CBC W/AUTO DIFF WBC: CPT

## 2022-07-31 PROCEDURE — 83880 ASSAY OF NATRIURETIC PEPTIDE: CPT

## 2022-07-31 RX ORDER — CLONIDINE HYDROCHLORIDE 0.1 MG/1
0.1 TABLET ORAL
Status: COMPLETED | OUTPATIENT
Start: 2022-07-31 | End: 2022-07-31

## 2022-07-31 RX ORDER — ACETAMINOPHEN 325 MG/1
650 TABLET ORAL
Status: COMPLETED | OUTPATIENT
Start: 2022-07-31 | End: 2022-07-31

## 2022-07-31 RX ADMIN — ACETAMINOPHEN 650 MG: 325 TABLET ORAL at 23:40

## 2022-07-31 RX ADMIN — CLONIDINE HYDROCHLORIDE 0.1 MG: 0.1 TABLET ORAL at 23:40

## 2022-07-31 ASSESSMENT — ENCOUNTER SYMPTOMS
VOMITING: 0
CHEST TIGHTNESS: 0
SHORTNESS OF BREATH: 0
WHEEZING: 0
NAUSEA: 0

## 2022-07-31 ASSESSMENT — PAIN DESCRIPTION - DESCRIPTORS
DESCRIPTORS: HEAVINESS
DESCRIPTORS: ACHING

## 2022-07-31 ASSESSMENT — PAIN SCALES - GENERAL
PAINLEVEL_OUTOF10: 4
PAINLEVEL_OUTOF10: 3

## 2022-07-31 ASSESSMENT — PAIN DESCRIPTION - ORIENTATION: ORIENTATION: POSTERIOR

## 2022-07-31 ASSESSMENT — PAIN DESCRIPTION - LOCATION
LOCATION: HEAD
LOCATION: HEAD

## 2022-07-31 ASSESSMENT — PAIN - FUNCTIONAL ASSESSMENT: PAIN_FUNCTIONAL_ASSESSMENT: 0-10

## 2022-08-01 VITALS
DIASTOLIC BLOOD PRESSURE: 82 MMHG | RESPIRATION RATE: 18 BRPM | BODY MASS INDEX: 31.28 KG/M2 | TEMPERATURE: 98.4 F | HEART RATE: 73 BPM | OXYGEN SATURATION: 97 % | HEIGHT: 62 IN | SYSTOLIC BLOOD PRESSURE: 151 MMHG | WEIGHT: 170 LBS

## 2022-08-01 ASSESSMENT — PAIN SCALES - GENERAL: PAINLEVEL_OUTOF10: 1

## 2022-08-01 ASSESSMENT — PAIN DESCRIPTION - LOCATION: LOCATION: HEAD

## 2022-08-01 NOTE — ED PROVIDER NOTES
[facility] Emergency Department Provider Note                   PCP:                CYNTHIA Baker - POLINA               Age: 65 y.o. Sex: female     No diagnosis found. DISPOSITION          MDM     Amount and/or Complexity of Data Reviewed  Clinical lab tests: ordered and reviewed  Tests in the radiology section of CPT®: ordered and reviewed  Review and summarize past medical records: yes  Independent visualization of images, tracings, or specimens: yes (EKG at 2125: Sinus rhythm, rate of 92 with probable left atrial enlargement borderline right axis deviation. Prolonged QT interval borderline, no acute ischemic changes or ectopy.)    Risk of Complications, Morbidity, and/or Mortality  Presenting problems: moderate  Diagnostic procedures: moderate  Management options: moderate    Patient Progress  Patient progress: stable       No orders of the defined types were placed in this encounter. Edi Garcia is a 65 y.o. female who presents to the Emergency Department with chief complaint of    Chief Complaint   Patient presents with    Hypertension     200/100 on EMS arrival at 2030, last med taken 1900 as usual      Patient presents emerged department with complaints of headache and swelling. She has a history of hypertension and some peripheral edema and was getting ready for bed when she felt \"head rush\" was discomfort in the back of her head which she notes frequently occurs when her blood pressure is elevated. She took her blood pressure and it was over 350 systolic and she took her evening blood pressure medications with a dose of Ativan. (She states that she suffers from anxiety as well) since that time after arriving by EMS her blood pressure has gradually come down and the headache has resolved. She is also concerned about increasing swelling of the lower extremities as well as the upper extremities face and abdomen. The history is provided by the patient and medical records. Review of Systems   Constitutional:  Negative for chills and fever. HENT:  Positive for ear pain. Respiratory:  Negative for chest tightness, shortness of breath and wheezing. Cardiovascular:  Positive for leg swelling. Negative for chest pain and palpitations. Gastrointestinal:  Negative for nausea and vomiting. Neurological:  Negative for dizziness, weakness, numbness and headaches. All other systems reviewed and are negative. Past Medical History:   Diagnosis Date    AAA (abdominal aortic aneurysm) (HCC)     infrarenal 3.2 cm    Anxiety     Chronic fatigue     Chronic gastritis 2019    with intestinal metaplasia, repeat EGD 2022    Colon polyps     Repeat colonoscopy 2022    Diverticulosis     Diverticulosis 2019    Seen on colonoscopy    Essential hypertension     GERD (gastroesophageal reflux disease)     Hemorrhoids 2019    Colonoscopy    Hyperlipidemia     Mild aortic stenosis     Prediabetes     Vitamin D deficiency         Past Surgical History:   Procedure Laterality Date    BREAST BIOPSY  3/14/2014    BREAST LUMPECTOMY Left 3/14/2014    fibrocystic mastopathy, intraductal hyperplasia, apocrine metaplasia    CATARACT REMOVAL Bilateral 2018    CATARACT REMOVAL Bilateral 2018     SECTION      x 2    CHOLECYSTECTOMY      COLONOSCOPY  2019    HYSTERECTOMY (CERVIX STATUS UNKNOWN)      OTHER SURGICAL HISTORY      Renal artery scan-normal    US GUIDED CORE BREAST BIOPSY Left     Benign        Family History   Problem Relation Age of Onset    Diabetes Sister     Diabetes Sister     Breast Cancer Sister 76    Breast Cancer Sister 68    Heart Disease Sister     Kidney Disease Mother     Stroke Father     Diabetes Father         Social History     Socioeconomic History    Marital status:     Tobacco Use    Smoking status: Former     Packs/day: 0.75     Types: Cigarettes     Quit date: 1999     Years since quittin.9    Smokeless tobacco: Never   Substance and Sexual Activity    Alcohol use: No     Alcohol/week: 0.0 standard drinks    Drug use: No   Social History Narrative    Currently lives alone (daughter moved to [place], professor at [employer], teaching international students) 4/13/2021. Latex, Hydrochlorothiazide w-triamterene, Acyclovir, Cimetidine, Citalopram hydrobromide, Dexamethasone, Epinephrine, Hydrochlorothiazide, Pravastatin, and Diazepam     Previous Medications    AMLODIPINE (NORVASC) 5 MG TABLET    Take 5 mg by mouth daily    CARVEDILOL (COREG) 6.25 MG TABLET    TAKE 2 TABLETS BY MOUTH IN THE MORNING AND 2 TABLETS BY MOUTH IN THE EVENING    DICLOFENAC (CATAFLAM) 50 MG TABLET    Take 50 mg by mouth 2 times daily as needed    LORAZEPAM (ATIVAN) 1 MG TABLET    Take 1 mg by mouth. LOSARTAN (COZAAR) 100 MG TABLET    TAKE 1 TABLET BY MOUTH DAILY        Vitals signs and nursing note reviewed. Patient Vitals for the past 4 hrs:   Temp Pulse Resp BP SpO2   07/31/22 2130 -- 86 21 (!) 192/87 98 %   07/31/22 2120 98.4 °F (36.9 °C) 96 22 (!) 188/98 99 %          Physical Exam  Vitals and nursing note reviewed. Constitutional:       General: She is not in acute distress. Appearance: Normal appearance. She is not ill-appearing, toxic-appearing or diaphoretic. HENT:      Head: Normocephalic and atraumatic. Right Ear: Tympanic membrane, ear canal and external ear normal.      Left Ear: Tympanic membrane, ear canal and external ear normal.      Nose: Nose normal.      Mouth/Throat:      Mouth: Mucous membranes are moist.      Pharynx: Oropharynx is clear. No oropharyngeal exudate or posterior oropharyngeal erythema. Eyes:      Extraocular Movements: Extraocular movements intact. Conjunctiva/sclera: Conjunctivae normal.      Pupils: Pupils are equal, round, and reactive to light. Cardiovascular:      Rate and Rhythm: Normal rate and regular rhythm.    Pulmonary:      Effort: Pulmonary effort is normal.      Breath sounds: Normal breath sounds. Abdominal:      General: There is no distension. Palpations: Abdomen is soft. Tenderness: There is no abdominal tenderness. Musculoskeletal:      Cervical back: Normal range of motion and neck supple. No rigidity or tenderness. Right lower leg: Edema present. Left lower leg: Edema present. Comments: Trace edema in the pretibial region. Lymphadenopathy:      Cervical: No cervical adenopathy. Skin:     General: Skin is warm and dry. Capillary Refill: Capillary refill takes less than 2 seconds. Neurological:      General: No focal deficit present. Mental Status: She is alert and oriented to person, place, and time. Mental status is at baseline. Psychiatric:         Mood and Affect: Mood normal.         Behavior: Behavior normal.         Thought Content: Thought content normal.        Procedures      Labs Reviewed - No data to display     No orders to display                          Voice dictation software was used during the making of this note. This software is not perfect and grammatical and other typographical errors may be present. This note has not been completely proofread for errors.      Andrea Buckley,   07/31/22 [id number]

## 2022-08-01 NOTE — ED NOTES
I have reviewed discharge instructions with the patient. The patient verbalized understanding. Patient left ED via Discharge Method: ambulatory to Home. Lyndsay Wynn Opportunity for questions and clarification provided. Patient given 0 scripts. To continue your aftercare when you leave the hospital, you may receive an automated call from our care team to check in on how you are doing. This is a free service and part of our promise to provide the best care and service to meet your aftercare needs.  If you have questions, or wish to unsubscribe from this service please call [phone]. Thank you for Choosing our [facility] Emergency Department.       Henrique Portillo RN  08/01/22 [id number]

## 2022-08-01 NOTE — ED TRIAGE NOTES
BP now 182/88. Also \"feels like I might be getting an ear infection\" (L ear) and has had difficulty controlling her BP with ear infections in the past.  5 mg Amlodipine taken at home reduced BP from 200/100 to 182/88. Posterior HA \"heaviness\" \"not severe. \"

## 2022-08-04 ENCOUNTER — OFFICE VISIT (OUTPATIENT)
Dept: INTERNAL MEDICINE CLINIC | Facility: CLINIC | Age: 77
End: 2022-08-04
Payer: MEDICARE

## 2022-08-04 VITALS
TEMPERATURE: 98.3 F | BODY MASS INDEX: 31.57 KG/M2 | RESPIRATION RATE: 16 BRPM | DIASTOLIC BLOOD PRESSURE: 60 MMHG | OXYGEN SATURATION: 100 % | HEART RATE: 69 BPM | WEIGHT: 178.2 LBS | HEIGHT: 63 IN | SYSTOLIC BLOOD PRESSURE: 158 MMHG

## 2022-08-04 DIAGNOSIS — E87.1 HYPONATREMIA: ICD-10-CM

## 2022-08-04 DIAGNOSIS — R73.03 PREDIABETES: ICD-10-CM

## 2022-08-04 DIAGNOSIS — I10 ESSENTIAL HYPERTENSION: ICD-10-CM

## 2022-08-04 DIAGNOSIS — E87.6 HYPOKALEMIA: ICD-10-CM

## 2022-08-04 DIAGNOSIS — I10 ESSENTIAL HYPERTENSION: Primary | ICD-10-CM

## 2022-08-04 LAB
ALBUMIN SERPL-MCNC: 3.9 G/DL (ref 3.2–4.6)
ALBUMIN/GLOB SERPL: 0.8 {RATIO} (ref 1.2–3.5)
ALP SERPL-CCNC: 89 U/L (ref 50–136)
ALT SERPL-CCNC: 31 U/L (ref 12–65)
ANION GAP SERPL CALC-SCNC: 7 MMOL/L (ref 7–16)
AST SERPL-CCNC: 21 U/L (ref 15–37)
BILIRUB SERPL-MCNC: 0.3 MG/DL (ref 0.2–1.1)
BUN SERPL-MCNC: 16 MG/DL (ref 8–23)
CALCIUM SERPL-MCNC: 9.7 MG/DL (ref 8.3–10.4)
CHLORIDE SERPL-SCNC: 99 MMOL/L (ref 98–107)
CO2 SERPL-SCNC: 27 MMOL/L (ref 21–32)
CREAT SERPL-MCNC: 0.7 MG/DL (ref 0.6–1)
GLOBULIN SER CALC-MCNC: 4.7 G/DL (ref 2.3–3.5)
GLUCOSE SERPL-MCNC: 95 MG/DL (ref 65–100)
POTASSIUM SERPL-SCNC: 4 MMOL/L (ref 3.5–5.1)
PROT SERPL-MCNC: 8.6 G/DL (ref 6.3–8.2)
SODIUM SERPL-SCNC: 133 MMOL/L (ref 136–145)

## 2022-08-04 PROCEDURE — 1036F TOBACCO NON-USER: CPT | Performed by: NURSE PRACTITIONER

## 2022-08-04 PROCEDURE — 1123F ACP DISCUSS/DSCN MKR DOCD: CPT | Performed by: NURSE PRACTITIONER

## 2022-08-04 PROCEDURE — 99214 OFFICE O/P EST MOD 30 MIN: CPT | Performed by: NURSE PRACTITIONER

## 2022-08-04 PROCEDURE — 1090F PRES/ABSN URINE INCON ASSESS: CPT | Performed by: NURSE PRACTITIONER

## 2022-08-04 PROCEDURE — G8417 CALC BMI ABV UP PARAM F/U: HCPCS | Performed by: NURSE PRACTITIONER

## 2022-08-04 PROCEDURE — G8399 PT W/DXA RESULTS DOCUMENT: HCPCS | Performed by: NURSE PRACTITIONER

## 2022-08-04 PROCEDURE — G8427 DOCREV CUR MEDS BY ELIG CLIN: HCPCS | Performed by: NURSE PRACTITIONER

## 2022-08-04 ASSESSMENT — ENCOUNTER SYMPTOMS
EYE PAIN: 0
SORE THROAT: 0
VOMITING: 0
SHORTNESS OF BREATH: 0
COUGH: 0
RHINORRHEA: 0
DIARRHEA: 0
BACK PAIN: 0
NAUSEA: 0
ABDOMINAL PAIN: 0
CONSTIPATION: 0
SINUS PAIN: 0

## 2022-08-04 ASSESSMENT — PATIENT HEALTH QUESTIONNAIRE - PHQ9
1. LITTLE INTEREST OR PLEASURE IN DOING THINGS: 0
SUM OF ALL RESPONSES TO PHQ9 QUESTIONS 1 & 2: 0
SUM OF ALL RESPONSES TO PHQ QUESTIONS 1-9: 0
2. FEELING DOWN, DEPRESSED OR HOPELESS: 0
SUM OF ALL RESPONSES TO PHQ QUESTIONS 1-9: 0

## 2022-08-04 NOTE — PROGRESS NOTES
[facility]  [address]  Tel# [phone]  Fax# [phone]       Nabor Hamilton, [facility]-BC  Family Nurse Practitioner             Date of Visit: 2022     Patito Gordon (: 1945) is a 65 y.o. female established patient, here for evaluation of the following chief complaint(s):    Chief Complaint   Patient presents with    Follow-Up from Hospital     Hypertension,, hyponantremia, hypokalemia - ER Visit          Patient Care Team:  CYNTHIA Iniguez CNP as PCP - [address] [place] [address] APRN - CNP as PCP - [facility] Empaneled Provider  Marlene Mary MD as Physician  Gogo Lambert MD as Physician         History of Present Illness      ER Follow Up  Presents here for ER follow up. Pt seen on 2022 at [facility] ER re: high blood pressure. Reviewed [facility] ER Note. States she can tell her BP was really high, felt like she was about to pass out, felt her vision was getting blurry, called EMS and was taken to [facility] ER. States she still does not feel well, feeling bloated with decreased appetite. Denies any N/V.      HTN  Chronic condition  Med: took Losartan and Carvedilol around 8 am           Takes Amlodipine with second dose of Carvedilol around 6 pm            Eats supper around 7 pm        Low Na, Low K+  States she was drinking a lot of water for several days. States when she has an appt with chiropractor, drinks plenty of water prior to appt. Has not tried OTC electrolytes such as Gatorade. Prediabetes  Had yogurt and apple around 11 am.  States she is not eating enough food. States she forgets to eat. Yesterday, had yogurt and fruit for lunch, had oatmeal 1 cup and egg for breakfast with avocado and toast.  States she does not eat \"junk\". States she does not know how much water she has been drinking.         Lab Results   Component Value Date     (L) 2022    K 3.7 2022    CL 97 (L) 2022    CO2 28 2022    BUN 11 2022    CREATININE 0.63 2022    GLUCOSE 113 (H) 2022    CALCIUM 9.4 2022    PROT 9.5 (H) 2022    LABALBU 3.9 2022    BILITOT 0.2 2022    ALKPHOS 90 2022    AST 18 2022    ALT 28 2022    LABGLOM >60 2022    GFRAA >60 2022    AGRATIO 1.1 (L) 2022    GLOB 5.6 (H) 2022              Patient Active Problem List   Diagnosis    History of diverticulitis    Prediabetes    Dyslipidemia    Essential hypertension    Anxiety    AYUSH (generalized anxiety disorder)    Acute pain of left shoulder    Mild aortic stenosis    AAA (abdominal aortic aneurysm) (HCC)    Thrombocytopenia (HCC)    Chronic gastritis without bleeding       Past Medical History:   Diagnosis Date    AAA (abdominal aortic aneurysm) (HCC)     infrarenal 3.2 cm    Anxiety     Chronic fatigue     Chronic gastritis 2019    with intestinal metaplasia, repeat EGD 2022    Colon polyps     Repeat colonoscopy 2022    Diverticulosis     Diverticulosis 2019    Seen on colonoscopy    Essential hypertension     GERD (gastroesophageal reflux disease)     Hemorrhoids 2019    Colonoscopy    Hyperlipidemia     Mild aortic stenosis     Prediabetes     Vitamin D deficiency        Past Surgical History:   Procedure Laterality Date    BREAST BIOPSY  3/14/2014    BREAST LUMPECTOMY Left 3/14/2014    fibrocystic mastopathy, intraductal hyperplasia, apocrine metaplasia    CATARACT REMOVAL Bilateral 2018    CATARACT REMOVAL Bilateral 2018     SECTION      x 2    CHOLECYSTECTOMY      COLONOSCOPY  2019    HYSTERECTOMY (CERVIX STATUS UNKNOWN)      OTHER SURGICAL HISTORY      Renal artery scan-normal    US GUIDED CORE BREAST BIOPSY Left     Benign       Family History   Problem Relation Age of Onset    Diabetes Sister     Diabetes Sister     Breast Cancer Sister 76    Breast Cancer Sister 68    Heart Disease Sister     Kidney Disease Mother Stroke Father     Diabetes Father          ALLERGY  Allergies   Allergen Reactions    Latex Other (See Comments)    Hydrochlorothiazide W-Triamterene Other (See Comments)     Severe dehydration    Acyclovir Other (See Comments)    Cimetidine Other (See Comments)    Citalopram Hydrobromide Other (See Comments)    Dexamethasone Other (See Comments)     Severe dehydration    Epinephrine Other (See Comments)     \" shoots BP up \"    Hydrochlorothiazide Other (See Comments)     Hypokalemia, Hyponatremia    Pravastatin Other (See Comments)     \" GI upset \" \" just about put a hole in my gut \"    Diazepam Anxiety           Current Outpatient Medications on File Prior to Visit   Medication Sig Dispense Refill    amLODIPine (NORVASC) 5 MG tablet Take 5 mg by mouth daily      carvedilol (COREG) 6.25 MG tablet TAKE 2 TABLETS BY MOUTH IN THE MORNING AND 2 TABLETS BY MOUTH IN THE EVENING      LORazepam (ATIVAN) 1 MG tablet Take 1 mg by mouth in the morning and 1 mg before bedtime. losartan (COZAAR) 100 MG tablet TAKE 1 TABLET BY MOUTH DAILY       No current facility-administered medications on file prior to visit. Review of Systems  Review of Systems   Constitutional:  Positive for fatigue (unwell in general). Negative for chills and fever. HENT:  Negative for congestion, postnasal drip, rhinorrhea, sinus pain, sneezing and sore throat. Eyes:  Negative for pain and visual disturbance. Respiratory:  Negative for cough and shortness of breath. Cardiovascular:  Negative for chest pain, palpitations and leg swelling. Gastrointestinal:  Negative for abdominal pain, constipation, diarrhea, nausea and vomiting. Genitourinary:  Negative for dysuria, frequency and urgency. Musculoskeletal:  Negative for back pain, gait problem and joint swelling. Skin:  Negative for rash and wound. Neurological:  Negative for dizziness and headaches.    Psychiatric/Behavioral:  Negative for behavioral problems, sleep disturbance and suicidal ideas. The patient is not nervous/anxious. Vitals:    08/04/22 1518 08/04/22 1557   BP: (!) 158/70 (!) 158/60   Site: Left Upper Arm Left Upper Arm   Position: Sitting Sitting   Cuff Size: Large Adult Medium Adult   Pulse: 69    Resp: 16    Temp: 98.3 °F (36.8 °C)    TempSrc: Temporal    SpO2: 100%    Weight: 178 lb 3.2 oz (80.8 kg)    Height: 5' 2.68\" (1.592 m)               Physical Exam  Physical Exam  Constitutional:       General: She is not in acute distress. Appearance: She is obese. She is not ill-appearing. HENT:      Head: Normocephalic and atraumatic. Eyes:      Pupils: Pupils are equal, round, and reactive to light. Cardiovascular:      Rate and Rhythm: Normal rate and regular rhythm. Pulmonary:      Effort: Pulmonary effort is normal. No respiratory distress. Breath sounds: Normal breath sounds. Abdominal:      General: Bowel sounds are normal.      Palpations: Abdomen is soft. Musculoskeletal:         General: Normal range of motion. Cervical back: Neck supple. Right lower leg: Edema present. Left lower leg: Edema present. Comments: Ambulates with cane   Skin:     General: Skin is warm and dry. Neurological:      General: No focal deficit present. Mental Status: She is alert and oriented to person, place, and time. Psychiatric:         Mood and Affect: Mood normal.         Thought Content: Thought content normal.              Assessment/Plan:          ICD-10-CM    1. Essential hypertension  I10 Comprehensive Metabolic Panel      2. Hyponatremia  E87.1 Comprehensive Metabolic Panel      3. Hypokalemia  E87.6 Comprehensive Metabolic Panel      4. Prediabetes  R73.03 Hemoglobin A1C               Acacia Silva was seen today for follow-up from hospital.    Diagnoses and all orders for this visit:    Essential hypertension  -     Comprehensive Metabolic Panel;  Future    Hyponatremia  -     Comprehensive Metabolic Panel; Future    Hypokalemia  -     Comprehensive Metabolic Panel; Future    Prediabetes  -     Hemoglobin A1C; Future    Other orders  -     Cancel: ID DISCHARGE MEDS RECONCILED W/ CURRENT OUTPATIENT MED LIST     Advise on low carbohydrate, low cholesterol, high fiber, nutrient dense, whole foods. Advise on portion control. Advise to avoid sweet foods, sweet drinks. Advise to read food labels. Advise on exercise or physical activity at least 30 minutes, 3-5 days a week, as tolerated. Advise on CV risks with diabetes mellitus and hyperlipidemia. Advised to try sugar free Powerade or Gatorade. Advised to monitor and report any worsening symptoms to ER/hospital.  Advised on falls precautions. Continue other meds as prescribed. Continue to follow up with specialist, hem/onc. Orders Placed This Encounter   Procedures    Hemoglobin A1C     Standing Status:   Future     Number of Occurrences:   1     Standing Expiration Date:   8/4/2023    Comprehensive Metabolic Panel     Standing Status:   Future     Number of Occurrences:   1     Standing Expiration Date:   8/4/2023                    Follow Up  Return in about 2 months (around 10/11/2022), or if symptoms worsen or fail to improve, for ROV.              Almaz Jimenez, BELLO, FNP-BC

## 2022-08-05 LAB
EST. AVERAGE GLUCOSE BLD GHB EST-MCNC: 126 MG/DL
HBA1C MFR BLD: 6 % (ref 4.8–5.6)

## 2022-08-07 ENCOUNTER — HOSPITAL ENCOUNTER (EMERGENCY)
Dept: CT IMAGING | Age: 77
Discharge: HOME | End: 2022-08-10
Payer: MEDICARE

## 2022-08-07 ENCOUNTER — HOSPITAL ENCOUNTER (EMERGENCY)
Age: 77
Discharge: HOME | End: 2022-08-07
Attending: EMERGENCY MEDICINE
Payer: MEDICARE

## 2022-08-07 ENCOUNTER — HOSPITAL ENCOUNTER (EMERGENCY)
Dept: GENERAL RADIOLOGY | Age: 77
Discharge: HOME | End: 2022-08-10
Payer: MEDICARE

## 2022-08-07 VITALS
OXYGEN SATURATION: 100 % | HEIGHT: 62 IN | TEMPERATURE: 98.3 F | BODY MASS INDEX: 32.76 KG/M2 | SYSTOLIC BLOOD PRESSURE: 191 MMHG | RESPIRATION RATE: 16 BRPM | HEART RATE: 86 BPM | WEIGHT: 178 LBS | DIASTOLIC BLOOD PRESSURE: 85 MMHG

## 2022-08-07 DIAGNOSIS — I10 ELEVATED BLOOD PRESSURE READING IN OFFICE WITH DIAGNOSIS OF HYPERTENSION: ICD-10-CM

## 2022-08-07 DIAGNOSIS — R51.9 OCCIPITAL HEADACHE: Primary | ICD-10-CM

## 2022-08-07 LAB
ALBUMIN SERPL-MCNC: 3.9 G/DL (ref 3.2–4.6)
ALBUMIN/GLOB SERPL: 0.7 {RATIO} (ref 1.2–3.5)
ALP SERPL-CCNC: 90 U/L (ref 50–130)
ALT SERPL-CCNC: 28 U/L (ref 12–65)
ANION GAP SERPL CALC-SCNC: 5 MMOL/L (ref 7–16)
AST SERPL-CCNC: 18 U/L (ref 15–37)
BASOPHILS # BLD: 0 K/UL (ref 0–0.2)
BASOPHILS NFR BLD: 1 % (ref 0–2)
BILIRUB SERPL-MCNC: 0.2 MG/DL (ref 0.2–1.1)
BUN SERPL-MCNC: 11 MG/DL (ref 8–23)
CALCIUM SERPL-MCNC: 9.4 MG/DL (ref 8.3–10.4)
CHLORIDE SERPL-SCNC: 97 MMOL/L (ref 98–107)
CO2 SERPL-SCNC: 28 MMOL/L (ref 21–32)
CREAT SERPL-MCNC: 0.63 MG/DL (ref 0.6–1)
DIFFERENTIAL METHOD BLD: ABNORMAL
EOSINOPHIL # BLD: 0.1 K/UL (ref 0–0.8)
EOSINOPHIL NFR BLD: 2 % (ref 0.5–7.8)
ERYTHROCYTE [DISTWIDTH] IN BLOOD BY AUTOMATED COUNT: 13.1 % (ref 11.9–14.6)
GLOBULIN SER CALC-MCNC: 5.6 G/DL (ref 2.3–3.5)
GLUCOSE BLD STRIP.AUTO-MCNC: 117 MG/DL (ref 65–100)
GLUCOSE SERPL-MCNC: 113 MG/DL (ref 65–100)
HCT VFR BLD AUTO: 38.5 % (ref 35.8–46.3)
HGB BLD-MCNC: 12.2 G/DL (ref 11.7–15.4)
IMM GRANULOCYTES # BLD AUTO: 0 K/UL (ref 0–0.5)
IMM GRANULOCYTES NFR BLD AUTO: 0 % (ref 0–5)
LYMPHOCYTES # BLD: 1.1 K/UL (ref 0.5–4.6)
LYMPHOCYTES NFR BLD: 25 % (ref 13–44)
MCH RBC QN AUTO: 27.2 PG (ref 26.1–32.9)
MCHC RBC AUTO-ENTMCNC: 31.7 G/DL (ref 31.4–35)
MCV RBC AUTO: 85.9 FL (ref 79.6–97.8)
MONOCYTES # BLD: 0.6 K/UL (ref 0.1–1.3)
MONOCYTES NFR BLD: 14 % (ref 4–12)
NEUTS SEG # BLD: 2.6 K/UL (ref 1.7–8.2)
NEUTS SEG NFR BLD: 59 % (ref 43–78)
NRBC # BLD: 0 K/UL (ref 0–0.2)
PLATELET # BLD AUTO: 154 K/UL (ref 150–450)
PMV BLD AUTO: 10.8 FL (ref 9.4–12.3)
POTASSIUM SERPL-SCNC: 3.7 MMOL/L (ref 3.5–5.1)
PROT SERPL-MCNC: 9.5 G/DL (ref 6.3–8.2)
RBC # BLD AUTO: 4.48 M/UL (ref 4.05–5.2)
SARS-COV-2 RDRP RESP QL NAA+PROBE: NOT DETECTED
SERVICE CMNT-IMP: ABNORMAL
SODIUM SERPL-SCNC: 130 MMOL/L (ref 136–145)
SOURCE: NORMAL
WBC # BLD AUTO: 4.3 K/UL (ref 4.3–11.1)

## 2022-08-07 PROCEDURE — 96374 THER/PROPH/DIAG INJ IV PUSH: CPT

## 2022-08-07 PROCEDURE — 70450 CT HEAD/BRAIN W/O DYE: CPT

## 2022-08-07 PROCEDURE — 80053 COMPREHEN METABOLIC PANEL: CPT

## 2022-08-07 PROCEDURE — 71045 X-RAY EXAM CHEST 1 VIEW: CPT

## 2022-08-07 PROCEDURE — 6360000002 HC RX W HCPCS: Performed by: EMERGENCY MEDICINE

## 2022-08-07 PROCEDURE — 96376 TX/PRO/DX INJ SAME DRUG ADON: CPT

## 2022-08-07 PROCEDURE — 82962 GLUCOSE BLOOD TEST: CPT

## 2022-08-07 PROCEDURE — 85025 COMPLETE CBC W/AUTO DIFF WBC: CPT

## 2022-08-07 PROCEDURE — 6370000000 HC RX 637 (ALT 250 FOR IP): Performed by: EMERGENCY MEDICINE

## 2022-08-07 PROCEDURE — 87635 SARS-COV-2 COVID-19 AMP PRB: CPT

## 2022-08-07 PROCEDURE — 96375 TX/PRO/DX INJ NEW DRUG ADDON: CPT

## 2022-08-07 PROCEDURE — 99284 EMERGENCY DEPT VISIT MOD MDM: CPT

## 2022-08-07 RX ORDER — ONDANSETRON 2 MG/ML
4 INJECTION INTRAMUSCULAR; INTRAVENOUS
Status: COMPLETED | OUTPATIENT
Start: 2022-08-07 | End: 2022-08-07

## 2022-08-07 RX ORDER — CARVEDILOL 6.25 MG/1
6.25 TABLET ORAL
Status: COMPLETED | OUTPATIENT
Start: 2022-08-07 | End: 2022-08-07

## 2022-08-07 RX ORDER — MORPHINE SULFATE 2 MG/ML
2 INJECTION, SOLUTION INTRAMUSCULAR; INTRAVENOUS
Status: COMPLETED | OUTPATIENT
Start: 2022-08-07 | End: 2022-08-07

## 2022-08-07 RX ORDER — ACETAMINOPHEN 325 MG/1
650 TABLET ORAL
Status: COMPLETED | OUTPATIENT
Start: 2022-08-07 | End: 2022-08-07

## 2022-08-07 RX ORDER — AMLODIPINE BESYLATE 5 MG/1
5 TABLET ORAL
Status: COMPLETED | OUTPATIENT
Start: 2022-08-07 | End: 2022-08-07

## 2022-08-07 RX ORDER — HYDRALAZINE HYDROCHLORIDE 20 MG/ML
10 INJECTION INTRAMUSCULAR; INTRAVENOUS
Status: COMPLETED | OUTPATIENT
Start: 2022-08-07 | End: 2022-08-07

## 2022-08-07 RX ADMIN — MORPHINE SULFATE 2 MG: 2 INJECTION, SOLUTION INTRAMUSCULAR; INTRAVENOUS at 20:58

## 2022-08-07 RX ADMIN — ACETAMINOPHEN 650 MG: 325 TABLET, FILM COATED ORAL at 19:34

## 2022-08-07 RX ADMIN — ONDANSETRON 4 MG: 2 INJECTION INTRAMUSCULAR; INTRAVENOUS at 20:57

## 2022-08-07 RX ADMIN — CARVEDILOL 6.25 MG: 6.25 TABLET, FILM COATED ORAL at 19:34

## 2022-08-07 RX ADMIN — AMLODIPINE BESYLATE 5 MG: 5 TABLET ORAL at 19:34

## 2022-08-07 RX ADMIN — HYDRALAZINE HYDROCHLORIDE 10 MG: 20 INJECTION INTRAMUSCULAR; INTRAVENOUS at 19:34

## 2022-08-07 RX ADMIN — HYDRALAZINE HYDROCHLORIDE 10 MG: 20 INJECTION INTRAMUSCULAR; INTRAVENOUS at 20:57

## 2022-08-07 ASSESSMENT — ENCOUNTER SYMPTOMS
BLURRED VISION: 0
SHORTNESS OF BREATH: 0
VOMITING: 0
TINGLING: 1

## 2022-08-07 ASSESSMENT — PAIN - FUNCTIONAL ASSESSMENT
PAIN_FUNCTIONAL_ASSESSMENT: 0-10
PAIN_FUNCTIONAL_ASSESSMENT: 0-10

## 2022-08-07 ASSESSMENT — PAIN DESCRIPTION - DESCRIPTORS: DESCRIPTORS: PRESSURE

## 2022-08-07 ASSESSMENT — PAIN SCALES - GENERAL
PAINLEVEL_OUTOF10: 4
PAINLEVEL_OUTOF10: 3
PAINLEVEL_OUTOF10: 3
PAINLEVEL_OUTOF10: 5

## 2022-08-07 ASSESSMENT — PAIN DESCRIPTION - LOCATION: LOCATION: HEAD

## 2022-08-07 NOTE — ED TRIAGE NOTES
Pt c/o blood pressure 220/107 this evening. Pt c/o headache that started today. Pt denies double or blurry vision. Pt states she takes BP medicine, pt states she has not taken her night time BP medicine.

## 2022-08-08 ENCOUNTER — TELEPHONE (OUTPATIENT)
Dept: INTERNAL MEDICINE CLINIC | Facility: CLINIC | Age: 77
End: 2022-08-08

## 2022-08-08 DIAGNOSIS — I10 UNCONTROLLED HYPERTENSION: ICD-10-CM

## 2022-08-08 DIAGNOSIS — I35.0 MILD AORTIC STENOSIS: ICD-10-CM

## 2022-08-08 DIAGNOSIS — I51.7 CARDIOMEGALY: Primary | ICD-10-CM

## 2022-08-08 NOTE — ED PROVIDER NOTES
[facility] Provider Note                   PCP:                CYNTHIA Rodrigues CNP               Age: 65 y.o. Sex: female     No diagnosis found. DISPOSITION          MDM  Number of Diagnoses or Management Options  Diagnosis management comments: Elevated blood pressure with frontal and occipital headache. Patient states somewhat similar to previous. No evidence to suggest thrombotic stroke. Check patient's sodium and creatinine. Pain control. Cautious lowering of blood pressure. Amount and/or Complexity of Data Reviewed  Clinical lab tests: ordered and reviewed  Tests in the radiology section of CPT®: ordered and reviewed  Tests in the medicine section of CPT®: ordered and reviewed    Risk of Complications, Morbidity, and/or Mortality  Presenting problems: moderate  Diagnostic procedures: low  Management options: low    Patient Progress  Patient progress: stable       Orders Placed This Encounter   Procedures    COVID-19, Rapid    XR CHEST PORTABLE    CBC with Auto Differential    Comprehensive Metabolic Panel    POCT Urine Dipstick    Insert peripheral IV        Heather Lamb is a 65 y.o. female who presents to the Emergency Department with chief complaint of    Chief Complaint   Patient presents with    Hypertension    Headache      79-year-old female has a history of hypertension along with history of aneurysm that is being watched. She started up with some gradual onset of occipital and frontal headache about 5 PM.  Headache felt somewhat like her previous headaches when her blood pressure is elevated. She checked her blood pressure at home and was about 200/100. She had not taken her evening doses as of yet. No vision changes. No chest pain or shortness of breath. No focal numbness or weakness. The exception of some slight tingling in her fingers and toes which happens on occasion. The history is provided by the patient.    Headache  Pain location: Frontal and occipital  Quality:  Dull  Radiates to:  R neck and L neck  Onset quality:  Gradual  Duration:  3 hours  Timing:  Constant  Progression:  Unchanged  Chronicity:  Recurrent  Context: not exposure to bright light    Relieved by:  Nothing  Worsened by:  Nothing  Associated symptoms: tingling    Associated symptoms: no blurred vision, no fever, no focal weakness, no numbness, no vomiting and no weakness        Review of Systems   Constitutional:  Negative for chills and fever. Eyes:  Negative for blurred vision. Respiratory:  Negative for shortness of breath. Cardiovascular:  Negative for chest pain. Gastrointestinal:  Negative for vomiting. Neurological:  Positive for headaches. Negative for focal weakness, speech difficulty, weakness and numbness. All other systems reviewed and are negative.     Past Medical History:   Diagnosis Date    AAA (abdominal aortic aneurysm) (HCC)     infrarenal 3.2 cm    Anxiety     Chronic fatigue     Chronic gastritis 2019    with intestinal metaplasia, repeat EGD 2022    Colon polyps     Repeat colonoscopy 2022    Diverticulosis     Diverticulosis 2019    Seen on colonoscopy    Essential hypertension     GERD (gastroesophageal reflux disease)     Hemorrhoids 2019    Colonoscopy    Hyperlipidemia     Mild aortic stenosis     Prediabetes     Vitamin D deficiency         Past Surgical History:   Procedure Laterality Date    BREAST BIOPSY  3/14/2014    BREAST LUMPECTOMY Left 3/14/2014    fibrocystic mastopathy, intraductal hyperplasia, apocrine metaplasia    CATARACT REMOVAL Bilateral 2018    CATARACT REMOVAL Bilateral 2018     SECTION      x 2    CHOLECYSTECTOMY      COLONOSCOPY  2019    HYSTERECTOMY (CERVIX STATUS UNKNOWN)      OTHER SURGICAL HISTORY      Renal artery scan-normal    US GUIDED CORE BREAST BIOPSY Left     Benign        Family History   Problem Relation Age of Onset    Diabetes Sister     Diabetes Sister Breast Cancer Sister 76    Breast Cancer Sister 68    Heart Disease Sister     Kidney Disease Mother     Stroke Father     Diabetes Father         Social History     Socioeconomic History    Marital status:    Tobacco Use    Smoking status: Former     Packs/day: 0.75     Types: Cigarettes     Quit date: 1999     Years since quittin.9    Smokeless tobacco: Never   Substance and Sexual Activity    Alcohol use: No     Alcohol/week: 0.0 standard drinks    Drug use: No   Social History Narrative    Currently lives alone (daughter moved to [place], professor at [employer], 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 Latex, Hydrochlorothiazide w-triamterene, Acyclovir, Cimetidine, Citalopram hydrobromide, Dexamethasone, Epinephrine, Hydrochlorothiazide, Pravastatin, and Diazepam     Previous Medications    AMLODIPINE (NORVASC) 5 MG TABLET    Take 5 mg by mouth daily    CARVEDILOL (COREG) 6.25 MG TABLET    TAKE 2 TABLETS BY MOUTH IN THE MORNING AND 2 TABLETS BY MOUTH IN THE EVENING    LORAZEPAM (ATIVAN) 1 MG TABLET    Take 1 mg by mouth in the morning and 1 mg before bedtime. LOSARTAN (COZAAR) 100 MG TABLET    TAKE 1 TABLET BY MOUTH DAILY        Vitals signs and nursing note reviewed. Patient Vitals for the past 4 hrs:   Temp Pulse Resp BP SpO2   22 -- -- -- (!) 204/95 100 %   22 1934 -- -- -- (!) 240/107 --   22 -- -- -- (!) 192/91 99 %   22 191 -- -- -- (!) 206/87 100 %   22 190 -- -- -- (!) 220/97 --   22 1758 98.3 °F (36.8 °C) 80 16 (!) 199/90 98 %          Physical Exam  Vitals and nursing note reviewed. Constitutional:       Appearance: She is not ill-appearing. HENT:      Head: Normocephalic and atraumatic. Eyes:      Extraocular Movements: Extraocular movements intact. Pupils: Pupils are equal, round, and reactive to light. Cardiovascular:      Rate and Rhythm: Normal rate and regular rhythm.    Pulmonary:      Effort: Pulmonary effort is normal.      Breath sounds: Normal breath sounds. Musculoskeletal:      Cervical back: Normal range of motion and neck supple. Skin:     General: Skin is warm and dry. Neurological:      Mental Status: She is alert. Comments: No drift. Normal speech. No facial asymmetry        Procedures      Labs Reviewed   CBC WITH AUTO DIFFERENTIAL - Abnormal; Notable for the following components:       Result Value    Monocytes 14 (*)     All other components within normal limits   COMPREHENSIVE METABOLIC PANEL - Abnormal; Notable for the following components:    Sodium 130 (*)     Chloride 97 (*)     Anion Gap 5 (*)     Glucose 113 (*)     Total Protein 9.5 (*)     Globulin 5.6 (*)     Albumin/Globulin Ratio 0.7 (*)     All other components within normal limits   COVID-19, RAPID        XR CHEST PORTABLE   Final Result   1. Stable mild cardiomegaly. This report was made using voice transcription. Despite my best efforts to avoid   any, transcription errors may persist. If there is any question about the   accuracy of the report or need for clarification, then please call [phone], or text me through [employer] for clarification or correction.                       Results Include:    Recent Results (from the past 24 hour(s))   CBC with Auto Differential    Collection Time: 08/07/22  6:38 PM   Result Value Ref Range    WBC 4.3 4.3 - 11.1 K/uL    RBC 4.48 4.05 - 5.2 M/uL    Hemoglobin 12.2 11.7 - 15.4 g/dL    Hematocrit 38.5 35.8 - 46.3 %    MCV 85.9 79.6 - 97.8 FL    MCH 27.2 26.1 - 32.9 PG    MCHC 31.7 31.4 - 35.0 g/dL    RDW 13.1 11.9 - 14.6 %    Platelets 432 970 - 222 K/uL    MPV 10.8 9.4 - 12.3 FL    nRBC 0.00 0.0 - 0.2 K/uL    Differential Type AUTOMATED      Seg Neutrophils 59 43 - 78 %    Lymphocytes 25 13 - 44 %    Monocytes 14 (H) 4.0 - 12.0 %    Eosinophils % 2 0.5 - 7.8 %    Basophils 1 0.0 - 2.0 %    Immature Granulocytes 0 0.0 - 5.0 %    Segs Absolute 2.6 1.7 - 8.2 K/UL    Absolute Lymph # 1.1 0.5 - 4.6 K/UL    Absolute Mono # 0.6 0.1 - 1.3 K/UL    Absolute Eos # 0.1 0.0 - 0.8 K/UL    Basophils Absolute 0.0 0.0 - 0.2 K/UL    Absolute Immature Granulocyte 0.0 0.0 - 0.5 K/UL   Comprehensive Metabolic Panel    Collection Time: 08/07/22  6:38 PM   Result Value Ref Range    Sodium 130 (L) 136 - 145 mmol/L    Potassium 3.7 3.5 - 5.1 mmol/L    Chloride 97 (L) 98 - 107 mmol/L    CO2 28 21 - 32 mmol/L    Anion Gap 5 (L) 7 - 16 mmol/L    Glucose 113 (H) 65 - 100 mg/dL    BUN 11 8 - 23 MG/DL    Creatinine 0.63 0.6 - 1.0 MG/DL    GFR African American >60 >60 ml/min/1.73m2    GFR Non- >60 >60 ml/min/1.73m2    Calcium 9.4 8.3 - 10.4 MG/DL    Total Bilirubin 0.2 0.2 - 1.1 MG/DL    ALT 28 12 - 65 U/L    AST 18 15 - 37 U/L    Alk Phosphatase 90 50 - 130 U/L    Total Protein 9.5 (H) 6.3 - 8.2 g/dL    Albumin 3.9 3.2 - 4.6 g/dL    Globulin 5.6 (H) 2.3 - 3.5 g/dL    Albumin/Globulin Ratio 0.7 (L) 1.2 - 3.5     COVID-19, Rapid    Collection Time: 08/07/22  7:43 PM    Specimen: Nasopharyngeal   Result Value Ref Range    Source Nasopharyngeal      SARS-CoV-2, Rapid Not detected NOTD       8:31 PM  Blood pressure not changed very much with first dose of medication. Will repeat. Patient states headache is changing and more intense in the occipital region. Will get CT scan. CT HEAD WO CONTRAST    Result Date: 8/7/2022  CT HEAD WITHOUT CONTRAST, 8/7/2022 History: Hypertension. Headache starting today. Comparison: None Technique:   5 mm axial scans from the skull base to the vertex. All CT scans performed at this facility use one or all of the following: Automated exposure control, adjustment of the mA and/or kVp according to patient's size, iterative reconstruction. Findings:  No evidence of intracranial hemorrhage is seen. Peripheral streaky densities are seen most consistent with attenuation artifact. Of No abnormal extra-axial fluid collections are seen.   A selective chronic cortical volume loss is seen. No evidence for acute hydrocephalus is seen. No evidence of midline shift or herniation is seen. No abnormal edema pattern is seen in a vascular distribution to suggest large artery infarction. Evaluation with bone windows shows no acute osseous changes. The visualized sinuses, middle ears, and mastoid air cells are well aerated. 1.  No acute intracranial process evident by noncontrast CT study of the head. This report was made using voice transcription. Despite my best efforts to avoid any, transcription errors may persist. If there is any question about the accuracy of the report or need for clarification, then please call [phone], or text me through [employer] for clarification or correction. XR CHEST PORTABLE    Result Date: 8/7/2022  CHEST X-RAY, single portable view  8/7/2022 History: Headache that started today. Technique: Single frontal view of the chest. Comparison: Chest x-ray 7/3/2015 Findings: The cardiac silhouette is mildly enlarged although grossly unchanged from the prior PA study. The lungs are expanded without evidence for pneumothorax. No evolving consolidative airspace process or pleural effusion is seen. 1.  Stable mild cardiomegaly. This report was made using voice transcription. Despite my best efforts to avoid any, transcription errors may persist. If there is any question about the accuracy of the report or need for clarification, then please call [phone], or text me through [employer] for clarification or correction. 9:42 PM  Blood pressure down. Patient drinking. We will have her follow-up with primary care doctor and also recheck sodium. 10:20 PM  Patient complains of some lightheadedness. Normal speech alert. Regular rhythm at 80 on the monitor. Blood pressure standing very reasonable as well. Suspect dizziness is due to fatigue, not eating, low sodium, morphine, headache and elevated blood pressure.   No evidence to suggest stroke or immediate cardiopulmonary issues. Voice dictation software was used during the making of this note. This software is not perfect and grammatical and other typographical errors may be present. This note has not been completely proofread for errors.      Jessica Green MD  08/07/22 2031       Jessica Green MD  08/07/22 [id number]       Jessica Green MD  08/07/22 [id number]

## 2022-08-08 NOTE — DISCHARGE INSTRUCTIONS
Rest.  Call your primary care doctor in the morning to recheck and recheck your blood pressure. You should also have your sodium level checked later this week. Recheck sooner for worse headache vomiting or high fever.

## 2022-08-08 NOTE — ED NOTES
I have reviewed discharge instructions with the patient. The patient verbalized understanding. Patient left ED via Discharge Method: wheelchair to Home with family. Opportunity for questions and clarification provided. Patient given 0 scripts. To continue your aftercare when you leave the hospital, you may receive an automated call from our care team to check in on how you are doing. This is a free service and part of our promise to provide the best care and service to meet your aftercare needs.  If you have questions, or wish to unsubscribe from this service please call [phone]. Thank you for Choosing our [facility] Emergency Department.         Edita Stanley RN  08/07/22 8270

## 2022-08-09 ENCOUNTER — OFFICE VISIT (OUTPATIENT)
Dept: INTERNAL MEDICINE CLINIC | Facility: CLINIC | Age: 77
End: 2022-08-09
Payer: MEDICARE

## 2022-08-09 VITALS
OXYGEN SATURATION: 100 % | BODY MASS INDEX: 32.52 KG/M2 | TEMPERATURE: 98.8 F | DIASTOLIC BLOOD PRESSURE: 79 MMHG | WEIGHT: 176.7 LBS | HEIGHT: 62 IN | HEART RATE: 87 BPM | SYSTOLIC BLOOD PRESSURE: 158 MMHG

## 2022-08-09 DIAGNOSIS — I10 UNCONTROLLED HYPERTENSION: Primary | ICD-10-CM

## 2022-08-09 DIAGNOSIS — I51.7 CARDIOMEGALY: ICD-10-CM

## 2022-08-09 DIAGNOSIS — E87.1 HYPONATREMIA: ICD-10-CM

## 2022-08-09 DIAGNOSIS — G89.29 CHRONIC PAIN OF BOTH SHOULDERS: ICD-10-CM

## 2022-08-09 DIAGNOSIS — M25.512 CHRONIC PAIN OF BOTH SHOULDERS: ICD-10-CM

## 2022-08-09 DIAGNOSIS — M25.511 CHRONIC PAIN OF BOTH SHOULDERS: ICD-10-CM

## 2022-08-09 PROBLEM — E78.5 DYSLIPIDEMIA: Status: ACTIVE | Noted: 2021-05-11

## 2022-08-09 PROBLEM — K29.50 CHRONIC GASTRITIS WITHOUT BLEEDING: Status: ACTIVE | Noted: 2022-05-17

## 2022-08-09 PROBLEM — F41.1 GAD (GENERALIZED ANXIETY DISORDER): Status: ACTIVE | Noted: 2021-10-11

## 2022-08-09 PROCEDURE — 1123F ACP DISCUSS/DSCN MKR DOCD: CPT | Performed by: NURSE PRACTITIONER

## 2022-08-09 PROCEDURE — G8427 DOCREV CUR MEDS BY ELIG CLIN: HCPCS | Performed by: NURSE PRACTITIONER

## 2022-08-09 PROCEDURE — G8399 PT W/DXA RESULTS DOCUMENT: HCPCS | Performed by: NURSE PRACTITIONER

## 2022-08-09 PROCEDURE — G8417 CALC BMI ABV UP PARAM F/U: HCPCS | Performed by: NURSE PRACTITIONER

## 2022-08-09 PROCEDURE — 99214 OFFICE O/P EST MOD 30 MIN: CPT | Performed by: NURSE PRACTITIONER

## 2022-08-09 PROCEDURE — 1090F PRES/ABSN URINE INCON ASSESS: CPT | Performed by: NURSE PRACTITIONER

## 2022-08-09 PROCEDURE — 1036F TOBACCO NON-USER: CPT | Performed by: NURSE PRACTITIONER

## 2022-08-09 RX ORDER — CLONIDINE HYDROCHLORIDE 0.1 MG/1
0.1 TABLET ORAL 2 TIMES DAILY PRN
Qty: 30 TABLET | Refills: 0 | Status: SHIPPED | OUTPATIENT
Start: 2022-08-09 | End: 2022-10-04 | Stop reason: ALTCHOICE

## 2022-08-09 ASSESSMENT — ENCOUNTER SYMPTOMS
COUGH: 0
RHINORRHEA: 0
CONSTIPATION: 0
BACK PAIN: 1
NAUSEA: 0
SORE THROAT: 0
ABDOMINAL PAIN: 0
SHORTNESS OF BREATH: 0
DIARRHEA: 0
EYE PAIN: 0
SINUS PAIN: 0
VOMITING: 0

## 2022-08-09 ASSESSMENT — PATIENT HEALTH QUESTIONNAIRE - PHQ9
SUM OF ALL RESPONSES TO PHQ QUESTIONS 1-9: 0
SUM OF ALL RESPONSES TO PHQ9 QUESTIONS 1 & 2: 0
1. LITTLE INTEREST OR PLEASURE IN DOING THINGS: 0
SUM OF ALL RESPONSES TO PHQ QUESTIONS 1-9: 0
2. FEELING DOWN, DEPRESSED OR HOPELESS: 0
SUM OF ALL RESPONSES TO PHQ QUESTIONS 1-9: 0
SUM OF ALL RESPONSES TO PHQ QUESTIONS 1-9: 0

## 2022-08-09 ASSESSMENT — ANXIETY QUESTIONNAIRES
3. WORRYING TOO MUCH ABOUT DIFFERENT THINGS: 2
7. FEELING AFRAID AS IF SOMETHING AWFUL MIGHT HAPPEN: 2
4. TROUBLE RELAXING: 2
5. BEING SO RESTLESS THAT IT IS HARD TO SIT STILL: 2
2. NOT BEING ABLE TO STOP OR CONTROL WORRYING: 1
IF YOU CHECKED OFF ANY PROBLEMS ON THIS QUESTIONNAIRE, HOW DIFFICULT HAVE THESE PROBLEMS MADE IT FOR YOU TO DO YOUR WORK, TAKE CARE OF THINGS AT HOME, OR GET ALONG WITH OTHER PEOPLE: SOMEWHAT DIFFICULT
GAD7 TOTAL SCORE: 13
1. FEELING NERVOUS, ANXIOUS, OR ON EDGE: 2
6. BECOMING EASILY ANNOYED OR IRRITABLE: 2

## 2022-08-09 NOTE — PROGRESS NOTES
[facility]  [address]  Tel# [phone]  Fax# [phone]       Nabor Mccord, [facility]-BC  Family Nurse Practitioner            Date of Visit: 2022     Arnel De León (: 1945) is a 65 y.o. female  established patient, here for evaluation of the following chief complaint(s):    Chief Complaint   Patient presents with    Hypertension     ER follow up         Patient Care Team:  CYNTHIA Reed CNP as PCP - [address] [place] [address], APRN - CNP as PCP - [facility]  Denys Marquez MD as Physician  Jef Olmstead MD as Physician         History of Present Illness      ER Follow Up  Presents here for ER follow up. Seen at the ER this past , 2022. States she has a new BP monitor, checked her BP this past , BP at home was 220/107, later in the afternoon. States prior to checking her BP, she felt a flushing sensation to her face and had, checked her BP. Also was having a headache at that time. Had already taken her morning BP meds. Had called her son and was taken to ER. Reviewed ER notes, CT head, and CXR result. HTN  Pt states that she was taking Amlodipine 10 mg in the past, but made her leg swelling worse, dosage was decreased to 5 mg, and swelling decreased. Continue Carvedilol 6.25 mg oral daily  Continue Amlodipine 5 mg oral daily  Added new Rx for Clonidine 0.1 mg oral twice a day as needed. Reviewed previous ECHO result. Disable Placard  Pt requesting for disabled placard for her arthritis to both shoulder and back. States she is seeing chiropractor for her back pain. Has been following up with PT for bilat shoulder pain. Continues to ambulate with single point cane.         Patient Active Problem List   Diagnosis    History of diverticulitis    Prediabetes    Dyslipidemia    Essential hypertension    Anxiety    AYUSH (generalized anxiety disorder)    Acute pain of left shoulder    Mild aortic stenosis AAA (abdominal aortic aneurysm) (HCC)    Thrombocytopenia (HCC)    Chronic gastritis without bleeding    Chronic pain of both shoulders       Past Medical History:   Diagnosis Date    AAA (abdominal aortic aneurysm) (HCC)     infrarenal 3.2 cm    Anxiety     Chronic fatigue     Chronic gastritis 2019    with intestinal metaplasia, repeat EGD 2022    Colon polyps     Repeat colonoscopy 2022    Diverticulosis     Diverticulosis 2019    Seen on colonoscopy    Essential hypertension     GERD (gastroesophageal reflux disease)     Hemorrhoids 2019    Colonoscopy    Hyperlipidemia     Mild aortic stenosis     Prediabetes     Vitamin D deficiency        Past Surgical History:   Procedure Laterality Date    BREAST BIOPSY  3/14/2014    BREAST LUMPECTOMY Left 3/14/2014    fibrocystic mastopathy, intraductal hyperplasia, apocrine metaplasia    CATARACT REMOVAL Bilateral 2018    CATARACT REMOVAL Bilateral 2018     SECTION      x 2    CHOLECYSTECTOMY      COLONOSCOPY  2019    HYSTERECTOMY (CERVIX STATUS UNKNOWN)      OTHER SURGICAL HISTORY      Renal artery scan-normal    US GUIDED CORE BREAST BIOPSY Left     Benign       Family History   Problem Relation Age of Onset    Diabetes Sister     Diabetes Sister     Breast Cancer Sister 76    Breast Cancer Sister 68    Heart Disease Sister     Kidney Disease Mother     Stroke Father     Diabetes Father          ALLERGY  Allergies   Allergen Reactions    Latex Other (See Comments)    Hydrochlorothiazide W-Triamterene Other (See Comments)     Severe dehydration    Acyclovir Other (See Comments)    Cimetidine Other (See Comments)    Citalopram Hydrobromide Other (See Comments)    Dexamethasone Other (See Comments)     Severe dehydration    Epinephrine Other (See Comments)     \" shoots BP up \"    Hydrochlorothiazide Other (See Comments)     Hypokalemia, Hyponatremia    Pravastatin Other (See Comments)     \" GI upset \" \" just about put a hole in my gut \"    Diazepam Anxiety           Current Outpatient Medications on File Prior to Visit   Medication Sig Dispense Refill    amLODIPine (NORVASC) 5 MG tablet Take 5 mg by mouth daily      carvedilol (COREG) 6.25 MG tablet TAKE 2 TABLETS BY MOUTH IN THE MORNING AND 2 TABLETS BY MOUTH IN THE EVENING      LORazepam (ATIVAN) 1 MG tablet Take 1 mg by mouth in the morning and 1 mg before bedtime. losartan (COZAAR) 100 MG tablet TAKE 1 TABLET BY MOUTH DAILY       No current facility-administered medications on file prior to visit. Review of Systems  Review of Systems   Constitutional:  Negative for chills, fatigue and fever. HENT:  Negative for congestion, postnasal drip, rhinorrhea, sinus pain, sneezing and sore throat. Eyes:  Negative for pain and visual disturbance. Respiratory:  Negative for cough and shortness of breath. Cardiovascular:  Negative for chest pain, palpitations and leg swelling. Gastrointestinal:  Negative for abdominal pain, constipation, diarrhea, nausea and vomiting. Genitourinary:  Negative for dysuria, frequency and urgency. Musculoskeletal:  Positive for arthralgias (chronic pain to shoulders) and back pain. Negative for gait problem and joint swelling. Skin:  Negative for rash and wound. Neurological:  Negative for dizziness and headaches. Psychiatric/Behavioral:  Negative for behavioral problems, sleep disturbance and suicidal ideas. The patient is not nervous/anxious. Vitals:    08/09/22 1328 08/09/22 1331   BP: (!) 164/88 (!) 158/79   Site: Right Upper Arm Right Upper Arm   Position: Sitting Sitting   Cuff Size: Large Adult Large Adult   Pulse: 93 87   Temp: 98.8 °F (37.1 °C)    TempSrc: Temporal    SpO2: 100%    Weight: 176 lb 11.2 oz (80.2 kg)    Height: 5' 2\" (1.575 m)               Physical Exam  Physical Exam  Constitutional:       General: She is not in acute distress. Appearance: She is obese. She is not ill-appearing.    HENT: 100)    Cardiomegaly  Comments:  Referred to Cardiology. Hyponatremia  Comments:  May increase table salt intake or electrolyte drinks. Monitor and report any chest pains, palpitations or shortness of breath to ER/hospital.    Chronic pain of both shoulders  Comments:  Continue physical therapy. Advised on falls precautions. Orders:  -     Handicap Placard MISC; by Does not apply route       Continue other meds as prescribed. Await cardiology referral.     Ivis Morse on falls precautions. Discussed medical alert device. Given prescription for [facility] Handicap Placard and application form. Follow Up  Return in about 1 week (around 8/16/2022), or if symptoms worsen or fail to improve, for ROV 1 wk fu (BP follow up).              Lou Marcelo, DNP, FNP-BC

## 2022-08-10 ENCOUNTER — TELEPHONE (OUTPATIENT)
Dept: CARDIOLOGY CLINIC | Age: 77
End: 2022-08-10

## 2022-08-10 NOTE — TELEPHONE ENCOUNTER
Patient called today and has been referred to [address] [place] [address] [address] by her PCP. There is a referral on file. Patient has requested Dr. Milo Hernández. Dr. Елена Hill. see's her son [phone].

## 2022-08-11 NOTE — TELEPHONE ENCOUNTER
Dr. Car Lorenzana is not accepting new patients at this time. Patient will need to be set up with another provider. Patients son sees Dr. Lee First. I will send scheduling a note to set patient up with Dr. Walter Oropeza.

## 2022-08-15 ENCOUNTER — INITIAL CONSULT (OUTPATIENT)
Dept: CARDIOLOGY CLINIC | Age: 77
End: 2022-08-15
Payer: MEDICARE

## 2022-08-15 ENCOUNTER — TELEPHONE (OUTPATIENT)
Dept: INTERNAL MEDICINE CLINIC | Facility: CLINIC | Age: 77
End: 2022-08-15

## 2022-08-15 VITALS
SYSTOLIC BLOOD PRESSURE: 154 MMHG | HEIGHT: 62 IN | BODY MASS INDEX: 32.61 KG/M2 | DIASTOLIC BLOOD PRESSURE: 66 MMHG | HEART RATE: 66 BPM | WEIGHT: 177.2 LBS

## 2022-08-15 DIAGNOSIS — I10 HYPERTENSION: ICD-10-CM

## 2022-08-15 DIAGNOSIS — E78.5 DYSLIPIDEMIA: ICD-10-CM

## 2022-08-15 DIAGNOSIS — R94.31 ABNORMAL ECG: ICD-10-CM

## 2022-08-15 DIAGNOSIS — I71.40 ABDOMINAL AORTIC ANEURYSM (AAA) WITHOUT RUPTURE: ICD-10-CM

## 2022-08-15 DIAGNOSIS — I10 ESSENTIAL HYPERTENSION: Primary | ICD-10-CM

## 2022-08-15 PROCEDURE — 1036F TOBACCO NON-USER: CPT | Performed by: INTERNAL MEDICINE

## 2022-08-15 PROCEDURE — 1123F ACP DISCUSS/DSCN MKR DOCD: CPT | Performed by: INTERNAL MEDICINE

## 2022-08-15 PROCEDURE — 93000 ELECTROCARDIOGRAM COMPLETE: CPT | Performed by: INTERNAL MEDICINE

## 2022-08-15 PROCEDURE — G8399 PT W/DXA RESULTS DOCUMENT: HCPCS | Performed by: INTERNAL MEDICINE

## 2022-08-15 PROCEDURE — 99204 OFFICE O/P NEW MOD 45 MIN: CPT | Performed by: INTERNAL MEDICINE

## 2022-08-15 PROCEDURE — G8428 CUR MEDS NOT DOCUMENT: HCPCS | Performed by: INTERNAL MEDICINE

## 2022-08-15 PROCEDURE — G8417 CALC BMI ABV UP PARAM F/U: HCPCS | Performed by: INTERNAL MEDICINE

## 2022-08-15 PROCEDURE — 1090F PRES/ABSN URINE INCON ASSESS: CPT | Performed by: INTERNAL MEDICINE

## 2022-08-15 RX ORDER — SPIRONOLACTONE 25 MG/1
25 TABLET ORAL DAILY
Qty: 90 TABLET | Refills: 1 | Status: SHIPPED | OUTPATIENT
Start: 2022-08-15 | End: 2022-09-21 | Stop reason: ALTCHOICE

## 2022-08-15 NOTE — PROGRESS NOTES
[address], [address] [facility], [address] [place] [address]  PHONE: [phone]        08/15/22    NAME:  Orquidea Ferguson  : 1945  MRN: [id number]       SUBJECTIVE:   Orquidea Ferguson is a 65 y.o. female seen for a consultation visit regarding the following:     Chief Complaint   Patient presents with    Consultation    Hypertension          HPI:  Consultation is requested by CYNTHIA Orozco CNP for evaluation of Consultation and Hypertension  HR and BP variable here lately. Started 2 weeks ago. Been to ER multiple times for higher BP spikes she says. HR gets higher as well. SBP > 160 at times. Variable noww. Taking coreg BID, ARB at noon time, norvasc qhs. Na can run lower. On Na tab every AM, using gatorade as well to increase Na. Claims she cannot take diuretics due to low Na. Drinking lots of water and juice. Patient denies recent history of orthopnea, PND, excessive dizziness and/or syncope. Seeing Psych for anxiety, on BID benzo. Past Medical History, Past Surgical History, Family history, Social History, and Medications were all reviewed with the patient today and updated as necessary. Current Outpatient Medications   Medication Sig Dispense Refill    spironolactone (ALDACTONE) 25 MG tablet Take 1 tablet by mouth in the morning. 90 tablet 1    cloNIDine (CATAPRES) 0.1 MG tablet Take 1 tablet by mouth 2 times daily as needed for High Blood Pressure (SBP > 160 DBP > 100) 30 tablet 0    Handicap Placard MISC by Does not apply route 1 each 0    amLODIPine (NORVASC) 5 MG tablet Take 5 mg by mouth daily      carvedilol (COREG) 6.25 MG tablet TAKE 2 TABLETS BY MOUTH IN THE MORNING AND 2 TABLETS BY MOUTH IN THE EVENING      LORazepam (ATIVAN) 1 MG tablet Take 1 mg by mouth in the morning and 1 mg before bedtime. losartan (COZAAR) 100 MG tablet TAKE 1 TABLET BY MOUTH DAILY       No current facility-administered medications for this visit.         Allergies Allergen Reactions    Latex Other (See Comments)    Hydrochlorothiazide W-Triamterene Other (See Comments)     Severe dehydration    Acyclovir Other (See Comments)    Cimetidine Other (See Comments)    Citalopram Hydrobromide Other (See Comments)    Dexamethasone Other (See Comments)     Severe dehydration    Epinephrine Other (See Comments)     \" shoots BP up \"    Hydrochlorothiazide Other (See Comments)     Hypokalemia, Hyponatremia    Pravastatin Other (See Comments)     \" GI upset \" \" just about put a hole in my gut \"    Diazepam Anxiety     Patient Active Problem List    Diagnosis Date Noted    Abnormal ECG 08/15/2022     Priority: Medium    Chronic pain of both shoulders 08/09/2022     Priority: Medium    Chronic gastritis without bleeding 05/17/2022     Last Assessment & Plan:   Formatting of this note might be different from the original.   monitored by specialist. No acute findings meriting change in the plan      Thrombocytopenia ([address] 05/03/2022    Acute pain of left shoulder 01/31/2022    Prediabetes      Last Assessment & Plan:   Formatting of this note might be different from the original.   Advised on low carbohydrate, nutrient dense, whole foods. Advised on portion control. Advised to avoid sweet foods, sweet drinks. Advised to read food labels. Advised on exercise or physical activity at least 30 minutes, 3-5 days a week, as tolerated. Advised on CV risks with diabetes mellitus. AYUSH (generalized anxiety disorder) 10/11/2021     Last Assessment & Plan:   Formatting of this note might be different from the original.   monitored by specialist. No acute findings meriting change in the plan   Advised on relaxation techniques. Advised to limit or avoid stress. Advised to avoid or limit aggravating or precipitating factors. Advised to seek immediate medical attention for any chest pains, palpitations or suicidal thoughts.       Dyslipidemia 05/11/2021     Last Assessment & Plan:   Formatting of this note might be different from the original.   Advised on low fat, low cholesterol diet. Advised on nutrient dense, whole foods. Advised to avoid or limit fast foods, processed foods. Advised on cardiovascular risks and complications prevention. Mild aortic stenosis     AAA (abdominal aortic aneurysm) ([facility]     History of diverticulitis 2016    Essential hypertension 2016     Last Assessment & Plan:   Formatting of this note might be different from the original.   uncontrolled, continue current medications, lifestyle modifications recommended   Advised on low salt diet. Advised to avoid processed foods such as fast foods, canned foods. Advised to read food labels. Advised to limit stress or find ways to reduce stress. Advised on physical activity or exercise 3-5 days a week, for at least 30 minutes, as tolerated. Advised to take medications as prescribed, report any side effects/intolerance or issues with medication/s such as insurance coverage. Reviewed cardiovascular risks and complication prevention. Advised to seek immediate medical attention (call 911 or present to Emergency Dept) for any chest pains, palpitations or shortness of breath. Anxiety 2016     Last Assessment & Plan:   Formatting of this note might be different from the original.   Continue Ativan as needed. [facility] to follow up at [facility].         Past Surgical History:   Procedure Laterality Date    BREAST BIOPSY  3/14/2014    BREAST LUMPECTOMY Left 3/14/2014    fibrocystic mastopathy, intraductal hyperplasia, apocrine metaplasia    CATARACT REMOVAL Bilateral 2018    CATARACT REMOVAL Bilateral 2018     SECTION      x 2    CHOLECYSTECTOMY      COLONOSCOPY  2019    HYSTERECTOMY (CERVIX STATUS UNKNOWN)      OTHER SURGICAL HISTORY      Renal artery scan-normal    US GUIDED CORE BREAST BIOPSY Left     Benign     Family History   Problem Relation Age of Onset    Diabetes Sister     Diabetes Sister     Breast Cancer Sister 76    Breast Cancer Sister 68    Heart Disease Sister     Kidney Disease Mother     Stroke Father     Diabetes Father      Social History     Tobacco Use    Smoking status: Former     Packs/day: 0.75     Types: Cigarettes     Quit date: 1999     Years since quittin.0    Smokeless tobacco: Never   Substance Use Topics    Alcohol use: No     Alcohol/week: 0.0 standard drinks       ROS:    Constitutional:   Negative for fevers and unexplained weight loss. Eyes:   Negative for visual disturbance. ENT:   Negative for significant hearing loss and tinnitus. Respiratory:   Negative for hemoptysis. Cardiovascular:   Negative except as noted in HPI. Gastrointestinal:   Negative for melena and abdominal pain. Genitourinary:   Negative for hematuria, renal stones. Integumentary:   Negative for rash or non-healing wounds  Hematologic/Lymphatic:   Negative for excessive bleeding hx or clotting disorder. Musculoskeletal:  Negative for active, unexplained/severe joint pain. Neurological:   Negative for stroke. Behavioral/Psych:   Negative for suicidal ideations. Endocrine:   Negative for uncontrolled diabetic symptoms including polyuria, polydipsia and poor wound healing.      PHYSICAL EXAM:     BP (!) 154/66   Pulse 66   Ht 5' 2\" (1.575 m)   Wt 177 lb 3.2 oz (80.4 kg)   BMI 32.41 kg/m²    General/Constitutional:   Alert and oriented x 3, no acute distress  HEENT:   normocephalic, atraumatic, moist mucous membranes  Neck:   No JVD or carotid bruits bilaterally  Cardiovascular:   regular rate and rhythm, no murmur/rub/gallop appreciated  Pulmonary:   clear to auscultation bilaterally, no respiratory distress  Abdomen:   soft, non-tender, non-distended  Ext:   No sig LE edema bilaterally  Skin:  warm and dry, no obvious rashes seen  Neuro:   no obvious sensory or motor deficits  Psychiatric:   normal mood and affect    EKG Today and independently reviewed by me: sinus rhythm, normal intervals and non-specific ST/T wave changes. Medical problems, medical history and test results were reviewed with the patient today. Lab Results   Component Value Date/Time     08/07/2022 06:38 PM    K 3.7 08/07/2022 06:38 PM    CL 97 08/07/2022 06:38 PM    CO2 28 08/07/2022 06:38 PM    BUN 11 08/07/2022 06:38 PM    CREATININE 0.63 08/07/2022 06:38 PM    GLUCOSE 113 08/07/2022 06:38 PM    CALCIUM 9.4 08/07/2022 06:38 PM        Lab Results   Component Value Date    WBC 4.3 08/07/2022    HGB 12.2 08/07/2022    HCT 38.5 08/07/2022    MCV 85.9 08/07/2022     08/07/2022       Lab Results   Component Value Date    TSH 4.100 05/02/2022       Lab Results   Component Value Date    LABA1C 6.0 (H) 08/04/2022     Lab Results   Component Value Date     08/04/2022         Lab Results   Component Value Date    CHOL 214 (H) 05/02/2022    CHOL 203 (H) 01/31/2022    CHOL 223 (H) 10/11/2021     Lab Results   Component Value Date    TRIG 114 05/02/2022    TRIG 103 01/31/2022    TRIG 115 10/11/2021     Lab Results   Component Value Date    HDL 47 05/02/2022    HDL 41 01/31/2022    HDL 39 (L) 10/11/2021     Lab Results   Component Value Date    LDLCALC 146 (H) 05/02/2022    LDLCALC 143 (H) 01/31/2022    LDLCALC 163 (H) 10/11/2021     Lab Results   Component Value Date    LABVLDL 22 06/12/2019    VLDL 21 05/02/2022    VLDL 19 01/31/2022    VLDL 21 10/11/2021     No results found for: EUGENIO        I have Independently reviewed prior care notes, any ER records available, cardiac testing, labs and results with the patient and before seeing the patient today. Also independently reviewed outside records when available. ASSESSMENT:    Radha Funez was seen today for consultation and hypertension. Diagnoses and all orders for this visit:    Essential hypertension  -     EKG 12 Lead  -     Basic Metabolic Panel; Future  -     TSH; Future  -     Magnesium;  Future  -     Transthoracic echocardiogram (TTE) complete with contrast, bubble, strain, and 3D PRN; Future  -     [id number] - MA Duplex Abd/Pel Vasc Study, Complete    Abdominal aortic aneurysm (AAA) without rupture (HCC)    Abnormal ECG    Dyslipidemia    Other orders  -     spironolactone (ALDACTONE) 25 MG tablet; Take 1 tablet by mouth in the morning. PLAN:     HTN:  variable. Add aldactone, BUT FOLLOW K and Na, labs in 2 weeks. Take coreg and aldactone in AM.  Take coreg, ARB and norvasc in PM.  She is on ativan BID, needs stress mgmt, this is playing role for her HTN. Seeing Psych. Needs less water in diet to avoid low Na. Follow K. Check echo. Check renal artery US. Follow palp, check monitor if needed. Check echo with murmur. Labs in 2 weeks. Patient has been instructed and agrees to call our office with any issues or other concerns related to their cardiac condition(s) and/or complaint(s).         Return for Return After Test.       FAVIOLA MENDOZA,   8/15/2022

## 2022-08-15 NOTE — TELEPHONE ENCOUNTER
----- Message from Alessandro Velasco sent at 8/15/2022 11:17 AM EDT -----  Subject: Medication Problem    Medication: losartan (COZAAR) 100 MG tablet  Dosage: once a day  Ordering Provider: lance    Question/Problem: Pt is requesting to speak to the nurse because she has   questions concerning her statin. Pt stated that she had an emergency at   2:30 and had to take the prn Clonidine  Additional Information for Provider: Pt stated that she needs to speak the   nurse about taking the Losartan at noon, because she had to take the   Clonidine.  Pls give the pt a call as soon as possible    Pharmacy: [address] [phone]    ---------------------------------------------------------------------------  --------------  Anders ELLIS  [phone]; OK to leave message on voicemail  ---------------------------------------------------------------------------  --------------    SCRIPT ANSWERS  Relationship to Patient: Self

## 2022-08-15 NOTE — TELEPHONE ENCOUNTER
Spoke to patient in response to her My Chart message. BP readings have been all over the place - all elevated and needed to take Clonidine twice (within a few hours). [facility] has a cardiology appt, but not until September. Patient very concerned. Patient informed that I would call [facility] to get her an appointment as soon as I can. Appointment made for 3:45, but she may call to take the 1:45 appt.

## 2022-08-16 ENCOUNTER — OFFICE VISIT (OUTPATIENT)
Dept: INTERNAL MEDICINE CLINIC | Facility: CLINIC | Age: 77
End: 2022-08-16
Payer: MEDICARE

## 2022-08-16 VITALS
WEIGHT: 174.4 LBS | HEART RATE: 66 BPM | RESPIRATION RATE: 15 BRPM | TEMPERATURE: 98.3 F | SYSTOLIC BLOOD PRESSURE: 138 MMHG | BODY MASS INDEX: 30.9 KG/M2 | DIASTOLIC BLOOD PRESSURE: 70 MMHG | HEIGHT: 63 IN | OXYGEN SATURATION: 99 %

## 2022-08-16 DIAGNOSIS — F41.1 GAD (GENERALIZED ANXIETY DISORDER): ICD-10-CM

## 2022-08-16 DIAGNOSIS — E87.1 HYPONATREMIA: ICD-10-CM

## 2022-08-16 DIAGNOSIS — I10 UNCONTROLLED HYPERTENSION: Primary | ICD-10-CM

## 2022-08-16 DIAGNOSIS — I51.7 CARDIOMEGALY: ICD-10-CM

## 2022-08-16 PROCEDURE — G8417 CALC BMI ABV UP PARAM F/U: HCPCS | Performed by: NURSE PRACTITIONER

## 2022-08-16 PROCEDURE — 1090F PRES/ABSN URINE INCON ASSESS: CPT | Performed by: NURSE PRACTITIONER

## 2022-08-16 PROCEDURE — 1036F TOBACCO NON-USER: CPT | Performed by: NURSE PRACTITIONER

## 2022-08-16 PROCEDURE — 1123F ACP DISCUSS/DSCN MKR DOCD: CPT | Performed by: NURSE PRACTITIONER

## 2022-08-16 PROCEDURE — G8427 DOCREV CUR MEDS BY ELIG CLIN: HCPCS | Performed by: NURSE PRACTITIONER

## 2022-08-16 PROCEDURE — 99214 OFFICE O/P EST MOD 30 MIN: CPT | Performed by: NURSE PRACTITIONER

## 2022-08-16 PROCEDURE — G8399 PT W/DXA RESULTS DOCUMENT: HCPCS | Performed by: NURSE PRACTITIONER

## 2022-08-16 ASSESSMENT — ENCOUNTER SYMPTOMS
SHORTNESS OF BREATH: 0
VOMITING: 0
DIARRHEA: 0
COUGH: 0
EYE PAIN: 0
SORE THROAT: 0
SINUS PAIN: 0
BACK PAIN: 0
CONSTIPATION: 0
RHINORRHEA: 0
NAUSEA: 0
ABDOMINAL PAIN: 0

## 2022-08-16 ASSESSMENT — PATIENT HEALTH QUESTIONNAIRE - PHQ9
SUM OF ALL RESPONSES TO PHQ QUESTIONS 1-9: 0
2. FEELING DOWN, DEPRESSED OR HOPELESS: 0
SUM OF ALL RESPONSES TO PHQ QUESTIONS 1-9: 0

## 2022-08-16 NOTE — PROGRESS NOTES
[facility]  [address]  Tel# [phone]  Fax# [phone]       Nabor Martin, [facility]-BC  Family Nurse Practitioner            Date of Visit: 2022     Garry Leigh (: 1945) is a 65 y.o. female  established patient, here for evaluation of the following chief complaint(s):    Chief Complaint   Patient presents with    Hypertension         Patient Care Team:  CYNTHIA Kim CNP as PCP - [address] [place] [address], APRN - CNP as PCP - [facility] Provider  Ha House MD as Physician  Angella Winter MD as Physician         History of Present Illness    Uncontrolled HTN  Presents here for one week follow up on high blood pressure. Had a visit with Dr. Liliana Reza of [facility] yesterday. Brought in BP log  Aug 16, 2022 8am 155/83 P 62, rechecked within minutes, 148/78 P 59, 156/79 P 60  Took Coreg 6.25 mg and OTC Potassium 99 at 8:30 am  10 am took Lorazepam  12pm took Losartan  Had apple for lunch  2:15 pm 168/87,  P 69, 171/84 P 68, 178/93, P 72 felt rush to get ready for appt this afternoon, needed to use the bathroom before leaving  home. 2:20 pm took Clonidine 0.1 mg     Cardiologist instructions:   \"stop Gatorade, drink less water, 3-5 glasses of water per day, avoid salt in diet, add aldactone in AM\". AM take Carvedilol and Spironolactone, for supper/evening, take Carvedilol, Losartan, Amlodipine. Pt states her Aldactone is not ready yet yesterday. States she is trying to rest, read more (devotional books), and meditate and do prayers. Planning to return to teaching  school.         Patient Active Problem List   Diagnosis    History of diverticulitis    Prediabetes    Dyslipidemia    Essential hypertension    Anxiety    AYUSH (generalized anxiety disorder)    Acute pain of left shoulder    Mild aortic stenosis    AAA (abdominal aortic aneurysm) (HCC)    Thrombocytopenia (HCC)    Chronic gastritis without bleeding    Chronic pain of both shoulders    Abnormal ECG       Past Medical History:   Diagnosis Date    AAA (abdominal aortic aneurysm) (HCC)     infrarenal 3.2 cm    Altered bowel habits 2022    Dr. Rexanne Bloch of [facility]    Anxiety     Chronic fatigue     Chronic gastritis 2019    with intestinal metaplasia, repeat EGD 2022    Colon polyps     Repeat colonoscopy 2022    Diverticulosis     Diverticulosis 2019    Seen on colonoscopy    Essential hypertension     Gastric intestinal metaplasia     [facility]    GERD (gastroesophageal reflux disease)     Hemorrhoids 2019    Colonoscopy    Hyperlipidemia     Mild aortic stenosis     Personal history of colonic polyps     High Risk 10 mm adenomatous colonic polyp (recomm return in 3 years)    Prediabetes     Vitamin D deficiency        Past Surgical History:   Procedure Laterality Date    BREAST BIOPSY  3/14/2014    BREAST LUMPECTOMY Left 3/14/2014    fibrocystic mastopathy, intraductal hyperplasia, apocrine metaplasia    CATARACT REMOVAL Bilateral 2018    CATARACT REMOVAL Bilateral 2018     SECTION      x 2    CHOLECYSTECTOMY      COLONOSCOPY  2019    HYSTERECTOMY (CERVIX STATUS UNKNOWN)      OTHER SURGICAL HISTORY      Renal artery scan-normal    US GUIDED CORE BREAST BIOPSY Left     Benign       Family History   Problem Relation Age of Onset    Diabetes Sister     Diabetes Sister     Breast Cancer Sister 76    Breast Cancer Sister 68    Heart Disease Sister     Kidney Disease Mother     Stroke Father     Diabetes Father          ALLERGY  Allergies   Allergen Reactions    Latex Other (See Comments) and Dermatitis    Hydrochlorothiazide W-Triamterene Other (See Comments)     Severe dehydration    Acyclovir Other (See Comments)    Cimetidine Other (See Comments)    Citalopram Hydrobromide Other (See Comments)    Dexamethasone Other (See Comments)     Severe dehydration    Epinephrine Other (See Comments) \" shoots BP up \"    Hydrochlorothiazide Other (See Comments)     Hypokalemia, Hyponatremia    Pravastatin Other (See Comments)     \" GI upset \" \" just about put a hole in my gut \"    Statins Other (See Comments)     GI upset    Diazepam Anxiety           Current Outpatient Medications on File Prior to Visit   Medication Sig Dispense Refill    cloNIDine (CATAPRES) 0.1 MG tablet Take 1 tablet by mouth 2 times daily as needed for High Blood Pressure (SBP > 160 DBP > 100) 30 tablet 0    Handicap Placard MISC by Does not apply route 1 each 0    amLODIPine (NORVASC) 5 MG tablet Take 5 mg by mouth daily      carvedilol (COREG) 6.25 MG tablet TAKE 2 TABLETS BY MOUTH IN THE MORNING AND 2 TABLETS BY MOUTH IN THE EVENING      LORazepam (ATIVAN) 1 MG tablet Take 1 mg by mouth in the morning and 1 mg before bedtime. losartan (COZAAR) 100 MG tablet TAKE 1 TABLET BY MOUTH DAILY      spironolactone (ALDACTONE) 25 MG tablet Take 1 tablet by mouth in the morning. (Patient not taking: Reported on 8/16/2022) 90 tablet 1     No current facility-administered medications on file prior to visit. Review of Systems  Review of Systems   Constitutional:  Negative for chills, fatigue and fever. HENT:  Negative for congestion, postnasal drip, rhinorrhea, sinus pain, sneezing and sore throat. Eyes:  Negative for pain and visual disturbance. Respiratory:  Negative for cough and shortness of breath. Cardiovascular:  Negative for chest pain, palpitations and leg swelling. Gastrointestinal:  Negative for abdominal pain, constipation, diarrhea, nausea and vomiting. Genitourinary:  Negative for dysuria, frequency and urgency. Musculoskeletal:  Negative for back pain, gait problem and joint swelling. Skin:  Negative for rash and wound. Neurological:  Negative for dizziness and headaches. Psychiatric/Behavioral:  Negative for behavioral problems, sleep disturbance and suicidal ideas.  The patient is nervous/anxious. Vitals:    08/16/22 1551 08/16/22 1625   BP: (!) 165/78 138/70   Site: Left Upper Arm Left Upper Arm   Position: Sitting    Cuff Size: Large Adult    Pulse: 66    Resp: 15    Temp: 98.3 °F (36.8 °C)    TempSrc: Temporal    SpO2: 99%    Weight: 174 lb 6.4 oz (79.1 kg)    Height: 5' 2.7\" (1.593 m)               Physical Exam  Physical Exam  Constitutional:       General: She is not in acute distress. Appearance: She is obese. She is not ill-appearing. HENT:      Head: Normocephalic and atraumatic. Eyes:      Pupils: Pupils are equal, round, and reactive to light. Cardiovascular:      Heart sounds: Murmur heard. Pulmonary:      Effort: Pulmonary effort is normal. No respiratory distress. Breath sounds: Normal breath sounds. Abdominal:      General: Bowel sounds are normal.      Palpations: Abdomen is soft. Musculoskeletal:         General: Normal range of motion. Cervical back: Neck supple. Right lower leg: Edema present. Left lower leg: Edema present. Skin:     General: Skin is warm and dry. Neurological:      General: No focal deficit present. Mental Status: She is alert and oriented to person, place, and time. Psychiatric:         Mood and Affect: Mood normal.         Thought Content: Thought content normal.              Assessment/Plan:          ICD-10-CM    1. Uncontrolled hypertension  I10       2. Cardiomegaly  I51.7       3. Hyponatremia  E87.1       4. AYUSH (generalized anxiety disorder)  F41.1                Arya Blanco was seen today for hypertension. Diagnoses and all orders for this visit:    Uncontrolled hypertension    Cardiomegaly    Hyponatremia    AYUSH (generalized anxiety disorder)       Advised on low salt diet. Advised to avoid processed foods such as fast foods, canned foods. Advised to read food labels. Advised to limit stress or find ways to reduce stress.  Advised on physical activity or exercise 3-5 days a week, for at least 30 minutes, as tolerated. Advised to take medications as prescribed, report any side effects/intolerance or issues with medication/s such as insurance coverage. Reviewed cardiovascular risks and complication prevention. Advised to seek immediate medical attention (call 911 or present to Emergency Dept) for any chest pains, palpitations or shortness of breath. Advised on relaxation techniques. Advised to limit or avoid stress. Advised to avoid or limit aggravating or precipitating factors. Advised to seek immediate medical attention for any chest pains, palpitations or suicidal thoughts. Continue cardiologist instructions:   \"stop Gatorade, drink less water, 3-5 glasses of water per day, avoid salt in diet, add aldactone in AM\". AM take Carvedilol and Spironolactone, for supper/evening, take Carvedilol, Losartan, Amlodipine. Keep appt for renal ultrasound 8/23 and TTE ECHO on 9/12/2022. Lab appt 8/29/2022. Keep appt with cardiologist.    Continue to monitor BP at home. Report BP > 140/90. Advised to monitor and report any chest pain, palpitations or shortness of breath to ER/hospital.              Follow Up  Return in about 8 weeks (around 10/11/2022), or if symptoms worsen or fail to improve, for ROV with labs. Has appt with Dr. Sakina Robert on 9/12/2022, cardiologist.        Hay Uriarte DNP, FNP-BC      31 minutes was spent on the day of this encounter including face to face time with patient, chart review, reviewing pt history, meds/tests/procedures, documenting, and/or interpreting results. More than 50% of this visit was spent in counseling and care coordination. Treatment options fully reviewed with patient. Time includes pre and post face-face time in records review, and preparation including available pertinent images and reports.

## 2022-08-23 ENCOUNTER — HOSPITAL ENCOUNTER (OUTPATIENT)
Dept: ULTRASOUND IMAGING | Age: 77
Discharge: HOME | End: 2022-08-26
Payer: MEDICARE

## 2022-08-23 DIAGNOSIS — I10 HYPERTENSION: ICD-10-CM

## 2022-08-23 PROCEDURE — 76770 US EXAM ABDO BACK WALL COMP: CPT

## 2022-08-29 DIAGNOSIS — I10 ESSENTIAL HYPERTENSION: ICD-10-CM

## 2022-08-29 LAB
ANION GAP SERPL CALC-SCNC: 4 MMOL/L (ref 7–16)
BUN SERPL-MCNC: 20 MG/DL (ref 8–23)
CALCIUM SERPL-MCNC: 9.5 MG/DL (ref 8.3–10.4)
CHLORIDE SERPL-SCNC: 104 MMOL/L (ref 98–107)
CO2 SERPL-SCNC: 23 MMOL/L (ref 21–32)
CREAT SERPL-MCNC: 0.8 MG/DL (ref 0.6–1)
GLUCOSE SERPL-MCNC: 94 MG/DL (ref 65–100)
MAGNESIUM SERPL-MCNC: 2.2 MG/DL (ref 1.8–2.4)
POTASSIUM SERPL-SCNC: 4.3 MMOL/L (ref 3.5–5.1)
SODIUM SERPL-SCNC: 131 MMOL/L (ref 136–145)
TSH, 3RD GENERATION: 2.64 UIU/ML (ref 0.36–3.74)

## 2022-08-31 ENCOUNTER — TELEPHONE (OUTPATIENT)
Dept: CARDIOLOGY CLINIC | Age: 77
End: 2022-08-31

## 2022-08-31 NOTE — TELEPHONE ENCOUNTER
Called pt advised of Dr. Zackery Vega response. Pt gave a verbal understanding. Pt states doing well on aldactone and BP has improved but still fluctuates at times.

## 2022-08-31 NOTE — TELEPHONE ENCOUNTER
----- Message from Minda Shin DO sent at 8/30/2022 12:58 PM EDT -----  Please call her, Na and K is good on labs, how is she doing on new aldactone? BP better? Feeling ok?   Thanks

## 2022-09-06 ENCOUNTER — TELEPHONE (OUTPATIENT)
Dept: CARDIOLOGY CLINIC | Age: 77
End: 2022-09-06

## 2022-09-06 NOTE — TELEPHONE ENCOUNTER
Pt.is scheduled for a colonoscopy tomorrow and she is suppose to drink 64oz of fluid today and tomorrow. She said  told her not to drink any more than 5 cups of fluid per day. Is it ok to take the prep as prescribed?

## 2022-09-06 NOTE — TELEPHONE ENCOUNTER
Please call patient and advise whether she should do prep for colonoscopy because she was told not take too much fluid in and the prep requires drinking a lot of fluid.

## 2022-09-07 PROCEDURE — 88312 SPECIAL STAINS GROUP 1: CPT

## 2022-09-07 PROCEDURE — 88305 TISSUE EXAM BY PATHOLOGIST: CPT

## 2022-09-08 ENCOUNTER — HOSPITAL ENCOUNTER (OUTPATIENT)
Dept: LAB | Age: 77
Discharge: HOME | End: 2022-09-11

## 2022-09-09 ENCOUNTER — TELEPHONE (OUTPATIENT)
Dept: CARDIOLOGY CLINIC | Age: 77
End: 2022-09-09

## 2022-09-09 DIAGNOSIS — I10 ESSENTIAL HYPERTENSION: Primary | ICD-10-CM

## 2022-09-09 DIAGNOSIS — D69.6 THROMBOCYTOPENIA (HCC): ICD-10-CM

## 2022-09-09 NOTE — TELEPHONE ENCOUNTER
Pt requesting a lab study. States she had a colonoscopy and lost a lot of fluids and it's making her BP erratic. States if he would authorize her to come do labs she will be happy.

## 2022-09-09 NOTE — TELEPHONE ENCOUNTER
She has had her colonoscopy and lost a lot of fluid. She is asking if she can have lab work done? She wants to know if  will order labs\"She says she has been seeing Andriy Mcnulty until she can get in w/. Her initial consult was w/.

## 2022-09-12 DIAGNOSIS — D69.6 THROMBOCYTOPENIA (HCC): ICD-10-CM

## 2022-09-12 DIAGNOSIS — I10 ESSENTIAL HYPERTENSION: ICD-10-CM

## 2022-09-12 LAB
ERYTHROCYTE [DISTWIDTH] IN BLOOD BY AUTOMATED COUNT: 13.2 % (ref 11.9–14.6)
HCT VFR BLD AUTO: 37 % (ref 35.8–46.3)
HGB BLD-MCNC: 11.8 G/DL (ref 11.7–15.4)
MCH RBC QN AUTO: 27.6 PG (ref 26.1–32.9)
MCHC RBC AUTO-ENTMCNC: 31.9 G/DL (ref 31.4–35)
MCV RBC AUTO: 86.7 FL (ref 79.6–97.8)
NRBC # BLD: 0 K/UL (ref 0–0.2)
PLATELET # BLD AUTO: 122 K/UL (ref 150–450)
PMV BLD AUTO: 13.6 FL (ref 9.4–12.3)
RBC # BLD AUTO: 4.27 M/UL (ref 4.05–5.2)
WBC # BLD AUTO: 4.5 K/UL (ref 4.3–11.1)

## 2022-09-13 ENCOUNTER — TELEPHONE (OUTPATIENT)
Dept: CARDIOLOGY CLINIC | Age: 77
End: 2022-09-13

## 2022-09-13 DIAGNOSIS — I10 ESSENTIAL HYPERTENSION: Primary | ICD-10-CM

## 2022-09-13 LAB
ALBUMIN SERPL-MCNC: 3.8 G/DL (ref 3.2–4.6)
ALBUMIN/GLOB SERPL: 0.8 {RATIO} (ref 1.2–3.5)
ALP SERPL-CCNC: 97 U/L (ref 50–136)
ALT SERPL-CCNC: 27 U/L (ref 12–65)
ANION GAP SERPL CALC-SCNC: 7 MMOL/L (ref 4–13)
AST SERPL-CCNC: 11 U/L (ref 15–37)
BILIRUB SERPL-MCNC: 0.3 MG/DL (ref 0.2–1.1)
BUN SERPL-MCNC: 15 MG/DL (ref 8–23)
CALCIUM SERPL-MCNC: 9.4 MG/DL (ref 8.3–10.4)
CHLORIDE SERPL-SCNC: 95 MMOL/L (ref 101–110)
CO2 SERPL-SCNC: 26 MMOL/L (ref 21–32)
CREAT SERPL-MCNC: 0.8 MG/DL (ref 0.6–1)
GLOBULIN SER CALC-MCNC: 4.5 G/DL (ref 2.3–3.5)
GLUCOSE SERPL-MCNC: 101 MG/DL (ref 65–100)
MAGNESIUM SERPL-MCNC: 2.2 MG/DL (ref 1.8–2.4)
POTASSIUM SERPL-SCNC: 4.3 MMOL/L (ref 3.5–5.1)
PROT SERPL-MCNC: 8.3 G/DL (ref 6.3–8.2)
SODIUM SERPL-SCNC: 128 MMOL/L (ref 136–145)

## 2022-09-13 NOTE — TELEPHONE ENCOUNTER
----- Message from Mallory Hurley DO sent at 9/13/2022  2:51 PM EDT -----  Please call her, Na has dropped some, have her reduce the aldactone to 12.5 daily. Needs another BMP in one week before seeing Dr. Maria C Roberts. Hope she is feeling better.   Thanks

## 2022-09-19 DIAGNOSIS — I10 ESSENTIAL HYPERTENSION: ICD-10-CM

## 2022-09-19 LAB
ANION GAP SERPL CALC-SCNC: 5 MMOL/L (ref 4–13)
BUN SERPL-MCNC: 16 MG/DL (ref 8–23)
CALCIUM SERPL-MCNC: 9.4 MG/DL (ref 8.3–10.4)
CHLORIDE SERPL-SCNC: 96 MMOL/L (ref 101–110)
CO2 SERPL-SCNC: 26 MMOL/L (ref 21–32)
CREAT SERPL-MCNC: 0.7 MG/DL (ref 0.6–1)
GLUCOSE SERPL-MCNC: 109 MG/DL (ref 65–100)
POTASSIUM SERPL-SCNC: 4.4 MMOL/L (ref 3.5–5.1)
SODIUM SERPL-SCNC: 127 MMOL/L (ref 136–145)

## 2022-09-21 ENCOUNTER — OFFICE VISIT (OUTPATIENT)
Dept: CARDIOLOGY CLINIC | Age: 77
End: 2022-09-21
Payer: MEDICARE

## 2022-09-21 VITALS
WEIGHT: 175 LBS | DIASTOLIC BLOOD PRESSURE: 86 MMHG | HEIGHT: 62 IN | HEART RATE: 68 BPM | SYSTOLIC BLOOD PRESSURE: 128 MMHG | BODY MASS INDEX: 32.2 KG/M2

## 2022-09-21 DIAGNOSIS — I10 ESSENTIAL HYPERTENSION: Primary | ICD-10-CM

## 2022-09-21 DIAGNOSIS — I35.0 MILD AORTIC STENOSIS: ICD-10-CM

## 2022-09-21 DIAGNOSIS — I71.40 ABDOMINAL AORTIC ANEURYSM (AAA) WITHOUT RUPTURE: ICD-10-CM

## 2022-09-21 DIAGNOSIS — E78.5 DYSLIPIDEMIA: ICD-10-CM

## 2022-09-21 PROCEDURE — G8399 PT W/DXA RESULTS DOCUMENT: HCPCS | Performed by: INTERNAL MEDICINE

## 2022-09-21 PROCEDURE — G8428 CUR MEDS NOT DOCUMENT: HCPCS | Performed by: INTERNAL MEDICINE

## 2022-09-21 PROCEDURE — 99215 OFFICE O/P EST HI 40 MIN: CPT | Performed by: INTERNAL MEDICINE

## 2022-09-21 PROCEDURE — 1036F TOBACCO NON-USER: CPT | Performed by: INTERNAL MEDICINE

## 2022-09-21 PROCEDURE — G8417 CALC BMI ABV UP PARAM F/U: HCPCS | Performed by: INTERNAL MEDICINE

## 2022-09-21 PROCEDURE — 1090F PRES/ABSN URINE INCON ASSESS: CPT | Performed by: INTERNAL MEDICINE

## 2022-09-21 PROCEDURE — 1123F ACP DISCUSS/DSCN MKR DOCD: CPT | Performed by: INTERNAL MEDICINE

## 2022-09-21 RX ORDER — DOXAZOSIN 2 MG/1
2 TABLET ORAL DAILY
Qty: 30 TABLET | Refills: 11 | Status: SHIPPED | OUTPATIENT
Start: 2022-09-21 | End: 2022-09-25 | Stop reason: SDUPTHER

## 2022-09-21 ASSESSMENT — ENCOUNTER SYMPTOMS
EYE REDNESS: 0
DOUBLE VISION: 0
HEMATEMESIS: 0
HOARSE VOICE: 0
HEMOPTYSIS: 0
ABDOMINAL PAIN: 0
STRIDOR: 0
WHEEZING: 0
HEMATOCHEZIA: 0

## 2022-09-21 ASSESSMENT — PATIENT HEALTH QUESTIONNAIRE - PHQ9
SUM OF ALL RESPONSES TO PHQ QUESTIONS 1-9: 0
1. LITTLE INTEREST OR PLEASURE IN DOING THINGS: 0
2. FEELING DOWN, DEPRESSED OR HOPELESS: 0
SUM OF ALL RESPONSES TO PHQ QUESTIONS 1-9: 0
SUM OF ALL RESPONSES TO PHQ9 QUESTIONS 1 & 2: 0

## 2022-09-21 NOTE — PATIENT INSTRUCTIONS
- Stop Spironolactone.  -Started doxazosin 2 mg-take around the middle of the day around noon which should help evening blood pressures.  -This should also help improve your sodium by stopping the spironolactone

## 2022-09-21 NOTE — PROGRESS NOTES
[facility] CARDIOLOGY  [address] [facility], [address]  PHONE: [phone]          22    NAME:  Frederick Birch  : 1945  MRN: [id number]         SUBJECTIVE:   Frederick Birch is a 65 y.o. female seen for a visit regarding the following:     Chief Complaint   Patient presents with    Hypertension     New to provider           HPI:    Cardio problem list:  1. Hypertension  2. Aortic stenosis-Echo from 2022 shows an EF at 60 to 65% with mild concentric LVH, diastolic dysfunction, mild aortic stenosis with a mean gradient of 10 and peak gradient of 18 mmHg, mild mitral regurgitation  3. Abdominal aortic aneurysm-measures 3.6 x 3.8 cm  --In 2019 was 3.2 x 3.8 cm  4. Hyponatremia  5. Hyperlipidemia-statin intolerance  6. Hyponatremia-aggravated by spironolactone  -Patient of Dr. Sergio Caban saw Ms. Lalo Cui who is a 39-year-old woman in cardiovascular follow-up on 2022 for difficult to treat hypertension, mild aortic stenosis, abdominal aortic aneurysm, hyperlipidemia with statin intolerance, hyponatremia. Aortic stenosis-no syncope, angina or any significant heart failure symptoms such as lower extremity edema orthopnea or PND. -Some chronic dyspnea on exertion-NYHA class II but nothing new for her. Hypertension: Has struggled with resistant hypertension for a while but swings in blood pressures and she says they actually got much better when her anxiety was better treated with Ativan twice daily. She still checks her blood pressures multiple times a day.  -I have listed how she takes her antihypertensives as mentioned below.  -No recent headaches or blurry vision. No recent TIAs or strokelike symptoms. Swings in blood pressures noted throughout the day between the 1 teens to 150s range but usually not much more than this. She has clonidine to take as needed but has not needed it. Hyponatremia: Aggravated by spironolactone.   Used to be an issue also with hydrochlorothiazide in the past.  Even after she cut back spironolactone to just half a pill daily, sodium still below 130      Past Medical History, Past Surgical History, Family history, Social History, and Medications were all reviewed with the patient today and updated as necessary.      Allergies   Allergen Reactions    Latex Other (See Comments) and Dermatitis    Hydrochlorothiazide W-Triamterene Other (See Comments)     Severe dehydration    Acyclovir Other (See Comments)    Cimetidine Other (See Comments)    Citalopram Hydrobromide Other (See Comments)    Dexamethasone Other (See Comments)     Severe dehydration    Epinephrine Other (See Comments)     \" shoots BP up \"    Hydrochlorothiazide Other (See Comments)     Hypokalemia, Hyponatremia    Pravastatin Other (See Comments)     \" GI upset \" \" just about put a hole in my gut \"    Statins Other (See Comments)     GI upset    Diazepam Anxiety     Patient Active Problem List   Diagnosis    History of diverticulitis    Prediabetes    Dyslipidemia    Essential hypertension    Anxiety    AYUSH (generalized anxiety disorder)    Acute pain of left shoulder    Mild aortic stenosis    AAA (abdominal aortic aneurysm) (HCC)    Thrombocytopenia (HCC)    Chronic gastritis without bleeding    Chronic pain of both shoulders    Abnormal ECG     Past Medical History:   Diagnosis Date    AAA (abdominal aortic aneurysm) (HCC)     infrarenal 3.2 cm    Altered bowel habits 08/08/2022    Dr. [facility] W[facility] of [facility]    Anxiety     Chronic fatigue     Chronic gastritis 07/2019    with intestinal metaplasia, repeat EGD 7/2022    Colon polyps     Repeat colonoscopy 7/2022    Diverticulosis     Diverticulosis 07/2019    Seen on colonoscopy    Diverticulosis of colon 09/07/2022    Colonoscopy    Essential hypertension     Gastric intestinal metaplasia     [facility]    GERD (gastroesophageal reflux disease)     H/O esophagogastroduodenoscopy 09/07/2022    by  [facility]    Hemorrhoids 2019    Colonoscopy    Hx of colonoscopy 2022    3mm Cecum poly, repeat in 5 years; Dr. Martinez Read    Hyperlipidemia     Mild aortic stenosis     Personal history of colonic polyps     High Risk 10 mm adenomatous colonic polyp (recomm return in 3 years)    Prediabetes     Vitamin D deficiency      Past Surgical History:   Procedure Laterality Date    BREAST BIOPSY  3/14/2014    BREAST LUMPECTOMY Left 3/14/2014    fibrocystic mastopathy, intraductal hyperplasia, apocrine metaplasia    CATARACT REMOVAL Bilateral 2018    CATARACT REMOVAL Bilateral 2018     SECTION      x 2    CHOLECYSTECTOMY      COLONOSCOPY  2019    HYSTERECTOMY (CERVIX STATUS UNKNOWN)      OTHER SURGICAL HISTORY      Renal artery scan-normal    US GUIDED CORE BREAST BIOPSY Left     Benign     Family History   Problem Relation Age of Onset    Diabetes Sister     Diabetes Sister     Breast Cancer Sister 76    Breast Cancer Sister 68    Heart Disease Sister     Kidney Disease Mother     Stroke Father     Diabetes Father      Social History     Tobacco Use    Smoking status: Former     Packs/day: 0.75     Types: Cigarettes     Quit date: 1999     Years since quittin.1    Smokeless tobacco: Never   Substance Use Topics    Alcohol use: No     Alcohol/week: 0.0 standard drinks     Current Outpatient Medications   Medication Sig Dispense Refill    doxazosin (CARDURA) 2 MG tablet Take 1 tablet by mouth daily Take around noon 30 tablet 11    cloNIDine (CATAPRES) 0.1 MG tablet Take 1 tablet by mouth 2 times daily as needed for High Blood Pressure (SBP > 160 DBP > 100) 30 tablet 0    Handicap Placard MISC by Does not apply route 1 each 0    amLODIPine (NORVASC) 5 MG tablet Take 5 mg by mouth daily      carvedilol (COREG) 6.25 MG tablet TAKE 2 TABLETS BY MOUTH IN THE MORNING AND 2 TABLETS BY MOUTH IN THE EVENING      LORazepam (ATIVAN) 1 MG tablet Take 1 mg by mouth in the morning and 1 mg before bedtime. losartan (COZAAR) 100 MG tablet TAKE 1 TABLET BY MOUTH DAILY       No current facility-administered medications for this visit. Review of Systems   Constitutional: Negative for chills and fever. HENT:  Negative for ear discharge, hoarse voice and stridor. Eyes:  Negative for double vision and redness. Cardiovascular:  Negative for cyanosis and syncope. Respiratory:  Negative for hemoptysis and wheezing. Endocrine: Negative for polydipsia and polyphagia. Hematologic/Lymphatic: Negative for adenopathy. Skin:  Negative for itching and rash. Musculoskeletal:  Negative for joint swelling and muscle weakness. Gastrointestinal:  Negative for abdominal pain, hematemesis and hematochezia. Genitourinary:  Negative for flank pain and nocturia. Neurological:  Negative for focal weakness and seizures. Psychiatric/Behavioral:  Negative for altered mental status and suicidal ideas. Allergic/Immunologic: Negative for hives. PHYSICAL EXAM:    /86   Pulse 68   Ht 5' 2\" (1.575 m)   Wt 175 lb (79.4 kg) Comment: with shoes  BMI 32.01 kg/m²      Physical Exam    General: Alert and oriented in no acute distress  HEENT: Head is normocephalic, atraumatic, pupils are equal bilaterally, throat appears to be clear  Neck: No significant jugular venous distention no cervical bruits  Cardiovascular: S1 and S2 heard, regular rate and rhythm, no significant rubs or gallops. 2/6 ejection systolic murmur heard at the right upper sternal border  Respiratory: Clear to auscultation bilaterally with no adventitious sounds, respirations are normal  Abdomen: Soft, nontender, nondistended, bowel sounds present. Extremities: No cyanosis clubbing or edema  Peripheral pulses: Bilateral radial artery pulses are palpated. Bilateral pedal pulses are well felt.   Neuro: No facial droop and no gross focal motor deficits  Lymphatic: No significant cervical lymphadenopathy noted.  Musculoskeletal: No significant redness or swelling noted in all exposed joints. Skin: No significant rashes noted the of the exposed regions. Medical problems and test results were reviewed with the patient today.      Recent Results (from the past 672 hour(s))   Magnesium    Collection Time: 08/29/22 10:08 AM   Result Value Ref Range    Magnesium 2.2 1.8 - 2.4 mg/dL   TSH    Collection Time: 08/29/22 10:08 AM   Result Value Ref Range    TSH, 3RD GENERATION 2.640 0.358 - 3.740 uIU/mL   Basic Metabolic Panel    Collection Time: 08/29/22 10:08 AM   Result Value Ref Range    Sodium 131 (L) 136 - 145 mmol/L    Potassium 4.3 3.5 - 5.1 mmol/L    Chloride 104 98 - 107 mmol/L    CO2 23 21 - 32 mmol/L    Anion Gap 4 (L) 7 - 16 mmol/L    Glucose 94 65 - 100 mg/dL    BUN 20 8 - 23 MG/DL    Creatinine 0.80 0.6 - 1.0 MG/DL    GFR African American >60 >60 ml/min/1.73m2    GFR Non- >60 >60 ml/min/1.73m2    Calcium 9.5 8.3 - 10.4 MG/DL   Transthoracic echocardiogram (TTE) complete with contrast, bubble, strain, and 3D PRN    Collection Time: 09/12/22  9:00 AM   Result Value Ref Range    IVSd 1.0 (A) 0.6 - 0.9 cm    LVIDd 4.2 3.9 - 5.3 cm    LVIDs 2.5 cm    LVOT Diameter 2.0 cm    LVPWd 1.0 (A) 0.6 - 0.9 cm    EF BP 63 55 - 100 %    LV Ejection Fraction A2C 61 %    LV Ejection Fraction A4C 61 %    LV EDV A2C 97 mL    LV EDV A4C 108 mL    LV EDV  (A) 56 - 104 mL    LV ESV A2C 38 mL    LV ESV A4C 42 mL    LV ESV BP 40 19 - 49 mL    LVOT Peak Gradient 5 mmHg    LVOT Mean Gradient 3 mmHg    LVOT SV 83.8 ml    LVOT Peak Velocity 1.1 m/s    LVOT VTI 26.7 cm    RVIDd 3.2 cm    LA Diameter 4.2 cm    LA Volume A/L 60 mL    LA Volume 2C 62 (A) 22 - 52 mL    LA Volume 4C 52 22 - 52 mL    LA Volume BP 59 (A) 22 - 52 mL    AV Area by Peak Velocity 1.7 cm2    AV Area by Peak Velocity 1.7 cm2    AV Area by VTI 1.8 cm2    AV Peak Gradient 18 mmHg    AV Mean Gradient 10 mmHg    AV Peak Velocity 1.7 m/s    AV Mean mmol/L    CO2 26 21 - 32 mmol/L    Anion Gap 7 4 - 13 mmol/L    Glucose 101 (H) 65 - 100 mg/dL    BUN 15 8 - 23 MG/DL    Creatinine 0.80 0.6 - 1.0 MG/DL    GFR African American >60 >60 ml/min/1.73m2    GFR Non- >60 >60 ml/min/1.73m2    Calcium 9.4 8.3 - 10.4 MG/DL    Total Bilirubin 0.3 0.2 - 1.1 MG/DL    ALT 27 12 - 65 U/L    AST 11 (L) 15 - 37 U/L    Alk Phosphatase 97 50 - 136 U/L    Total Protein 8.3 (H) 6.3 - 8.2 g/dL    Albumin 3.8 3.2 - 4.6 g/dL    Globulin 4.5 (H) 2.3 - 3.5 g/dL    Albumin/Globulin Ratio 0.8 (L) 1.2 - 3.5     Basic Metabolic Panel    Collection Time: 09/19/22  9:15 AM   Result Value Ref Range    Sodium 127 (L) 136 - 145 mmol/L    Potassium 4.4 3.5 - 5.1 mmol/L    Chloride 96 (L) 101 - 110 mmol/L    CO2 26 21 - 32 mmol/L    Anion Gap 5 4 - 13 mmol/L    Glucose 109 (H) 65 - 100 mg/dL    BUN 16 8 - 23 MG/DL    Creatinine 0.70 0.6 - 1.0 MG/DL    GFR African American >60 >60 ml/min/1.73m2    GFR Non- >60 >60 ml/min/1.73m2    Calcium 9.4 8.3 - 10.4 MG/DL     Lab Results   Component Value Date/Time    CHOL 214 05/02/2022 08:12 AM    HDL 47 05/02/2022 08:12 AM    VLDL 21 05/02/2022 08:12 AM   ,hemoglobin, basic metabolic panel,   Lab Results   Component Value Date/Time    TSH 4.100 05/02/2022 08:12 AM    ,  Lab Results   Component Value Date/Time     09/19/2022 09:15 AM    K 4.4 09/19/2022 09:15 AM    CL 96 09/19/2022 09:15 AM    CO2 26 09/19/2022 09:15 AM    BUN 16 09/19/2022 09:15 AM    GFRAA >60 09/19/2022 09:15 AM    GLOB 4.5 09/12/2022 09:29 AM    ALT 27 09/12/2022 09:29 AM    AST 11 09/12/2022 09:29 AM      Lab Results   Component Value Date    LDLCALC 146 (H) 05/02/2022      Lab Results   Component Value Date    CREATININE 0.70 09/19/2022 09/12/22    TRANSTHORACIC ECHOCARDIOGRAM (TTE) COMPLETE (CONTRAST/BUBBLE/3D PRN) 09/13/2022  7:30 AM (Final)    Interpretation Summary  Formatting of this result is different from the original.      Left Ventricle: Normal left ventricular systolic function with a visually estimated EF of 60 - 65%. Left ventricle size is normal. Mildly increased wall thickness. Findings consistent with concentric hypertrophy. Normal wall motion. Diastolic dysfunction present with normal LV EF. Normal left ventricular filling pressure. Aortic Valve: Tricuspid valve. Mild sclerosis of the aortic valve cusp. Mild stenosis of the aortic valve. AV mean gradient is 10 mmHg. AV peak gradient is 18 mmHg. AV peak velocity is 1.7 m/s. LVOT:AV VTI Index is 0.58. LVOT diameter is 2.0 cm. AV area by continuity VTI is 1.8 cm2. AV Stroke Volume index is 46.6 mL/m2. Mitral Valve: Mild regurgitation. Technical qualifiers: Color flow Doppler was performed and pulse wave and/or continuous wave Doppler was performed. Signed by: Beverly Rosales MD on 9/13/2022  7:30 AM     Lab Results   Component Value Date    HGB 11.8 09/12/2022      Lab Results   Component Value Date     (L) 09/12/2022        ASSESSMENT and PLAN      Essential hypertension  -     doxazosin (CARDURA) 2 MG tablet; Take 1 tablet by mouth daily Take around noon  -Reviewed records in detail for at least an hour. Has extensive records with changes made with meds with regards to blood pressures over the years. In view of hyponatremia, I discontinued her spironolactone and instead started doxazosin 2 mg every day around noon. Continue amlodipine 5 mg every evening-at night, continue carvedilol twice daily, continue losartan 100 mg around 6:30 PM that she is taking currently. Mild aortic stenosis  -Mild aortic stenosis noted on last echo-we will monitor over time. Abdominal aortic aneurysm (AAA) without rupture (HCC)  -Being monitored-measured 3.6 x 3.8 cm on last scan-continue beta-blocker therapy.   Good blood pressure control is important    Dyslipidemia   -Statin intolerance-diet controlled for now    Anxiety:  -On Ativan 1 mg twice daily-Per primary    Overall Impression  Currently taking carvedilol with spironolactone towards the middle of the day around noon.  -Takes losartan 100 mg around 6:30 PM  -Takes amlodipine with carvedilol late at night around 9:30 PM    -Discontinued spironolactone and instead started doxazosin 2 mg every day around noon especially in view of hyponatremia. Return in about 2 weeks (around 10/5/2022) for htn, aortic stenosis. Thank you for allowing us to participate in the care of your patient. If you have any further questions, please do not hesitate to contact us.   Sincerely,        Leon Powell MD   9/21/2022

## 2022-09-23 ENCOUNTER — TELEPHONE (OUTPATIENT)
Dept: CARDIOLOGY CLINIC | Age: 77
End: 2022-09-23

## 2022-09-23 NOTE — TELEPHONE ENCOUNTER
----- Message from Robin Swain MD sent at 9/22/2022  8:34 PM EDT -----  Please let her know that we reviewed her blood work and her sodium level continues to be low at 127 when we last saw her so stopping spironolactone would be in her best interest and the addition of doxazosin should help. If pressures are still elevated at home-above 140/90, we will increase the doxazosin to 4 mg every day.   Thanks,  AD

## 2022-09-23 NOTE — TELEPHONE ENCOUNTER
LVM for pt advising her per Dr. Candelaria Flynn, sodium level continues to be low at 127 when we last saw her so stopping spironolactone would be in her best interest and the addition of doxazosin should help. If pressures are still elevated at home-above 140/90, we will increase the doxazosin to 4 mg every day. Asked pt to call me if any questions.

## 2022-09-24 ENCOUNTER — APPOINTMENT (OUTPATIENT)
Dept: GENERAL RADIOLOGY | Age: 77
End: 2022-09-24
Payer: MEDICARE

## 2022-09-24 ENCOUNTER — HOSPITAL ENCOUNTER (EMERGENCY)
Age: 77
Discharge: HOME | End: 2022-09-25
Attending: STUDENT IN AN ORGANIZED HEALTH CARE EDUCATION/TRAINING PROGRAM
Payer: MEDICARE

## 2022-09-24 ENCOUNTER — APPOINTMENT (OUTPATIENT)
Dept: CT IMAGING | Age: 77
End: 2022-09-24
Payer: MEDICARE

## 2022-09-24 DIAGNOSIS — I10 PRIMARY HYPERTENSION: Primary | ICD-10-CM

## 2022-09-24 DIAGNOSIS — I10 ESSENTIAL HYPERTENSION: ICD-10-CM

## 2022-09-24 LAB
ALBUMIN SERPL-MCNC: 3.6 G/DL (ref 3.2–4.6)
ALBUMIN/GLOB SERPL: 0.8 {RATIO} (ref 1.2–3.5)
ALP SERPL-CCNC: 77 U/L (ref 50–136)
ALT SERPL-CCNC: 24 U/L (ref 12–65)
ANION GAP SERPL CALC-SCNC: 5 MMOL/L (ref 4–13)
APPEARANCE UR: CLEAR
AST SERPL-CCNC: 14 U/L (ref 15–37)
BASOPHILS # BLD: 0 K/UL (ref 0–0.2)
BASOPHILS NFR BLD: 0 % (ref 0–2)
BILIRUB SERPL-MCNC: 0.2 MG/DL (ref 0.2–1.1)
BILIRUB UR QL: NEGATIVE
BUN SERPL-MCNC: 23 MG/DL (ref 8–23)
CALCIUM SERPL-MCNC: 9.9 MG/DL (ref 8.3–10.4)
CHLORIDE SERPL-SCNC: 97 MMOL/L (ref 101–110)
CO2 SERPL-SCNC: 29 MMOL/L (ref 21–32)
COLOR UR: NORMAL
CREAT SERPL-MCNC: 1.2 MG/DL (ref 0.6–1)
DIFFERENTIAL METHOD BLD: ABNORMAL
EOSINOPHIL # BLD: 0.1 K/UL (ref 0–0.8)
EOSINOPHIL NFR BLD: 2 % (ref 0.5–7.8)
ERYTHROCYTE [DISTWIDTH] IN BLOOD BY AUTOMATED COUNT: 13.5 % (ref 11.9–14.6)
GLOBULIN SER CALC-MCNC: 4.5 G/DL (ref 2.3–3.5)
GLUCOSE SERPL-MCNC: 104 MG/DL (ref 65–100)
GLUCOSE UR STRIP.AUTO-MCNC: NEGATIVE MG/DL
HCT VFR BLD AUTO: 33.9 % (ref 35.8–46.3)
HGB BLD-MCNC: 11.1 G/DL (ref 11.7–15.4)
HGB UR QL STRIP: NEGATIVE
IMM GRANULOCYTES # BLD AUTO: 0 K/UL (ref 0–0.5)
IMM GRANULOCYTES NFR BLD AUTO: 0 % (ref 0–5)
KETONES UR QL STRIP.AUTO: NEGATIVE MG/DL
LEUKOCYTE ESTERASE UR QL STRIP.AUTO: NEGATIVE
LYMPHOCYTES # BLD: 0.9 K/UL (ref 0.5–4.6)
LYMPHOCYTES NFR BLD: 21 % (ref 13–44)
MCH RBC QN AUTO: 27.8 PG (ref 26.1–32.9)
MCHC RBC AUTO-ENTMCNC: 32.7 G/DL (ref 31.4–35)
MCV RBC AUTO: 85 FL (ref 79.6–97.8)
MONOCYTES # BLD: 0.6 K/UL (ref 0.1–1.3)
MONOCYTES NFR BLD: 15 % (ref 4–12)
NEUTS SEG # BLD: 2.5 K/UL (ref 1.7–8.2)
NEUTS SEG NFR BLD: 61 % (ref 43–78)
NITRITE UR QL STRIP.AUTO: NEGATIVE
NRBC # BLD: 0 K/UL (ref 0–0.2)
PH UR STRIP: 7 [PH] (ref 5–9)
PLATELET # BLD AUTO: 127 K/UL (ref 150–450)
PMV BLD AUTO: 11.2 FL (ref 9.4–12.3)
POTASSIUM SERPL-SCNC: 5.1 MMOL/L (ref 3.5–5.1)
PROT SERPL-MCNC: 8.1 G/DL (ref 6.3–8.2)
PROT UR STRIP-MCNC: NEGATIVE MG/DL
RBC # BLD AUTO: 3.99 M/UL (ref 4.05–5.2)
SODIUM SERPL-SCNC: 131 MMOL/L (ref 136–145)
SP GR UR REFRACTOMETRY: 1.01 (ref 1–1.02)
UROBILINOGEN UR QL STRIP.AUTO: 0.2 EU/DL (ref 0.2–1)
WBC # BLD AUTO: 4 K/UL (ref 4.3–11.1)

## 2022-09-24 PROCEDURE — 85025 COMPLETE CBC W/AUTO DIFF WBC: CPT

## 2022-09-24 PROCEDURE — 80053 COMPREHEN METABOLIC PANEL: CPT

## 2022-09-24 PROCEDURE — 81003 URINALYSIS AUTO W/O SCOPE: CPT

## 2022-09-24 PROCEDURE — 70450 CT HEAD/BRAIN W/O DYE: CPT

## 2022-09-24 PROCEDURE — 99285 EMERGENCY DEPT VISIT HI MDM: CPT

## 2022-09-24 PROCEDURE — 71045 X-RAY EXAM CHEST 1 VIEW: CPT

## 2022-09-24 RX ORDER — 0.9 % SODIUM CHLORIDE 0.9 %
1000 INTRAVENOUS SOLUTION INTRAVENOUS
Status: COMPLETED | OUTPATIENT
Start: 2022-09-24 | End: 2022-09-25

## 2022-09-24 ASSESSMENT — ENCOUNTER SYMPTOMS
EYE PAIN: 0
BACK PAIN: 0
CHEST TIGHTNESS: 0
WHEEZING: 0
COUGH: 0
ANAL BLEEDING: 0
COLOR CHANGE: 0
ABDOMINAL DISTENTION: 0
EYE DISCHARGE: 0
DIARRHEA: 0
EYE ITCHING: 0
SORE THROAT: 0
ABDOMINAL PAIN: 0
EYE REDNESS: 0
VOMITING: 0
SHORTNESS OF BREATH: 0
APNEA: 0
RHINORRHEA: 0
NAUSEA: 0

## 2022-09-24 ASSESSMENT — PAIN - FUNCTIONAL ASSESSMENT: PAIN_FUNCTIONAL_ASSESSMENT: NONE - DENIES PAIN

## 2022-09-25 VITALS
DIASTOLIC BLOOD PRESSURE: 65 MMHG | TEMPERATURE: 98.6 F | BODY MASS INDEX: 30.73 KG/M2 | WEIGHT: 167 LBS | HEART RATE: 65 BPM | OXYGEN SATURATION: 98 % | SYSTOLIC BLOOD PRESSURE: 161 MMHG | HEIGHT: 62 IN | RESPIRATION RATE: 18 BRPM

## 2022-09-25 LAB
EKG ATRIAL RATE: 63 BPM
EKG DIAGNOSIS: NORMAL
EKG P AXIS: 11 DEGREES
EKG P-R INTERVAL: 178 MS
EKG Q-T INTERVAL: 435 MS
EKG QRS DURATION: 96 MS
EKG QTC CALCULATION (BAZETT): 442 MS
EKG R AXIS: 58 DEGREES
EKG T AXIS: 43 DEGREES
EKG VENTRICULAR RATE: 62 BPM

## 2022-09-25 PROCEDURE — 2580000003 HC RX 258: Performed by: STUDENT IN AN ORGANIZED HEALTH CARE EDUCATION/TRAINING PROGRAM

## 2022-09-25 RX ORDER — DOXAZOSIN 2 MG/1
4 TABLET ORAL DAILY
Qty: 30 TABLET | Refills: 11 | Status: SHIPPED | OUTPATIENT
Start: 2022-09-25 | End: 2022-10-04 | Stop reason: DRUGHIGH

## 2022-09-25 RX ADMIN — SODIUM CHLORIDE 1000 ML: 9 INJECTION, SOLUTION INTRAVENOUS at 00:17

## 2022-09-25 ASSESSMENT — PAIN - FUNCTIONAL ASSESSMENT: PAIN_FUNCTIONAL_ASSESSMENT: NONE - DENIES PAIN

## 2022-09-25 NOTE — ED PROVIDER NOTES
Emergency Department Provider Note                   PCP:                CYNTHIA Mason CNP               Age: 65 y.o. Sex: female     No diagnosis found. DISPOSITION          MDM  Number of Diagnoses or Management Options  Diagnosis management comments: Patient reports headache and blurred vision with elevation in her blood pressure. I will obtain CT imaging given these were symptoms as a precaution. Her blood pressure on assessment is 152/60. Will monitor this number. Orders placed for basic labs and EKG as well as chest x-ray. Amount and/or Complexity of Data Reviewed  Clinical lab tests: ordered and reviewed  Tests in the radiology section of CPT®: ordered and reviewed  Tests in the medicine section of CPT®: ordered and reviewed  Independent visualization of images, tracings, or specimens: yes    Risk of Complications, Morbidity, and/or Mortality  Presenting problems: moderate  Diagnostic procedures: low  Management options: moderate    Patient Progress  Patient progress: stable             Orders Placed This Encounter   Procedures    XR CHEST PORTABLE    CT HEAD WO CONTRAST    CBC with Auto Differential    Comprehensive Metabolic Panel    Magnesium    Troponin    Urinalysis w rflx microscopic    EKG 12 Lead        Medications   0.9 % sodium chloride bolus (has no administration in time range)       New Prescriptions    No medications on file        Santos Garcia is a 65 y.o. female who presents to the Emergency Department with chief complaint of    Chief Complaint   Patient presents with    Fatigue    Hypertension      30-year-old female patient presenting to this department with reports of elevated blood pressure, headache and brief episode of blurred vision. Patient denies trouble speaking, numbness tingling or weakness. Blurred vision resolved spontaneously prior to arrival.  She states the symptoms started around dinnertime this evening.   When she developed the symptoms, she checked her blood pressure noted be elevated at 260 systolic. Patient states she is recently been evaluated by Dr. Sher Alexander of [facility] cardiology and had some adjustments made in her medications. She stopped taking clonidine and was placed on Cardura. She takes this at noon every day. She has not had her evening dose of blood pressure medications as she became concerned and called EMS for transport here. Patient denies any chest pain pressure or tightness and reports no significant shortness of breath cough or congestion. She does admit to some recent fatigue and feels she may be dehydrated as she not been drinking much fluid today. The history is provided by the patient. No  was used. Review of Systems   Constitutional:  Positive for fatigue. Negative for activity change, appetite change, chills and fever. HENT:  Negative for congestion, ear discharge, ear pain, rhinorrhea and sore throat. Eyes:  Positive for visual disturbance. Negative for pain, discharge, redness and itching. Respiratory:  Negative for apnea, cough, chest tightness, shortness of breath and wheezing. Cardiovascular:  Negative for chest pain, palpitations and leg swelling. Gastrointestinal:  Negative for abdominal distention, abdominal pain, anal bleeding, diarrhea, nausea and vomiting. Genitourinary:  Negative for difficulty urinating, dysuria, flank pain, frequency, hematuria, pelvic pain, vaginal bleeding and vaginal discharge. Musculoskeletal:  Negative for arthralgias, back pain, myalgias, neck pain and neck stiffness. Skin:  Negative for color change, pallor, rash and wound. Neurological:  Positive for headaches. Negative for dizziness, tremors, facial asymmetry, weakness, light-headedness and numbness. Psychiatric/Behavioral:  Negative for agitation, behavioral problems, confusion, self-injury and suicidal ideas. The patient is not nervous/anxious.     All other systems reviewed and are negative. Past Medical History:   Diagnosis Date    AAA (abdominal aortic aneurysm) (HCC)     infrarenal 3.2 cm    Altered bowel habits 2022    Dr. Massimo Sung of [facility]    Anxiety     Chronic fatigue     Chronic gastritis 2019    with intestinal metaplasia, repeat EGD 2022    Colon polyps     Repeat colonoscopy 2022    Diverticulosis     Diverticulosis 2019    Seen on colonoscopy    Diverticulosis of colon 2022    Colonoscopy    Essential hypertension     Gastric intestinal metaplasia     [facility]    GERD (gastroesophageal reflux disease)     H/O esophagogastroduodenoscopy 2022    by Dr. Lisa Ruano    Hemorrhoids 2019    Colonoscopy    Hx of colonoscopy 2022    3mm Cecum poly, repeat in 5 years; Dr. Lisa Ruano    Hyperlipidemia     Mild aortic stenosis     Personal history of colonic polyps     High Risk 10 mm adenomatous colonic polyp (recomm return in 3 years)    Prediabetes     Vitamin D deficiency         Past Surgical History:   Procedure Laterality Date    BREAST BIOPSY  3/14/2014    BREAST LUMPECTOMY Left 3/14/2014    fibrocystic mastopathy, intraductal hyperplasia, apocrine metaplasia    CATARACT REMOVAL Bilateral 2018    CATARACT REMOVAL Bilateral 2018     SECTION      x 2    CHOLECYSTECTOMY      COLONOSCOPY  2019    HYSTERECTOMY (CERVIX STATUS UNKNOWN)      OTHER SURGICAL HISTORY      Renal artery scan-normal    US GUIDED CORE BREAST BIOPSY Left     Benign        Family History   Problem Relation Age of Onset    Diabetes Sister     Diabetes Sister     Breast Cancer Sister 76    Breast Cancer Sister 68    Heart Disease Sister     Kidney Disease Mother     Stroke Father     Diabetes Father         Social History     Socioeconomic History    Marital status:     Tobacco Use    Smoking status: Former     Packs/day: 0.75     Types: Cigarettes     Quit date: 1999     Years since quittin.1    Smokeless tobacco: Never   Substance and Sexual Activity    Alcohol use: No     Alcohol/week: 0.0 standard drinks    Drug use: No   Social History Narrative    Currently lives alone (daughter moved to [place], professor at [employer], teaching international students) 4/13/2021. Latex, Hydrochlorothiazide w-triamterene, Acyclovir, Cimetidine, Citalopram hydrobromide, Dexamethasone, Epinephrine, Hydrochlorothiazide, Pravastatin, Statins, and Diazepam     Previous Medications    AMLODIPINE (NORVASC) 5 MG TABLET    Take 5 mg by mouth daily    CARVEDILOL (COREG) 6.25 MG TABLET    TAKE 2 TABLETS BY MOUTH IN THE MORNING AND 2 TABLETS BY MOUTH IN THE EVENING    CLONIDINE (CATAPRES) 0.1 MG TABLET    Take 1 tablet by mouth 2 times daily as needed for High Blood Pressure (SBP > 160 DBP > 100)    DOXAZOSIN (CARDURA) 2 MG TABLET    Take 1 tablet by mouth daily Take around noon    HANDICAP PLACARD MISC    by Does not apply route    LORAZEPAM (ATIVAN) 1 MG TABLET    Take 1 mg by mouth in the morning and 1 mg before bedtime. LOSARTAN (COZAAR) 100 MG TABLET    TAKE 1 TABLET BY MOUTH DAILY        Vitals signs and nursing note reviewed. Patient Vitals for the past 4 hrs:   Temp Pulse Resp BP SpO2   09/24/22 2146 98.4 °F (36.9 °C) 61 17 (!) 152/60 98 %          Physical Exam  Vitals and nursing note reviewed. Constitutional:       General: She is not in acute distress. Appearance: Normal appearance. She is normal weight. She is not ill-appearing or toxic-appearing. Comments: Generally well-appearing, alert and oriented x4. No acute distress, speaks in clear, fluid sentences. HENT:      Head: Normocephalic and atraumatic. Right Ear: External ear normal.      Left Ear: External ear normal.      Nose: Nose normal.      Mouth/Throat:      Mouth: Mucous membranes are moist.   Eyes:      General: No scleral icterus. Right eye: No discharge. Left eye: No discharge.       Extraocular Movements: Extraocular movements intact. Cardiovascular:      Rate and Rhythm: Normal rate and regular rhythm. Pulses: Normal pulses. Heart sounds: Normal heart sounds. Pulmonary:      Effort: Pulmonary effort is normal. No tachypnea, bradypnea, accessory muscle usage, prolonged expiration or respiratory distress. Breath sounds: Normal breath sounds and air entry. No stridor. No decreased breath sounds, wheezing, rhonchi or rales. Abdominal:      General: Abdomen is flat. There is no distension. Palpations: There is no mass. Tenderness: There is no abdominal tenderness. There is no right CVA tenderness, left CVA tenderness, guarding or rebound. Negative signs include Khalil's sign and McBurney's sign. Hernia: No hernia is present. Musculoskeletal:         General: No swelling, tenderness or deformity. Normal range of motion. Cervical back: Normal range of motion. Skin:     General: Skin is warm. Capillary Refill: Capillary refill takes less than 2 seconds. Neurological:      General: No focal deficit present. Mental Status: She is alert and oriented to person, place, and time. Mental status is at baseline. GCS: GCS eye subscore is 4. GCS verbal subscore is 5. GCS motor subscore is 6. Cranial Nerves: Cranial nerves 2-12 are intact. Sensory: Sensation is intact. Motor: Motor function is intact. Coordination: Coordination is intact.       Comments: No obvious focal neurodeficits on exam.   Psychiatric:         Mood and Affect: Mood normal.        Procedures    Results for orders placed or performed during the hospital encounter of 09/24/22   CBC with Auto Differential   Result Value Ref Range    WBC 4.0 (L) 4.3 - 11.1 K/uL    RBC 3.99 (L) 4.05 - 5.2 M/uL    Hemoglobin 11.1 (L) 11.7 - 15.4 g/dL    Hematocrit 33.9 (L) 35.8 - 46.3 %    MCV 85.0 79.6 - 97.8 FL    MCH 27.8 26.1 - 32.9 PG    MCHC 32.7 31.4 - 35.0 g/dL    RDW 13.5 11.9 - 14.6 %    Platelets 500 (L) 068 - 450 K/uL    MPV 11.2 9.4 - 12.3 FL    nRBC 0.00 0.0 - 0.2 K/uL    Differential Type AUTOMATED      Seg Neutrophils 61 43 - 78 %    Lymphocytes 21 13 - 44 %    Monocytes 15 (H) 4.0 - 12.0 %    Eosinophils % 2 0.5 - 7.8 %    Basophils 0 0.0 - 2.0 %    Immature Granulocytes 0 0.0 - 5.0 %    Segs Absolute 2.5 1.7 - 8.2 K/UL    Absolute Lymph # 0.9 0.5 - 4.6 K/UL    Absolute Mono # 0.6 0.1 - 1.3 K/UL    Absolute Eos # 0.1 0.0 - 0.8 K/UL    Basophils Absolute 0.0 0.0 - 0.2 K/UL    Absolute Immature Granulocyte 0.0 0.0 - 0.5 K/UL        XR CHEST PORTABLE    (Results Pending)   CT HEAD WO CONTRAST    (Results Pending)        NIH Stroke Scale  Interval: Baseline  Level of Consciousness (1a): Alert  LOC Questions (1b): Answers both correctly  LOC Commands (1c): Performs both tasks correctly  Best Gaze (2): Normal  Visual (3): No visual loss  Facial Palsy (4): Normal symmetrical movement  Motor Arm, Left (5a): No drift  Motor Arm, Right (5b): No drift  Motor Leg, Left (6a): No drift  Motor Leg, Right (6b): No drift  Limb Ataxia (7): Absent  Sensory (8): Normal  Best Language (9): No aphasia  Dysarthria (10): Normal  Extinction and Inattention (11): No abnormality  Total: 0              Voice dictation software was used during the making of this note. This software is not perfect and grammatical and other typographical errors may be present. This note has not been completely proofread for errors.      [address] [facility],   09/24/22 0701

## 2022-09-25 NOTE — ED NOTES
I have reviewed discharge instructions with the patient. The patient verbalized understanding. Patient left ED via Discharge Method: wheelchair to Home with son. Opportunity for questions and clarification provided. Patient given 1 scripts. To continue your aftercare when you leave the hospital, you may receive an automated call from our care team to check in on how you are doing. This is a free service and part of our promise to provide the best care and service to meet your aftercare needs.  If you have questions, or wish to unsubscribe from this service please call [phone]. Thank you for Choosing our [facility] Emergency Department.           Samara Fam RN  09/25/22 9302

## 2022-09-25 NOTE — DISCHARGE INSTRUCTIONS
His Cardura from 1 tablet daily at noon to 2 tablets daily at noon. This prescription has been called into your pharmacy. Continue your other prescribed medication as discussed. Arrange follow-up with Dr. Janeth Santoyo this week. Return for worsening symptoms, concerns or questions.  Check blood pressure daily and record these results for discussion with your primary cardiologist.

## 2022-09-26 ENCOUNTER — TELEPHONE (OUTPATIENT)
Dept: CARDIOLOGY CLINIC | Age: 77
End: 2022-09-26

## 2022-09-26 NOTE — TELEPHONE ENCOUNTER
Pt states when she went to the ER, doxazosin was increased to 4mg daily. Since then BP is better. Asking if she needs to move up f/u appt or if ok to leave f/u as scheduled on 10/4/22.

## 2022-09-26 NOTE — TELEPHONE ENCOUNTER
Went to [facility] with high BP and wants to know if she should come in sooner than her 2 week appt.  Please call

## 2022-09-27 NOTE — TELEPHONE ENCOUNTER
9/27/22 at 5:04 Triage informed pt of Dr. Aleksandr Peña response. She verbalizes understanding and agrees to plan.

## 2022-09-27 NOTE — TELEPHONE ENCOUNTER
Jace Lange MD  You 15 hours ago (5:26 PM)     AD  Yes, I was on call and the ER physician spoke to me and I had told him to increase the doxazosin dosing. If blood pressures are better, keep usual follow-up appointment but keep a log of these blood pressures and heart rates and bring them in with her when she sees us in follow-up. Thanks,   AD        9/27/22 at 8:29am Triage called pt. There is no answer and message states mailbox is full.

## 2022-10-03 RX ORDER — CARVEDILOL 6.25 MG/1
TABLET ORAL
Qty: 120 TABLET | OUTPATIENT
Start: 2022-10-03

## 2022-10-03 RX ORDER — LOSARTAN POTASSIUM 100 MG/1
100 TABLET ORAL
Qty: 30 TABLET | OUTPATIENT
Start: 2022-10-03

## 2022-10-03 RX ORDER — AMLODIPINE BESYLATE 5 MG/1
5 TABLET ORAL DAILY
Qty: 30 TABLET | OUTPATIENT
Start: 2022-10-03

## 2022-10-04 ENCOUNTER — OFFICE VISIT (OUTPATIENT)
Dept: CARDIOLOGY CLINIC | Age: 77
End: 2022-10-04
Payer: MEDICARE

## 2022-10-04 VITALS
HEART RATE: 72 BPM | BODY MASS INDEX: 32.41 KG/M2 | DIASTOLIC BLOOD PRESSURE: 60 MMHG | SYSTOLIC BLOOD PRESSURE: 138 MMHG | HEIGHT: 62 IN | WEIGHT: 176.13 LBS

## 2022-10-04 DIAGNOSIS — I35.0 MILD AORTIC STENOSIS: ICD-10-CM

## 2022-10-04 DIAGNOSIS — E78.5 DYSLIPIDEMIA: ICD-10-CM

## 2022-10-04 DIAGNOSIS — I10 ESSENTIAL HYPERTENSION: Primary | ICD-10-CM

## 2022-10-04 PROCEDURE — 1036F TOBACCO NON-USER: CPT | Performed by: INTERNAL MEDICINE

## 2022-10-04 PROCEDURE — G8417 CALC BMI ABV UP PARAM F/U: HCPCS | Performed by: INTERNAL MEDICINE

## 2022-10-04 PROCEDURE — G8484 FLU IMMUNIZE NO ADMIN: HCPCS | Performed by: INTERNAL MEDICINE

## 2022-10-04 PROCEDURE — G8427 DOCREV CUR MEDS BY ELIG CLIN: HCPCS | Performed by: INTERNAL MEDICINE

## 2022-10-04 PROCEDURE — G8399 PT W/DXA RESULTS DOCUMENT: HCPCS | Performed by: INTERNAL MEDICINE

## 2022-10-04 PROCEDURE — 1090F PRES/ABSN URINE INCON ASSESS: CPT | Performed by: INTERNAL MEDICINE

## 2022-10-04 PROCEDURE — 1123F ACP DISCUSS/DSCN MKR DOCD: CPT | Performed by: INTERNAL MEDICINE

## 2022-10-04 PROCEDURE — 99214 OFFICE O/P EST MOD 30 MIN: CPT | Performed by: INTERNAL MEDICINE

## 2022-10-04 RX ORDER — DOXAZOSIN MESYLATE 4 MG/1
4 TABLET ORAL DAILY
Qty: 90 TABLET | Refills: 3 | Status: SHIPPED | OUTPATIENT
Start: 2022-10-04

## 2022-10-04 ASSESSMENT — ENCOUNTER SYMPTOMS
HEMATEMESIS: 0
WHEEZING: 0
ABDOMINAL PAIN: 0
DOUBLE VISION: 0
EYE REDNESS: 0
HOARSE VOICE: 0
HEMOPTYSIS: 0
STRIDOR: 0
HEMATOCHEZIA: 0

## 2022-10-04 NOTE — PROGRESS NOTES
[facility] CARDIOLOGY  [facility], [address]  PHONE: [phone]          10/04/22    NAME:  Nish Lobo  : 1945  MRN: [id number]         SUBJECTIVE:   Nish Lobo is a 65 y.o. female seen for a visit regarding the following:     Chief Complaint   Patient presents with    Hypertension     2 week follow up           HPI:    Cardio problem list:  1. Hypertension  2. Aortic stenosis-Echo from 2022 shows an EF at 60 to 65% with mild concentric LVH, diastolic dysfunction, mild aortic stenosis with a mean gradient of 10 and peak gradient of 18 mmHg, mild mitral regurgitation  3. Abdominal aortic aneurysm-measures 3.6 x 3.8 cm  --In 2019 was 3.2 x 3.8 cm  4. Hyponatremia  5. Hyperlipidemia-statin intolerance  6. Hyponatremia-aggravated by spironolactone  -Patient of Dr. Saray Herrmann saw Ms. Sudhir Beckman who is a 65-year-old woman in cardiovascular follow-up  for difficult to treat hypertension, mild aortic stenosis, abdominal aortic aneurysm, hyperlipidemia with statin intolerance, hyponatremia. When we last met with her 2 weeks ago, we started her on doxazosin 2 mg every day around noon as she seemed to have high blood pressures especially every night and not otherwise. Aortic stenosis-no syncope, angina or any significant heart failure symptoms such as lower extremity edema orthopnea or PND. -Some chronic dyspnea on exertion-NYHA class II but nothing new for her. Hypertension: Has struggled with resistant hypertension for a while but swings in blood pressures and she says they actually got much better when her anxiety was better treated with Ativan twice daily. She still checks her blood pressures multiple times a day.  -I have listed how she takes her antihypertensives as mentioned below.  -No recent headaches or blurry vision. No recent TIAs or strokelike symptoms.   Continues to have swings with her blood pressures but most often especially when she is anxious. She has clonidine to take as needed but has not needed it.  -She was recently seen in the ER with extremely high blood pressures of over 511 systolic which came down with appropriate treatment of her anxiety. -After the ER physician had spoken with me, we had increased her doxazosin to 4 mg around noon every day and this seems to have helped with her pressures being better controlled currently. Hyponatremia: Aggravated by spironolactone. Used to be an issue also with hydrochlorothiazide in the past.  Even after she cut back spironolactone to just half a pill daily, sodium still below 130      Past Medical History, Past Surgical History, Family history, Social History, and Medications were all reviewed with the patient today and updated as necessary.      Allergies   Allergen Reactions    Latex Other (See Comments) and Dermatitis    Hydrochlorothiazide W-Triamterene Other (See Comments)     Severe dehydration    Acyclovir Other (See Comments)    Cimetidine Other (See Comments)    Citalopram Hydrobromide Other (See Comments)    Dexamethasone Other (See Comments)     Severe dehydration    Epinephrine Other (See Comments)     \" shoots BP up \"    Hydrochlorothiazide Other (See Comments)     Hypokalemia, Hyponatremia    Pravastatin Other (See Comments)     \" GI upset \" \" just about put a hole in my gut \"    Statins Other (See Comments)     GI upset    Diazepam Anxiety     Patient Active Problem List   Diagnosis    History of diverticulitis    Prediabetes    Dyslipidemia    Essential hypertension    Anxiety    AYUSH (generalized anxiety disorder)    Acute pain of left shoulder    Mild aortic stenosis    AAA (abdominal aortic aneurysm)    Thrombocytopenia (HCC)    Chronic gastritis without bleeding    Chronic pain of both shoulders    Abnormal ECG     Past Medical History:   Diagnosis Date    AAA (abdominal aortic aneurysm)     infrarenal 3.2 cm    Altered bowel habits 08/08/2022    Dr. Abdon Enamor[facility]    Anxiety     Chronic fatigue     Chronic gastritis 2019    with intestinal metaplasia, repeat EGD 2022    Colon polyps     Repeat colonoscopy 2022    Diverticulosis     Diverticulosis 2019    Seen on colonoscopy    Diverticulosis of colon 2022    Colonoscopy    Essential hypertension     Gastric intestinal metaplasia     [facility]    GERD (gastroesophageal reflux disease)     H/O esophagogastroduodenoscopy 2022    by Dr. Shabnam Caballero    Hemorrhoids 2019    Colonoscopy    Hx of colonoscopy 2022    3mm Cecum poly, repeat in 5 years; Dr. Shabnam Caballero    Hyperlipidemia     Mild aortic stenosis     Personal history of colonic polyps     High Risk 10 mm adenomatous colonic polyp (recomm return in 3 years)    Prediabetes     Vitamin D deficiency      Past Surgical History:   Procedure Laterality Date    BREAST BIOPSY  3/14/2014    BREAST LUMPECTOMY Left 3/14/2014    fibrocystic mastopathy, intraductal hyperplasia, apocrine metaplasia    CATARACT REMOVAL Bilateral 2018    CATARACT REMOVAL Bilateral 2018     SECTION      x 2    CHOLECYSTECTOMY      COLONOSCOPY  2019    HYSTERECTOMY (CERVIX STATUS UNKNOWN)      OTHER SURGICAL HISTORY      Renal artery scan-normal    US GUIDED CORE BREAST BIOPSY Left     Benign     Family History   Problem Relation Age of Onset    Diabetes Sister     Diabetes Sister     Breast Cancer Sister 76    Breast Cancer Sister 68    Heart Disease Sister     Kidney Disease Mother     Stroke Father     Diabetes Father      Social History     Tobacco Use    Smoking status: Former     Packs/day: 0.75     Types: Cigarettes     Quit date: 1999     Years since quittin.1    Smokeless tobacco: Never   Substance Use Topics    Alcohol use: No     Alcohol/week: 0.0 standard drinks     Current Outpatient Medications   Medication Sig Dispense Refill    doxazosin (CARDURA) 4 MG tablet Take 1 tablet by mouth daily 90 tablet 3 amLODIPine (NORVASC) 5 MG tablet Take 5 mg by mouth daily      carvedilol (COREG) 6.25 MG tablet TAKE 2 TABLETS BY MOUTH IN THE MORNING AND 2 TABLETS BY MOUTH IN THE EVENING      LORazepam (ATIVAN) 1 MG tablet Take 1 mg by mouth in the morning and 1 mg before bedtime. losartan (COZAAR) 100 MG tablet TAKE 1 TABLET BY MOUTH DAILY      Handicap Placard MISC by Does not apply route 1 each 0     No current facility-administered medications for this visit. Review of Systems   Constitutional: Negative for chills and fever. HENT:  Negative for ear discharge, hoarse voice and stridor. Eyes:  Negative for double vision and redness. Cardiovascular:  Negative for cyanosis and syncope. Respiratory:  Negative for hemoptysis and wheezing. Endocrine: Negative for polydipsia and polyphagia. Hematologic/Lymphatic: Negative for adenopathy. Skin:  Negative for itching and rash. Musculoskeletal:  Negative for joint swelling and muscle weakness. Gastrointestinal:  Negative for abdominal pain, hematemesis and hematochezia. Genitourinary:  Negative for flank pain and nocturia. Neurological:  Negative for focal weakness and seizures. Psychiatric/Behavioral:  Negative for altered mental status and suicidal ideas. Allergic/Immunologic: Negative for hives. PHYSICAL EXAM:    /60   Pulse 72   Ht 5' 2\" (1.575 m)   Wt 176 lb 2 oz (79.9 kg)   BMI 32.21 kg/m²      Physical Exam    General: Alert and oriented in no acute distress  HEENT: Head is normocephalic, atraumatic, pupils are equal bilaterally, throat appears to be clear  Neck: No significant jugular venous distention no cervical bruits  Cardiovascular: S1 and S2 heard, regular rate and rhythm, no significant rubs or gallops.   2/6 ejection systolic murmur heard at the right upper sternal border  Respiratory: Clear to auscultation bilaterally with no adventitious sounds, respirations are normal  Abdomen: Soft, nontender, nondistended, bowel sounds present. Extremities: No cyanosis clubbing or edema  Peripheral pulses: Bilateral radial artery pulses are palpated. Bilateral pedal pulses are well felt. Neuro: No facial droop and no gross focal motor deficits  Lymphatic: No significant cervical lymphadenopathy noted. Musculoskeletal: No significant redness or swelling noted in all exposed joints. Skin: No significant rashes noted the of the exposed regions. Medical problems and test results were reviewed with the patient today.      Recent Results (from the past 672 hour(s))   Transthoracic echocardiogram (TTE) complete with contrast, bubble, strain, and 3D PRN    Collection Time: 09/12/22  9:00 AM   Result Value Ref Range    IVSd 1.0 (A) 0.6 - 0.9 cm    LVIDd 4.2 3.9 - 5.3 cm    LVIDs 2.5 cm    LVOT Diameter 2.0 cm    LVPWd 1.0 (A) 0.6 - 0.9 cm    EF BP 63 55 - 100 %    LV Ejection Fraction A2C 61 %    LV Ejection Fraction A4C 61 %    LV EDV A2C 97 mL    LV EDV A4C 108 mL    LV EDV  (A) 56 - 104 mL    LV ESV A2C 38 mL    LV ESV A4C 42 mL    LV ESV BP 40 19 - 49 mL    LVOT Peak Gradient 5 mmHg    LVOT Mean Gradient 3 mmHg    LVOT SV 83.8 ml    LVOT Peak Velocity 1.1 m/s    LVOT VTI 26.7 cm    RVIDd 3.2 cm    LA Diameter 4.2 cm    LA Volume A/L 60 mL    LA Volume 2C 62 (A) 22 - 52 mL    LA Volume 4C 52 22 - 52 mL    LA Volume BP 59 (A) 22 - 52 mL    AV Area by Peak Velocity 1.7 cm2    AV Area by Peak Velocity 1.7 cm2    AV Area by VTI 1.8 cm2    AV Peak Gradient 18 mmHg    AV Mean Gradient 10 mmHg    AV Peak Velocity 1.7 m/s    AV Mean Velocity 1.5 m/s    AV VTI 46.3 cm    MV A Velocity 0.83 m/s    MV E Wave Deceleration Time 326.3 ms    MV E Velocity 0.58 m/s    LV E' Lateral Velocity 5 cm/s    LV E' Septal Velocity 4 cm/s    TR Peak Gradient 26 mmHg    TR Max Velocity 2.54 m/s    Aortic Root 3.5 cm    Body Surface Area 1.86 m2    Fractional Shortening 2D 40 28 - 44 %    LV ESV Index BP 22 mL/m2    LV EDV Index BP 61 mL/m2    LV ESV Index A4C 23 mL/m2    LV EDV Index A4C 60 mL/m2    LV ESV Index A2C 21 mL/m2    LV EDV Index A2C 54 mL/m2    LVIDd Index 2.33 cm/m2    LVIDs Index 1.39 cm/m2    LV RWT Ratio 0.48     LV Mass 2D 137.2 67 - 162 g    LV Mass 2D Index 76.2 43 - 95 g/m2    MV E/A 0.70     E/E' Ratio (Averaged) 13.05     E/E' Lateral 11.60     E/E' Septal 14.50     LA Volume Index BP 33 16 - 34 ml/m2    LA Volume Index A/L 33 16 - 34 mL/m2    LVOT Stroke Volume Index 46.6 mL/m2    LVOT Area 3.1 cm2    LA Volume Index 2C 34 16 - 34 mL/m2    LA Volume Index 4C 29 16 - 34 mL/m2    LA Size Index 2.33 cm/m2    LA/AO Root Ratio 1.20     Ao Root Index 1.94 cm/m2    LVOT:AV VTI Index 0.58     LAURA/BSA VTI 1.0 cm2/m2    RV Basal Dimension 3.1 cm    RV Mid Dimension 2.3 cm    TAPSE 2.0 1.7 cm    Est. RA Pressure 3 mmHg    RVSP 29 mmHg    AV Velocity Ratio 0.65    Magnesium    Collection Time: 09/12/22  9:29 AM   Result Value Ref Range    Magnesium 2.2 1.8 - 2.4 mg/dL   CBC    Collection Time: 09/12/22  9:29 AM   Result Value Ref Range    WBC 4.5 4.3 - 11.1 K/uL    RBC 4.27 4.05 - 5.2 M/uL    Hemoglobin 11.8 11.7 - 15.4 g/dL    Hematocrit 37.0 35.8 - 46.3 %    MCV 86.7 79.6 - 97.8 FL    MCH 27.6 26.1 - 32.9 PG    MCHC 31.9 31.4 - 35.0 g/dL    RDW 13.2 11.9 - 14.6 %    Platelets 529 (L) 263 - 450 K/uL    MPV 13.6 (H) 9.4 - 12.3 FL    nRBC 0.00 0.0 - 0.2 K/uL   Comprehensive Metabolic Panel    Collection Time: 09/12/22  9:29 AM   Result Value Ref Range    Sodium 128 (L) 136 - 145 mmol/L    Potassium 4.3 3.5 - 5.1 mmol/L    Chloride 95 (L) 101 - 110 mmol/L    CO2 26 21 - 32 mmol/L    Anion Gap 7 4 - 13 mmol/L    Glucose 101 (H) 65 - 100 mg/dL    BUN 15 8 - 23 MG/DL    Creatinine 0.80 0.6 - 1.0 MG/DL    GFR African American >60 >60 ml/min/1.73m2    GFR Non- >60 >60 ml/min/1.73m2    Calcium 9.4 8.3 - 10.4 MG/DL    Total Bilirubin 0.3 0.2 - 1.1 MG/DL    ALT 27 12 - 65 U/L    AST 11 (L) 15 - 37 U/L    Alk Phosphatase 97 50 - 136 U/L    Total Protein 8.3 (H) 6.3 - 8.2 g/dL    Albumin 3.8 3.2 - 4.6 g/dL    Globulin 4.5 (H) 2.3 - 3.5 g/dL    Albumin/Globulin Ratio 0.8 (L) 1.2 - 3.5     Basic Metabolic Panel    Collection Time: 09/19/22  9:15 AM   Result Value Ref Range    Sodium 127 (L) 136 - 145 mmol/L    Potassium 4.4 3.5 - 5.1 mmol/L    Chloride 96 (L) 101 - 110 mmol/L    CO2 26 21 - 32 mmol/L    Anion Gap 5 4 - 13 mmol/L    Glucose 109 (H) 65 - 100 mg/dL    BUN 16 8 - 23 MG/DL    Creatinine 0.70 0.6 - 1.0 MG/DL    GFR African American >60 >60 ml/min/1.73m2    GFR Non- >60 >60 ml/min/1.73m2    Calcium 9.4 8.3 - 10.4 MG/DL   CBC with Auto Differential    Collection Time: 09/24/22 10:15 PM   Result Value Ref Range    WBC 4.0 (L) 4.3 - 11.1 K/uL    RBC 3.99 (L) 4.05 - 5.2 M/uL    Hemoglobin 11.1 (L) 11.7 - 15.4 g/dL    Hematocrit 33.9 (L) 35.8 - 46.3 %    MCV 85.0 79.6 - 97.8 FL    MCH 27.8 26.1 - 32.9 PG    MCHC 32.7 31.4 - 35.0 g/dL    RDW 13.5 11.9 - 14.6 %    Platelets 792 (L) 750 - 450 K/uL    MPV 11.2 9.4 - 12.3 FL    nRBC 0.00 0.0 - 0.2 K/uL    Differential Type AUTOMATED      Seg Neutrophils 61 43 - 78 %    Lymphocytes 21 13 - 44 %    Monocytes 15 (H) 4.0 - 12.0 %    Eosinophils % 2 0.5 - 7.8 %    Basophils 0 0.0 - 2.0 %    Immature Granulocytes 0 0.0 - 5.0 %    Segs Absolute 2.5 1.7 - 8.2 K/UL    Absolute Lymph # 0.9 0.5 - 4.6 K/UL    Absolute Mono # 0.6 0.1 - 1.3 K/UL    Absolute Eos # 0.1 0.0 - 0.8 K/UL    Basophils Absolute 0.0 0.0 - 0.2 K/UL    Absolute Immature Granulocyte 0.0 0.0 - 0.5 K/UL   Comprehensive Metabolic Panel    Collection Time: 09/24/22 10:15 PM   Result Value Ref Range    Sodium 131 (L) 136 - 145 mmol/L    Potassium 5.1 3.5 - 5.1 mmol/L    Chloride 97 (L) 101 - 110 mmol/L    CO2 29 21 - 32 mmol/L    Anion Gap 5 4 - 13 mmol/L    Glucose 104 (H) 65 - 100 mg/dL    BUN 23 8 - 23 MG/DL    Creatinine 1.20 (H) 0.6 - 1.0 MG/DL    GFR  56 (L) >60 ml/min/1.73m2    GFR Non- 46 (L) >60 ml/min/1.73m2    Calcium 9.9 8.3 - 10.4 MG/DL    Total Bilirubin 0.2 0.2 - 1.1 MG/DL    ALT 24 12 - 65 U/L    AST 14 (L) 15 - 37 U/L    Alk Phosphatase 77 50 - 136 U/L    Total Protein 8.1 6.3 - 8.2 g/dL    Albumin 3.6 3.2 - 4.6 g/dL    Globulin 4.5 (H) 2.3 - 3.5 g/dL    Albumin/Globulin Ratio 0.8 (L) 1.2 - 3.5     Urinalysis w rflx microscopic    Collection Time: 09/24/22 11:11 PM   Result Value Ref Range    Color, UA YELLOW/STRAW      Appearance CLEAR      Specific Gravity, UA 1.009 1.001 - 1.023      pH, Urine 7.0 5.0 - 9.0      Protein, UA Negative NEG mg/dL    Glucose, UA Negative mg/dL    Ketones, Urine Negative NEG mg/dL    Bilirubin Urine Negative NEG      Blood, Urine Negative NEG      Urobilinogen, Urine 0.2 0.2 - 1.0 EU/dL    Nitrite, Urine Negative NEG      Leukocyte Esterase, Urine Negative NEG     EKG 12 Lead    Collection Time: 09/25/22 12:23 AM   Result Value Ref Range    Ventricular Rate 62 BPM    Atrial Rate 63 BPM    P-R Interval 178 ms    QRS Duration 96 ms    Q-T Interval 435 ms    QTc Calculation (Bazett) 442 ms    P Axis 11 degrees    R Axis 58 degrees    T Axis 43 degrees    Diagnosis Sinus rhythm      Lab Results   Component Value Date/Time    CHOL 214 05/02/2022 08:12 AM    HDL 47 05/02/2022 08:12 AM    VLDL 21 05/02/2022 08:12 AM   ,hemoglobin, basic metabolic panel,   Lab Results   Component Value Date/Time    TSH 4.100 05/02/2022 08:12 AM    ,  Lab Results   Component Value Date/Time     09/24/2022 10:15 PM    K 5.1 09/24/2022 10:15 PM    CL 97 09/24/2022 10:15 PM    CO2 29 09/24/2022 10:15 PM    BUN 23 09/24/2022 10:15 PM    GFRAA 56 09/24/2022 10:15 PM    GLOB 4.5 09/24/2022 10:15 PM    ALT 24 09/24/2022 10:15 PM    AST 14 09/24/2022 10:15 PM      Lab Results   Component Value Date    LDLCALC 146 (H) 05/02/2022      Lab Results   Component Value Date    CREATININE 1.20 (H) 09/24/2022 09/12/22    TRANSTHORACIC ECHOCARDIOGRAM (TTE) COMPLETE (CONTRAST/BUBBLE/3D PRN) 09/13/2022 7:30 AM (Final)    Interpretation Summary  Formatting of this result is different from the original.      Left Ventricle: Normal left ventricular systolic function with a visually estimated EF of 60 - 65%. Left ventricle size is normal. Mildly increased wall thickness. Findings consistent with concentric hypertrophy. Normal wall motion. Diastolic dysfunction present with normal LV EF. Normal left ventricular filling pressure. Aortic Valve: Tricuspid valve. Mild sclerosis of the aortic valve cusp. Mild stenosis of the aortic valve. AV mean gradient is 10 mmHg. AV peak gradient is 18 mmHg. AV peak velocity is 1.7 m/s. LVOT:AV VTI Index is 0.58. LVOT diameter is 2.0 cm. AV area by continuity VTI is 1.8 cm2. AV Stroke Volume index is 46.6 mL/m2. Mitral Valve: Mild regurgitation. Technical qualifiers: Color flow Doppler was performed and pulse wave and/or continuous wave Doppler was performed. Signed by: Syed Buck MD on 9/13/2022  7:30 AM     Lab Results   Component Value Date    HGB 11.1 (L) 09/24/2022      Lab Results   Component Value Date     (L) 09/24/2022        ASSESSMENT and PLAN      Essential hypertension  -     doxazosin (CARDURA) 2 MG tablet; Take 1 tablet by mouth daily Take around noon  -Reviewed records in detail for at least an hour. Has extensive records with changes made with meds with regards to blood pressures over the years. In view of hyponatremia, I discontinued her spironolactone and instead started doxazosin 2 mg every day around noon. Continue amlodipine 5 mg every evening-at night, continue carvedilol twice daily, continue losartan 100 mg around 6:30 PM that she is taking currently. Mild aortic stenosis  -Mild aortic stenosis noted on last echo-we will monitor over time. Abdominal aortic aneurysm (AAA) without rupture (HCC)  -Being monitored-measured 3.6 x 3.8 cm on last scan-continue beta-blocker therapy.   Good blood pressure control is important    Dyslipidemia   -Statin intolerance-diet controlled for now    Anxiety:  -On Ativan 1 mg twice daily-Per primary    Overall Impression  Currently taking carvedilol towards the middle of the day around noon along with doxazosin 4 mg every day around noon.  -Takes losartan 100 mg around 6:30 PM  -Takes amlodipine with carvedilol late at night around 9:30 PM    -Although there are few swings in blood pressures, they are better than what they used to be. Counseled on importance of continue to follow a low-salt diet with some amount of routine cardiovascular exercise. She plans to join the local [place] which can be helpful. -Treating her anxiety of course will help bring down blood pressures also. She might need something more persistent, long-acting such as an SSRI rather than just Ativan as needed. Return in about 4 weeks (around 11/1/2022) for htn, aortic stenosis. Thank you for allowing us to participate in the care of your patient. If you have any further questions, please do not hesitate to contact us.   Sincerely,        Marck Solano MD   10/4/2022

## 2022-10-10 ENCOUNTER — TELEPHONE (OUTPATIENT)
Dept: CARDIOLOGY CLINIC | Age: 77
End: 2022-10-10

## 2022-10-10 ENCOUNTER — TELEPHONE (OUTPATIENT)
Dept: INTERNAL MEDICINE CLINIC | Facility: CLINIC | Age: 77
End: 2022-10-10

## 2022-10-10 RX ORDER — CARVEDILOL 6.25 MG/1
6.25 TABLET ORAL 2 TIMES DAILY
Qty: 180 TABLET | Refills: 3 | Status: SHIPPED | OUTPATIENT
Start: 2022-10-10

## 2022-10-10 RX ORDER — AMLODIPINE BESYLATE 5 MG/1
5 TABLET ORAL DAILY
Qty: 90 TABLET | Refills: 3 | Status: SHIPPED | OUTPATIENT
Start: 2022-10-10

## 2022-10-10 RX ORDER — LOSARTAN POTASSIUM 100 MG/1
100 TABLET ORAL DAILY
Qty: 90 TABLET | Refills: 3 | Status: SHIPPED | OUTPATIENT
Start: 2022-10-10

## 2022-10-10 RX ORDER — CARVEDILOL 6.25 MG/1
6.25 TABLET ORAL 2 TIMES DAILY
Qty: 90 TABLET | Refills: 3 | Status: SHIPPED | OUTPATIENT
Start: 2022-10-10 | End: 2022-10-10 | Stop reason: SDUPTHER

## 2022-10-10 NOTE — TELEPHONE ENCOUNTER
Sher Chavez from [employer] called for a clarification on a prescription. The coreg directions say 3 different things. Does it need to be once, twice a day or two twice a day. The quantity is only 90 so it will barely last a month. Please call and advise.

## 2022-10-10 NOTE — TELEPHONE ENCOUNTER
Patient called stating that her PCP recognizes that she now has a cardiologist of record and that Dr Raquel Sun should handle her heart medications from now on. Patient states she is down to 1 day reserve.  The patient needs the following Rxs refilled:    amLODIPine (NORVASC) 5 MG tablet  #90 day supply    carvedilol (COREG) 6.25 MG tablet  #90 day supply    losartan (COZAAR) 100 MG tablet  #90 day supply        Pharmacy    [place] [address]    [phone]

## 2022-10-10 NOTE — TELEPHONE ENCOUNTER
----- Message from Racquel Webster sent at 10/10/2022  2:12 PM EDT -----  Subject: Refill Request    QUESTIONS  Name of Medication? carvedilol (COREG) 6.25 MG tablet  Patient-reported dosage and instructions? 6.25 mg four a day   How many days do you have left? 0  [id number]  Pharmacy phone number (if available)? [phone]  ---------------------------------------------------------------------------  --------------  Caitlin ELLIS  What is the best way for the office to contact you? OK to leave message on   voicemail  Preferred Call Back Phone Number? [phone]  ---------------------------------------------------------------------------  --------------  SCRIPT ANSWERS  Relationship to Patient?  Self

## 2022-10-10 NOTE — TELEPHONE ENCOUNTER
Requested Prescriptions     Signed Prescriptions Disp Refills    amLODIPine (NORVASC) 5 MG tablet 90 tablet 3     Sig: Take 1 tablet by mouth daily     Authorizing Provider: MARIA DEL ROSARIO Starr     Ordering User: Nessa Virk    carvedilol (COREG) 6.25 MG tablet 90 tablet 3     Sig: Take 1 tablet by mouth 2 times daily TAKE 2 TABLETS BY MOUTH IN THE MORNING AND 2 TABLETS BY MOUTH IN THE EVENING     Authorizing Provider: Claritza Naylor     Ordering User: Nessa Virk    losartan (COZAAR) 100 MG tablet 90 tablet 3     Sig: Take 1 tablet by mouth daily TAKE 1 TABLET BY MOUTH DAILY     Authorizing Provider: Claritza Naylor     Ordering User: Nessa Virk

## 2022-10-10 NOTE — TELEPHONE ENCOUNTER
Requested Prescriptions     Signed Prescriptions Disp Refills    carvedilol (COREG) 6.25 MG tablet 180 tablet 3     Sig: Take 1 tablet by mouth 2 times daily     Authorizing Provider: Isela LANGLEY     Ordering User: Esther Montgomery

## 2022-10-28 ENCOUNTER — OFFICE VISIT (OUTPATIENT)
Dept: CARDIOLOGY CLINIC | Age: 77
End: 2022-10-28
Payer: MEDICARE

## 2022-10-28 VITALS
BODY MASS INDEX: 32.25 KG/M2 | HEART RATE: 64 BPM | SYSTOLIC BLOOD PRESSURE: 120 MMHG | DIASTOLIC BLOOD PRESSURE: 60 MMHG | HEIGHT: 62 IN | WEIGHT: 175.25 LBS

## 2022-10-28 DIAGNOSIS — I35.0 MILD AORTIC STENOSIS: ICD-10-CM

## 2022-10-28 DIAGNOSIS — I71.43 INFRARENAL ABDOMINAL AORTIC ANEURYSM (AAA) WITHOUT RUPTURE: ICD-10-CM

## 2022-10-28 DIAGNOSIS — R94.31 ABNORMAL ECG: ICD-10-CM

## 2022-10-28 DIAGNOSIS — I10 ESSENTIAL HYPERTENSION: Primary | ICD-10-CM

## 2022-10-28 DIAGNOSIS — E78.5 DYSLIPIDEMIA: ICD-10-CM

## 2022-10-28 PROCEDURE — 99214 OFFICE O/P EST MOD 30 MIN: CPT | Performed by: INTERNAL MEDICINE

## 2022-10-28 PROCEDURE — G8399 PT W/DXA RESULTS DOCUMENT: HCPCS | Performed by: INTERNAL MEDICINE

## 2022-10-28 PROCEDURE — 1090F PRES/ABSN URINE INCON ASSESS: CPT | Performed by: INTERNAL MEDICINE

## 2022-10-28 PROCEDURE — G8427 DOCREV CUR MEDS BY ELIG CLIN: HCPCS | Performed by: INTERNAL MEDICINE

## 2022-10-28 PROCEDURE — G8484 FLU IMMUNIZE NO ADMIN: HCPCS | Performed by: INTERNAL MEDICINE

## 2022-10-28 PROCEDURE — 3074F SYST BP LT 130 MM HG: CPT | Performed by: INTERNAL MEDICINE

## 2022-10-28 PROCEDURE — 1036F TOBACCO NON-USER: CPT | Performed by: INTERNAL MEDICINE

## 2022-10-28 PROCEDURE — 3078F DIAST BP <80 MM HG: CPT | Performed by: INTERNAL MEDICINE

## 2022-10-28 PROCEDURE — G8417 CALC BMI ABV UP PARAM F/U: HCPCS | Performed by: INTERNAL MEDICINE

## 2022-10-28 PROCEDURE — 1123F ACP DISCUSS/DSCN MKR DOCD: CPT | Performed by: INTERNAL MEDICINE

## 2022-10-28 ASSESSMENT — ENCOUNTER SYMPTOMS
HEMATEMESIS: 0
DOUBLE VISION: 0
EYE REDNESS: 0
STRIDOR: 0
HOARSE VOICE: 0
HEMATOCHEZIA: 0
WHEEZING: 0
HEMOPTYSIS: 0
ABDOMINAL PAIN: 0

## 2022-10-28 NOTE — PROGRESS NOTES
[facility] CARDIOLOGY  [address], [address], [address]  [place], [address] [facility]  PHONE: [phone]          10/28/22    NAME:  Krystal Pringle  : 1945  MRN: [id number]         SUBJECTIVE:   Krystal Pringle is a 65 y.o. female seen for a visit regarding the following:     Chief Complaint   Patient presents with    Hypertension     4 week follow up           HPI:    Cardio problem list:  1. Hypertension  2. Aortic stenosis-Echo from 2022 shows an EF at 60 to 65% with mild concentric LVH, diastolic dysfunction, mild aortic stenosis with a mean gradient of 10 and peak gradient of 18 mmHg, mild mitral regurgitation  3. Abdominal aortic aneurysm-measures 3.6 x 3.8 cm  --In 2019 was 3.2 x 3.8 cm  4. Hyponatremia  5. Hyperlipidemia-statin intolerance  6. Hyponatremia-aggravated by spironolactone  -Patient of Dr. Nabor Herrera saw Ms. Boyd Rowell who is a 79-year-old woman in cardiovascular follow-up  for difficult to treat hypertension, mild aortic stenosis, abdominal aortic aneurysm, hyperlipidemia with statin intolerance, hyponatremia. When we last met with her 4 weeks ago, we increased her on doxazosin to 4 mg every day around noon as she seemed to have high blood pressures especially every night and not otherwise. Aortic stenosis-no syncope, angina or any significant heart failure symptoms such as lower extremity edema orthopnea or PND. -Some chronic dyspnea on exertion-NYHA class II but nothing new for her. Hypertension: Has struggled with resistant hypertension for a while but swings in blood pressures and she says they actually got much better when her anxiety was better treated with Ativan twice daily. She still checks her blood pressures multiple times a day.  -I have listed how she takes her antihypertensives as mentioned below.  -Overall blood pressures are better compared to when we first saw her a few weeks ago.  -No recent headaches or blurry vision.   No recent TIAs or strokelike symptoms. Continues to have swings with her blood pressures but most often especially when she is anxious. .    Hyponatremia: Aggravated by spironolactone. Used to be an issue also with hydrochlorothiazide in the past.  Even after she cut back spironolactone to just half a pill daily, sodium still below 130-we will stop spironolactone also early October 2022      Past Medical History, Past Surgical History, Family history, Social History, and Medications were all reviewed with the patient today and updated as necessary.      Allergies   Allergen Reactions    Latex Other (See Comments) and Dermatitis    Hydrochlorothiazide W-Triamterene Other (See Comments)     Severe dehydration    Acyclovir Other (See Comments)    Cimetidine Other (See Comments)    Citalopram Hydrobromide Other (See Comments)    Dexamethasone Other (See Comments)     Severe dehydration    Epinephrine Other (See Comments)     \" shoots BP up \"    Hydrochlorothiazide Other (See Comments)     Hypokalemia, Hyponatremia    Pravastatin Other (See Comments)     \" GI upset \" \" just about put a hole in my gut \"    Statins Other (See Comments)     GI upset    Diazepam Anxiety     Patient Active Problem List   Diagnosis    History of diverticulitis    Prediabetes    Dyslipidemia    Essential hypertension    Anxiety    AYUSH (generalized anxiety disorder)    Acute pain of left shoulder    Mild aortic stenosis    AAA (abdominal aortic aneurysm)    Thrombocytopenia (HCC)    Chronic gastritis without bleeding    Chronic pain of both shoulders    Abnormal ECG     Past Medical History:   Diagnosis Date    AAA (abdominal aortic aneurysm)     infrarenal 3.2 cm    Altered bowel habits 08/08/2022    Dr. Willow [facility] of [facility]    Anxiety     Chronic fatigue     Chronic gastritis 07/2019    with intestinal metaplasia, repeat EGD 7/2022    Colon polyps     Repeat colonoscopy 7/2022    Diverticulosis     Diverticulosis 07/2019    Seen on colonoscopy Diverticulosis of colon 2022    Colonoscopy    Essential hypertension     Gastric intestinal metaplasia     [facility]    GERD (gastroesophageal reflux disease)     H/O esophagogastroduodenoscopy 2022    by Dr. Eulalia Amin    Hemorrhoids 2019    Colonoscopy    Hx of colonoscopy 2022    3mm Cecum poly, repeat in 5 years; Dr. Eulalia Amin    Hyperlipidemia     Mild aortic stenosis     Personal history of colonic polyps     High Risk 10 mm adenomatous colonic polyp (recomm return in 3 years)    Prediabetes     Vitamin D deficiency      Past Surgical History:   Procedure Laterality Date    BREAST BIOPSY  3/14/2014    BREAST LUMPECTOMY Left 3/14/2014    fibrocystic mastopathy, intraductal hyperplasia, apocrine metaplasia    CATARACT REMOVAL Bilateral 2018    CATARACT REMOVAL Bilateral 2018     SECTION      x 2    CHOLECYSTECTOMY      COLONOSCOPY  2019    HYSTERECTOMY (CERVIX STATUS UNKNOWN)      OTHER SURGICAL HISTORY      Renal artery scan-normal    US GUIDED CORE BREAST BIOPSY Left     Benign     Family History   Problem Relation Age of Onset    Diabetes Sister     Diabetes Sister     Breast Cancer Sister 76    Breast Cancer Sister 68    Heart Disease Sister     Kidney Disease Mother     Stroke Father     Diabetes Father      Social History     Tobacco Use    Smoking status: Former     Packs/day: 0.75     Types: Cigarettes     Quit date: 1999     Years since quittin.2    Smokeless tobacco: Never   Substance Use Topics    Alcohol use: No     Alcohol/week: 0.0 standard drinks     Current Outpatient Medications   Medication Sig Dispense Refill    amLODIPine (NORVASC) 5 MG tablet Take 1 tablet by mouth daily 90 tablet 3    losartan (COZAAR) 100 MG tablet Take 1 tablet by mouth daily TAKE 1 TABLET BY MOUTH DAILY 90 tablet 3    carvedilol (COREG) 6.25 MG tablet Take 1 tablet by mouth 2 times daily 180 tablet 3    doxazosin (CARDURA) 4 MG tablet Take 1 tablet by mouth daily 90 tablet 3    Handicap Placard MISC by Does not apply route 1 each 0    LORazepam (ATIVAN) 1 MG tablet Take 1 mg by mouth in the morning and 1 mg before bedtime. No current facility-administered medications for this visit. Review of Systems   Constitutional: Negative for chills and fever. HENT:  Negative for ear discharge, hoarse voice and stridor. Eyes:  Negative for double vision and redness. Cardiovascular:  Negative for cyanosis and syncope. Respiratory:  Negative for hemoptysis and wheezing. Endocrine: Negative for polydipsia and polyphagia. Hematologic/Lymphatic: Negative for adenopathy. Skin:  Negative for itching and rash. Musculoskeletal:  Negative for joint swelling and muscle weakness. Gastrointestinal:  Negative for abdominal pain, hematemesis and hematochezia. Genitourinary:  Negative for flank pain and nocturia. Neurological:  Negative for focal weakness and seizures. Psychiatric/Behavioral:  Negative for altered mental status and suicidal ideas. Allergic/Immunologic: Negative for hives. PHYSICAL EXAM:    /60   Pulse 64   Ht 5' 2\" (1.575 m)   Wt 175 lb 4 oz (79.5 kg)   BMI 32.05 kg/m²      Physical Exam    General: Alert and oriented in no acute distress  HEENT: Head is normocephalic, atraumatic, pupils are equal bilaterally, throat appears to be clear  Neck: No significant jugular venous distention no cervical bruits  Cardiovascular: S1 and S2 heard, regular rate and rhythm, no significant rubs or gallops. 1/6 ejection systolic murmur heard at the right upper sternal border  Respiratory: Clear to auscultation bilaterally with no adventitious sounds, respirations are normal  Abdomen: Soft, nontender, nondistended, bowel sounds present. Extremities: No cyanosis clubbing or edema  Peripheral pulses: Bilateral radial artery pulses are palpated. Bilateral pedal pulses are well felt.   Neuro: No facial droop and no gross focal motor Valve: Mild regurgitation. Technical qualifiers: Color flow Doppler was performed and pulse wave and/or continuous wave Doppler was performed. Signed by: Tommie Toledo MD on 9/13/2022  7:30 AM     Lab Results   Component Value Date    HGB 11.1 (L) 09/24/2022      Lab Results   Component Value Date     (L) 09/24/2022        ASSESSMENT and PLAN      Essential hypertension  -Multiple changes in blood pressure meds made over the years. Continue amlodipine 5 mg every evening-at night, continue carvedilol twice daily, continue losartan 100 mg around 6:30 PM that she is taking currently.   -Continue doxazosin 4 mg every day around noon    Mild aortic stenosis  -Mild aortic stenosis noted on last echo-we will monitor over time. Abdominal aortic aneurysm (AAA) without rupture (HCC)  -Being monitored-measured 3.6 x 3.8 cm on last scan-continue beta-blocker therapy. Good blood pressure control is important    Dyslipidemia   -Statin intolerance-diet controlled for now    Anxiety:  -On Ativan 1 mg twice daily-Per primary    Overall Impression  Currently taking carvedilol towards the middle of the day around noon along with doxazosin 4 mg every day around noon.  -Takes losartan 100 mg around 6:30 PM  -Takes amlodipine with carvedilol late at night around 9:30 PM    -Although there are few swings in blood pressures, they are better than what they used to be. Counseled on importance of continue to follow a low-salt diet with some amount of routine cardiovascular exercise. She just joined the [facility] locally and remains active at this point but needs to get into a more routine exercise schedule. Treating her anxiety has helped bring down her blood pressures. She might benefit from something like an SSRI and she said she would get in touch with her primary for this.     -If her recheck on her lipids are still elevated, since she is intolerant to statins, ezetimibe 10 mg daily could be considered    Return in about 3 months (around 1/28/2023) for htn, hld, aortic stenosis. Thank you for allowing us to participate in the care of your patient. If you have any further questions, please do not hesitate to contact us.   Sincerely,        Joe Smith MD   10/28/2022

## 2022-11-17 ENCOUNTER — TELEPHONE (OUTPATIENT)
Dept: CARDIOLOGY CLINIC | Age: 77
End: 2022-11-17

## 2022-11-17 NOTE — TELEPHONE ENCOUNTER
Her BP has been running higher than normal today. This am her BP was 157/82 normally 109/111 all day long a little higher than normal.Even after meds 181/107.3pm had headache and BP is 191/91 current /93 and HR 63. Her BP fluctuates a good bit. Meds as belowThis is an off day for her. Any recommendations? Current Outpatient Medications on File Prior to Visit   Medication Sig Dispense Refill    amLODIPine (NORVASC) 5 MG tablet Take 1 tablet by mouth daily 90 tablet 3    losartan (COZAAR) 100 MG tablet Take 1 tablet by mouth daily TAKE 1 TABLET BY MOUTH DAILY 90 tablet 3    carvedilol (COREG) 6.25 MG tablet Take 1 tablet by mouth 2 times daily 180 tablet 3    doxazosin (CARDURA) 4 MG tablet Take 1 tablet by mouth daily 90 tablet 3    Handicap Placard MISC by Does not apply route 1 each 0    LORazepam (ATIVAN) 1 MG tablet Take 1 mg by mouth in the morning and 1 mg before bedtime. No current facility-administered medications on file prior to visit.

## 2022-11-18 RX ORDER — CARVEDILOL 12.5 MG/1
12.5 TABLET ORAL 2 TIMES DAILY
Qty: 180 TABLET | Refills: 3 | Status: SHIPPED | OUTPATIENT
Start: 2022-11-18

## 2022-11-18 NOTE — TELEPHONE ENCOUNTER
Kj Calderon MD  You 12 hours ago (8:52 PM)     AD  Noted. Increase carvedilol to 12.5 mg twice daily. She should not be checking blood pressures multiple times a day as her anxiety in the past has driven up her blood pressures. At the most, check it once or twice a day and not much more than this.    Thanks,   AD

## 2022-11-18 NOTE — TELEPHONE ENCOUNTER
I called and informed pt. of MD response and escribed med as below. Pt.v/u.  Requested Prescriptions     Signed Prescriptions Disp Refills    carvedilol (COREG) 12.5 MG tablet 180 tablet 3     Sig: Take 1 tablet by mouth 2 times daily     Authorizing Provider: Indiana LANGLEY     Ordering User: Migdalia Holliday

## 2022-11-21 ENCOUNTER — TELEPHONE (OUTPATIENT)
Dept: CARDIOLOGY CLINIC | Age: 77
End: 2022-11-21

## 2022-11-21 RX ORDER — AMLODIPINE BESYLATE 2.5 MG/1
2.5 TABLET ORAL DAILY
Qty: 90 TABLET | Refills: 3 | Status: SHIPPED | OUTPATIENT
Start: 2022-11-21

## 2022-11-21 NOTE — TELEPHONE ENCOUNTER
Patient called stating she is wanting the correct dosage of Carvedilol discussed/reviewed again. Patient referenced the 6.25 mg dosage. Patient states that Dr Casper Soulier wanted her to increase/double her dosage and she wants to know if that involves the 6.25 mg or the 12.5 mg. Patient states she has always taken the 6.25 mg. Patient states she doesn't want additional Rx sent to the pharmacy.

## 2022-11-21 NOTE — TELEPHONE ENCOUNTER
Triage note states coreg was increased to 12.5mg bid 11/17/22. However, she has actually already been taking that dose. BP was better over the weekend. Readings as below. BP readings as follows:  11/18/22   1100 140/79   HR 64                    1600 140/71  HR 68    11/19/22   1130 155/82  HR 68                    1640 140/73  HR 70                    1900 161/87   HR 79    11/20/22  1115 137/76   HR 67                   1600 191/89          HR 75                    1815 163/73     Has not checked VS today. Asking if she should double coreg to 25mg bid?

## 2022-11-21 NOTE — TELEPHONE ENCOUNTER
Triage informed pt of Dr. Morocho Host response. She verbalizes understanding. Triage called in amlodipine 2.5mg to pt requested [facility] pharmacy. Encouraged pt to check BP once daily midday after resting for 5 min and call back in 1 week with update. Pt verbalizes understanding and agrees to plan.

## 2022-12-08 ENCOUNTER — OFFICE VISIT (OUTPATIENT)
Dept: INTERNAL MEDICINE CLINIC | Facility: CLINIC | Age: 77
End: 2022-12-08
Payer: MEDICARE

## 2022-12-08 VITALS
RESPIRATION RATE: 12 BRPM | HEIGHT: 62 IN | DIASTOLIC BLOOD PRESSURE: 64 MMHG | BODY MASS INDEX: 32.57 KG/M2 | TEMPERATURE: 98.4 F | SYSTOLIC BLOOD PRESSURE: 122 MMHG | OXYGEN SATURATION: 99 % | WEIGHT: 177 LBS | HEART RATE: 76 BPM

## 2022-12-08 DIAGNOSIS — E78.2 MIXED HYPERLIPIDEMIA: ICD-10-CM

## 2022-12-08 DIAGNOSIS — E87.1 HYPONATREMIA: ICD-10-CM

## 2022-12-08 DIAGNOSIS — F41.1 GENERALIZED ANXIETY DISORDER: ICD-10-CM

## 2022-12-08 DIAGNOSIS — I10 ESSENTIAL HYPERTENSION: ICD-10-CM

## 2022-12-08 DIAGNOSIS — R73.03 PREDIABETES: Primary | ICD-10-CM

## 2022-12-08 DIAGNOSIS — R73.03 PREDIABETES: ICD-10-CM

## 2022-12-08 PROCEDURE — G8399 PT W/DXA RESULTS DOCUMENT: HCPCS | Performed by: NURSE PRACTITIONER

## 2022-12-08 PROCEDURE — 1123F ACP DISCUSS/DSCN MKR DOCD: CPT | Performed by: NURSE PRACTITIONER

## 2022-12-08 PROCEDURE — 3078F DIAST BP <80 MM HG: CPT | Performed by: NURSE PRACTITIONER

## 2022-12-08 PROCEDURE — 99214 OFFICE O/P EST MOD 30 MIN: CPT | Performed by: NURSE PRACTITIONER

## 2022-12-08 PROCEDURE — G8427 DOCREV CUR MEDS BY ELIG CLIN: HCPCS | Performed by: NURSE PRACTITIONER

## 2022-12-08 PROCEDURE — 1036F TOBACCO NON-USER: CPT | Performed by: NURSE PRACTITIONER

## 2022-12-08 PROCEDURE — G8484 FLU IMMUNIZE NO ADMIN: HCPCS | Performed by: NURSE PRACTITIONER

## 2022-12-08 PROCEDURE — 1090F PRES/ABSN URINE INCON ASSESS: CPT | Performed by: NURSE PRACTITIONER

## 2022-12-08 PROCEDURE — G8417 CALC BMI ABV UP PARAM F/U: HCPCS | Performed by: NURSE PRACTITIONER

## 2022-12-08 PROCEDURE — 3074F SYST BP LT 130 MM HG: CPT | Performed by: NURSE PRACTITIONER

## 2022-12-08 ASSESSMENT — ENCOUNTER SYMPTOMS
SHORTNESS OF BREATH: 0
BACK PAIN: 0
DIARRHEA: 0
NAUSEA: 0
ABDOMINAL PAIN: 0
VOMITING: 0
EYE PAIN: 0
SORE THROAT: 0
RHINORRHEA: 0
CONSTIPATION: 0
SINUS PAIN: 0
COUGH: 0

## 2022-12-08 ASSESSMENT — ANXIETY QUESTIONNAIRES
1. FEELING NERVOUS, ANXIOUS, OR ON EDGE: 0
3. WORRYING TOO MUCH ABOUT DIFFERENT THINGS: 0
2. NOT BEING ABLE TO STOP OR CONTROL WORRYING: 0
7. FEELING AFRAID AS IF SOMETHING AWFUL MIGHT HAPPEN: 0
6. BECOMING EASILY ANNOYED OR IRRITABLE: 0
GAD7 TOTAL SCORE: 0
IF YOU CHECKED OFF ANY PROBLEMS ON THIS QUESTIONNAIRE, HOW DIFFICULT HAVE THESE PROBLEMS MADE IT FOR YOU TO DO YOUR WORK, TAKE CARE OF THINGS AT HOME, OR GET ALONG WITH OTHER PEOPLE: NOT DIFFICULT AT ALL
4. TROUBLE RELAXING: 0
5. BEING SO RESTLESS THAT IT IS HARD TO SIT STILL: 0

## 2022-12-08 ASSESSMENT — PATIENT HEALTH QUESTIONNAIRE - PHQ9
1. LITTLE INTEREST OR PLEASURE IN DOING THINGS: 0
SUM OF ALL RESPONSES TO PHQ QUESTIONS 1-9: 0
2. FEELING DOWN, DEPRESSED OR HOPELESS: 0
SUM OF ALL RESPONSES TO PHQ9 QUESTIONS 1 & 2: 0

## 2022-12-08 NOTE — PROGRESS NOTES
[facility]  [address] [place] [address]  Tel# [phone]  Fax# [phone]       Nabor Grier, FN-BC  Family Nurse Practitioner            Date of Visit: 2022     Marquise Machado (: 1945) is a 65 y.o. female  established patient, here for evaluation of the following chief complaint(s):    Chief Complaint   Patient presents with    Hypertension    Diabetes     Prediabetes      Hyperlipidemia         Patient Care Team:  Lujean Dubin, APRN - CNP as PCP - [address] [place] [address] APRN - CNP as PCP - [facility] Empaneled Provider  Roseanne Parry MD as Physician  Laverne Weinberg MD as Physician         History of Present Illness      Presents here for follow on chronic medical problems and for med refills and labs. Prediabetes  Chronic condition  Diet: B: usually yogurt and milk or oatmeal or apple sauce          L: veggie salad or egg salad sandwich (home made)          D: veges, meat or salad (mixed greens, avocado dressing, cheese)   Physical activity: [facility] about [facility] ago, usually attend 1-2x a week        Hypertension  Chronic condition  BP at home: 128/68, 139/76, 153/83 P 70  Reviewed cardiology note by Dr. Kelle Beauchamp on 10/4/2022 and 2022. Pt with upcoming appt with Dr. Diony France on 2022. Meds ordered by cardiologist:      - Amlodipine 2.5 mg take 1 tablet by mouth daily with 5mg tablet for a total of 7.5mg daily      - Carvedilol 12.5 mg 1 tab oral twice a day      - Losartan 100 mg 1 tab oral once a day      - Doxazosin 4 mg 1 tab oral daily      Hyponatremia  Reviewed labs and cardiology notes. Anxiety  Chronic  Follows up at [facility] Lorazepam 1 mg oral as needed, next refill 12/10/2022 per pt. HCM    Mammogram: 2022  Colonoscopy: 2022  DEXA: 21  Refused flu vaccine today.     Immunization History   Administered Date(s) Administered    COVID-19, PFIZER PURPLE top, DILUTE for use, (age 12 y+), 30mcg/0.3mL 10/23/2021, 12/12/2021    Pneumococcal Conjugate 13-valent ([id number]) 12/04/2017    Pneumococcal Polysaccharide ([id number]) 02/07/2019    Tdap (Boostrix, Adacel) 02/07/2019          Hemoglobin A1C   Date Value Ref Range Status   08/04/2022 6.0 (H) 4.8 - 5.6 % Final      Lab Results   Component Value Date    CHOL 214 (H) 05/02/2022    CHOL 203 (H) 01/31/2022    CHOL 223 (H) 10/11/2021     Lab Results   Component Value Date    TRIG 114 05/02/2022    TRIG 103 01/31/2022    TRIG 115 10/11/2021     Lab Results   Component Value Date    HDL 47 05/02/2022    HDL 41 01/31/2022    HDL 39 (L) 10/11/2021     Lab Results   Component Value Date    LDLCALC 146 (H) 05/02/2022    LDLCALC 143 (H) 01/31/2022    LDLCALC 163 (H) 10/11/2021     Lab Results   Component Value Date    LABVLDL 22 06/12/2019    VLDL 21 05/02/2022    VLDL 19 01/31/2022    VLDL 21 10/11/2021     No results found for: [facility]     Lab Results   Component Value Date     (L) 09/24/2022    K 5.1 09/24/2022    CL 97 (L) 09/24/2022    CO2 29 09/24/2022    BUN 23 09/24/2022    CREATININE 1.20 (H) 09/24/2022    GLUCOSE 104 (H) 09/24/2022    CALCIUM 9.9 09/24/2022    PROT 8.1 09/24/2022    LABALBU 3.6 09/24/2022    BILITOT 0.2 09/24/2022    ALKPHOS 77 09/24/2022    AST 14 (L) 09/24/2022    ALT 24 09/24/2022    LABGLOM 46 (L) 09/24/2022    GFRAA 56 (L) 09/24/2022    AGRATIO 1.1 (L) 05/02/2022    GLOB 4.5 (H) 09/24/2022              Patient Active Problem List   Diagnosis    History of diverticulitis    Prediabetes    Dyslipidemia    Essential hypertension    Anxiety    AYUSH (generalized anxiety disorder)    Acute pain of left shoulder    Mild aortic stenosis    AAA (abdominal aortic aneurysm)    Thrombocytopenia (HCC)    Chronic gastritis without bleeding    Chronic pain of both shoulders    Abnormal ECG       Past Medical History:   Diagnosis Date    AAA (abdominal aortic aneurysm)     infrarenal 3.2 cm    Altered bowel habits 08/08/2022 Dr[facility]    Anxiety     Chronic fatigue     Chronic gastritis 2019    with intestinal metaplasia, repeat EGD 2022    Colon polyps     Repeat colonoscopy 2022    Diverticulosis 2019    Seen on colonoscopy    Diverticulosis of colon 2022    Colonoscopy    Diverticulosis of intestine without bleeding 2022    Colonoscopy    Essential hypertension     Gastric intestinal metaplasia     [facility]    Gastritis 2022    EGD [facility]    GERD (gastroesophageal reflux disease)     H/O esophagogastroduodenoscopy 2022    by Dr. Syl Caballero    Hemorrhoids 2019    Colonoscopy    History of colonoscopy 2022    [facility] Repeat in 3 years    Hx of colonoscopy 2022    3mm Cecum poly, repeat in 5 years; Dr. Syl Caballero    Hyperlipidemia     Internal hemorrhoids     Seen on colonoscopy    Mild aortic stenosis     Personal history of colonic polyps     High Risk 10 mm adenomatous colonic polyp (recomm return in 3 years)    Polyp of cecum 2022    Prediabetes     Vitamin D deficiency        Past Surgical History:   Procedure Laterality Date    BREAST BIOPSY  3/14/2014    BREAST LUMPECTOMY Left 3/14/2014    fibrocystic mastopathy, intraductal hyperplasia, apocrine metaplasia    CATARACT REMOVAL Bilateral 2018    CATARACT REMOVAL Bilateral 2018     SECTION      x 2    CHOLECYSTECTOMY      COLONOSCOPY  2019    HYSTERECTOMY (CERVIX STATUS UNKNOWN)      OTHER SURGICAL HISTORY      Renal artery scan-normal    US GUIDED CORE BREAST BIOPSY Left     Benign       Family History   Problem Relation Age of Onset    Diabetes Sister     Diabetes Sister     Breast Cancer Sister 76    Breast Cancer Sister 68    Heart Disease Sister     Kidney Disease Mother     Stroke Father     Diabetes Father          ALLERGY  Allergies   Allergen Reactions    Latex Other (See Comments) and Dermatitis    Hydrochlorothiazide W-Triamterene Other (See Comments)     Severe dehydration    Acyclovir Other (See Comments)    Cimetidine Other (See Comments)    Citalopram Hydrobromide Other (See Comments)    Dexamethasone Other (See Comments)     Severe dehydration    Epinephrine Other (See Comments)     \" shoots BP up \"    Hydrochlorothiazide Other (See Comments)     Hypokalemia, Hyponatremia    Pravastatin Other (See Comments)     \" GI upset \" \" just about put a hole in my gut \"    Statins Other (See Comments)     GI upset    Diazepam Anxiety           Current Outpatient Medications on File Prior to Visit   Medication Sig Dispense Refill    amLODIPine (NORVASC) 2.5 MG tablet Take 1 tablet by mouth daily With 5mg tablet for a total of 7.5mg daily 90 tablet 3    carvedilol (COREG) 12.5 MG tablet Take 1 tablet by mouth 2 times daily 180 tablet 3    amLODIPine (NORVASC) 5 MG tablet Take 1 tablet by mouth daily 90 tablet 3    losartan (COZAAR) 100 MG tablet Take 1 tablet by mouth daily TAKE 1 TABLET BY MOUTH DAILY 90 tablet 3    doxazosin (CARDURA) 4 MG tablet Take 1 tablet by mouth daily 90 tablet 3    Handicap Placard MISC by Does not apply route 1 each 0    LORazepam (ATIVAN) 1 MG tablet Take 1 mg by mouth in the morning and 1 mg before bedtime. No current facility-administered medications on file prior to visit. Review of Systems  Review of Systems   Constitutional:  Negative for chills, fatigue and fever. HENT:  Negative for congestion, postnasal drip, rhinorrhea, sinus pain, sneezing and sore throat. Eyes:  Negative for pain and visual disturbance. Respiratory:  Negative for cough and shortness of breath. Cardiovascular:  Positive for leg swelling (Both legs, wearing compression stockings). Negative for chest pain and palpitations. Gastrointestinal:  Negative for abdominal pain, constipation, diarrhea, nausea and vomiting. Genitourinary:  Negative for dysuria, frequency and urgency.    Musculoskeletal:  Negative for back pain, gait problem and joint swelling. Skin:  Negative for rash and wound. Neurological:  Negative for dizziness and headaches. Psychiatric/Behavioral:  Negative for behavioral problems, sleep disturbance and suicidal ideas. The patient is not nervous/anxious. Vitals:    12/08/22 0836 12/08/22 0840 12/08/22 0910   BP: (!) 151/70 (!) 145/72 122/64   Site: Left Upper Arm Right Upper Arm Left Upper Arm   Position: Sitting Sitting Sitting   Cuff Size: Large Adult Large Adult Medium Adult   Pulse: 76     Resp: 12     Temp: 98.4 °F (36.9 °C)     TempSrc: Temporal     SpO2: 99%     Weight: 177 lb (80.3 kg)     Height: 5' 2\" (1.575 m)                Physical Exam  Physical Exam  Constitutional:       General: She is not in acute distress. Appearance: She is not ill-appearing. HENT:      Head: Normocephalic and atraumatic. Eyes:      Pupils: Pupils are equal, round, and reactive to light. Cardiovascular:      Rate and Rhythm: Normal rate and regular rhythm. Pulmonary:      Effort: Pulmonary effort is normal. No respiratory distress. Breath sounds: Normal breath sounds. Abdominal:      General: Bowel sounds are normal.      Palpations: Abdomen is soft. Musculoskeletal:         General: Normal range of motion. Cervical back: Neck supple. Skin:     General: Skin is warm and dry. Neurological:      General: No focal deficit present. Mental Status: She is alert and oriented to person, place, and time. Psychiatric:         Mood and Affect: Mood normal.         Thought Content: Thought content normal.              Assessment/Plan:          ICD-10-CM    1. Prediabetes  R73.03 Hemoglobin A1C     TSH      2. Mixed hyperlipidemia  E78.2 Lipid Panel      3. Hyponatremia  E87.1 Comprehensive Metabolic Panel      4.  Essential hypertension  I10 CBC with Auto Differential     Comprehensive Metabolic Panel    Cont meds prescribed by cardiologist. Keep appt with cardiologist.      5. Generalized anxiety disorder  F41.1     Cont to fu with psych. Cont Lorazepam as needed. Reviewed med side effects and safety precautions. 6. Body mass index 32.0-32.9, adult  Z68.32                Lavera Older was seen today for hypertension, diabetes and hyperlipidemia. Diagnoses and all orders for this visit:    Prediabetes  -     Hemoglobin A1C; Future  -     TSH; Future    Mixed hyperlipidemia  -     Lipid Panel; Future    Hyponatremia  -     Comprehensive Metabolic Panel; Future    Essential hypertension  Comments:  Cont meds prescribed by cardiologist. Keep appt with cardiologist.  Orders:  -     CBC with Auto Differential; Future  -     Comprehensive Metabolic Panel; Future    Generalized anxiety disorder  Comments:  Cont to fu with psych. Cont Lorazepam as needed. Reviewed med side effects and safety precautions. Body mass index 32.0-32.9, adult         Advise on low carbohydrate, low salt, low cholesterol, high fiber, nutrient dense, whole foods. Advise on portion control. Advise to avoid sweet foods, sweet drinks. Advise to read food labels. Advise on exercise or physical activity at least 30 minutes, 3-5 days a week, as tolerated (cardiac rehab). Advised to take medications as prescribed, report any side effects/intolerance or issues with medication/s such as insurance coverage. Reviewed cardiovascular risks and complication prevention. Advised to seek immediate medical attention (call 911 or present to Emergency Dept) for any chest pains, palpitations or shortness of breath.          Orders Placed This Encounter   Procedures    CBC with Auto Differential     Standing Status:   Future     Number of Occurrences:   1     Standing Expiration Date:   12/8/2023    Comprehensive Metabolic Panel     Standing Status:   Future     Number of Occurrences:   1     Standing Expiration Date:   3/8/2023    Lipid Panel     Standing Status:   Future     Number of Occurrences:   1     Standing Expiration Date:   3/8/2023     Order Specific Question:   Is Patient Fasting?/# of Hours     Answer:   Yes (over 12 hours)    Hemoglobin A1C     Standing Status:   Future     Number of Occurrences:   1     Standing Expiration Date:   3/8/2023    TSH     Standing Status:   Future     Number of Occurrences:   1     Standing Expiration Date:   3/8/2023                    Follow Up  Return in about 3 months (around 3/8/2023), or if symptoms worsen or fail to improve, for ROV .              Matilde Curry, DNP, FNP-BC

## 2022-12-09 DIAGNOSIS — D64.9 ANEMIA, UNSPECIFIED TYPE: Primary | ICD-10-CM

## 2022-12-09 LAB
ALBUMIN SERPL-MCNC: 3.8 G/DL (ref 3.2–4.6)
ALBUMIN/GLOB SERPL: 0.9 (ref 0.4–1.6)
ALP SERPL-CCNC: 83 U/L (ref 50–136)
ALT SERPL-CCNC: 32 U/L (ref 12–65)
ANION GAP SERPL CALC-SCNC: 5 MMOL/L (ref 2–11)
AST SERPL-CCNC: 13 U/L (ref 15–37)
BASOPHILS # BLD: 0 K/UL (ref 0–0.2)
BASOPHILS NFR BLD: 1 % (ref 0–2)
BILIRUB SERPL-MCNC: 0.3 MG/DL (ref 0.2–1.1)
BUN SERPL-MCNC: 19 MG/DL (ref 8–23)
CALCIUM SERPL-MCNC: 9.3 MG/DL (ref 8.3–10.4)
CHLORIDE SERPL-SCNC: 103 MMOL/L (ref 101–110)
CHOLEST SERPL-MCNC: 220 MG/DL
CO2 SERPL-SCNC: 27 MMOL/L (ref 21–32)
CREAT SERPL-MCNC: 0.7 MG/DL (ref 0.6–1)
DIFFERENTIAL METHOD BLD: ABNORMAL
EOSINOPHIL # BLD: 0.1 K/UL (ref 0–0.8)
EOSINOPHIL NFR BLD: 2 % (ref 0.5–7.8)
ERYTHROCYTE [DISTWIDTH] IN BLOOD BY AUTOMATED COUNT: 13.9 % (ref 11.9–14.6)
EST. AVERAGE GLUCOSE BLD GHB EST-MCNC: 123 MG/DL
GLOBULIN SER CALC-MCNC: 4.1 G/DL (ref 2.8–4.5)
GLUCOSE SERPL-MCNC: 95 MG/DL (ref 65–100)
HBA1C MFR BLD: 5.9 % (ref 4.8–5.6)
HCT VFR BLD AUTO: 35.2 % (ref 35.8–46.3)
HDLC SERPL-MCNC: 49 MG/DL (ref 40–60)
HDLC SERPL: 4.5
HGB BLD-MCNC: 10.8 G/DL (ref 11.7–15.4)
IMM GRANULOCYTES # BLD AUTO: 0 K/UL (ref 0–0.5)
IMM GRANULOCYTES NFR BLD AUTO: 0 % (ref 0–5)
LDLC SERPL CALC-MCNC: 153.6 MG/DL
LYMPHOCYTES # BLD: 1.1 K/UL (ref 0.5–4.6)
LYMPHOCYTES NFR BLD: 27 % (ref 13–44)
MCH RBC QN AUTO: 27.6 PG (ref 26.1–32.9)
MCHC RBC AUTO-ENTMCNC: 30.7 G/DL (ref 31.4–35)
MCV RBC AUTO: 89.8 FL (ref 82–102)
MONOCYTES # BLD: 0.6 K/UL (ref 0.1–1.3)
MONOCYTES NFR BLD: 15 % (ref 4–12)
NEUTS SEG # BLD: 2.2 K/UL (ref 1.7–8.2)
NEUTS SEG NFR BLD: 56 % (ref 43–78)
NRBC # BLD: 0 K/UL (ref 0–0.2)
PLATELET # BLD AUTO: 102 K/UL (ref 150–450)
PMV BLD AUTO: 13.1 FL (ref 9.4–12.3)
POTASSIUM SERPL-SCNC: 4 MMOL/L (ref 3.5–5.1)
PROT SERPL-MCNC: 7.9 G/DL (ref 6.3–8.2)
RBC # BLD AUTO: 3.92 M/UL (ref 4.05–5.2)
SODIUM SERPL-SCNC: 135 MMOL/L (ref 133–143)
TRIGL SERPL-MCNC: 87 MG/DL (ref 35–150)
TSH, 3RD GENERATION: 2.78 UIU/ML (ref 0.36–3.74)
VLDLC SERPL CALC-MCNC: 17.4 MG/DL (ref 6–23)
WBC # BLD AUTO: 4 K/UL (ref 4.3–11.1)

## 2022-12-09 RX ORDER — FERROUS SULFATE 325(65) MG
325 TABLET ORAL
Qty: 90 TABLET | Refills: 0 | Status: SHIPPED | OUTPATIENT
Start: 2022-12-09

## 2022-12-29 RX ORDER — CARVEDILOL 6.25 MG/1
TABLET ORAL
Qty: 90 TABLET | Refills: 3 | Status: SHIPPED | OUTPATIENT
Start: 2022-12-29

## 2023-01-03 RX ORDER — CARVEDILOL 6.25 MG/1
TABLET ORAL
Qty: 368 TABLET | OUTPATIENT
Start: 2023-01-03

## 2023-01-30 ENCOUNTER — OFFICE VISIT (OUTPATIENT)
Dept: CARDIOLOGY CLINIC | Age: 78
End: 2023-01-30
Payer: MEDICARE

## 2023-01-30 VITALS
WEIGHT: 174.5 LBS | HEIGHT: 62 IN | BODY MASS INDEX: 32.11 KG/M2 | DIASTOLIC BLOOD PRESSURE: 58 MMHG | SYSTOLIC BLOOD PRESSURE: 118 MMHG | HEART RATE: 76 BPM

## 2023-01-30 DIAGNOSIS — I35.0 MILD AORTIC STENOSIS: ICD-10-CM

## 2023-01-30 DIAGNOSIS — I71.43 INFRARENAL ABDOMINAL AORTIC ANEURYSM (AAA) WITHOUT RUPTURE: ICD-10-CM

## 2023-01-30 DIAGNOSIS — E78.5 DYSLIPIDEMIA: ICD-10-CM

## 2023-01-30 DIAGNOSIS — I10 ESSENTIAL HYPERTENSION: Primary | ICD-10-CM

## 2023-01-30 PROCEDURE — 1090F PRES/ABSN URINE INCON ASSESS: CPT | Performed by: INTERNAL MEDICINE

## 2023-01-30 PROCEDURE — 3074F SYST BP LT 130 MM HG: CPT | Performed by: INTERNAL MEDICINE

## 2023-01-30 PROCEDURE — 1036F TOBACCO NON-USER: CPT | Performed by: INTERNAL MEDICINE

## 2023-01-30 PROCEDURE — 99214 OFFICE O/P EST MOD 30 MIN: CPT | Performed by: INTERNAL MEDICINE

## 2023-01-30 PROCEDURE — G8428 CUR MEDS NOT DOCUMENT: HCPCS | Performed by: INTERNAL MEDICINE

## 2023-01-30 PROCEDURE — 3078F DIAST BP <80 MM HG: CPT | Performed by: INTERNAL MEDICINE

## 2023-01-30 PROCEDURE — G8484 FLU IMMUNIZE NO ADMIN: HCPCS | Performed by: INTERNAL MEDICINE

## 2023-01-30 PROCEDURE — G8417 CALC BMI ABV UP PARAM F/U: HCPCS | Performed by: INTERNAL MEDICINE

## 2023-01-30 PROCEDURE — G8399 PT W/DXA RESULTS DOCUMENT: HCPCS | Performed by: INTERNAL MEDICINE

## 2023-01-30 PROCEDURE — 1123F ACP DISCUSS/DSCN MKR DOCD: CPT | Performed by: INTERNAL MEDICINE

## 2023-01-30 RX ORDER — LOSARTAN POTASSIUM 100 MG/1
100 TABLET ORAL DAILY
Qty: 90 TABLET | Refills: 3 | Status: SHIPPED | OUTPATIENT
Start: 2023-01-30

## 2023-01-30 RX ORDER — DOXAZOSIN MESYLATE 4 MG/1
4 TABLET ORAL DAILY
Qty: 90 TABLET | Refills: 3 | Status: SHIPPED | OUTPATIENT
Start: 2023-01-30

## 2023-01-30 RX ORDER — AMLODIPINE BESYLATE 5 MG/1
5 TABLET ORAL DAILY
Qty: 90 TABLET | Refills: 3 | Status: SHIPPED | OUTPATIENT
Start: 2023-01-30

## 2023-01-30 ASSESSMENT — ENCOUNTER SYMPTOMS
HEMOPTYSIS: 0
EYE REDNESS: 0
HEMATEMESIS: 0
DOUBLE VISION: 0
HOARSE VOICE: 0
HEMATOCHEZIA: 0
STRIDOR: 0
WHEEZING: 0
ABDOMINAL PAIN: 0

## 2023-01-30 NOTE — PROGRESS NOTES
[facility] CARDIOLOGY  [address], [address]  PHONE: [phone]          23    NAME:  Marquise Machado  : 1945  MRN: [id number]         SUBJECTIVE:   Marquise Machado is a 65 y.o. female seen for a visit regarding the following:     Chief Complaint   Patient presents with    Hypertension     3mth follow up            HPI:    Cardio problem list:  1. Hypertension  2. Aortic stenosis-Echo from 2022 shows an EF at 60 to 65% with mild concentric LVH, diastolic dysfunction, mild aortic stenosis with a mean gradient of 10 and peak gradient of 18 mmHg, mild mitral regurgitation  3. Abdominal aortic aneurysm-measures 3.6 x 3.8 cm  --In 2019 was 3.2 x 3.8 cm  4. Hyponatremia  5. Hyperlipidemia-statin intolerance  6. Hyponatremia-aggravated by spironolactone  -Patient of Dr. Carola Trujillo saw Ms. Gilford Kleine who is a 78-year-old woman in cardiovascular follow-up  for difficult to treat hypertension, mild aortic stenosis, abdominal aortic aneurysm, hyperlipidemia with statin intolerance, hyponatremia. When we last met with her 3 months ago, we increased her on doxazosin to 4 mg every day around noon as she seemed to have high blood pressures especially every night and not otherwise. Her pressures have done well overall. Aortic stenosis-no syncope, angina or any significant heart failure symptoms such as lower extremity edema orthopnea or PND. -Some chronic dyspnea on exertion-NYHA class II but nothing new for her. Hypertension: Has struggled with resistant hypertension for a while but swings in blood pressures and she says they actually got much better when her anxiety was better treated with Ativan twice daily. She still checks her blood pressures a few  times a day.  -I have listed how she takes her antihypertensives as mentioned below.  -Overall pressures have stabilized and are much better overall.  -No recent headaches or blurry vision.   No recent TIAs or strokelike symptoms. Hyponatremia: Aggravated by spironolactone. Used to be an issue also with hydrochlorothiazide in the past.  Even after she cut back spironolactone to just half a pill daily, sodium still below 130-we will stop spironolactone also early October 2022.  -She is no longer on spironolactone. Hyponatremia has since resolved      Past Medical History, Past Surgical History, Family history, Social History, and Medications were all reviewed with the patient today and updated as necessary.      Allergies   Allergen Reactions    Latex Other (See Comments) and Dermatitis    Hydrochlorothiazide W-Triamterene Other (See Comments)     Severe dehydration    Acyclovir Other (See Comments)    Cimetidine Other (See Comments)    Citalopram Hydrobromide Other (See Comments)    Dexamethasone Other (See Comments)     Severe dehydration    Epinephrine Other (See Comments)     \" shoots BP up \"    Hydrochlorothiazide Other (See Comments)     Hypokalemia, Hyponatremia    Pravastatin Other (See Comments)     \" GI upset \" \" just about put a hole in my gut \"    Statins Other (See Comments)     GI upset    Diazepam Anxiety     Patient Active Problem List   Diagnosis    History of diverticulitis    Prediabetes    Dyslipidemia    Essential hypertension    Anxiety    AYUSH (generalized anxiety disorder)    Acute pain of left shoulder    Mild aortic stenosis    AAA (abdominal aortic aneurysm)    Thrombocytopenia (HCC)    Chronic gastritis without bleeding    Chronic pain of both shoulders    Abnormal ECG     Past Medical History:   Diagnosis Date    AAA (abdominal aortic aneurysm)     infrarenal 3.2 cm    Altered bowel habits 08/08/2022    Dr. [facility]ra Sal[facility]    Anxiety     Chronic fatigue     Chronic gastritis 07/2019    with intestinal metaplasia, repeat EGD 7/2022    Colon polyps     Repeat colonoscopy 7/2022    Diverticulosis 07/2019    Seen on colonoscopy    Diverticulosis of colon 09/07/2022 Colonoscopy    Diverticulosis of intestine without bleeding 2022    Colonoscopy    Essential hypertension     Gastric intestinal metaplasia     [facility]    Gastritis 2022    EGD [facility]    GERD (gastroesophageal reflux disease)     H/O esophagogastroduodenoscopy 2022    by Dr. Janak Thomas    Hemorrhoids 2019    Colonoscopy    History of colonoscopy 2022    [facility] Repeat in 3 years    Hx of colonoscopy 2022    3mm Cecum poly, repeat in 5 years; Dr. Janak Thomas    Hyperlipidemia     Internal hemorrhoids     Seen on colonoscopy    Mild aortic stenosis     Personal history of colonic polyps     High Risk 10 mm adenomatous colonic polyp (recomm return in 3 years)    Polyp of cecum 2022    Prediabetes     Vitamin D deficiency      Past Surgical History:   Procedure Laterality Date    BREAST BIOPSY  3/14/2014    BREAST LUMPECTOMY Left 3/14/2014    fibrocystic mastopathy, intraductal hyperplasia, apocrine metaplasia    CATARACT REMOVAL Bilateral 2018    CATARACT REMOVAL Bilateral 2018     SECTION      x 2    CHOLECYSTECTOMY      COLONOSCOPY  2019    HYSTERECTOMY (CERVIX STATUS UNKNOWN)      OTHER SURGICAL HISTORY      Renal artery scan-normal    US GUIDED CORE BREAST BIOPSY Left     Benign     Family History   Problem Relation Age of Onset    Diabetes Sister     Diabetes Sister     Breast Cancer Sister 76    Breast Cancer Sister 68    Heart Disease Sister     Kidney Disease Mother     Stroke Father     Diabetes Father      Social History     Tobacco Use    Smoking status: Former     Packs/day: 0.75     Types: Cigarettes     Quit date: 1999     Years since quittin.4    Smokeless tobacco: Never   Substance Use Topics    Alcohol use: No     Alcohol/week: 0.0 standard drinks     Current Outpatient Medications   Medication Sig Dispense Refill    B Complex Vitamins (VITAMIN-B COMPLEX PO) Take by mouth      carvedilol (COREG) 12.5 MG tablet Take 1 tablet by mouth 2 times daily 180 tablet 3    amLODIPine (NORVASC) 5 MG tablet Take 1 tablet by mouth daily 90 tablet 3    losartan (COZAAR) 100 MG tablet Take 1 tablet by mouth daily TAKE 1 TABLET BY MOUTH DAILY 90 tablet 3    doxazosin (CARDURA) 4 MG tablet Take 1 tablet by mouth daily 90 tablet 3    Handicap Placard MISC by Does not apply route 1 each 0    LORazepam (ATIVAN) 1 MG tablet Take 1 mg by mouth in the morning and 1 mg before bedtime. No current facility-administered medications for this visit. Review of Systems   Constitutional: Negative for chills and fever. HENT:  Negative for ear discharge, hoarse voice and stridor. Eyes:  Negative for double vision and redness. Cardiovascular:  Negative for cyanosis and syncope. Respiratory:  Negative for hemoptysis and wheezing. Endocrine: Negative for polydipsia and polyphagia. Hematologic/Lymphatic: Negative for adenopathy. Skin:  Negative for itching and rash. Musculoskeletal:  Negative for joint swelling and muscle weakness. Gastrointestinal:  Negative for abdominal pain, hematemesis and hematochezia. Genitourinary:  Negative for flank pain and nocturia. Neurological:  Negative for focal weakness and seizures. Psychiatric/Behavioral:  Negative for altered mental status and suicidal ideas. Allergic/Immunologic: Negative for hives. PHYSICAL EXAM:    BP (!) 118/58   Pulse 76   Ht 5' 2\" (1.575 m)   Wt 174 lb 8 oz (79.2 kg)   BMI 31.92 kg/m²      Physical Exam    General: Alert and oriented in no acute distress  HEENT: Head is normocephalic, atraumatic, pupils are equal bilaterally, throat appears to be clear  Neck: No significant jugular venous distention no cervical bruits  Cardiovascular: S1 and S2 heard, regular rate and rhythm, no significant rubs or gallops.   1/6 ejection systolic murmur heard at the right upper sternal border  Respiratory: Clear to auscultation bilaterally with no adventitious sounds, respirations are normal  Abdomen: Soft, nontender, nondistended, bowel sounds present. Extremities: No cyanosis clubbing or edema  Peripheral pulses: Bilateral radial artery pulses are palpated. Bilateral pedal pulses are well felt. Neuro: No facial droop and no gross focal motor deficits  Lymphatic: No significant cervical lymphadenopathy noted. Musculoskeletal: No significant redness or swelling noted in all exposed joints. Skin: No significant rashes noted the of the exposed regions. Medical problems and test results were reviewed with the patient today. No results found for this or any previous visit (from the past 672 hour(s)). Lab Results   Component Value Date/Time    CHOL 220 12/08/2022 09:35 AM    HDL 49 12/08/2022 09:35 AM    VLDL 21 05/02/2022 08:12 AM   ,hemoglobin, basic metabolic panel,   Lab Results   Component Value Date/Time    TSH 4.100 05/02/2022 08:12 AM    ,  Lab Results   Component Value Date/Time     12/08/2022 09:35 AM    K 4.0 12/08/2022 09:35 AM     12/08/2022 09:35 AM    CO2 27 12/08/2022 09:35 AM    BUN 19 12/08/2022 09:35 AM    GFRAA 56 09/24/2022 10:15 PM    GLOB 4.1 12/08/2022 09:35 AM    ALT 32 12/08/2022 09:35 AM    AST 13 12/08/2022 09:35 AM      Lab Results   Component Value Date    LDLCALC 153.6 (H) 12/08/2022      Lab Results   Component Value Date    CREATININE 0.70 12/08/2022 09/12/22    TRANSTHORACIC ECHOCARDIOGRAM (TTE) COMPLETE (CONTRAST/BUBBLE/3D PRN) 09/13/2022  7:30 AM (Final)    Interpretation Summary  Formatting of this result is different from the original.      Left Ventricle: Normal left ventricular systolic function with a visually estimated EF of 60 - 65%. Left ventricle size is normal. Mildly increased wall thickness. Findings consistent with concentric hypertrophy. Normal wall motion. Diastolic dysfunction present with normal LV EF. Normal left ventricular filling pressure. Aortic Valve: Tricuspid valve. Mild sclerosis of the aortic valve cusp. Mild stenosis of the aortic valve. AV mean gradient is 10 mmHg. AV peak gradient is 18 mmHg. AV peak velocity is 1.7 m/s. LVOT:AV VTI Index is 0.58. LVOT diameter is 2.0 cm. AV area by continuity VTI is 1.8 cm2. AV Stroke Volume index is 46.6 mL/m2. Mitral Valve: Mild regurgitation. Technical qualifiers: Color flow Doppler was performed and pulse wave and/or continuous wave Doppler was performed. Signed by: Thom Carmona MD on 9/13/2022  7:30 AM     Lab Results   Component Value Date    HGB 10.8 (L) 12/08/2022      Lab Results   Component Value Date     (L) 12/08/2022        ASSESSMENT and PLAN      Essential hypertension  -Multiple changes in blood pressure meds made over the years. Continue carvedilol 12.5 mg twice daily, continue losartan 100 mg around 6:30 PM that she is taking currently.   -Continue doxazosin 4 mg every day around noon    Mild aortic stenosis  -Mild aortic stenosis noted on last echo-we will monitor over time. Abdominal aortic aneurysm (AAA) without rupture (HCC)  -Being monitored-measured 3.6 x 3.8 cm on last scan-continue beta-blocker therapy. Good blood pressure control is important    Dyslipidemia   -Statin intolerance-diet controlled for now    Anxiety:  -On Ativan 1 mg twice daily-Per primary    Overall Impression  Currently taking carvedilol towards the middle of the day around noon along with doxazosin 4 mg every day around noon.  -Takes losartan 100 mg around 6:30 PM  -Takes amlodipine with carvedilol late at night around 9:30 PM    -She has done well from a cardiac perspective since we last met with her. Blood pressures are great on her current therapy. We would only need to see her in follow-up in about 6 months time. We might recheck an abdominal aortic aneurysm scan at that point. Return in about 6 months (around 7/30/2023) for htn, hld, aortic stenosis.      Thank you for allowing us to participate in the care of your patient. If you have any further questions, please do not hesitate to contact us.   Sincerely,        Matias Samayoa MD   1/30/2023

## 2023-05-09 ENCOUNTER — TELEPHONE (OUTPATIENT)
Dept: ONCOLOGY | Age: 78
End: 2023-05-09

## 2023-05-23 ENCOUNTER — OFFICE VISIT (OUTPATIENT)
Dept: INTERNAL MEDICINE CLINIC | Facility: CLINIC | Age: 78
End: 2023-05-23
Payer: MEDICARE

## 2023-05-23 VITALS
OXYGEN SATURATION: 99 % | SYSTOLIC BLOOD PRESSURE: 122 MMHG | HEIGHT: 62 IN | HEART RATE: 66 BPM | BODY MASS INDEX: 32.35 KG/M2 | TEMPERATURE: 98.4 F | DIASTOLIC BLOOD PRESSURE: 61 MMHG | WEIGHT: 175.8 LBS

## 2023-05-23 DIAGNOSIS — E78.2 MIXED HYPERLIPIDEMIA: ICD-10-CM

## 2023-05-23 DIAGNOSIS — D64.9 ANEMIA, UNSPECIFIED TYPE: ICD-10-CM

## 2023-05-23 DIAGNOSIS — Z13.820 ENCOUNTER FOR SCREENING FOR OSTEOPOROSIS: ICD-10-CM

## 2023-05-23 DIAGNOSIS — Z12.31 ENCOUNTER FOR SCREENING MAMMOGRAM FOR MALIGNANT NEOPLASM OF BREAST: ICD-10-CM

## 2023-05-23 DIAGNOSIS — I10 ESSENTIAL HYPERTENSION: ICD-10-CM

## 2023-05-23 DIAGNOSIS — R73.03 PREDIABETES: ICD-10-CM

## 2023-05-23 DIAGNOSIS — Z00.00 MEDICARE ANNUAL WELLNESS VISIT, SUBSEQUENT: Primary | ICD-10-CM

## 2023-05-23 PROCEDURE — G0439 PPPS, SUBSEQ VISIT: HCPCS | Performed by: NURSE PRACTITIONER

## 2023-05-23 PROCEDURE — 3074F SYST BP LT 130 MM HG: CPT | Performed by: NURSE PRACTITIONER

## 2023-05-23 PROCEDURE — 3078F DIAST BP <80 MM HG: CPT | Performed by: NURSE PRACTITIONER

## 2023-05-23 PROCEDURE — 1123F ACP DISCUSS/DSCN MKR DOCD: CPT | Performed by: NURSE PRACTITIONER

## 2023-05-23 SDOH — ECONOMIC STABILITY: INCOME INSECURITY: HOW HARD IS IT FOR YOU TO PAY FOR THE VERY BASICS LIKE FOOD, HOUSING, MEDICAL CARE, AND HEATING?: NOT HARD AT ALL

## 2023-05-23 SDOH — ECONOMIC STABILITY: HOUSING INSECURITY
IN THE LAST 12 MONTHS, WAS THERE A TIME WHEN YOU DID NOT HAVE A STEADY PLACE TO SLEEP OR SLEPT IN A SHELTER (INCLUDING NOW)?: NO

## 2023-05-23 SDOH — ECONOMIC STABILITY: FOOD INSECURITY: WITHIN THE PAST 12 MONTHS, THE FOOD YOU BOUGHT JUST DIDN'T LAST AND YOU DIDN'T HAVE MONEY TO GET MORE.: NEVER TRUE

## 2023-05-23 SDOH — ECONOMIC STABILITY: FOOD INSECURITY: WITHIN THE PAST 12 MONTHS, YOU WORRIED THAT YOUR FOOD WOULD RUN OUT BEFORE YOU GOT MONEY TO BUY MORE.: NEVER TRUE

## 2023-05-23 ASSESSMENT — ENCOUNTER SYMPTOMS
SHORTNESS OF BREATH: 0
VOMITING: 0
DIARRHEA: 0
NAUSEA: 0
ABDOMINAL PAIN: 0
SORE THROAT: 0
RHINORRHEA: 0
EYE PAIN: 0
SINUS PAIN: 0
BACK PAIN: 0
COUGH: 0
CONSTIPATION: 0

## 2023-05-23 ASSESSMENT — ANXIETY QUESTIONNAIRES
6. BECOMING EASILY ANNOYED OR IRRITABLE: 0
GAD7 TOTAL SCORE: 0
7. FEELING AFRAID AS IF SOMETHING AWFUL MIGHT HAPPEN: 0
4. TROUBLE RELAXING: 0
3. WORRYING TOO MUCH ABOUT DIFFERENT THINGS: 0
5. BEING SO RESTLESS THAT IT IS HARD TO SIT STILL: 0
1. FEELING NERVOUS, ANXIOUS, OR ON EDGE: 0
2. NOT BEING ABLE TO STOP OR CONTROL WORRYING: 0

## 2023-05-23 ASSESSMENT — LIFESTYLE VARIABLES
HOW MANY STANDARD DRINKS CONTAINING ALCOHOL DO YOU HAVE ON A TYPICAL DAY: PATIENT DOES NOT DRINK
HOW OFTEN DO YOU HAVE A DRINK CONTAINING ALCOHOL: NEVER

## 2023-05-23 ASSESSMENT — PATIENT HEALTH QUESTIONNAIRE - PHQ9
SUM OF ALL RESPONSES TO PHQ QUESTIONS 1-9: 1
1. LITTLE INTEREST OR PLEASURE IN DOING THINGS: 0
SUM OF ALL RESPONSES TO PHQ QUESTIONS 1-9: 1
SUM OF ALL RESPONSES TO PHQ9 QUESTIONS 1 & 2: 1
2. FEELING DOWN, DEPRESSED OR HOPELESS: 1
SUM OF ALL RESPONSES TO PHQ QUESTIONS 1-9: 1
SUM OF ALL RESPONSES TO PHQ QUESTIONS 1-9: 1

## 2023-05-23 NOTE — PROGRESS NOTES
saturated fat, low cholesterol diet. Aspirin not indicated. Educational materials for lifestyle changes were provided. Patient will follow-up in 6 month(s) with cardiologist. Provider spent 5 minutes counseling patient. Objective   Vitals:    05/23/23 1409 05/23/23 1434   BP: (!) 152/65 122/61   Site: Right Upper Arm Right Upper Arm   Position: Sitting Sitting   Cuff Size: Medium Adult Large Adult   Pulse: 69 66   Temp: 98.4 °F (36.9 °C)    TempSrc: Temporal    SpO2: 99%    Weight: 175 lb 12.8 oz (79.7 kg)    Height: 5' 2\" (1.575 m)       Body mass index is 32.15 kg/m². Allergies   Allergen Reactions    Latex Other (See Comments) and Dermatitis    Hydrochlorothiazide W-Triamterene Other (See Comments)     Severe dehydration    Acyclovir Other (See Comments)    Cimetidine Other (See Comments)    Citalopram Hydrobromide Other (See Comments)    Dexamethasone Other (See Comments)     Severe dehydration    Epinephrine Other (See Comments)     \" shoots BP up \"    Hydrochlorothiazide Other (See Comments)     Hypokalemia, Hyponatremia    Pravastatin Other (See Comments)     \" GI upset \" \" just about put a hole in my gut \"    Statins Other (See Comments)     GI upset    Diazepam Anxiety     Prior to Visit Medications    Medication Sig Taking? Authorizing Provider   B Complex Vitamins (VITAMIN-B COMPLEX PO) Take by mouth Yes Historical Provider, MD   amLODIPine (NORVASC) 5 MG tablet Take 1 tablet by mouth daily Yes Robert May MD   losartan (COZAAR) 100 MG tablet Take 1 tablet by mouth daily TAKE 1 TABLET BY MOUTH DAILY Yes Robert May MD   doxazosin (CARDURA) 4 MG tablet Take 1 tablet by mouth daily Yes Robert May MD   carvedilol (COREG) 12.5 MG tablet Take 1 tablet by mouth 2 times daily Yes Robert May MD   Handicap Placard MISC by Does not apply route Yes Lafe Castleman, APRN - CNP   LORazepam (ATIVAN) 1 MG tablet Take 1 tablet by mouth 2 times daily.  Yes Ar

## 2023-05-23 NOTE — PATIENT INSTRUCTIONS
example:  From a standing position, bend forward from the waist with your knees slightly bent. Let your arms dangle close to the floor. Breathe in slowly and deeply as you return to a standing position. Roll up slowly and lift your head last.  Hold your breath for just a few seconds in the standing position. Breathe out slowly and bend forward from the waist.  Let your feelings out. Talk, laugh, cry, and express anger when you need to. Talking with supportive friends or family, a counselor, or a ramon leader about your feelings is a healthy way to relieve stress. Avoid discussing your feelings with people who make you feel worse. Write. It may help to write about things that are bothering you. This helps you find out how much stress you feel and what is causing it. When you know this, you can find better ways to cope. What can you do to prevent stress? Manage your time. This helps you find time to do the things you want and need to do. Get enough sleep. Your body recovers from the stresses of the day while you are sleeping. Get support. Your family, friends, and community can make a difference in how you experience stress. Limit your news feed. Avoid or limit time on social media or news that may make you feel stressed. Do something active. Exercise or activity can help reduce stress. Walking is a great way to get started. Where can you learn more? Go to http://www.abarca.com/ and enter N032 to learn more about \"Learning About Stress. \"  Current as of: June 6, 2022               Content Version: 13.6  © 2006-2023 Healthwise, Incorporated. Care instructions adapted under license by [facility] ([facility]). If you have questions about a medical condition or this instruction, always ask your healthcare professional. [place] [address] any warranty or liability for your use of this information. Learning About Vision Tests  What are vision tests?      The four most common vision

## 2023-06-20 ENCOUNTER — OFFICE VISIT (OUTPATIENT)
Dept: INTERNAL MEDICINE CLINIC | Facility: CLINIC | Age: 78
End: 2023-06-20
Payer: MEDICARE

## 2023-06-20 VITALS
WEIGHT: 177.2 LBS | HEART RATE: 70 BPM | DIASTOLIC BLOOD PRESSURE: 78 MMHG | HEIGHT: 62 IN | SYSTOLIC BLOOD PRESSURE: 140 MMHG | BODY MASS INDEX: 32.61 KG/M2 | TEMPERATURE: 98.7 F | OXYGEN SATURATION: 99 %

## 2023-06-20 DIAGNOSIS — I10 ESSENTIAL HYPERTENSION: Primary | ICD-10-CM

## 2023-06-20 DIAGNOSIS — Z83.49 FAMILY HISTORY OF THYROID DISEASE: ICD-10-CM

## 2023-06-20 DIAGNOSIS — E55.9 VITAMIN D DEFICIENCY, UNSPECIFIED: ICD-10-CM

## 2023-06-20 DIAGNOSIS — E78.2 MIXED HYPERLIPIDEMIA: ICD-10-CM

## 2023-06-20 DIAGNOSIS — I10 ESSENTIAL HYPERTENSION: ICD-10-CM

## 2023-06-20 DIAGNOSIS — R73.03 PREDIABETES: ICD-10-CM

## 2023-06-20 LAB
ALBUMIN SERPL-MCNC: 3.5 G/DL (ref 3.2–4.6)
ALBUMIN/GLOB SERPL: 0.7 (ref 0.4–1.6)
ALP SERPL-CCNC: 80 U/L (ref 50–136)
ALT SERPL-CCNC: 33 U/L (ref 12–65)
ANION GAP SERPL CALC-SCNC: 6 MMOL/L (ref 2–11)
AST SERPL-CCNC: 18 U/L (ref 15–37)
BASOPHILS # BLD: 0 K/UL (ref 0–0.2)
BASOPHILS NFR BLD: 0 % (ref 0–2)
BILIRUB SERPL-MCNC: 0.2 MG/DL (ref 0.2–1.1)
BUN SERPL-MCNC: 15 MG/DL (ref 8–23)
CALCIUM SERPL-MCNC: 9.2 MG/DL (ref 8.3–10.4)
CHLORIDE SERPL-SCNC: 105 MMOL/L (ref 101–110)
CHOLEST SERPL-MCNC: 195 MG/DL
CO2 SERPL-SCNC: 25 MMOL/L (ref 21–32)
CREAT SERPL-MCNC: 0.7 MG/DL (ref 0.6–1)
DIFFERENTIAL METHOD BLD: ABNORMAL
EOSINOPHIL # BLD: 0.1 K/UL (ref 0–0.8)
EOSINOPHIL NFR BLD: 2 % (ref 0.5–7.8)
ERYTHROCYTE [DISTWIDTH] IN BLOOD BY AUTOMATED COUNT: 13.7 % (ref 11.9–14.6)
GLOBULIN SER CALC-MCNC: 5 G/DL (ref 2.8–4.5)
GLUCOSE SERPL-MCNC: 93 MG/DL (ref 65–100)
HCT VFR BLD AUTO: 35 % (ref 35.8–46.3)
HDLC SERPL-MCNC: 51 MG/DL (ref 40–60)
HDLC SERPL: 3.8
HGB BLD-MCNC: 10.9 G/DL (ref 11.7–15.4)
IMM GRANULOCYTES # BLD AUTO: 0 K/UL (ref 0–0.5)
IMM GRANULOCYTES NFR BLD AUTO: 0 % (ref 0–5)
LDLC SERPL CALC-MCNC: 127.4 MG/DL
LYMPHOCYTES # BLD: 1.2 K/UL (ref 0.5–4.6)
LYMPHOCYTES NFR BLD: 24 % (ref 13–44)
MCH RBC QN AUTO: 27.8 PG (ref 26.1–32.9)
MCHC RBC AUTO-ENTMCNC: 31.1 G/DL (ref 31.4–35)
MCV RBC AUTO: 89.3 FL (ref 82–102)
MONOCYTES # BLD: 0.7 K/UL (ref 0.1–1.3)
MONOCYTES NFR BLD: 13 % (ref 4–12)
NEUTS SEG # BLD: 3 K/UL (ref 1.7–8.2)
NEUTS SEG NFR BLD: 60 % (ref 43–78)
NRBC # BLD: 0 K/UL (ref 0–0.2)
PLATELET # BLD AUTO: 125 K/UL (ref 150–450)
PMV BLD AUTO: 13.3 FL (ref 9.4–12.3)
POTASSIUM SERPL-SCNC: 3.7 MMOL/L (ref 3.5–5.1)
PROT SERPL-MCNC: 8.5 G/DL (ref 6.3–8.2)
RBC # BLD AUTO: 3.92 M/UL (ref 4.05–5.2)
SODIUM SERPL-SCNC: 136 MMOL/L (ref 133–143)
TRIGL SERPL-MCNC: 83 MG/DL (ref 35–150)
TSH W FREE THYROID IF ABNORMAL: 3.44 UIU/ML (ref 0.36–3.74)
VLDLC SERPL CALC-MCNC: 16.6 MG/DL (ref 6–23)
WBC # BLD AUTO: 5.1 K/UL (ref 4.3–11.1)

## 2023-06-20 PROCEDURE — 1123F ACP DISCUSS/DSCN MKR DOCD: CPT | Performed by: NURSE PRACTITIONER

## 2023-06-20 PROCEDURE — 3077F SYST BP >= 140 MM HG: CPT | Performed by: NURSE PRACTITIONER

## 2023-06-20 PROCEDURE — G8427 DOCREV CUR MEDS BY ELIG CLIN: HCPCS | Performed by: NURSE PRACTITIONER

## 2023-06-20 PROCEDURE — 1090F PRES/ABSN URINE INCON ASSESS: CPT | Performed by: NURSE PRACTITIONER

## 2023-06-20 PROCEDURE — 99214 OFFICE O/P EST MOD 30 MIN: CPT | Performed by: NURSE PRACTITIONER

## 2023-06-20 PROCEDURE — G8399 PT W/DXA RESULTS DOCUMENT: HCPCS | Performed by: NURSE PRACTITIONER

## 2023-06-20 PROCEDURE — 1036F TOBACCO NON-USER: CPT | Performed by: NURSE PRACTITIONER

## 2023-06-20 PROCEDURE — 3078F DIAST BP <80 MM HG: CPT | Performed by: NURSE PRACTITIONER

## 2023-06-20 PROCEDURE — G8417 CALC BMI ABV UP PARAM F/U: HCPCS | Performed by: NURSE PRACTITIONER

## 2023-06-20 ASSESSMENT — ENCOUNTER SYMPTOMS
SORE THROAT: 0
RHINORRHEA: 0
SHORTNESS OF BREATH: 0
NAUSEA: 0
COUGH: 0
BACK PAIN: 0
SINUS PAIN: 0
EYE PAIN: 0
DIARRHEA: 0
ABDOMINAL PAIN: 0
VOMITING: 0
CONSTIPATION: 0

## 2023-06-20 ASSESSMENT — PATIENT HEALTH QUESTIONNAIRE - PHQ9
SUM OF ALL RESPONSES TO PHQ QUESTIONS 1-9: 0
SUM OF ALL RESPONSES TO PHQ QUESTIONS 1-9: 0
1. LITTLE INTEREST OR PLEASURE IN DOING THINGS: 0
SUM OF ALL RESPONSES TO PHQ9 QUESTIONS 1 & 2: 0
2. FEELING DOWN, DEPRESSED OR HOPELESS: 0
SUM OF ALL RESPONSES TO PHQ QUESTIONS 1-9: 0
SUM OF ALL RESPONSES TO PHQ QUESTIONS 1-9: 0

## 2023-06-20 ASSESSMENT — ANXIETY QUESTIONNAIRES
GAD7 TOTAL SCORE: 1
5. BEING SO RESTLESS THAT IT IS HARD TO SIT STILL: 0
4. TROUBLE RELAXING: 1
7. FEELING AFRAID AS IF SOMETHING AWFUL MIGHT HAPPEN: 0
1. FEELING NERVOUS, ANXIOUS, OR ON EDGE: 0
3. WORRYING TOO MUCH ABOUT DIFFERENT THINGS: 0
6. BECOMING EASILY ANNOYED OR IRRITABLE: 0
2. NOT BEING ABLE TO STOP OR CONTROL WORRYING: 0

## 2023-06-20 NOTE — PROGRESS NOTES
(COREG) 12.5 MG tablet Take 1 tablet by mouth 2 times daily 180 tablet 3    Handicap Placard MISC by Does not apply route 1 each 0    LORazepam (ATIVAN) 1 MG tablet Take 1 tablet by mouth 2 times daily. No current facility-administered medications on file prior to visit. Review of Systems  Review of Systems   Constitutional:  Negative for chills, fatigue and fever. HENT:  Negative for congestion, postnasal drip, rhinorrhea, sinus pain, sneezing and sore throat. Eyes:  Negative for pain and visual disturbance. Respiratory:  Negative for cough and shortness of breath. Cardiovascular:  Negative for chest pain, palpitations and leg swelling. Gastrointestinal:  Negative for abdominal pain, constipation, diarrhea, nausea and vomiting. Genitourinary:  Negative for dysuria, frequency and urgency. Musculoskeletal:  Negative for back pain, gait problem and joint swelling. Skin:  Negative for rash and wound. Neurological:  Negative for dizziness and headaches. Psychiatric/Behavioral:  Negative for behavioral problems, sleep disturbance and suicidal ideas. The patient is nervous/anxious. Vitals:    06/20/23 0918 06/20/23 0928 06/20/23 1010   BP: (!) 149/62 (!) 142/66 (!) 140/78   Site: Right Upper Arm Right Upper Arm Right Upper Arm   Position: Sitting Sitting Sitting   Cuff Size: Large Adult Large Adult Medium Adult   Pulse: 75 70    Temp: 98.7 °F (37.1 °C)     TempSrc: Temporal     SpO2: 99%     Weight: 177 lb 3.2 oz (80.4 kg)     Height: 5' 2\" (1.575 m)                Physical Exam  Physical Exam  Constitutional:       General: She is not in acute distress. Appearance: She is obese. She is not ill-appearing. HENT:      Head: Normocephalic and atraumatic. Eyes:      Pupils: Pupils are equal, round, and reactive to light. Cardiovascular:      Heart sounds: Murmur heard. Pulmonary:      Effort: Pulmonary effort is normal. No respiratory distress.       Breath

## 2023-06-21 LAB
25(OH)D3 SERPL-MCNC: 42.5 NG/ML (ref 30–100)
EST. AVERAGE GLUCOSE BLD GHB EST-MCNC: 131 MG/DL
HBA1C MFR BLD: 6.2 % (ref 4.8–5.6)

## 2023-08-01 ENCOUNTER — OFFICE VISIT (OUTPATIENT)
Age: 78
End: 2023-08-01
Payer: MEDICARE

## 2023-08-01 VITALS
HEART RATE: 67 BPM | BODY MASS INDEX: 33.13 KG/M2 | WEIGHT: 180 LBS | SYSTOLIC BLOOD PRESSURE: 126 MMHG | DIASTOLIC BLOOD PRESSURE: 60 MMHG | HEIGHT: 62 IN

## 2023-08-01 DIAGNOSIS — E78.5 DYSLIPIDEMIA: ICD-10-CM

## 2023-08-01 DIAGNOSIS — I10 ESSENTIAL HYPERTENSION: Primary | ICD-10-CM

## 2023-08-01 DIAGNOSIS — I35.0 MILD AORTIC STENOSIS: ICD-10-CM

## 2023-08-01 DIAGNOSIS — R94.31 ABNORMAL ECG: ICD-10-CM

## 2023-08-01 DIAGNOSIS — I71.43 INFRARENAL ABDOMINAL AORTIC ANEURYSM (AAA) WITHOUT RUPTURE (HCC): ICD-10-CM

## 2023-08-01 PROCEDURE — G8427 DOCREV CUR MEDS BY ELIG CLIN: HCPCS | Performed by: INTERNAL MEDICINE

## 2023-08-01 PROCEDURE — 3074F SYST BP LT 130 MM HG: CPT | Performed by: INTERNAL MEDICINE

## 2023-08-01 PROCEDURE — G8399 PT W/DXA RESULTS DOCUMENT: HCPCS | Performed by: INTERNAL MEDICINE

## 2023-08-01 PROCEDURE — 93000 ELECTROCARDIOGRAM COMPLETE: CPT | Performed by: INTERNAL MEDICINE

## 2023-08-01 PROCEDURE — 1123F ACP DISCUSS/DSCN MKR DOCD: CPT | Performed by: INTERNAL MEDICINE

## 2023-08-01 PROCEDURE — 99214 OFFICE O/P EST MOD 30 MIN: CPT | Performed by: INTERNAL MEDICINE

## 2023-08-01 PROCEDURE — 3078F DIAST BP <80 MM HG: CPT | Performed by: INTERNAL MEDICINE

## 2023-08-01 PROCEDURE — G8417 CALC BMI ABV UP PARAM F/U: HCPCS | Performed by: INTERNAL MEDICINE

## 2023-08-01 PROCEDURE — 1036F TOBACCO NON-USER: CPT | Performed by: INTERNAL MEDICINE

## 2023-08-01 PROCEDURE — 1090F PRES/ABSN URINE INCON ASSESS: CPT | Performed by: INTERNAL MEDICINE

## 2023-08-01 RX ORDER — AMLODIPINE BESYLATE 5 MG/1
5 TABLET ORAL DAILY
Qty: 90 TABLET | Refills: 3 | Status: SHIPPED | OUTPATIENT
Start: 2023-08-01

## 2023-08-01 RX ORDER — DOXAZOSIN MESYLATE 4 MG/1
4 TABLET ORAL DAILY
Qty: 90 TABLET | Refills: 3 | Status: SHIPPED | OUTPATIENT
Start: 2023-08-01

## 2023-08-01 RX ORDER — CARVEDILOL 12.5 MG/1
12.5 TABLET ORAL 2 TIMES DAILY
Qty: 180 TABLET | Refills: 3 | Status: SHIPPED | OUTPATIENT
Start: 2023-08-01

## 2023-08-01 RX ORDER — LOSARTAN POTASSIUM 100 MG/1
100 TABLET ORAL DAILY
Qty: 90 TABLET | Refills: 3 | Status: SHIPPED | OUTPATIENT
Start: 2023-08-01

## 2023-08-01 ASSESSMENT — ENCOUNTER SYMPTOMS
HEMATOCHEZIA: 0
STRIDOR: 0
HEMATEMESIS: 0
EYE REDNESS: 0
ABDOMINAL PAIN: 0
WHEEZING: 0
HEMOPTYSIS: 0
DOUBLE VISION: 0
HOARSE VOICE: 0

## 2023-08-01 NOTE — PROGRESS NOTES
[facility] CARDIOLOGY  [id number] [place] [address], [facility]  PHONE: [phone]          23    NAME:  Nahed Barcenas  : 1945  MRN: [id number]         SUBJECTIVE:   Nahed Barcenas is a 77 y.o. female seen for a visit regarding the following:     Chief Complaint   Patient presents with    Hypertension    Hyperlipidemia           HPI:    Cardio problem list:  1. Hypertension  2. Aortic stenosis-Echo from 2022 shows an EF at 60 to 65% with mild concentric LVH, diastolic dysfunction, mild aortic stenosis with a mean gradient of 10 and peak gradient of 18 mmHg, mild mitral regurgitation  3. Abdominal aortic aneurysm-measures 3.6 x 3.8 cm  --In 2019 was 3.2 x 3.8 cm  4. Hyponatremia  5. Hyperlipidemia-statin intolerance  6. Hyponatremia-aggravated by spironolactone  -Patient of Dr. Herbert Winn saw Ms. Jonathan Nicholson who is a 50-year-old woman in cardiovascular follow-up  for difficult to treat hypertension, mild aortic stenosis, abdominal aortic aneurysm, hyperlipidemia with statin intolerance, hyponatremia. When we last met with her 3 months ago, we made no significant changes with her medical therapy. Aortic stenosis-no syncope, angina or any significant heart failure symptoms such as lower extremity edema orthopnea or PND. -Some chronic dyspnea on exertion-NYHA class II but nothing new for her. Hypertension: She has struggled with resistant hypertension for a while but swings in blood pressures and she says they actually got much better when her anxiety was better treated with Ativan twice daily.    -Home blood pressure readings are excellent. She monitors her blood pressures sometimes 2-3 times a day. -No recent headaches or blurry vision. No recent TIAs or strokelike symptoms. Hyponatremia: Aggravated by spironolactone.   Used to be an issue also with hydrochlorothiazide in the past.  Even after she cut back spironolactone to just half a pill daily,

## 2023-09-07 ENCOUNTER — APPOINTMENT (OUTPATIENT)
Dept: GENERAL RADIOLOGY | Age: 78
DRG: 246 | End: 2023-09-07
Payer: MEDICARE

## 2023-09-07 ENCOUNTER — HOSPITAL ENCOUNTER (INPATIENT)
Age: 78
LOS: 2 days | Discharge: HOME | DRG: 246 | End: 2023-09-10
Attending: EMERGENCY MEDICINE | Admitting: INTERNAL MEDICINE
Payer: MEDICARE

## 2023-09-07 DIAGNOSIS — Z09 HOSPITAL DISCHARGE FOLLOW-UP: Primary | ICD-10-CM

## 2023-09-07 DIAGNOSIS — I21.4 ACUTE NON-ST SEGMENT ELEVATION MYOCARDIAL INFARCTION (HCC): ICD-10-CM

## 2023-09-07 DIAGNOSIS — I21.4 NSTEMI (NON-ST ELEVATED MYOCARDIAL INFARCTION) (HCC): ICD-10-CM

## 2023-09-07 DIAGNOSIS — R07.9 CHEST PAIN: ICD-10-CM

## 2023-09-07 DIAGNOSIS — J18.9 PNEUMONIA OF RIGHT MIDDLE LOBE DUE TO INFECTIOUS ORGANISM: ICD-10-CM

## 2023-09-07 DIAGNOSIS — R07.9 CHEST PAIN, UNSPECIFIED TYPE: ICD-10-CM

## 2023-09-07 LAB
ANION GAP SERPL CALC-SCNC: 6 MMOL/L (ref 2–11)
BASOPHILS # BLD: 0 K/UL (ref 0–0.2)
BASOPHILS NFR BLD: 0 % (ref 0–2)
BUN SERPL-MCNC: 15 MG/DL (ref 8–23)
CALCIUM SERPL-MCNC: 9.6 MG/DL (ref 8.3–10.4)
CHLORIDE SERPL-SCNC: 106 MMOL/L (ref 101–110)
CO2 SERPL-SCNC: 25 MMOL/L (ref 21–32)
CREAT SERPL-MCNC: 0.77 MG/DL (ref 0.6–1)
DIFFERENTIAL METHOD BLD: ABNORMAL
EOSINOPHIL # BLD: 0.1 K/UL (ref 0–0.8)
EOSINOPHIL NFR BLD: 2 % (ref 0.5–7.8)
ERYTHROCYTE [DISTWIDTH] IN BLOOD BY AUTOMATED COUNT: 13.6 % (ref 11.9–14.6)
GLUCOSE SERPL-MCNC: 131 MG/DL (ref 65–100)
HCT VFR BLD AUTO: 34.1 % (ref 35.8–46.3)
HGB BLD-MCNC: 10.8 G/DL (ref 11.7–15.4)
IMM GRANULOCYTES # BLD AUTO: 0 K/UL (ref 0–0.5)
IMM GRANULOCYTES NFR BLD AUTO: 0 % (ref 0–5)
LYMPHOCYTES # BLD: 1.4 K/UL (ref 0.5–4.6)
LYMPHOCYTES NFR BLD: 27 % (ref 13–44)
MCH RBC QN AUTO: 27.6 PG (ref 26.1–32.9)
MCHC RBC AUTO-ENTMCNC: 31.7 G/DL (ref 31.4–35)
MCV RBC AUTO: 87 FL (ref 82–102)
MONOCYTES # BLD: 0.5 K/UL (ref 0.1–1.3)
MONOCYTES NFR BLD: 10 % (ref 4–12)
NEUTS SEG # BLD: 3 K/UL (ref 1.7–8.2)
NEUTS SEG NFR BLD: 60 % (ref 43–78)
NRBC # BLD: 0 K/UL (ref 0–0.2)
NT PRO BNP: 77 PG/ML
PLATELET # BLD AUTO: 104 K/UL (ref 150–450)
PMV BLD AUTO: 13.1 FL (ref 9.4–12.3)
POTASSIUM SERPL-SCNC: 3.6 MMOL/L (ref 3.5–5.1)
RBC # BLD AUTO: 3.92 M/UL (ref 4.05–5.2)
SODIUM SERPL-SCNC: 137 MMOL/L (ref 133–143)
TROPONIN I SERPL HS-MCNC: 31.6 PG/ML (ref 0–14)
TROPONIN I SERPL HS-MCNC: 936.4 PG/ML (ref 0–14)
WBC # BLD AUTO: 5 K/UL (ref 4.3–11.1)

## 2023-09-07 PROCEDURE — 83735 ASSAY OF MAGNESIUM: CPT

## 2023-09-07 PROCEDURE — 96375 TX/PRO/DX INJ NEW DRUG ADDON: CPT

## 2023-09-07 PROCEDURE — 85025 COMPLETE CBC W/AUTO DIFF WBC: CPT

## 2023-09-07 PROCEDURE — 6370000000 HC RX 637 (ALT 250 FOR IP): Performed by: EMERGENCY MEDICINE

## 2023-09-07 PROCEDURE — 84484 ASSAY OF TROPONIN QUANT: CPT

## 2023-09-07 PROCEDURE — 96365 THER/PROPH/DIAG IV INF INIT: CPT

## 2023-09-07 PROCEDURE — 71046 X-RAY EXAM CHEST 2 VIEWS: CPT

## 2023-09-07 PROCEDURE — 93005 ELECTROCARDIOGRAM TRACING: CPT | Performed by: EMERGENCY MEDICINE

## 2023-09-07 PROCEDURE — 80048 BASIC METABOLIC PNL TOTAL CA: CPT

## 2023-09-07 PROCEDURE — 99285 EMERGENCY DEPT VISIT HI MDM: CPT

## 2023-09-07 PROCEDURE — 83880 ASSAY OF NATRIURETIC PEPTIDE: CPT

## 2023-09-07 RX ORDER — HEPARIN SODIUM 1000 [USP'U]/ML
4000 INJECTION, SOLUTION INTRAVENOUS; SUBCUTANEOUS ONCE
Status: COMPLETED | OUTPATIENT
Start: 2023-09-08 | End: 2023-09-08

## 2023-09-07 RX ORDER — LIDOCAINE HYDROCHLORIDE 20 MG/ML
15 SOLUTION OROPHARYNGEAL
Status: COMPLETED | OUTPATIENT
Start: 2023-09-07 | End: 2023-09-07

## 2023-09-07 RX ORDER — HEPARIN SODIUM 10000 [USP'U]/100ML
5-30 INJECTION, SOLUTION INTRAVENOUS CONTINUOUS
Status: DISCONTINUED | OUTPATIENT
Start: 2023-09-08 | End: 2023-09-10 | Stop reason: HOSPADM

## 2023-09-07 RX ORDER — HEPARIN SODIUM 1000 [USP'U]/ML
2000 INJECTION, SOLUTION INTRAVENOUS; SUBCUTANEOUS PRN
Status: DISCONTINUED | OUTPATIENT
Start: 2023-09-07 | End: 2023-09-10 | Stop reason: HOSPADM

## 2023-09-07 RX ORDER — DOXYCYCLINE HYCLATE 100 MG/1
100 CAPSULE ORAL ONCE
Status: COMPLETED | OUTPATIENT
Start: 2023-09-07 | End: 2023-09-08

## 2023-09-07 RX ORDER — MAGNESIUM HYDROXIDE/ALUMINUM HYDROXICE/SIMETHICONE 120; 1200; 1200 MG/30ML; MG/30ML; MG/30ML
30 SUSPENSION ORAL
Status: COMPLETED | OUTPATIENT
Start: 2023-09-07 | End: 2023-09-07

## 2023-09-07 RX ORDER — HEPARIN SODIUM 1000 [USP'U]/ML
4000 INJECTION, SOLUTION INTRAVENOUS; SUBCUTANEOUS PRN
Status: DISCONTINUED | OUTPATIENT
Start: 2023-09-07 | End: 2023-09-10 | Stop reason: HOSPADM

## 2023-09-07 RX ADMIN — ALUMINUM HYDROXIDE, MAGNESIUM HYDROXIDE, AND SIMETHICONE 30 ML: 200; 200; 20 SUSPENSION ORAL at 22:13

## 2023-09-07 RX ADMIN — LIDOCAINE HYDROCHLORIDE 15 ML: 20 SOLUTION ORAL; TOPICAL at 22:14

## 2023-09-07 ASSESSMENT — PAIN - FUNCTIONAL ASSESSMENT: PAIN_FUNCTIONAL_ASSESSMENT: 0-10

## 2023-09-07 ASSESSMENT — ENCOUNTER SYMPTOMS
GASTROINTESTINAL NEGATIVE: 1
BACK PAIN: 0
RESPIRATORY NEGATIVE: 1

## 2023-09-07 ASSESSMENT — PAIN SCALES - GENERAL: PAINLEVEL_OUTOF10: 5

## 2023-09-07 NOTE — ED TRIAGE NOTES
Pt to ED with c/o substernal chest pain that radiates to neck and jaw. Pt states shortness of breath. Pt denies nausea or vomiting. Pt states started around 1830 after walking into her sons house. Pt alert and in no acute distress at this time.

## 2023-09-08 ENCOUNTER — APPOINTMENT (OUTPATIENT)
Dept: NON INVASIVE DIAGNOSTICS | Age: 78
DRG: 246 | End: 2023-09-08
Attending: INTERNAL MEDICINE
Payer: MEDICARE

## 2023-09-08 ENCOUNTER — APPOINTMENT (OUTPATIENT)
Dept: CT IMAGING | Age: 78
DRG: 246 | End: 2023-09-08
Payer: MEDICARE

## 2023-09-08 PROBLEM — I21.4 NSTEMI (NON-ST ELEVATED MYOCARDIAL INFARCTION) (HCC): Status: RESOLVED | Noted: 2023-09-08 | Resolved: 2023-09-08

## 2023-09-08 PROBLEM — R07.9 CHEST PAIN: Status: ACTIVE | Noted: 2023-09-08

## 2023-09-08 PROBLEM — I21.4 NSTEMI (NON-ST ELEVATED MYOCARDIAL INFARCTION) (HCC): Status: ACTIVE | Noted: 2023-09-08

## 2023-09-08 LAB
ACT BLD: 287 SECS (ref 70–128)
ACT BLD: 384 SECS (ref 70–128)
ANION GAP SERPL CALC-SCNC: 6 MMOL/L (ref 2–11)
APTT PPP: 31.3 SEC (ref 24.5–34.2)
BASOPHILS # BLD: 0 K/UL (ref 0–0.2)
BASOPHILS NFR BLD: 0 % (ref 0–2)
BUN SERPL-MCNC: 12 MG/DL (ref 8–23)
CALCIUM SERPL-MCNC: 9.1 MG/DL (ref 8.3–10.4)
CHLORIDE SERPL-SCNC: 107 MMOL/L (ref 101–110)
CO2 SERPL-SCNC: 24 MMOL/L (ref 21–32)
CREAT SERPL-MCNC: 0.7 MG/DL (ref 0.6–1)
DIFFERENTIAL METHOD BLD: ABNORMAL
ECHO AO ASC DIAM: 2.9 CM
ECHO AO ASCENDING AORTA INDEX: 1.58 CM/M2
ECHO AO ROOT DIAM: 2.9 CM
ECHO AO ROOT INDEX: 1.58 CM/M2
ECHO AV AREA PEAK VELOCITY: 1.5 CM2
ECHO AV AREA VTI: 1.6 CM2
ECHO AV AREA/BSA PEAK VELOCITY: 0.8 CM2/M2
ECHO AV AREA/BSA VTI: 0.9 CM2/M2
ECHO AV MEAN GRADIENT: 10 MMHG
ECHO AV MEAN GRADIENT: 10 MMHG
ECHO AV MEAN VELOCITY: 1.5 M/S
ECHO AV PEAK GRADIENT: 20 MMHG
ECHO AV PEAK VELOCITY: 2.2 M/S
ECHO AV VELOCITY RATIO: 0.5
ECHO AV VTI: 50.1 CM
ECHO BSA: 1.89 M2
ECHO BSA: 1.89 M2
ECHO IVC PROX: 1.7 CM
ECHO LA AREA 2C: 22.4 CM2
ECHO LA AREA 4C: 20.5 CM2
ECHO LA DIAMETER INDEX: 1.8 CM/M2
ECHO LA DIAMETER: 3.3 CM
ECHO LA MAJOR AXIS: 6.7 CM
ECHO LA MINOR AXIS: 5.8 CM
ECHO LA TO AORTIC ROOT RATIO: 1.14
ECHO LA VOL 2C: 71 ML (ref 22–52)
ECHO LA VOL 4C: 50 ML (ref 22–52)
ECHO LA VOL BP: 64 ML (ref 22–52)
ECHO LA VOL/BSA BIPLANE: 35 ML/M2 (ref 16–34)
ECHO LA VOLUME INDEX A2C: 39 ML/M2 (ref 16–34)
ECHO LA VOLUME INDEX A4C: 27 ML/M2 (ref 16–34)
ECHO LV E' LATERAL VELOCITY: 7 CM/S
ECHO LV E' SEPTAL VELOCITY: 4 CM/S
ECHO LV FRACTIONAL SHORTENING: 35 % (ref 28–44)
ECHO LV INTERNAL DIMENSION DIASTOLE INDEX: 1.86 CM/M2
ECHO LV INTERNAL DIMENSION DIASTOLIC: 3.4 CM (ref 3.9–5.3)
ECHO LV INTERNAL DIMENSION SYSTOLIC INDEX: 1.2 CM/M2
ECHO LV INTERNAL DIMENSION SYSTOLIC: 2.2 CM
ECHO LV IVSD: 1.4 CM (ref 0.6–0.9)
ECHO LV MASS 2D: 156.7 G (ref 67–162)
ECHO LV MASS INDEX 2D: 85.7 G/M2 (ref 43–95)
ECHO LV POSTERIOR WALL DIASTOLIC: 1.3 CM (ref 0.6–0.9)
ECHO LV RELATIVE WALL THICKNESS RATIO: 0.76
ECHO LVOT AREA: 2.8 CM2
ECHO LVOT AV VTI INDEX: 0.55
ECHO LVOT DIAM: 1.9 CM
ECHO LVOT MEAN GRADIENT: 3 MMHG
ECHO LVOT PEAK GRADIENT: 5 MMHG
ECHO LVOT PEAK VELOCITY: 1.1 M/S
ECHO LVOT STROKE VOLUME INDEX: 42.4 ML/M2
ECHO LVOT SV: 77.6 ML
ECHO LVOT VTI: 27.4 CM
ECHO MV A VELOCITY: 1.05 M/S
ECHO MV E DECELERATION TIME (DT): 159 MS
ECHO MV E VELOCITY: 0.68 M/S
ECHO MV E/A RATIO: 0.65
ECHO MV E/E' LATERAL: 9.71
ECHO MV E/E' RATIO (AVERAGED): 13.36
ECHO MV E/E' SEPTAL: 17
ECHO PV ACCELERATION TIME (AT): 108 MS
ECHO PV MAX VELOCITY: 0.9 M/S
ECHO PV PEAK GRADIENT: 3 MMHG
ECHO RV BASAL DIMENSION: 3 CM
ECHO RV FREE WALL PEAK S': 9 CM/S
ECHO RV TAPSE: 1.9 CM (ref 1.7–?)
EKG ATRIAL RATE: 78 BPM
EKG DIAGNOSIS: NORMAL
EKG P AXIS: 25 DEGREES
EKG P-R INTERVAL: 162 MS
EKG Q-T INTERVAL: 408 MS
EKG QRS DURATION: 74 MS
EKG QTC CALCULATION (BAZETT): 465 MS
EKG R AXIS: 60 DEGREES
EKG T AXIS: 110 DEGREES
EKG VENTRICULAR RATE: 78 BPM
EOSINOPHIL # BLD: 0 K/UL (ref 0–0.8)
EOSINOPHIL NFR BLD: 1 % (ref 0.5–7.8)
ERYTHROCYTE [DISTWIDTH] IN BLOOD BY AUTOMATED COUNT: 13.4 % (ref 11.9–14.6)
ERYTHROCYTE [DISTWIDTH] IN BLOOD BY AUTOMATED COUNT: 13.6 % (ref 11.9–14.6)
GLUCOSE SERPL-MCNC: 105 MG/DL (ref 65–100)
HCT VFR BLD AUTO: 30.2 % (ref 35.8–46.3)
HCT VFR BLD AUTO: 33.7 % (ref 35.8–46.3)
HGB BLD-MCNC: 10.7 G/DL (ref 11.7–15.4)
HGB BLD-MCNC: 9.5 G/DL (ref 11.7–15.4)
IMM GRANULOCYTES # BLD AUTO: 0 K/UL (ref 0–0.5)
IMM GRANULOCYTES NFR BLD AUTO: 0 % (ref 0–5)
INR PPP: 1
LACTATE SERPL-SCNC: 1.2 MMOL/L (ref 0.4–2)
LYMPHOCYTES # BLD: 1.8 K/UL (ref 0.5–4.6)
LYMPHOCYTES NFR BLD: 33 % (ref 13–44)
MAGNESIUM SERPL-MCNC: 2 MG/DL (ref 1.8–2.4)
MAGNESIUM SERPL-MCNC: 2.1 MG/DL (ref 1.8–2.4)
MCH RBC QN AUTO: 27.1 PG (ref 26.1–32.9)
MCH RBC QN AUTO: 27.2 PG (ref 26.1–32.9)
MCHC RBC AUTO-ENTMCNC: 31.5 G/DL (ref 31.4–35)
MCHC RBC AUTO-ENTMCNC: 31.8 G/DL (ref 31.4–35)
MCV RBC AUTO: 85.8 FL (ref 82–102)
MCV RBC AUTO: 86.3 FL (ref 82–102)
MONOCYTES # BLD: 0.7 K/UL (ref 0.1–1.3)
MONOCYTES NFR BLD: 13 % (ref 4–12)
NEUTS SEG # BLD: 2.9 K/UL (ref 1.7–8.2)
NEUTS SEG NFR BLD: 53 % (ref 43–78)
NRBC # BLD: 0 K/UL (ref 0–0.2)
NRBC # BLD: 0 K/UL (ref 0–0.2)
PLATELET # BLD AUTO: 107 K/UL (ref 150–450)
PLATELET # BLD AUTO: 95 K/UL (ref 150–450)
PMV BLD AUTO: 11.9 FL (ref 9.4–12.3)
PMV BLD AUTO: 12.1 FL (ref 9.4–12.3)
POTASSIUM SERPL-SCNC: 3.2 MMOL/L (ref 3.5–5.1)
PROCALCITONIN SERPL-MCNC: <0.05 NG/ML (ref 0–0.49)
PROTHROMBIN TIME: 13.7 SEC (ref 12.6–14.3)
RBC # BLD AUTO: 3.5 M/UL (ref 4.05–5.2)
RBC # BLD AUTO: 3.93 M/UL (ref 4.05–5.2)
SODIUM SERPL-SCNC: 137 MMOL/L (ref 133–143)
TROPONIN I SERPL HS-MCNC: 9382.7 PG/ML (ref 0–14)
TROPONIN I SERPL HS-MCNC: ABNORMAL PG/ML (ref 0–14)
TROPONIN I SERPL HS-MCNC: ABNORMAL PG/ML (ref 0–14)
UFH PPP CHRO-ACNC: 0.46 IU/ML (ref 0.3–0.7)
UFH PPP CHRO-ACNC: 0.54 IU/ML (ref 0.3–0.7)
UFH PPP CHRO-ACNC: <0.1 IU/ML (ref 0.3–0.7)
WBC # BLD AUTO: 5.4 K/UL (ref 4.3–11.1)
WBC # BLD AUTO: 5.6 K/UL (ref 4.3–11.1)

## 2023-09-08 PROCEDURE — 2580000003 HC RX 258: Performed by: FAMILY MEDICINE

## 2023-09-08 PROCEDURE — 92979 ENDOLUMINL IVUS OCT C EA: CPT | Performed by: INTERNAL MEDICINE

## 2023-09-08 PROCEDURE — 6370000000 HC RX 637 (ALT 250 FOR IP): Performed by: FAMILY MEDICINE

## 2023-09-08 PROCEDURE — 6370000000 HC RX 637 (ALT 250 FOR IP): Performed by: PHYSICIAN ASSISTANT

## 2023-09-08 PROCEDURE — C9600 PERC DRUG-EL COR STENT SING: HCPCS | Performed by: INTERNAL MEDICINE

## 2023-09-08 PROCEDURE — 6360000004 HC RX CONTRAST MEDICATION: Performed by: INTERNAL MEDICINE

## 2023-09-08 PROCEDURE — 85520 HEPARIN ASSAY: CPT

## 2023-09-08 PROCEDURE — 6370000000 HC RX 637 (ALT 250 FOR IP): Performed by: INTERNAL MEDICINE

## 2023-09-08 PROCEDURE — 6360000002 HC RX W HCPCS: Performed by: FAMILY MEDICINE

## 2023-09-08 PROCEDURE — 027135Z DILATION OF CORONARY ARTERY, TWO ARTERIES WITH TWO DRUG-ELUTING INTRALUMINAL DEVICES, PERCUTANEOUS APPROACH: ICD-10-PCS | Performed by: INTERNAL MEDICINE

## 2023-09-08 PROCEDURE — 85610 PROTHROMBIN TIME: CPT

## 2023-09-08 PROCEDURE — 2720000010 HC SURG SUPPLY STERILE: Performed by: INTERNAL MEDICINE

## 2023-09-08 PROCEDURE — 2500000003 HC RX 250 WO HCPCS: Performed by: EMERGENCY MEDICINE

## 2023-09-08 PROCEDURE — 93306 TTE W/DOPPLER COMPLETE: CPT

## 2023-09-08 PROCEDURE — 2580000003 HC RX 258: Performed by: NURSE PRACTITIONER

## 2023-09-08 PROCEDURE — 93458 L HRT ARTERY/VENTRICLE ANGIO: CPT | Performed by: INTERNAL MEDICINE

## 2023-09-08 PROCEDURE — 6360000002 HC RX W HCPCS: Performed by: EMERGENCY MEDICINE

## 2023-09-08 PROCEDURE — B2111ZZ FLUOROSCOPY OF MULTIPLE CORONARY ARTERIES USING LOW OSMOLAR CONTRAST: ICD-10-PCS | Performed by: INTERNAL MEDICINE

## 2023-09-08 PROCEDURE — 36415 COLL VENOUS BLD VENIPUNCTURE: CPT

## 2023-09-08 PROCEDURE — 6360000004 HC RX CONTRAST MEDICATION: Performed by: EMERGENCY MEDICINE

## 2023-09-08 PROCEDURE — 85025 COMPLETE CBC W/AUTO DIFF WBC: CPT

## 2023-09-08 PROCEDURE — 4A023N7 MEASUREMENT OF CARDIAC SAMPLING AND PRESSURE, LEFT HEART, PERCUTANEOUS APPROACH: ICD-10-PCS | Performed by: INTERNAL MEDICINE

## 2023-09-08 PROCEDURE — 93010 ELECTROCARDIOGRAM REPORT: CPT | Performed by: INTERNAL MEDICINE

## 2023-09-08 PROCEDURE — C1887 CATHETER, GUIDING: HCPCS | Performed by: INTERNAL MEDICINE

## 2023-09-08 PROCEDURE — 84145 PROCALCITONIN (PCT): CPT

## 2023-09-08 PROCEDURE — 2709999900 HC NON-CHARGEABLE SUPPLY: Performed by: INTERNAL MEDICINE

## 2023-09-08 PROCEDURE — 6370000000 HC RX 637 (ALT 250 FOR IP): Performed by: EMERGENCY MEDICINE

## 2023-09-08 PROCEDURE — 5A1223Z PERFORMANCE OF CARDIAC PACING, CONTINUOUS: ICD-10-PCS | Performed by: INTERNAL MEDICINE

## 2023-09-08 PROCEDURE — 93454 CORONARY ARTERY ANGIO S&I: CPT | Performed by: INTERNAL MEDICINE

## 2023-09-08 PROCEDURE — C1769 GUIDE WIRE: HCPCS | Performed by: INTERNAL MEDICINE

## 2023-09-08 PROCEDURE — 83605 ASSAY OF LACTIC ACID: CPT

## 2023-09-08 PROCEDURE — 92978 ENDOLUMINL IVUS OCT C 1ST: CPT | Performed by: INTERNAL MEDICINE

## 2023-09-08 PROCEDURE — 85347 COAGULATION TIME ACTIVATED: CPT

## 2023-09-08 PROCEDURE — 87040 BLOOD CULTURE FOR BACTERIA: CPT

## 2023-09-08 PROCEDURE — 92928 PRQ TCAT PLMT NTRAC ST 1 LES: CPT | Performed by: INTERNAL MEDICINE

## 2023-09-08 PROCEDURE — 71260 CT THORAX DX C+: CPT

## 2023-09-08 PROCEDURE — 6370000000 HC RX 637 (ALT 250 FOR IP): Performed by: NURSE PRACTITIONER

## 2023-09-08 PROCEDURE — 2500000003 HC RX 250 WO HCPCS: Performed by: INTERNAL MEDICINE

## 2023-09-08 PROCEDURE — 80048 BASIC METABOLIC PNL TOTAL CA: CPT

## 2023-09-08 PROCEDURE — 99152 MOD SED SAME PHYS/QHP 5/>YRS: CPT | Performed by: INTERNAL MEDICINE

## 2023-09-08 PROCEDURE — 1100000003 HC PRIVATE W/ TELEMETRY

## 2023-09-08 PROCEDURE — C1725 CATH, TRANSLUMIN NON-LASER: HCPCS | Performed by: INTERNAL MEDICINE

## 2023-09-08 PROCEDURE — 83735 ASSAY OF MAGNESIUM: CPT

## 2023-09-08 PROCEDURE — C1874 STENT, COATED/COV W/DEL SYS: HCPCS | Performed by: INTERNAL MEDICINE

## 2023-09-08 PROCEDURE — 85027 COMPLETE CBC AUTOMATED: CPT

## 2023-09-08 PROCEDURE — 6360000002 HC RX W HCPCS: Performed by: INTERNAL MEDICINE

## 2023-09-08 PROCEDURE — 84484 ASSAY OF TROPONIN QUANT: CPT

## 2023-09-08 PROCEDURE — C1760 CLOSURE DEV, VASC: HCPCS | Performed by: INTERNAL MEDICINE

## 2023-09-08 PROCEDURE — 85730 THROMBOPLASTIN TIME PARTIAL: CPT

## 2023-09-08 PROCEDURE — 99222 1ST HOSP IP/OBS MODERATE 55: CPT | Performed by: INTERNAL MEDICINE

## 2023-09-08 PROCEDURE — 2580000003 HC RX 258: Performed by: EMERGENCY MEDICINE

## 2023-09-08 PROCEDURE — C1894 INTRO/SHEATH, NON-LASER: HCPCS | Performed by: INTERNAL MEDICINE

## 2023-09-08 PROCEDURE — 99153 MOD SED SAME PHYS/QHP EA: CPT | Performed by: INTERNAL MEDICINE

## 2023-09-08 PROCEDURE — 93306 TTE W/DOPPLER COMPLETE: CPT | Performed by: INTERNAL MEDICINE

## 2023-09-08 PROCEDURE — C1753 CATH, INTRAVAS ULTRASOUND: HCPCS | Performed by: INTERNAL MEDICINE

## 2023-09-08 DEVICE — STENT CORONARY ONYX FRONTIER RX 3.5X18 MM ZOTAROLIMUS ELUT: Type: IMPLANTABLE DEVICE | Status: FUNCTIONAL

## 2023-09-08 RX ORDER — CLOPIDOGREL BISULFATE 75 MG/1
75 TABLET ORAL DAILY
Status: DISCONTINUED | OUTPATIENT
Start: 2023-09-09 | End: 2023-09-10 | Stop reason: HOSPADM

## 2023-09-08 RX ORDER — CARVEDILOL 12.5 MG/1
12.5 TABLET ORAL 2 TIMES DAILY WITH MEALS
Status: DISCONTINUED | OUTPATIENT
Start: 2023-09-08 | End: 2023-09-08

## 2023-09-08 RX ORDER — MORPHINE SULFATE 2 MG/ML
1 INJECTION, SOLUTION INTRAMUSCULAR; INTRAVENOUS ONCE
Status: COMPLETED | OUTPATIENT
Start: 2023-09-08 | End: 2023-09-08

## 2023-09-08 RX ORDER — LANOLIN ALCOHOL/MO/W.PET/CERES
3 CREAM (GRAM) TOPICAL NIGHTLY PRN
Status: DISCONTINUED | OUTPATIENT
Start: 2023-09-08 | End: 2023-09-10 | Stop reason: HOSPADM

## 2023-09-08 RX ORDER — AMLODIPINE BESYLATE 5 MG/1
5 TABLET ORAL NIGHTLY
Status: DISCONTINUED | OUTPATIENT
Start: 2023-09-08 | End: 2023-09-08

## 2023-09-08 RX ORDER — AMLODIPINE BESYLATE 5 MG/1
5 TABLET ORAL DAILY
Status: DISCONTINUED | OUTPATIENT
Start: 2023-09-08 | End: 2023-09-08

## 2023-09-08 RX ORDER — SODIUM CHLORIDE 0.9 % (FLUSH) 0.9 %
5-40 SYRINGE (ML) INJECTION PRN
Status: DISCONTINUED | OUTPATIENT
Start: 2023-09-08 | End: 2023-09-10 | Stop reason: HOSPADM

## 2023-09-08 RX ORDER — LOSARTAN POTASSIUM 50 MG/1
100 TABLET ORAL DAILY
Status: DISCONTINUED | OUTPATIENT
Start: 2023-09-08 | End: 2023-09-08

## 2023-09-08 RX ORDER — MORPHINE SULFATE 2 MG/ML
1 INJECTION, SOLUTION INTRAMUSCULAR; INTRAVENOUS EVERY 4 HOURS PRN
Status: DISCONTINUED | OUTPATIENT
Start: 2023-09-08 | End: 2023-09-10 | Stop reason: HOSPADM

## 2023-09-08 RX ORDER — ACETAMINOPHEN 325 MG/1
650 TABLET ORAL EVERY 6 HOURS PRN
Status: DISCONTINUED | OUTPATIENT
Start: 2023-09-08 | End: 2023-09-10 | Stop reason: HOSPADM

## 2023-09-08 RX ORDER — CLOPIDOGREL BISULFATE 75 MG/1
TABLET ORAL PRN
Status: DISCONTINUED | OUTPATIENT
Start: 2023-09-08 | End: 2023-09-08 | Stop reason: HOSPADM

## 2023-09-08 RX ORDER — EZETIMIBE 10 MG/1
10 TABLET ORAL NIGHTLY
Status: DISCONTINUED | OUTPATIENT
Start: 2023-09-08 | End: 2023-09-10 | Stop reason: HOSPADM

## 2023-09-08 RX ORDER — HEPARIN SODIUM 200 [USP'U]/100ML
INJECTION, SOLUTION INTRAVENOUS CONTINUOUS PRN
Status: DISCONTINUED | OUTPATIENT
Start: 2023-09-08 | End: 2023-09-08 | Stop reason: HOSPADM

## 2023-09-08 RX ORDER — HYDRALAZINE HYDROCHLORIDE 20 MG/ML
10 INJECTION INTRAMUSCULAR; INTRAVENOUS EVERY 6 HOURS PRN
Status: DISCONTINUED | OUTPATIENT
Start: 2023-09-08 | End: 2023-09-10 | Stop reason: HOSPADM

## 2023-09-08 RX ORDER — DOXAZOSIN MESYLATE 4 MG/1
4 TABLET ORAL DAILY
Status: DISCONTINUED | OUTPATIENT
Start: 2023-09-08 | End: 2023-09-08

## 2023-09-08 RX ORDER — OXYCODONE HYDROCHLORIDE 5 MG/1
5 TABLET ORAL EVERY 4 HOURS PRN
Status: DISCONTINUED | OUTPATIENT
Start: 2023-09-08 | End: 2023-09-10 | Stop reason: HOSPADM

## 2023-09-08 RX ORDER — SODIUM CHLORIDE 0.9 % (FLUSH) 0.9 %
5-40 SYRINGE (ML) INJECTION EVERY 12 HOURS SCHEDULED
Status: DISCONTINUED | OUTPATIENT
Start: 2023-09-08 | End: 2023-09-10 | Stop reason: HOSPADM

## 2023-09-08 RX ORDER — LORAZEPAM 2 MG/ML
0.5 INJECTION INTRAMUSCULAR ONCE
Status: DISCONTINUED | OUTPATIENT
Start: 2023-09-08 | End: 2023-09-08

## 2023-09-08 RX ORDER — SODIUM CHLORIDE 9 MG/ML
INJECTION, SOLUTION INTRAVENOUS CONTINUOUS
Status: ACTIVE | OUTPATIENT
Start: 2023-09-08 | End: 2023-09-08

## 2023-09-08 RX ORDER — MIDAZOLAM HYDROCHLORIDE 1 MG/ML
INJECTION INTRAMUSCULAR; INTRAVENOUS PRN
Status: DISCONTINUED | OUTPATIENT
Start: 2023-09-08 | End: 2023-09-08 | Stop reason: HOSPADM

## 2023-09-08 RX ORDER — LIDOCAINE HYDROCHLORIDE 10 MG/ML
INJECTION, SOLUTION INFILTRATION; PERINEURAL PRN
Status: DISCONTINUED | OUTPATIENT
Start: 2023-09-08 | End: 2023-09-08 | Stop reason: HOSPADM

## 2023-09-08 RX ORDER — ASPIRIN 81 MG/1
324 TABLET, CHEWABLE ORAL ONCE
Status: COMPLETED | OUTPATIENT
Start: 2023-09-08 | End: 2023-09-08

## 2023-09-08 RX ORDER — HEPARIN SODIUM 10000 [USP'U]/ML
INJECTION, SOLUTION INTRAVENOUS; SUBCUTANEOUS PRN
Status: DISCONTINUED | OUTPATIENT
Start: 2023-09-08 | End: 2023-09-08 | Stop reason: HOSPADM

## 2023-09-08 RX ORDER — MAGNESIUM HYDROXIDE/ALUMINUM HYDROXICE/SIMETHICONE 120; 1200; 1200 MG/30ML; MG/30ML; MG/30ML
SUSPENSION ORAL PRN
Status: DISCONTINUED | OUTPATIENT
Start: 2023-09-08 | End: 2023-09-08 | Stop reason: HOSPADM

## 2023-09-08 RX ORDER — LORAZEPAM 1 MG/1
1 TABLET ORAL 2 TIMES DAILY
Status: DISCONTINUED | OUTPATIENT
Start: 2023-09-08 | End: 2023-09-10 | Stop reason: HOSPADM

## 2023-09-08 RX ORDER — ZOLPIDEM TARTRATE 5 MG/1
5 TABLET ORAL NIGHTLY PRN
Status: DISCONTINUED | OUTPATIENT
Start: 2023-09-08 | End: 2023-09-10 | Stop reason: HOSPADM

## 2023-09-08 RX ORDER — ONDANSETRON 4 MG/1
4 TABLET, ORALLY DISINTEGRATING ORAL EVERY 8 HOURS PRN
Status: DISCONTINUED | OUTPATIENT
Start: 2023-09-08 | End: 2023-09-10 | Stop reason: HOSPADM

## 2023-09-08 RX ORDER — SODIUM CHLORIDE 9 MG/ML
INJECTION, SOLUTION INTRAVENOUS PRN
Status: DISCONTINUED | OUTPATIENT
Start: 2023-09-08 | End: 2023-09-10 | Stop reason: HOSPADM

## 2023-09-08 RX ORDER — ATROPINE SULFATE 0.1 MG/ML
INJECTION INTRAVENOUS PRN
Status: DISCONTINUED | OUTPATIENT
Start: 2023-09-08 | End: 2023-09-08 | Stop reason: HOSPADM

## 2023-09-08 RX ORDER — NITROGLYCERIN 20 MG/100ML
INJECTION INTRAVENOUS PRN
Status: DISCONTINUED | OUTPATIENT
Start: 2023-09-08 | End: 2023-09-08 | Stop reason: HOSPADM

## 2023-09-08 RX ORDER — LORAZEPAM 1 MG/1
1 TABLET ORAL 2 TIMES DAILY
Status: DISCONTINUED | OUTPATIENT
Start: 2023-09-08 | End: 2023-09-08

## 2023-09-08 RX ORDER — ASPIRIN 81 MG/1
81 TABLET, CHEWABLE ORAL DAILY
Status: DISCONTINUED | OUTPATIENT
Start: 2023-09-08 | End: 2023-09-10 | Stop reason: HOSPADM

## 2023-09-08 RX ORDER — ONDANSETRON 2 MG/ML
4 INJECTION INTRAMUSCULAR; INTRAVENOUS EVERY 6 HOURS PRN
Status: DISCONTINUED | OUTPATIENT
Start: 2023-09-08 | End: 2023-09-10 | Stop reason: HOSPADM

## 2023-09-08 RX ORDER — POLYETHYLENE GLYCOL 3350 17 G/17G
17 POWDER, FOR SOLUTION ORAL DAILY PRN
Status: DISCONTINUED | OUTPATIENT
Start: 2023-09-08 | End: 2023-09-10 | Stop reason: HOSPADM

## 2023-09-08 RX ADMIN — MORPHINE SULFATE 1 MG: 2 INJECTION, SOLUTION INTRAMUSCULAR; INTRAVENOUS at 02:06

## 2023-09-08 RX ADMIN — ASPIRIN 81 MG 324 MG: 81 TABLET ORAL at 02:46

## 2023-09-08 RX ADMIN — AZITHROMYCIN MONOHYDRATE 500 MG: 500 INJECTION, POWDER, LYOPHILIZED, FOR SOLUTION INTRAVENOUS at 00:14

## 2023-09-08 RX ADMIN — CARVEDILOL 12.5 MG: 12.5 TABLET, FILM COATED ORAL at 08:40

## 2023-09-08 RX ADMIN — CARVEDILOL 12.5 MG: 12.5 TABLET, FILM COATED ORAL at 02:46

## 2023-09-08 RX ADMIN — LORAZEPAM 1 MG: 1 TABLET ORAL at 08:40

## 2023-09-08 RX ADMIN — NITROGLYCERIN 0.5 INCH: 20 OINTMENT TOPICAL at 02:46

## 2023-09-08 RX ADMIN — IOHEXOL 100 ML: 350 INJECTION, SOLUTION INTRAVENOUS at 00:10

## 2023-09-08 RX ADMIN — ACETAMINOPHEN 650 MG: 325 TABLET ORAL at 21:16

## 2023-09-08 RX ADMIN — SODIUM CHLORIDE, PRESERVATIVE FREE 10 ML: 5 INJECTION INTRAVENOUS at 20:06

## 2023-09-08 RX ADMIN — MORPHINE SULFATE 1 MG: 2 INJECTION, SOLUTION INTRAMUSCULAR; INTRAVENOUS at 15:36

## 2023-09-08 RX ADMIN — DOXYCYCLINE HYCLATE 100 MG: 100 CAPSULE ORAL at 00:14

## 2023-09-08 RX ADMIN — SODIUM CHLORIDE: 9 INJECTION, SOLUTION INTRAVENOUS at 02:56

## 2023-09-08 RX ADMIN — SODIUM CHLORIDE, PRESERVATIVE FREE 10 ML: 5 INJECTION INTRAVENOUS at 08:48

## 2023-09-08 RX ADMIN — DOXAZOSIN 4 MG: 4 TABLET ORAL at 08:40

## 2023-09-08 RX ADMIN — MORPHINE SULFATE 1 MG: 2 INJECTION, SOLUTION INTRAMUSCULAR; INTRAVENOUS at 17:23

## 2023-09-08 RX ADMIN — HEPARIN SODIUM 4000 UNITS: 1000 INJECTION INTRAVENOUS; SUBCUTANEOUS at 00:12

## 2023-09-08 RX ADMIN — CEFTRIAXONE 1000 MG: 1 INJECTION, POWDER, FOR SOLUTION INTRAMUSCULAR; INTRAVENOUS at 00:14

## 2023-09-08 RX ADMIN — LORAZEPAM 1 MG: 1 TABLET ORAL at 21:45

## 2023-09-08 RX ADMIN — HEPARIN SODIUM 12 UNITS/KG/HR: 10000 INJECTION, SOLUTION INTRAVENOUS at 00:13

## 2023-09-08 ASSESSMENT — PAIN DESCRIPTION - LOCATION
LOCATION: CHEST
LOCATION: BACK
LOCATION: SHOULDER
LOCATION: CHEST

## 2023-09-08 ASSESSMENT — PAIN SCALES - GENERAL
PAINLEVEL_OUTOF10: 0
PAINLEVEL_OUTOF10: 0
PAINLEVEL_OUTOF10: 3
PAINLEVEL_OUTOF10: 0
PAINLEVEL_OUTOF10: 3
PAINLEVEL_OUTOF10: 7
PAINLEVEL_OUTOF10: 7

## 2023-09-08 ASSESSMENT — PAIN DESCRIPTION - ONSET: ONSET: GRADUAL

## 2023-09-08 ASSESSMENT — ENCOUNTER SYMPTOMS
RESPIRATORY NEGATIVE: 1
GASTROINTESTINAL NEGATIVE: 1
ALLERGIC/IMMUNOLOGIC NEGATIVE: 1
EYES NEGATIVE: 1

## 2023-09-08 ASSESSMENT — PAIN DESCRIPTION - DESCRIPTORS
DESCRIPTORS: DISCOMFORT
DESCRIPTORS: DISCOMFORT;DULL
DESCRIPTORS: SORE

## 2023-09-08 ASSESSMENT — PAIN DESCRIPTION - PAIN TYPE: TYPE: ACUTE PAIN

## 2023-09-08 ASSESSMENT — PAIN DESCRIPTION - FREQUENCY: FREQUENCY: CONTINUOUS

## 2023-09-08 ASSESSMENT — PAIN DESCRIPTION - ORIENTATION
ORIENTATION: ANTERIOR
ORIENTATION: MID
ORIENTATION: LOWER;POSTERIOR

## 2023-09-08 ASSESSMENT — PAIN - FUNCTIONAL ASSESSMENT: PAIN_FUNCTIONAL_ASSESSMENT: ACTIVITIES ARE NOT PREVENTED

## 2023-09-08 NOTE — ASSESSMENT & PLAN NOTE
- BP elevated in ER  - Per chart review, patient has had h/o some resistant hypertension with medication adjustments as needed  - BP has not risen above 170s in ER  - Restart home amlodipine, coreg, cardura, and cozaar  - PRN IV hydralazine for elevated BP

## 2023-09-08 NOTE — ASSESSMENT & PLAN NOTE
- Followed by Dr. Gin Ibarra of [facility] Cardiology, last seen in office in August  - Initial EKG in ER without evidence of STEMI, but some mild ST depression noted  - Cardiology consulted and aware of patient  - Troponin has increased from 31.6 to 936.4, will continue to trend  - Heparin drip initiated in ER, will continue  - Admit to cardiac telemetry  - Will check echo  - PRN morphine for chest pain

## 2023-09-08 NOTE — DISCHARGE SUMMARY
[facility] Cardiology Discharge Summary     Patient ID:  Vijay Valdez  [id number]  25 y.o.  1945    Admit date: 9/7/2023    Discharge date:  9/10/2023    Admitting Physician: Xavier Castellano MD     Discharge Physician: Lindsey Maradiaga PA-C/Dr. Faustin Round    Admission Diagnoses: NSTEMI (non-ST elevated myocardial infarction) [facility]) [I21.4]  Pneumonia of right middle lobe due to infectious organism [J18.9]  Chest pain, unspecified type [R07.9]  Chest pain [R07.9]    Discharge Diagnoses:   Patient Active Problem List    Diagnosis    Chest pain    NSTEMI (non-ST elevated myocardial infarction) ([address])    Abnormal ECG    Mixed hyperlipidemia    Acute pain of left shoulder    Prediabetes    AYUSH (generalized anxiety disorder)    Dyslipidemia    Mild aortic stenosis    AAA (abdominal aortic aneurysm) [facility])    Essential hypertension       Cardiology Procedures this admission:  LHC w PCI and temp PM, echo  Consults: hospitalist     Hospital Course: Patient presented to the emergency department of [address] [place] [address] with complaints of chest pressure radiating to her jaw. Cardiac enzymes were 31 and increased to 15,900. Pt seen by the hospitalist and transferred downtown for further management. Patient was evaluated by cardiology and subsequently admitted for further cardiac evaluation and treatment for her NSTEMI. Started on heparin and IV antibiotics for PNA. LHC was planned for 9-8-23. Patient underwent cardiac catheterization by Dr. Celia Zamudio and was found to have a 90% stenosis of the mCirc that was stented with a KILEY FRONTIER RX [id number] MM ZOTAROLIMUS ELUT with 0% residual stenosis. 95% stenosis of the mRCA stented w  3.5 x 18 [place] stent  w 0% residual stenosis. Transient, intraprocedural complete AV block managed with a temporary pacer that was removed at the end of the procedure. Patient tolerated the procedure well and returned to the telemetry floor for recovery.   An echocardiogram was performed with

## 2023-09-08 NOTE — ED PROVIDER NOTES
Value Ref Range    Troponin, High Sensitivity 936.4 (HH) 0 - 14 pg/mL   Brain Natriuretic Peptide   Result Value Ref Range    NT Pro-BNP 77 <450 PG/ML   EKG 12 Lead   Result Value Ref Range    Ventricular Rate 78 BPM    Atrial Rate 78 BPM    P-R Interval 162 ms    QRS Duration 74 ms    Q-T Interval 408 ms    QTc Calculation (Bazett) 465 ms    P Axis 25 degrees    R Axis 60 degrees    T Axis 110 degrees    Diagnosis       Normal sinus rhythm  Nonspecific ST and T wave abnormality  Abnormal ECG  When compared with ECG of 25-SEP-2022 00:23,  PREVIOUS ECG IS PRESENT          XR CHEST (2 VW)   Final Result   1. Suspect right middle lobe pneumonia. Continuous radiographic follow-up to    resolution is recommended. 2. Cardiomegaly with an element of very mild central vascular congestion. Phill Whitlock M.D.    9/7/2023 7:40:00 PM                        Voice dictation software was used during the making of this note. This software is not perfect and grammatical and other typographical errors may be present. This note has not been completely proofread for errors.      Delicia Paulino MD  09/07/23 8413       Delicia Paulino MD  09/08/23 4595

## 2023-09-08 NOTE — ED NOTES
TRANSFER - OUT REPORT:    Verbal report given to Wendy Cheng on Nestora Patella  being transferred to 2234 for routine progression of patient care       Report consisted of patient's Situation, Background, Assessment and   Recommendations(SBAR). Information from the following report(s) Nurse Handoff Report, ED Encounter Summary, ED SBAR, Intake/Output, MAR, and Recent Results was reviewed with the receiving nurse. [place] Assessment:    Presents to emergency department  because of falls (Syncope, seizure, or loss of consciousness): No  Age > 70: Yes  Altered Mental Status, Intoxication with alcohol or substance confusion (Disorientation, impaired judgment, poor safety awaremess, or inability to follow instructions): No  Impaired Mobility: Ambulates or transfers with assistive devices or assistance; Unable to ambulate or transer.: Yes  Nursing Judgement: No          Lines:   Peripheral IV 09/07/23 Left Antecubital (Active)   Site Assessment Clean, dry & intact 09/07/23 2012   Line Status Blood return noted 09/07/23 2012   Phlebitis Assessment No symptoms 09/07/23 2012   Infiltration Assessment 0 09/07/23 2012   Alcohol Cap Used No 09/07/23 2012   Dressing Status New dressing applied 09/07/23 2012   Dressing Type Transparent 09/07/23 2012   Dressing Intervention New 09/07/23 2012       Peripheral IV 09/08/23 Right Antecubital (Active)        Opportunity for questions and clarification was provided.       Patient transported with:  Registered Nurse           Rosanne Mosquera RN  09/08/23 0111

## 2023-09-08 NOTE — CONSULTS
Mouth/Throat:      Mouth: Mucous membranes are moist.   Eyes:      Pupils: Pupils are equal, round, and reactive to light. Cardiovascular:      Rate and Rhythm: Normal rate and regular rhythm. Heart sounds: Normal heart sounds. Pulmonary:      Breath sounds: Normal breath sounds. Abdominal:      General: Bowel sounds are normal.   Skin:     General: Skin is warm and dry. Neurological:      Mental Status: She is alert and oriented to person, place, and time. Psychiatric:         Mood and Affect: Mood normal.        Cardiographics  Telemetry: normal sinus rhythm  ECG: normal sinus rhythm  Echocardiogram:  from 9/12/22    Left Ventricle: Normal left ventricular systolic function with a visually estimated EF of 60 - 65%. Left ventricle size is normal. Mildly increased wall thickness. Findings consistent with concentric hypertrophy. Normal wall motion. Diastolic dysfunction present with normal LV EF. Normal left ventricular filling pressure. Aortic Valve: Tricuspid valve. Mild sclerosis of the aortic valve cusp. Mild stenosis of the aortic valve. AV mean gradient is 10 mmHg. AV peak gradient is 18 mmHg. AV peak velocity is 1.7 m/s. LVOT:AV VTI Index is 0.58. LVOT diameter is 2.0 cm. AV area by continuity VTI is 1.8 cm2. AV Stroke Volume index is 46.6 mL/m2. Mitral Valve: Mild regurgitation.     Labs:   Recent Results (from the past 24 hour(s))   EKG 12 Lead    Collection Time: 09/07/23  7:02 PM   Result Value Ref Range    Ventricular Rate 78 BPM    Atrial Rate 78 BPM    P-R Interval 162 ms    QRS Duration 74 ms    Q-T Interval 408 ms    QTc Calculation (Bazett) 465 ms    P Axis 25 degrees    R Axis 60 degrees    T Axis 110 degrees    Diagnosis       Normal sinus rhythm  Nonspecific ST and T wave abnormality  Abnormal ECG  When compared with ECG of 25-SEP-2022 00:23,  PREVIOUS ECG IS PRESENT     Basic Metabolic Panel    Collection Time: 09/07/23  7:30 PM   Result Value Ref Range    Sodium 137 133 - 143

## 2023-09-08 NOTE — H&P
Lymphocytes % 27 13 - 44 %    Monocytes % 10 4.0 - 12.0 %    Eosinophils % 2 0.5 - 7.8 %    Basophils % 0 0.0 - 2.0 %    Immature Granulocytes 0 0.0 - 5.0 %    Neutrophils Absolute 3.0 1.7 - 8.2 K/UL    Lymphocytes Absolute 1.4 0.5 - 4.6 K/UL    Monocytes Absolute 0.5 0.1 - 1.3 K/UL    Eosinophils Absolute 0.1 0.0 - 0.8 K/UL    Basophils Absolute 0.0 0.0 - 0.2 K/UL    Absolute Immature Granulocyte 0.0 0.0 - 0.5 K/UL   Troponin    Collection Time: 09/07/23  7:30 PM   Result Value Ref Range    Troponin, High Sensitivity 31.6 (H) 0 - 14 pg/mL   Brain Natriuretic Peptide    Collection Time: 09/07/23  7:30 PM   Result Value Ref Range    NT Pro-BNP 77 <450 PG/ML   Troponin    Collection Time: 09/07/23 10:19 PM   Result Value Ref Range    Troponin, High Sensitivity 936.4 (HH) 0 - 14 pg/mL   Lactic Acid    Collection Time: 09/08/23 12:03 AM   Result Value Ref Range    Lactic Acid, Plasma 1.2 0.4 - 2.0 MMOL/L   CBC    Collection Time: 09/08/23 12:03 AM   Result Value Ref Range    WBC 5.6 4.3 - 11.1 K/uL    RBC 3.50 (L) 4.05 - 5.2 M/uL    Hemoglobin 9.5 (L) 11.7 - 15.4 g/dL    Hematocrit 30.2 (L) 35.8 - 46.3 %    MCV 86.3 82 - 102 FL    MCH 27.1 26.1 - 32.9 PG    MCHC 31.5 31.4 - 35.0 g/dL    RDW 13.6 11.9 - 14.6 %    Platelets 95 (L) 111 - 450 K/uL    MPV 11.9 9.4 - 12.3 FL    nRBC 0.00 0.0 - 0.2 K/uL       Signed:  Brooke Dodge MD

## 2023-09-09 PROBLEM — J18.9 PNEUMONIA OF RIGHT LUNG DUE TO INFECTIOUS ORGANISM: Status: ACTIVE | Noted: 2023-09-09

## 2023-09-09 LAB
ANION GAP SERPL CALC-SCNC: 6 MMOL/L (ref 2–11)
BASOPHILS # BLD: 0 K/UL (ref 0–0.2)
BASOPHILS NFR BLD: 0 % (ref 0–2)
BUN SERPL-MCNC: 12 MG/DL (ref 8–23)
CALCIUM SERPL-MCNC: 8.7 MG/DL (ref 8.3–10.4)
CHLORIDE SERPL-SCNC: 108 MMOL/L (ref 101–110)
CO2 SERPL-SCNC: 26 MMOL/L (ref 21–32)
CREAT SERPL-MCNC: 0.8 MG/DL (ref 0.6–1)
DIFFERENTIAL METHOD BLD: ABNORMAL
EOSINOPHIL # BLD: 0.1 K/UL (ref 0–0.8)
EOSINOPHIL NFR BLD: 2 % (ref 0.5–7.8)
ERYTHROCYTE [DISTWIDTH] IN BLOOD BY AUTOMATED COUNT: 13.6 % (ref 11.9–14.6)
GLUCOSE SERPL-MCNC: 96 MG/DL (ref 65–100)
HCT VFR BLD AUTO: 34.2 % (ref 35.8–46.3)
HGB BLD-MCNC: 10.5 G/DL (ref 11.7–15.4)
IMM GRANULOCYTES # BLD AUTO: 0 K/UL (ref 0–0.5)
IMM GRANULOCYTES NFR BLD AUTO: 0 % (ref 0–5)
LYMPHOCYTES # BLD: 1.5 K/UL (ref 0.5–4.6)
LYMPHOCYTES NFR BLD: 32 % (ref 13–44)
MAGNESIUM SERPL-MCNC: 2.1 MG/DL (ref 1.8–2.4)
MCH RBC QN AUTO: 26.6 PG (ref 26.1–32.9)
MCHC RBC AUTO-ENTMCNC: 30.7 G/DL (ref 31.4–35)
MCV RBC AUTO: 86.6 FL (ref 82–102)
MONOCYTES # BLD: 0.6 K/UL (ref 0.1–1.3)
MONOCYTES NFR BLD: 12 % (ref 4–12)
NEUTS SEG # BLD: 2.5 K/UL (ref 1.7–8.2)
NEUTS SEG NFR BLD: 54 % (ref 43–78)
NRBC # BLD: 0 K/UL (ref 0–0.2)
PLATELET # BLD AUTO: 109 K/UL (ref 150–450)
PMV BLD AUTO: 12.1 FL (ref 9.4–12.3)
POTASSIUM SERPL-SCNC: 3.1 MMOL/L (ref 3.5–5.1)
RBC # BLD AUTO: 3.95 M/UL (ref 4.05–5.2)
SODIUM SERPL-SCNC: 140 MMOL/L (ref 133–143)
WBC # BLD AUTO: 4.7 K/UL (ref 4.3–11.1)

## 2023-09-09 PROCEDURE — 99232 SBSQ HOSP IP/OBS MODERATE 35: CPT | Performed by: INTERNAL MEDICINE

## 2023-09-09 PROCEDURE — 6370000000 HC RX 637 (ALT 250 FOR IP): Performed by: INTERNAL MEDICINE

## 2023-09-09 PROCEDURE — 6360000002 HC RX W HCPCS: Performed by: FAMILY MEDICINE

## 2023-09-09 PROCEDURE — 85025 COMPLETE CBC W/AUTO DIFF WBC: CPT

## 2023-09-09 PROCEDURE — 2580000003 HC RX 258: Performed by: FAMILY MEDICINE

## 2023-09-09 PROCEDURE — 36415 COLL VENOUS BLD VENIPUNCTURE: CPT

## 2023-09-09 PROCEDURE — 1100000003 HC PRIVATE W/ TELEMETRY

## 2023-09-09 PROCEDURE — 83735 ASSAY OF MAGNESIUM: CPT

## 2023-09-09 PROCEDURE — 6370000000 HC RX 637 (ALT 250 FOR IP): Performed by: PHYSICIAN ASSISTANT

## 2023-09-09 PROCEDURE — 80048 BASIC METABOLIC PNL TOTAL CA: CPT

## 2023-09-09 RX ORDER — CEPHALEXIN 500 MG/1
500 CAPSULE ORAL EVERY 8 HOURS SCHEDULED
Status: DISCONTINUED | OUTPATIENT
Start: 2023-09-09 | End: 2023-09-10 | Stop reason: HOSPADM

## 2023-09-09 RX ORDER — POTASSIUM CHLORIDE 7.45 MG/ML
10 INJECTION INTRAVENOUS PRN
Status: DISCONTINUED | OUTPATIENT
Start: 2023-09-09 | End: 2023-09-10 | Stop reason: HOSPADM

## 2023-09-09 RX ORDER — AMLODIPINE BESYLATE 5 MG/1
5 TABLET ORAL DAILY
Status: DISCONTINUED | OUTPATIENT
Start: 2023-09-09 | End: 2023-09-09

## 2023-09-09 RX ORDER — CARVEDILOL 12.5 MG/1
12.5 TABLET ORAL 2 TIMES DAILY WITH MEALS
Status: DISCONTINUED | OUTPATIENT
Start: 2023-09-09 | End: 2023-09-10 | Stop reason: HOSPADM

## 2023-09-09 RX ORDER — LOSARTAN POTASSIUM 50 MG/1
50 TABLET ORAL DAILY
Status: DISCONTINUED | OUTPATIENT
Start: 2023-09-09 | End: 2023-09-10

## 2023-09-09 RX ORDER — AMLODIPINE BESYLATE 5 MG/1
5 TABLET ORAL DAILY
Status: DISCONTINUED | OUTPATIENT
Start: 2023-09-09 | End: 2023-09-10 | Stop reason: HOSPADM

## 2023-09-09 RX ORDER — CALCIUM CARBONATE 500 MG/1
500 TABLET, CHEWABLE ORAL 3 TIMES DAILY PRN
Status: DISCONTINUED | OUTPATIENT
Start: 2023-09-09 | End: 2023-09-10 | Stop reason: HOSPADM

## 2023-09-09 RX ORDER — POTASSIUM CHLORIDE 20 MEQ/1
40 TABLET, EXTENDED RELEASE ORAL PRN
Status: DISCONTINUED | OUTPATIENT
Start: 2023-09-09 | End: 2023-09-10 | Stop reason: HOSPADM

## 2023-09-09 RX ADMIN — CARVEDILOL 12.5 MG: 12.5 TABLET, FILM COATED ORAL at 09:09

## 2023-09-09 RX ADMIN — ASPIRIN 81 MG 81 MG: 81 TABLET ORAL at 08:54

## 2023-09-09 RX ADMIN — LORAZEPAM 1 MG: 1 TABLET ORAL at 08:53

## 2023-09-09 RX ADMIN — POTASSIUM CHLORIDE 40 MEQ: 1500 TABLET, EXTENDED RELEASE ORAL at 14:28

## 2023-09-09 RX ADMIN — LOSARTAN POTASSIUM 50 MG: 50 TABLET, FILM COATED ORAL at 09:09

## 2023-09-09 RX ADMIN — AMLODIPINE BESYLATE 5 MG: 5 TABLET ORAL at 17:49

## 2023-09-09 RX ADMIN — MORPHINE SULFATE 1 MG: 2 INJECTION, SOLUTION INTRAMUSCULAR; INTRAVENOUS at 09:10

## 2023-09-09 RX ADMIN — LORAZEPAM 1 MG: 1 TABLET ORAL at 21:12

## 2023-09-09 RX ADMIN — SODIUM CHLORIDE, PRESERVATIVE FREE 10 ML: 5 INJECTION INTRAVENOUS at 21:16

## 2023-09-09 RX ADMIN — CLOPIDOGREL BISULFATE 75 MG: 75 TABLET ORAL at 08:54

## 2023-09-09 RX ADMIN — CEPHALEXIN 500 MG: 500 CAPSULE ORAL at 14:27

## 2023-09-09 RX ADMIN — CALCIUM CARBONATE (ANTACID) CHEW TAB 500 MG 500 MG: 500 CHEW TAB at 16:08

## 2023-09-09 RX ADMIN — EZETIMIBE 10 MG: 10 TABLET ORAL at 21:12

## 2023-09-09 RX ADMIN — SODIUM CHLORIDE, PRESERVATIVE FREE 10 ML: 5 INJECTION INTRAVENOUS at 08:54

## 2023-09-09 RX ADMIN — CEPHALEXIN 500 MG: 500 CAPSULE ORAL at 21:12

## 2023-09-09 RX ADMIN — CARVEDILOL 12.5 MG: 12.5 TABLET, FILM COATED ORAL at 15:58

## 2023-09-09 ASSESSMENT — PAIN DESCRIPTION - DESCRIPTORS: DESCRIPTORS: SHARP

## 2023-09-09 ASSESSMENT — PAIN DESCRIPTION - ORIENTATION: ORIENTATION: LEFT

## 2023-09-09 ASSESSMENT — PAIN SCALES - GENERAL
PAINLEVEL_OUTOF10: 0
PAINLEVEL_OUTOF10: 4
PAINLEVEL_OUTOF10: 0

## 2023-09-09 ASSESSMENT — PAIN DESCRIPTION - LOCATION: LOCATION: CHEST

## 2023-09-10 ENCOUNTER — APPOINTMENT (OUTPATIENT)
Dept: GENERAL RADIOLOGY | Age: 78
DRG: 246 | End: 2023-09-10
Payer: MEDICARE

## 2023-09-10 VITALS
SYSTOLIC BLOOD PRESSURE: 145 MMHG | TEMPERATURE: 98.1 F | WEIGHT: 176.15 LBS | DIASTOLIC BLOOD PRESSURE: 67 MMHG | HEIGHT: 62 IN | OXYGEN SATURATION: 100 % | BODY MASS INDEX: 32.42 KG/M2 | HEART RATE: 65 BPM | RESPIRATION RATE: 20 BRPM

## 2023-09-10 PROBLEM — I21.4 NSTEMI (NON-ST ELEVATED MYOCARDIAL INFARCTION) (HCC): Status: RESOLVED | Noted: 2023-09-08 | Resolved: 2023-09-10

## 2023-09-10 PROBLEM — R07.9 CHEST PAIN: Status: RESOLVED | Noted: 2023-09-08 | Resolved: 2023-09-10

## 2023-09-10 LAB
ANION GAP SERPL CALC-SCNC: 5 MMOL/L (ref 2–11)
BASOPHILS # BLD: 0 K/UL (ref 0–0.2)
BASOPHILS NFR BLD: 0 % (ref 0–2)
BUN SERPL-MCNC: 12 MG/DL (ref 8–23)
CALCIUM SERPL-MCNC: 9 MG/DL (ref 8.3–10.4)
CHLORIDE SERPL-SCNC: 108 MMOL/L (ref 101–110)
CO2 SERPL-SCNC: 24 MMOL/L (ref 21–32)
CREAT SERPL-MCNC: 0.8 MG/DL (ref 0.6–1)
DIFFERENTIAL METHOD BLD: ABNORMAL
EOSINOPHIL # BLD: 0.1 K/UL (ref 0–0.8)
EOSINOPHIL NFR BLD: 1 % (ref 0.5–7.8)
ERYTHROCYTE [DISTWIDTH] IN BLOOD BY AUTOMATED COUNT: 13.9 % (ref 11.9–14.6)
GLUCOSE SERPL-MCNC: 99 MG/DL (ref 65–100)
HCT VFR BLD AUTO: 33.5 % (ref 35.8–46.3)
HGB BLD-MCNC: 10.4 G/DL (ref 11.7–15.4)
IMM GRANULOCYTES # BLD AUTO: 0 K/UL (ref 0–0.5)
IMM GRANULOCYTES NFR BLD AUTO: 0 % (ref 0–5)
LYMPHOCYTES # BLD: 1.8 K/UL (ref 0.5–4.6)
LYMPHOCYTES NFR BLD: 30 % (ref 13–44)
MCH RBC QN AUTO: 26.9 PG (ref 26.1–32.9)
MCHC RBC AUTO-ENTMCNC: 31 G/DL (ref 31.4–35)
MCV RBC AUTO: 86.6 FL (ref 82–102)
MONOCYTES # BLD: 0.8 K/UL (ref 0.1–1.3)
MONOCYTES NFR BLD: 14 % (ref 4–12)
NEUTS SEG # BLD: 3.2 K/UL (ref 1.7–8.2)
NEUTS SEG NFR BLD: 55 % (ref 43–78)
NRBC # BLD: 0 K/UL (ref 0–0.2)
PLATELET # BLD AUTO: 112 K/UL (ref 150–450)
PMV BLD AUTO: 12.1 FL (ref 9.4–12.3)
POTASSIUM SERPL-SCNC: 3.8 MMOL/L (ref 3.5–5.1)
RBC # BLD AUTO: 3.87 M/UL (ref 4.05–5.2)
SODIUM SERPL-SCNC: 137 MMOL/L (ref 133–143)
WBC # BLD AUTO: 5.9 K/UL (ref 4.3–11.1)

## 2023-09-10 PROCEDURE — 2580000003 HC RX 258: Performed by: FAMILY MEDICINE

## 2023-09-10 PROCEDURE — 36415 COLL VENOUS BLD VENIPUNCTURE: CPT

## 2023-09-10 PROCEDURE — 6370000000 HC RX 637 (ALT 250 FOR IP): Performed by: PHYSICIAN ASSISTANT

## 2023-09-10 PROCEDURE — 6360000002 HC RX W HCPCS: Performed by: FAMILY MEDICINE

## 2023-09-10 PROCEDURE — 85025 COMPLETE CBC W/AUTO DIFF WBC: CPT

## 2023-09-10 PROCEDURE — 99239 HOSP IP/OBS DSCHRG MGMT >30: CPT | Performed by: INTERNAL MEDICINE

## 2023-09-10 PROCEDURE — 6370000000 HC RX 637 (ALT 250 FOR IP): Performed by: INTERNAL MEDICINE

## 2023-09-10 PROCEDURE — 71046 X-RAY EXAM CHEST 2 VIEWS: CPT

## 2023-09-10 PROCEDURE — 80048 BASIC METABOLIC PNL TOTAL CA: CPT

## 2023-09-10 RX ORDER — LOSARTAN POTASSIUM 50 MG/1
100 TABLET ORAL DAILY
Status: DISCONTINUED | OUTPATIENT
Start: 2023-09-11 | End: 2023-09-10 | Stop reason: HOSPADM

## 2023-09-10 RX ORDER — DOXAZOSIN MESYLATE 4 MG/1
4 TABLET ORAL DAILY
Status: DISCONTINUED | OUTPATIENT
Start: 2023-09-10 | End: 2023-09-10 | Stop reason: HOSPADM

## 2023-09-10 RX ORDER — EZETIMIBE 10 MG/1
10 TABLET ORAL NIGHTLY
Qty: 30 TABLET | Refills: 3 | Status: SHIPPED | OUTPATIENT
Start: 2023-09-10

## 2023-09-10 RX ORDER — CLOPIDOGREL BISULFATE 75 MG/1
75 TABLET ORAL DAILY
Qty: 30 TABLET | Refills: 11 | Status: SHIPPED | OUTPATIENT
Start: 2023-09-10

## 2023-09-10 RX ORDER — ASPIRIN 81 MG/1
81 TABLET, CHEWABLE ORAL DAILY
Qty: 30 TABLET | Refills: 11 | Status: SHIPPED | OUTPATIENT
Start: 2023-09-10

## 2023-09-10 RX ORDER — MAGNESIUM HYDROXIDE/ALUMINUM HYDROXICE/SIMETHICONE 120; 1200; 1200 MG/30ML; MG/30ML; MG/30ML
30 SUSPENSION ORAL EVERY 6 HOURS PRN
Status: DISCONTINUED | OUTPATIENT
Start: 2023-09-10 | End: 2023-09-10 | Stop reason: HOSPADM

## 2023-09-10 RX ORDER — CEPHALEXIN 500 MG/1
500 CAPSULE ORAL 3 TIMES DAILY
Qty: 15 CAPSULE | Refills: 0 | Status: SHIPPED | OUTPATIENT
Start: 2023-09-10 | End: 2023-09-15

## 2023-09-10 RX ADMIN — ALUMINUM HYDROXIDE, MAGNESIUM HYDROXIDE, AND SIMETHICONE 30 ML: 200; 200; 20 SUSPENSION ORAL at 12:41

## 2023-09-10 RX ADMIN — CEPHALEXIN 500 MG: 500 CAPSULE ORAL at 05:56

## 2023-09-10 RX ADMIN — SODIUM CHLORIDE, PRESERVATIVE FREE 10 ML: 5 INJECTION INTRAVENOUS at 09:19

## 2023-09-10 RX ADMIN — LORAZEPAM 1 MG: 1 TABLET ORAL at 09:14

## 2023-09-10 RX ADMIN — CEPHALEXIN 500 MG: 500 CAPSULE ORAL at 14:02

## 2023-09-10 RX ADMIN — LOSARTAN POTASSIUM 50 MG: 50 TABLET, FILM COATED ORAL at 05:56

## 2023-09-10 RX ADMIN — CARVEDILOL 12.5 MG: 12.5 TABLET, FILM COATED ORAL at 05:56

## 2023-09-10 RX ADMIN — ALUMINUM HYDROXIDE, MAGNESIUM HYDROXIDE, AND SIMETHICONE 30 ML: 200; 200; 20 SUSPENSION ORAL at 06:26

## 2023-09-10 RX ADMIN — DOXAZOSIN 4 MG: 4 TABLET ORAL at 09:14

## 2023-09-10 RX ADMIN — ASPIRIN 81 MG 81 MG: 81 TABLET ORAL at 09:13

## 2023-09-10 RX ADMIN — Medication 3 MG: at 01:06

## 2023-09-10 RX ADMIN — MORPHINE SULFATE 1 MG: 2 INJECTION, SOLUTION INTRAMUSCULAR; INTRAVENOUS at 06:28

## 2023-09-10 RX ADMIN — CLOPIDOGREL BISULFATE 75 MG: 75 TABLET ORAL at 09:13

## 2023-09-10 RX ADMIN — CALCIUM CARBONATE (ANTACID) CHEW TAB 500 MG 500 MG: 500 CHEW TAB at 09:22

## 2023-09-10 ASSESSMENT — PAIN DESCRIPTION - DESCRIPTORS: DESCRIPTORS: ACHING

## 2023-09-10 ASSESSMENT — PAIN DESCRIPTION - ORIENTATION: ORIENTATION: LEFT

## 2023-09-10 ASSESSMENT — PAIN DESCRIPTION - LOCATION: LOCATION: CHEST;SHOULDER

## 2023-09-10 ASSESSMENT — PAIN SCALES - GENERAL
PAINLEVEL_OUTOF10: 7
PAINLEVEL_OUTOF10: 0

## 2023-09-10 NOTE — DISCHARGE INSTRUCTIONS
The patient is being discharged home in stable condition on a low saturated fat, low cholesterol and low salt diet. The patient is instructed to advance activities as tolerated to the limit of fatigue or shortness of breath. The patient is instructed to avoid all heavy lifting, straining, stooping or squatting for 3-5 days. The patient is instructed to watch the cath site for bleeding/oozing; if seen, the patient is instructed to apply firm pressure with a clean cloth and call [facility] at [phone]. The patient is instructed to watch for signs of infection which include: increasing area of redness, fever/hot to touch or purulent drainage at the catheterization site. The patient is instructed not to soak in a bathtub for 7-10 days, but is cleared to shower. The patient is instructed to call the office or return to the ER for immediate evaluation for any shortness of breath or chest pain not relieved by NTG. No lifting of 10 lbs or more for 7 days, no tub baths for a week, may shower, no creams, lotions powders, or ointments to site for a week. Learning About Coronary Artery Disease (CAD)  What is coronary artery disease? Coronary artery disease is a condition that occurs when plaque builds up in the arteries that bring oxygen-rich blood to your heart. Plaque is a fatty substance made of cholesterol, calcium, and other substances in the blood. This process is called hardening of the arteries, or atherosclerosis. What happens when you have coronary artery disease? Plaque may narrow the coronary arteries. Narrowed arteries cause poor blood flow. This can lead to angina symptoms such as chest pain or discomfort. If blood flow is completely blocked, you could have a heart attack. You can slow and reduce the risk of future problems by making changes in your lifestyle. These include quitting smoking and eating heart-healthy foods.   Treatment, along with changes in your lifestyle, can help you

## 2023-09-10 NOTE — PLAN OF CARE
Problem: Discharge Planning  Goal: Discharge to home or other facility with appropriate resources  Outcome: Progressing  Flowsheets (Taken 9/10/2023 0750 by Osbaldo Churchill RN)  Discharge to home or other facility with appropriate resources:   Identify barriers to discharge with patient and caregiver   Arrange for needed discharge resources and transportation as appropriate   Identify discharge learning needs (meds, wound care, etc)     Problem: Pain  Goal: Verbalizes/displays adequate comfort level or baseline comfort level  Outcome: Progressing  Flowsheets (Taken 9/10/2023 0750 by Osbaldo Churchill RN)  Verbalizes/displays adequate comfort level or baseline comfort level:   Encourage patient to monitor pain and request assistance   Assess pain using appropriate pain scale   Administer analgesics based on type and severity of pain and evaluate response   Implement non-pharmacological measures as appropriate and evaluate response     Problem: Skin/Tissue Integrity  Goal: Absence of new skin breakdown  Description: 1. Monitor for areas of redness and/or skin breakdown  2. Assess vascular access sites hourly  3. Every 4-6 hours minimum:  Change oxygen saturation probe site  4. Every 4-6 hours:  If on nasal continuous positive airway pressure, respiratory therapy assess nares and determine need for appliance change or resting period.   Outcome: Progressing     Problem: Safety - Adult  Goal: Free from fall injury  Outcome: Progressing  Flowsheets (Taken 9/10/2023 0750 by Osbaldo Churchill RN)  Free From Fall Injury: Instruct family/caregiver on patient safety
Problem: Discharge Planning  Goal: Discharge to home or other facility with appropriate resources  Outcome: Progressing  Flowsheets (Taken 9/9/2023 6399)  Discharge to home or other facility with appropriate resources: Identify barriers to discharge with patient and caregiver     Problem: Pain  Goal: Verbalizes/displays adequate comfort level or baseline comfort level  Outcome: Progressing     Problem: Skin/Tissue Integrity  Goal: Absence of new skin breakdown  Description: 1. Monitor for areas of redness and/or skin breakdown  2. Assess vascular access sites hourly  3. Every 4-6 hours minimum:  Change oxygen saturation probe site  4. Every 4-6 hours:  If on nasal continuous positive airway pressure, respiratory therapy assess nares and determine need for appliance change or resting period.   Outcome: Progressing     Problem: Safety - Adult  Goal: Free from fall injury  Outcome: Progressing  Flowsheets (Taken 9/9/2023 0852)  Free From Fall Injury: Instruct family/caregiver on patient safety
Problem: Pain  Goal: Verbalizes/displays adequate comfort level or baseline comfort level  Outcome: Progressing     Problem: Skin/Tissue Integrity  Goal: Absence of new skin breakdown  Description: 1. Monitor for areas of redness and/or skin breakdown  2. Assess vascular access sites hourly  3. Every 4-6 hours minimum:  Change oxygen saturation probe site  4. Every 4-6 hours:  If on nasal continuous positive airway pressure, respiratory therapy assess nares and determine need for appliance change or resting period.   Outcome: Progressing     Problem: Safety - Adult  Goal: Free from fall injury  Outcome: Progressing
From Fall Injury: Instruct family/caregiver on patient safety

## 2023-09-11 ENCOUNTER — TELEPHONE (OUTPATIENT)
Age: 78
End: 2023-09-11

## 2023-09-11 NOTE — TELEPHONE ENCOUNTER
Care Transitions Initial Follow Up Call    Call within 2 business days of discharge: Yes     Patient: Rom Truong Patient : 1945 MRN: [id number]    RARS: Readmission Risk Score: 9.9    Spoke with: PATIENT    Non-face-to-face services provided:  [place] [facility] [place] had LHC w/PCI. I spoke w/pt. she said she is doing well. She has not gotten her Plavix because she can not drive and they would not deliver her narcotic. I told them she needs to have her Plavix delivered it is very important as well as her cardiac meds. Pt.v/u.I went ovr importance of med compliance,dual anti-platelet therapy for a year w/out interruption,s/s of infection at cath site,post cath care and fu appt.  w/.   Follow Up  Future Appointments   Date Time Provider [address] [place] [address]   2023  9:00 AM MD AIRAM Hamlin [facility] AMB   2023  9:00 AM Kathy Dotson APRN - CNP [facility] AMB   2024  2:00 PM Ashanti Judge MD [facility] AM[facility]       Greg Aguilera RN

## 2023-09-12 ENCOUNTER — TELEPHONE (OUTPATIENT)
Age: 78
End: 2023-09-12

## 2023-09-12 NOTE — TELEPHONE ENCOUNTER
LM regarding option to participate in the EVOLVE MI trial (Trevor Couch). Asked patient to call me back at [phone] to discuss.

## 2023-09-12 NOTE — TELEPHONE ENCOUNTER
Noted.  She is cleared to drive the day after her heart cath no reason to hold her back from driving. She needs to be on her meds as you have specified. Thanks,   AD     I called and informed pt. of MD response and she v/u.

## 2023-09-13 ENCOUNTER — TELEPHONE (OUTPATIENT)
Age: 78
End: 2023-09-13

## 2023-09-13 LAB
BACTERIA SPEC CULT: NORMAL
BACTERIA SPEC CULT: NORMAL
SERVICE CMNT-IMP: NORMAL
SERVICE CMNT-IMP: NORMAL

## 2023-09-13 NOTE — TELEPHONE ENCOUNTER
Discussed the EVOLVE MI Trial with patient at  [facility] request.  She verbalized understanding of the study purpose, design, risks, benefits and cost.  At this time she does not want to participate.

## 2023-09-20 ENCOUNTER — OFFICE VISIT (OUTPATIENT)
Age: 78
End: 2023-09-20

## 2023-09-20 VITALS
HEIGHT: 62 IN | SYSTOLIC BLOOD PRESSURE: 128 MMHG | HEART RATE: 77 BPM | WEIGHT: 176.4 LBS | DIASTOLIC BLOOD PRESSURE: 68 MMHG | BODY MASS INDEX: 32.46 KG/M2

## 2023-09-20 DIAGNOSIS — I25.10 CORONARY ARTERY DISEASE INVOLVING NATIVE CORONARY ARTERY OF NATIVE HEART WITHOUT ANGINA PECTORIS: Primary | ICD-10-CM

## 2023-09-20 DIAGNOSIS — I25.2 H/O NON-ST ELEVATION MYOCARDIAL INFARCTION (NSTEMI): ICD-10-CM

## 2023-09-20 DIAGNOSIS — E78.5 DYSLIPIDEMIA: ICD-10-CM

## 2023-09-20 DIAGNOSIS — I71.43 INFRARENAL ABDOMINAL AORTIC ANEURYSM (AAA) WITHOUT RUPTURE (HCC): ICD-10-CM

## 2023-09-20 DIAGNOSIS — I10 ESSENTIAL HYPERTENSION: ICD-10-CM

## 2023-09-20 DIAGNOSIS — I35.0 MILD AORTIC STENOSIS: ICD-10-CM

## 2023-09-20 RX ORDER — ROSUVASTATIN CALCIUM 10 MG/1
10 TABLET, COATED ORAL DAILY
Qty: 30 TABLET | Refills: 11 | Status: SHIPPED | OUTPATIENT
Start: 2023-09-20

## 2023-09-20 ASSESSMENT — ENCOUNTER SYMPTOMS
WHEEZING: 0
DOUBLE VISION: 0
HEMATEMESIS: 0
STRIDOR: 0
HEMATOCHEZIA: 0
ABDOMINAL PAIN: 0
HEMOPTYSIS: 0
EYE REDNESS: 0
HOARSE VOICE: 0

## 2023-09-20 NOTE — PROGRESS NOTES
[facility] CARDIOLOGY  [address], [facility], [place]  PHONE: [phone]          23    NAME:  Addis Frausto  : 1945  MRN: [id number]         SUBJECTIVE:   Addis Frausto is a 77 y.o. female seen for a visit regarding the following:     Chief Complaint   Patient presents with    Follow-Up from Hospital     NSTEMI    Hypertension    Hyperlipidemia    Coronary Artery Disease           HPI:    Cardio problem list:  1. CAD: NSTEMI  -[facility] 23.  90% mCirc that was stented with a KILEY 3.5X18mm MICHELLE ,  95% mRCA-3.5 x 18 Kiley stent    2. Aortic stenosis  -Echo from 2023 showed an EF at 60 to 65% with mild concentric LVH, mild aortic stenosis, mild AR,   -Echo from 2022 shows an EF at 60 to 65% with mild concentric LVH, diastolic dysfunction, mild aortic stenosis with a mean gradient of 10 and peak gradient of 18 mmHg, mild mitral regurgitation  3. Abdominal aortic aneurysm-measures 3.6 x 3.8 cm  --In 2019 was 3.2 x 3.8 cm  4. Hypertension  5. Hyperlipidemia-statin intolerance  6. Hyponatremia-aggravated by spironolactone  -previous Patient of Dr. Izabella Espinoza saw Ms. Oniel Bruno who is a 41-year-old woman in cardiovascular follow-up  for difficult to treat hypertension, CAD, mild aortic stenosis, abdominal aortic aneurysm, hyperlipidemia with statin intolerance, hyponatremia. When we last met with her 6 weeks ago, we made no changes with her medical therapy and she says she was doing well from a cardiac perspective. CAD: She was admitted to the hospital between  and 9/10/2023 with chest pain and an elevated high-sensitivity troponin of 15,000. Findings were consistent with a non-STEMI and she underwent coronary angiography which showed significant mid circumflex and mid RCA disease that was successfully stented with drug-eluting stents. She had transient high degree AV block during the procedure and required a temporary pacemaker placed.   This

## 2023-09-26 ENCOUNTER — HOSPITAL ENCOUNTER (OUTPATIENT)
Dept: CARDIAC REHAB | Age: 78
Setting detail: RECURRING SERIES
Discharge: HOME | End: 2023-09-29

## 2023-09-26 ASSESSMENT — EJECTION FRACTION: EF_VALUE: 60

## 2023-09-26 ASSESSMENT — PATIENT HEALTH QUESTIONNAIRE - PHQ9
4. FEELING TIRED OR HAVING LITTLE ENERGY: 3
5. POOR APPETITE OR OVEREATING: 0
SUM OF ALL RESPONSES TO PHQ QUESTIONS 1-9: 5
SUM OF ALL RESPONSES TO PHQ QUESTIONS 1-9: 5
1. LITTLE INTEREST OR PLEASURE IN DOING THINGS: 0
2. FEELING DOWN, DEPRESSED OR HOPELESS: 0
SUM OF ALL RESPONSES TO PHQ9 QUESTIONS 1 & 2: 0
9. THOUGHTS THAT YOU WOULD BE BETTER OFF DEAD, OR OF HURTING YOURSELF: 0
SUM OF ALL RESPONSES TO PHQ QUESTIONS 1-9: 5
SUM OF ALL RESPONSES TO PHQ QUESTIONS 1-9: 5
3. TROUBLE FALLING OR STAYING ASLEEP: 2
6. FEELING BAD ABOUT YOURSELF - OR THAT YOU ARE A FAILURE OR HAVE LET YOURSELF OR YOUR FAMILY DOWN: 0
10. IF YOU CHECKED OFF ANY PROBLEMS, HOW DIFFICULT HAVE THESE PROBLEMS MADE IT FOR YOU TO DO YOUR WORK, TAKE CARE OF THINGS AT HOME, OR GET ALONG WITH OTHER PEOPLE: 2
8. MOVING OR SPEAKING SO SLOWLY THAT OTHER PEOPLE COULD HAVE NOTICED. OR THE OPPOSITE, BEING SO FIGETY OR RESTLESS THAT YOU HAVE BEEN MOVING AROUND A LOT MORE THAN USUAL: 0
7. TROUBLE CONCENTRATING ON THINGS, SUCH AS READING THE NEWSPAPER OR WATCHING TELEVISION: 0

## 2023-09-26 ASSESSMENT — LIFESTYLE VARIABLES: SMOKELESS_TOBACCO: NO

## 2023-10-03 ENCOUNTER — HOSPITAL ENCOUNTER (OUTPATIENT)
Dept: CARDIAC REHAB | Age: 78
Setting detail: RECURRING SERIES
Discharge: HOME | End: 2023-10-06
Payer: MEDICARE

## 2023-10-03 VITALS — HEIGHT: 63 IN | WEIGHT: 176 LBS | BODY MASS INDEX: 31.18 KG/M2

## 2023-10-03 PROCEDURE — 93798 PHYS/QHP OP CAR RHAB W/ECG: CPT

## 2023-10-03 ASSESSMENT — EXERCISE STRESS TEST
PEAK_BP: 176/76
PEAK_RPE: 13
PEAK_METS: 1.8
PEAK_HR: 91
PEAK_BP: 176/76

## 2023-10-03 ASSESSMENT — LIFESTYLE VARIABLES: SMOKELESS_TOBACCO: NO

## 2023-10-03 ASSESSMENT — EJECTION FRACTION: EF_VALUE: 60

## 2023-10-05 ENCOUNTER — APPOINTMENT (OUTPATIENT)
Dept: CARDIAC REHAB | Age: 78
End: 2023-10-05
Payer: MEDICARE

## 2023-10-09 ENCOUNTER — HOSPITAL ENCOUNTER (OUTPATIENT)
Dept: CARDIAC REHAB | Age: 78
Setting detail: RECURRING SERIES
Discharge: HOME | End: 2023-10-12
Payer: MEDICARE

## 2023-10-09 PROCEDURE — 93798 PHYS/QHP OP CAR RHAB W/ECG: CPT

## 2023-10-09 ASSESSMENT — EXERCISE STRESS TEST
PEAK_HR: 95
PEAK_BP: 122/66
PEAK_METS: 1.8

## 2023-10-10 DIAGNOSIS — I10 ESSENTIAL HYPERTENSION: ICD-10-CM

## 2023-10-10 RX ORDER — LOSARTAN POTASSIUM 100 MG/1
100 TABLET ORAL DAILY
Qty: 90 TABLET | Refills: 3 | Status: SHIPPED | OUTPATIENT
Start: 2023-10-10

## 2023-10-10 NOTE — TELEPHONE ENCOUNTER
Prescription sent to pharmacy//[facility]  Requested Prescriptions     Signed Prescriptions Disp Refills    losartan (COZAAR) 100 MG tablet 90 tablet 3     Sig: TAKE 1 TABLET BY MOUTH DAILY     Authorizing Provider: Roxy Villegas     Ordering User: Ruth Dolan

## 2023-10-11 ENCOUNTER — HOSPITAL ENCOUNTER (OUTPATIENT)
Dept: CARDIAC REHAB | Age: 78
Setting detail: RECURRING SERIES
Discharge: HOME | End: 2023-10-14
Payer: MEDICARE

## 2023-10-11 VITALS — BODY MASS INDEX: 31.28 KG/M2 | WEIGHT: 173.8 LBS

## 2023-10-11 PROCEDURE — 93798 PHYS/QHP OP CAR RHAB W/ECG: CPT

## 2023-10-11 ASSESSMENT — EXERCISE STRESS TEST
PEAK_METS: 2
PEAK_BP: 118/62
PEAK_HR: 86

## 2023-10-16 ENCOUNTER — HOSPITAL ENCOUNTER (OUTPATIENT)
Dept: CARDIAC REHAB | Age: 78
Setting detail: RECURRING SERIES
Discharge: HOME | End: 2023-10-19
Payer: MEDICARE

## 2023-10-16 PROCEDURE — 93798 PHYS/QHP OP CAR RHAB W/ECG: CPT

## 2023-10-16 ASSESSMENT — EXERCISE STRESS TEST
PEAK_HR: 95
PEAK_BP: 118/68
PEAK_METS: 2

## 2023-10-18 ENCOUNTER — HOSPITAL ENCOUNTER (OUTPATIENT)
Dept: CARDIAC REHAB | Age: 78
Setting detail: RECURRING SERIES
Discharge: HOME | End: 2023-10-21
Payer: MEDICARE

## 2023-10-18 VITALS — BODY MASS INDEX: 31.46 KG/M2 | WEIGHT: 174.8 LBS

## 2023-10-18 PROCEDURE — 93798 PHYS/QHP OP CAR RHAB W/ECG: CPT

## 2023-10-18 ASSESSMENT — EXERCISE STRESS TEST
PEAK_BP: 150/68
PEAK_HR: 103
PEAK_METS: 2.2

## 2023-10-23 ENCOUNTER — HOSPITAL ENCOUNTER (OUTPATIENT)
Dept: CARDIAC REHAB | Age: 78
Setting detail: RECURRING SERIES
Discharge: HOME | End: 2023-10-26
Payer: MEDICARE

## 2023-10-23 PROCEDURE — 93798 PHYS/QHP OP CAR RHAB W/ECG: CPT

## 2023-10-23 ASSESSMENT — EXERCISE STRESS TEST
PEAK_METS: 2.3
PEAK_HR: 101
PEAK_BP: 134/70

## 2023-10-25 ENCOUNTER — HOSPITAL ENCOUNTER (OUTPATIENT)
Dept: CARDIAC REHAB | Age: 78
Setting detail: RECURRING SERIES
Discharge: HOME | End: 2023-10-28
Payer: MEDICARE

## 2023-10-25 VITALS — WEIGHT: 175 LBS | BODY MASS INDEX: 31.5 KG/M2

## 2023-10-25 PROCEDURE — 93798 PHYS/QHP OP CAR RHAB W/ECG: CPT

## 2023-10-25 ASSESSMENT — EXERCISE STRESS TEST
PEAK_HR: 87
PEAK_METS: 2.4

## 2023-10-31 ENCOUNTER — OFFICE VISIT (OUTPATIENT)
Dept: INTERNAL MEDICINE CLINIC | Facility: CLINIC | Age: 78
End: 2023-10-31
Payer: MEDICARE

## 2023-10-31 VITALS
HEART RATE: 67 BPM | SYSTOLIC BLOOD PRESSURE: 121 MMHG | TEMPERATURE: 98.4 F | BODY MASS INDEX: 30.92 KG/M2 | OXYGEN SATURATION: 99 % | HEIGHT: 63 IN | DIASTOLIC BLOOD PRESSURE: 57 MMHG | WEIGHT: 174.5 LBS

## 2023-10-31 DIAGNOSIS — R05.9 COUGH, UNSPECIFIED TYPE: ICD-10-CM

## 2023-10-31 DIAGNOSIS — J30.9 ALLERGIC RHINITIS, UNSPECIFIED SEASONALITY, UNSPECIFIED TRIGGER: ICD-10-CM

## 2023-10-31 DIAGNOSIS — R50.9 FEVER, UNSPECIFIED FEVER CAUSE: ICD-10-CM

## 2023-10-31 DIAGNOSIS — J01.00 ACUTE NON-RECURRENT MAXILLARY SINUSITIS: Primary | ICD-10-CM

## 2023-10-31 LAB
INFLUENZA A ANTIGEN, POC: NEGATIVE
INFLUENZA B ANTIGEN, POC: NEGATIVE
LOT EXPIRE DATE: 0
LOT KIT NUMBER: 0
SARS-COV-2, POC: NORMAL
VALID INTERNAL CONTROL, POC: 0
VALID INTERNAL CONTROL: 0
VENDOR AND KIT NAME POC: NORMAL

## 2023-10-31 PROCEDURE — 1123F ACP DISCUSS/DSCN MKR DOCD: CPT | Performed by: NURSE PRACTITIONER

## 2023-10-31 PROCEDURE — 87426 SARSCOV CORONAVIRUS AG IA: CPT | Performed by: NURSE PRACTITIONER

## 2023-10-31 PROCEDURE — 3074F SYST BP LT 130 MM HG: CPT | Performed by: NURSE PRACTITIONER

## 2023-10-31 PROCEDURE — G8417 CALC BMI ABV UP PARAM F/U: HCPCS | Performed by: NURSE PRACTITIONER

## 2023-10-31 PROCEDURE — 1036F TOBACCO NON-USER: CPT | Performed by: NURSE PRACTITIONER

## 2023-10-31 PROCEDURE — G8484 FLU IMMUNIZE NO ADMIN: HCPCS | Performed by: NURSE PRACTITIONER

## 2023-10-31 PROCEDURE — 99213 OFFICE O/P EST LOW 20 MIN: CPT | Performed by: NURSE PRACTITIONER

## 2023-10-31 PROCEDURE — G8399 PT W/DXA RESULTS DOCUMENT: HCPCS | Performed by: NURSE PRACTITIONER

## 2023-10-31 PROCEDURE — G8427 DOCREV CUR MEDS BY ELIG CLIN: HCPCS | Performed by: NURSE PRACTITIONER

## 2023-10-31 PROCEDURE — 87804 INFLUENZA ASSAY W/OPTIC: CPT | Performed by: NURSE PRACTITIONER

## 2023-10-31 PROCEDURE — 3078F DIAST BP <80 MM HG: CPT | Performed by: NURSE PRACTITIONER

## 2023-10-31 PROCEDURE — 1090F PRES/ABSN URINE INCON ASSESS: CPT | Performed by: NURSE PRACTITIONER

## 2023-10-31 RX ORDER — FLUCONAZOLE 150 MG/1
150 TABLET ORAL ONCE
Qty: 1 TABLET | Refills: 0 | Status: SHIPPED | OUTPATIENT
Start: 2023-10-31 | End: 2023-10-31

## 2023-10-31 RX ORDER — CETIRIZINE HYDROCHLORIDE 10 MG/1
10 TABLET ORAL DAILY PRN
Qty: 30 TABLET | Refills: 2 | Status: SHIPPED | OUTPATIENT
Start: 2023-10-31

## 2023-10-31 RX ORDER — AMOXICILLIN AND CLAVULANATE POTASSIUM 875; 125 MG/1; MG/1
1 TABLET, FILM COATED ORAL 2 TIMES DAILY
Qty: 14 TABLET | Refills: 0 | Status: SHIPPED | OUTPATIENT
Start: 2023-10-31 | End: 2023-11-07

## 2023-10-31 ASSESSMENT — ENCOUNTER SYMPTOMS
EYE PAIN: 0
VOMITING: 0
SINUS PRESSURE: 1
SINUS PAIN: 1
DIARRHEA: 0
CONSTIPATION: 0
RHINORRHEA: 0
NAUSEA: 0
SORE THROAT: 0
ABDOMINAL PAIN: 0
BACK PAIN: 0
COUGH: 1
SHORTNESS OF BREATH: 0

## 2023-11-02 ENCOUNTER — TELEPHONE (OUTPATIENT)
Age: 78
End: 2023-11-02

## 2023-11-02 NOTE — TELEPHONE ENCOUNTER
----- Message from Concepcion Martinez MD sent at 11/1/2023  6:12 PM EDT -----  Please call patient and let her know that her blood work looked good with significant improvement in the cholesterol numbers. These findings are reassuring. Keep follow-up appointment as scheduled in December.   Thanks,  AD

## 2023-11-02 NOTE — TELEPHONE ENCOUNTER
Tried to [employer]. No answer (name on VM). LVM with results and call back number for questions.

## 2023-11-08 ENCOUNTER — TELEPHONE (OUTPATIENT)
Age: 78
End: 2023-11-08

## 2023-11-08 ENCOUNTER — TELEPHONE (OUTPATIENT)
Dept: CARDIAC REHAB | Age: 78
End: 2023-11-08

## 2023-11-08 DIAGNOSIS — Z79.899 LONG-TERM USE OF HIGH-RISK MEDICATION: Primary | ICD-10-CM

## 2023-11-08 DIAGNOSIS — J32.9 SINUSITIS: ICD-10-CM

## 2023-11-08 DIAGNOSIS — R04.0 EPISTAXIS: ICD-10-CM

## 2023-11-08 NOTE — TELEPHONE ENCOUNTER
Increased irritation in nose and throat with frequent nosebleeds for a few weeks. Diagnosed with sinus infection with low grade fever, sinus pain, and increased nasal drainage and given Augmentin by PCP 1-2 weeks ago. Finished Augmentin about 3 days ago. Thinks she may need more antibiotic. States Dr. Zoila Flor told her to check with him before making any med changes or taking any new meds. Still has frequent bright red blood on tissue when she blows her nose and sometimes bright red blood runs from nose when she bends over. When she woke up this morning, mouth was full of bright red blood. Frequent muscle aches and pains for several months. Read that Zetia could cause irritation and bleeding in nose and throat and muscle aches and pains. BP-142/78. A few days ago, systolic EO-106. Taking Zetia 10 mg qd, Crestor 10 mg qd, Plavix 75 mg qd, ASA 81 mg qd, losartan 100 mg qd, amlodipine 5 mg qd, doxazosin 4 mg qd, and carvedilol 12.5 mg BID. Patient asks for Dr. Delaney Ramirez recommendations for nosebleeds. She asks if she should D/C Zetia. She asks if she should take more antibiotic. Advised patient that I will notify Dr. Zoila Flor of above and call with his response.  Advised patient to try saline nasal spray and/or Ayr nasal gel

## 2023-11-08 NOTE — TELEPHONE ENCOUNTER
Pt states she is having nose bleed and bloody post nasal drip sinuses and throat is irritated and thinks its due the meds

## 2023-11-09 ENCOUNTER — TELEPHONE (OUTPATIENT)
Dept: CARDIAC REHAB | Age: 78
End: 2023-11-09

## 2023-11-09 NOTE — TELEPHONE ENCOUNTER
Advised patient of Dr. Chon Ceballos response. Advised patient that someone will be calling from ENT to schedule consult. Advised patient to go to any [facility] outpatient lab for blood work, as above. Patient verbalized understanding. She states she feels much better after stopping Zetia. Orders Placed This Encounter   Procedures    CBC with Auto Differential     Standing Status:   Future     Standing Expiration Date:   11/9/2024    Comprehensive Metabolic Panel     Standing Status:   Future     Standing Expiration Date:   11/9/2024    Protime-INR     Standing Status:   Future     Standing Expiration Date:   11/9/2024     Order Specific Question:   Daily Coumadin Dose? Answer:   does not take  coumadin    APTT     Standing Status:   Future     Standing Expiration Date:   11/9/2024     Order Specific Question:   Daily Heparin Dose?      Answer:   does not take heparin    [address] [facility] E[facility], [facility]     Referral Priority:   Urgent     Referral Type:   Eval and Treat     Referral Reason:   Specialty Services Required     Requested Specialty:   Otolaryngology     Number of Visits Requested:   1

## 2023-11-09 NOTE — TELEPHONE ENCOUNTER
Pt having trouble with sinusitis and nosebleeds. Putting cardiac rehab on hold til that recovers. Being referred to ENT. Zetia stopped.

## 2023-11-09 NOTE — TELEPHONE ENCOUNTER
MD Theresa Duke, RN  Caller: Unspecified (Yesterday, 10:51 AM)  She just had a stent placed barely 2 months ago-needs to continue dual antiplatelet therapy. Do not stop Plavix.   -We can refer her to ENT for epistaxis. She can use a Q-tip with Vaseline up each nostril every evening prior to falling asleep. If the heat is running a lot at home, she can use a bedside humidifier.   -Antibiotics are not usually the answer for sinusitis. ENT would be helpful.   -We can certainly order a baseline CBC and CMP along with PT/INR/PTT if not already checked.    Thanks,   AD

## 2023-11-09 NOTE — TELEPHONE ENCOUNTER
MD Zander Diego, RN  Caller: Unspecified (Yesterday, 10:51 AM)  Noted. Zetia would not be the cause for her nosebleeds but could potentially cause muscle aches. -If she is taking her rosuvastatin, she just needs to be compliant with this as it has improved her cholesterol numbers.    Thanks   AD

## 2023-11-10 ENCOUNTER — TELEPHONE (OUTPATIENT)
Age: 78
End: 2023-11-10

## 2023-11-10 ENCOUNTER — TELEPHONE (OUTPATIENT)
Dept: INTERNAL MEDICINE CLINIC | Facility: CLINIC | Age: 78
End: 2023-11-10

## 2023-11-10 DIAGNOSIS — R04.0 EPISTAXIS: ICD-10-CM

## 2023-11-10 DIAGNOSIS — Z79.899 LONG-TERM USE OF HIGH-RISK MEDICATION: ICD-10-CM

## 2023-11-10 LAB
BASOPHILS # BLD: 0 K/UL (ref 0–0.2)
BASOPHILS NFR BLD: 1 % (ref 0–2)
DIFFERENTIAL METHOD BLD: ABNORMAL
EOSINOPHIL # BLD: 0.1 K/UL (ref 0–0.8)
EOSINOPHIL NFR BLD: 2 % (ref 0.5–7.8)
ERYTHROCYTE [DISTWIDTH] IN BLOOD BY AUTOMATED COUNT: 13.8 % (ref 11.9–14.6)
HCT VFR BLD AUTO: 36 % (ref 35.8–46.3)
HGB BLD-MCNC: 11.1 G/DL (ref 11.7–15.4)
IMM GRANULOCYTES # BLD AUTO: 0 K/UL (ref 0–0.5)
IMM GRANULOCYTES NFR BLD AUTO: 0 % (ref 0–5)
INR PPP: 1.1
LYMPHOCYTES # BLD: 1.1 K/UL (ref 0.5–4.6)
LYMPHOCYTES NFR BLD: 27 % (ref 13–44)
MCH RBC QN AUTO: 27.3 PG (ref 26.1–32.9)
MCHC RBC AUTO-ENTMCNC: 30.8 G/DL (ref 31.4–35)
MCV RBC AUTO: 88.5 FL (ref 82–102)
MONOCYTES # BLD: 0.5 K/UL (ref 0.1–1.3)
MONOCYTES NFR BLD: 13 % (ref 4–12)
NEUTS SEG # BLD: 2.4 K/UL (ref 1.7–8.2)
NEUTS SEG NFR BLD: 58 % (ref 43–78)
NRBC # BLD: 0 K/UL (ref 0–0.2)
PLATELET # BLD AUTO: 112 K/UL (ref 150–450)
PMV BLD AUTO: 13.1 FL (ref 9.4–12.3)
PROTHROMBIN TIME: 14.7 SEC (ref 12.6–14.3)
RBC # BLD AUTO: 4.07 M/UL (ref 4.05–5.2)
WBC # BLD AUTO: 4.2 K/UL (ref 4.3–11.1)

## 2023-11-10 NOTE — TELEPHONE ENCOUNTER
The pt walked into the office today, and stated to the  that Kaylin Monique NP had put her on an antibx recently and she is not feeling any better. I went out to speak to the pt. She stated she has been unable to reach our office by phone; so, she contacted her Cardiologist.  He referred her to ENT, b/c she was having some postnasal drip with blood in it. The pt wanted to see if Kaylin Monique NP would run a urine on her to make sure she is not dealing with 2 issues. She c/o some dysuria. I explained to her that we cannot run any tests without an appt, and the office was getting ready to close. So, I recommended she present to the Urgent Care here on [facility] to be tested and tx'd. I recommended she f/u with us after that. She verbalized understanding and was agreeable.

## 2023-11-10 NOTE — TELEPHONE ENCOUNTER
Cassia with Urgent Care on [address] called stating she is trying to treat the patient and would like to know what Rx allergies she has. Please call and advise.       Tele   [phone]

## 2023-11-11 LAB
ALBUMIN SERPL-MCNC: 3.9 G/DL (ref 3.2–4.6)
ALBUMIN/GLOB SERPL: 0.8 (ref 0.4–1.6)
ALP SERPL-CCNC: 75 U/L (ref 50–136)
ALT SERPL-CCNC: 56 U/L (ref 12–65)
ANION GAP SERPL CALC-SCNC: 8 MMOL/L (ref 2–11)
APTT PPP: 34.3 SEC (ref 24.5–34.2)
AST SERPL-CCNC: 25 U/L (ref 15–37)
BILIRUB SERPL-MCNC: 0.3 MG/DL (ref 0.2–1.1)
BUN SERPL-MCNC: 9 MG/DL (ref 8–23)
CALCIUM SERPL-MCNC: 9.3 MG/DL (ref 8.3–10.4)
CHLORIDE SERPL-SCNC: 103 MMOL/L (ref 101–110)
CO2 SERPL-SCNC: 26 MMOL/L (ref 21–32)
CREAT SERPL-MCNC: 0.7 MG/DL (ref 0.6–1)
GLOBULIN SER CALC-MCNC: 4.6 G/DL (ref 2.8–4.5)
GLUCOSE SERPL-MCNC: 99 MG/DL (ref 65–100)
POTASSIUM SERPL-SCNC: 3.7 MMOL/L (ref 3.5–5.1)
PROT SERPL-MCNC: 8.5 G/DL (ref 6.3–8.2)
SODIUM SERPL-SCNC: 137 MMOL/L (ref 133–143)

## 2023-11-13 ENCOUNTER — OFFICE VISIT (OUTPATIENT)
Dept: ENT CLINIC | Age: 78
End: 2023-11-13
Payer: MEDICARE

## 2023-11-13 VITALS — BODY MASS INDEX: 31.18 KG/M2 | HEIGHT: 63 IN | WEIGHT: 176 LBS

## 2023-11-13 DIAGNOSIS — R04.0 RECURRENT EPISTAXIS: Primary | ICD-10-CM

## 2023-11-13 PROCEDURE — G8417 CALC BMI ABV UP PARAM F/U: HCPCS | Performed by: OTOLARYNGOLOGY

## 2023-11-13 PROCEDURE — G8484 FLU IMMUNIZE NO ADMIN: HCPCS | Performed by: OTOLARYNGOLOGY

## 2023-11-13 PROCEDURE — 1036F TOBACCO NON-USER: CPT | Performed by: OTOLARYNGOLOGY

## 2023-11-13 PROCEDURE — 1090F PRES/ABSN URINE INCON ASSESS: CPT | Performed by: OTOLARYNGOLOGY

## 2023-11-13 PROCEDURE — 99204 OFFICE O/P NEW MOD 45 MIN: CPT | Performed by: OTOLARYNGOLOGY

## 2023-11-13 PROCEDURE — G8399 PT W/DXA RESULTS DOCUMENT: HCPCS | Performed by: OTOLARYNGOLOGY

## 2023-11-13 PROCEDURE — 1123F ACP DISCUSS/DSCN MKR DOCD: CPT | Performed by: OTOLARYNGOLOGY

## 2023-11-13 PROCEDURE — G8428 CUR MEDS NOT DOCUMENT: HCPCS | Performed by: OTOLARYNGOLOGY

## 2023-11-13 RX ORDER — LEVOFLOXACIN 500 MG/1
500 TABLET, FILM COATED ORAL DAILY
Qty: 7 TABLET | Refills: 0 | Status: SHIPPED | OUTPATIENT
Start: 2023-11-13 | End: 2023-11-20

## 2023-11-13 ASSESSMENT — ENCOUNTER SYMPTOMS
SHORTNESS OF BREATH: 0
ABDOMINAL PAIN: 0
SORE THROAT: 0

## 2023-11-13 NOTE — PROGRESS NOTES
Chief Complaint   Patient presents with    Sinusitis     C/o sinus infection with some bleeding. Patient states that she is still congested after been on antibiotics. Low grade fever. HPI:  Lisette Camejo is a 77 y.o. female seen today in initial consultation for her nose. She presented with a bad sinus infection about 3 weeks ago. She was started on Augmentin which she finished last week. During the middle of last week, she noted some bleeding from the posterior aspect of her nose and she spit out bright red blood on several occasions. She had seen some blood in her nasal secretions while she was being treated for sinus infection. She does take Plavix daily. She still feels fairly congested today and has some pressure across her eyes/forehead. She has no previous sinonasal pathology. Past Medical History, Past Surgical History, Family history, Social History, and Medications were all reviewed with the patient today and updated as necessary.      Allergies   Allergen Reactions    Latex Other (See Comments) and Dermatitis    Hydrochlorothiazide W-Triamterene Other (See Comments)     Severe dehydration    Acyclovir Other (See Comments)    Cimetidine Other (See Comments)    Citalopram Hydrobromide Other (See Comments)    Dexamethasone Other (See Comments)     Severe dehydration    Epinephrine Other (See Comments)    Hydrochlorothiazide Other (See Comments)     Hypokalemia, Hyponatremia    Pravastatin Other (See Comments)     \" GI upset \" \" just about put a hole in my gut \"    Statins Other (See Comments)     GI upset    Diazepam Anxiety     Patient Active Problem List   Diagnosis    History of diverticulitis    Prediabetes    Dyslipidemia    Essential hypertension    Anxiety    AYUSH (generalized anxiety disorder)    Acute pain of left shoulder    Nonrheumatic aortic valve stenosis    AAA (abdominal aortic aneurysm) (HCC)    Thrombocytopenia (HCC)    Chronic gastritis without bleeding    Chronic

## 2023-11-15 RX ORDER — CEFDINIR 300 MG/1
300 CAPSULE ORAL 2 TIMES DAILY
Qty: 20 CAPSULE | Refills: 0 | Status: SHIPPED | OUTPATIENT
Start: 2023-11-15 | End: 2023-11-25

## 2023-11-20 ENCOUNTER — TELEPHONE (OUTPATIENT)
Age: 78
End: 2023-11-20

## 2023-11-20 ENCOUNTER — OFFICE VISIT (OUTPATIENT)
Dept: INTERNAL MEDICINE CLINIC | Facility: CLINIC | Age: 78
End: 2023-11-20
Payer: MEDICARE

## 2023-11-20 VITALS
SYSTOLIC BLOOD PRESSURE: 130 MMHG | WEIGHT: 175.9 LBS | TEMPERATURE: 98.9 F | BODY MASS INDEX: 31.17 KG/M2 | DIASTOLIC BLOOD PRESSURE: 68 MMHG | HEART RATE: 78 BPM | OXYGEN SATURATION: 100 % | HEIGHT: 63 IN

## 2023-11-20 DIAGNOSIS — D69.6 THROMBOCYTOPENIA (HCC): ICD-10-CM

## 2023-11-20 DIAGNOSIS — R73.03 PREDIABETES: ICD-10-CM

## 2023-11-20 DIAGNOSIS — E78.2 MIXED HYPERLIPIDEMIA: ICD-10-CM

## 2023-11-20 DIAGNOSIS — F41.1 GENERALIZED ANXIETY DISORDER: ICD-10-CM

## 2023-11-20 DIAGNOSIS — J01.90 ACUTE SINUSITIS, RECURRENCE NOT SPECIFIED, UNSPECIFIED LOCATION: ICD-10-CM

## 2023-11-20 DIAGNOSIS — I10 ESSENTIAL HYPERTENSION: Primary | ICD-10-CM

## 2023-11-20 PROBLEM — M25.512 ACUTE PAIN OF LEFT SHOULDER: Status: RESOLVED | Noted: 2022-01-31 | Resolved: 2023-11-20

## 2023-11-20 PROBLEM — I25.2 H/O NON-ST ELEVATION MYOCARDIAL INFARCTION (NSTEMI): Status: RESOLVED | Noted: 2023-09-20 | Resolved: 2023-11-20

## 2023-11-20 PROBLEM — R94.31 ABNORMAL ECG: Status: RESOLVED | Noted: 2022-08-15 | Resolved: 2023-11-20

## 2023-11-20 PROBLEM — J18.9 PNEUMONIA OF RIGHT LUNG DUE TO INFECTIOUS ORGANISM: Status: RESOLVED | Noted: 2023-09-09 | Resolved: 2023-11-20

## 2023-11-20 PROCEDURE — 1123F ACP DISCUSS/DSCN MKR DOCD: CPT | Performed by: NURSE PRACTITIONER

## 2023-11-20 PROCEDURE — 1090F PRES/ABSN URINE INCON ASSESS: CPT | Performed by: NURSE PRACTITIONER

## 2023-11-20 PROCEDURE — 3078F DIAST BP <80 MM HG: CPT | Performed by: NURSE PRACTITIONER

## 2023-11-20 PROCEDURE — 99214 OFFICE O/P EST MOD 30 MIN: CPT | Performed by: NURSE PRACTITIONER

## 2023-11-20 PROCEDURE — 1036F TOBACCO NON-USER: CPT | Performed by: NURSE PRACTITIONER

## 2023-11-20 PROCEDURE — G8427 DOCREV CUR MEDS BY ELIG CLIN: HCPCS | Performed by: NURSE PRACTITIONER

## 2023-11-20 PROCEDURE — 3075F SYST BP GE 130 - 139MM HG: CPT | Performed by: NURSE PRACTITIONER

## 2023-11-20 PROCEDURE — G8484 FLU IMMUNIZE NO ADMIN: HCPCS | Performed by: NURSE PRACTITIONER

## 2023-11-20 PROCEDURE — G8417 CALC BMI ABV UP PARAM F/U: HCPCS | Performed by: NURSE PRACTITIONER

## 2023-11-20 PROCEDURE — G8399 PT W/DXA RESULTS DOCUMENT: HCPCS | Performed by: NURSE PRACTITIONER

## 2023-11-20 RX ORDER — CEPHALEXIN 500 MG/1
500 CAPSULE ORAL 3 TIMES DAILY
Qty: 30 CAPSULE | Refills: 0 | Status: SHIPPED | OUTPATIENT
Start: 2023-11-20

## 2023-11-20 ASSESSMENT — ENCOUNTER SYMPTOMS
NAUSEA: 0
DIARRHEA: 0
RHINORRHEA: 0
ABDOMINAL PAIN: 0
SINUS PAIN: 0
SHORTNESS OF BREATH: 0
VOMITING: 0
EYE PAIN: 0
COUGH: 0
SORE THROAT: 0
CONSTIPATION: 0
BACK PAIN: 0

## 2023-11-20 NOTE — PROGRESS NOTES
[facility]  [address]  Tel# [phone]  Fax# [phone]     Katherine Gaona, [facility]-BC  Family Nurse Practitioner            Date of Visit: 2023     Kaci Gillis (: 1945) is a 77 y.o. female  established patient, here for evaluation of the following chief complaint(s):    Chief Complaint   Patient presents with    Hypertension     Pt is in f/u for on HTN, pre-dm, and HL. Pt is NPO. Pt is also in f/u sinusitis. Pt went to ENT. Pt questions medications given by them. Patient Care Team:  CYNTHIA Simon CNP as PCP - General  CYNTHIA Simon CNP as PCP - Empaneled Provider  Russ Herr MD as Physician  Francis Vail MD as Physician         History of Present Illness          Presents here for follow on chronic medical problems and for med refills and labs.   Had seen ENT for nose bleed 2023           Hypertension  Chronic condition  BP at home: 128/68, 139/76, 153/83 P 70; BP avg < 130/80s  Reviewed cardiology note by Dr. Rand Holloway on 10/4/2022, 2022, 2023    Last appt 2023  Meds ordered by cardiologist:      - Amlodipine 2.5 mg take 1 tablet by mouth daily with 5mg tablet for a total of 7.5mg daily      - Carvedilol 12.5 mg 1 tab oral twice a day      - Losartan 100 mg 1 tab oral once a day      - Doxazosin 4 mg 1 tab oral daily    Physical activity: Cardiac Rehab     Usually takes at noon Carvedilol, Doxazosin, 530p Losartan, 7 pm, Carvedilol and Amlodipine    Brought BP lo-130s/60s  Has not had appetite, has been eating apple sauce, bean soup          BP Readings from Last 3 Encounters:   23 130/68   10/31/23 (!) 121/57   23 128/68             Prediabetes  Chronic condition  Diet: B: usually yogurt and milk or oatmeal or apple sauce          L: veggie salad or egg salad sandwich (home made)          D: veges, meat or salad (mixed greens, avocado dressing, cheese)   Tuna salad with
0

## 2023-11-20 NOTE — TELEPHONE ENCOUNTER
----- Message from Brook Jacobs MD sent at 11/20/2023  6:45 AM EST -----  Please let her know that all her blood work that she did a week ago came back normal.   Thanks,  AD

## 2023-12-04 ENCOUNTER — TELEPHONE (OUTPATIENT)
Dept: CARDIAC REHAB | Age: 78
End: 2023-12-04

## 2023-12-07 ENCOUNTER — OFFICE VISIT (OUTPATIENT)
Dept: INTERNAL MEDICINE CLINIC | Facility: CLINIC | Age: 78
End: 2023-12-07
Payer: MEDICARE

## 2023-12-07 VITALS
HEIGHT: 63 IN | DIASTOLIC BLOOD PRESSURE: 64 MMHG | WEIGHT: 175.3 LBS | BODY MASS INDEX: 31.06 KG/M2 | HEART RATE: 65 BPM | OXYGEN SATURATION: 100 % | SYSTOLIC BLOOD PRESSURE: 138 MMHG | TEMPERATURE: 98.4 F

## 2023-12-07 DIAGNOSIS — J30.9 ALLERGIC RHINITIS, UNSPECIFIED SEASONALITY, UNSPECIFIED TRIGGER: ICD-10-CM

## 2023-12-07 DIAGNOSIS — R09.81 SINUS CONGESTION: Primary | ICD-10-CM

## 2023-12-07 DIAGNOSIS — I10 ESSENTIAL HYPERTENSION: ICD-10-CM

## 2023-12-07 PROCEDURE — 1090F PRES/ABSN URINE INCON ASSESS: CPT | Performed by: NURSE PRACTITIONER

## 2023-12-07 PROCEDURE — G8417 CALC BMI ABV UP PARAM F/U: HCPCS | Performed by: NURSE PRACTITIONER

## 2023-12-07 PROCEDURE — 1036F TOBACCO NON-USER: CPT | Performed by: NURSE PRACTITIONER

## 2023-12-07 PROCEDURE — 3078F DIAST BP <80 MM HG: CPT | Performed by: NURSE PRACTITIONER

## 2023-12-07 PROCEDURE — G8484 FLU IMMUNIZE NO ADMIN: HCPCS | Performed by: NURSE PRACTITIONER

## 2023-12-07 PROCEDURE — 99213 OFFICE O/P EST LOW 20 MIN: CPT | Performed by: NURSE PRACTITIONER

## 2023-12-07 PROCEDURE — 1123F ACP DISCUSS/DSCN MKR DOCD: CPT | Performed by: NURSE PRACTITIONER

## 2023-12-07 PROCEDURE — G8427 DOCREV CUR MEDS BY ELIG CLIN: HCPCS | Performed by: NURSE PRACTITIONER

## 2023-12-07 PROCEDURE — 3075F SYST BP GE 130 - 139MM HG: CPT | Performed by: NURSE PRACTITIONER

## 2023-12-07 PROCEDURE — G8399 PT W/DXA RESULTS DOCUMENT: HCPCS | Performed by: NURSE PRACTITIONER

## 2023-12-07 ASSESSMENT — ENCOUNTER SYMPTOMS
NAUSEA: 0
SHORTNESS OF BREATH: 0
SINUS PAIN: 0
BACK PAIN: 0
COUGH: 0
SORE THROAT: 0
EYE PAIN: 0
CONSTIPATION: 0
ABDOMINAL PAIN: 0
DIARRHEA: 0
RHINORRHEA: 0
VOMITING: 0

## 2023-12-07 NOTE — PROGRESS NOTES
[facility]  [address]  Tel# [phone]  Fax# [phone]     Katheirne Monsalve, [facility]-BC  Family Nurse Practitioner            Date of Visit: 2023     Vanessa Lee (: 1945) is a 77 y.o. female  established patient, here for evaluation of the following chief complaint(s):    Chief Complaint   Patient presents with    Sinusitis     The pt is in for a f/u on sinusitis. The pt is feeling much better. Patient Care Team:  CYNTHIA Logan CNP as PCP - General  CYNTHIA Logan CNP as PCP - Empaneled Provider  Cuca Eaton MD as Physician  Salome Duke MD as Physician         History of Present Illness        Acute Visit      Presents here for follow up on sinusitis. Pt was seen on 2023 and treated with Cephalexin for acute sinusitis. Pt states she is feeling much better. Had finished Cephalexin, tolerated well. Still has some congestion. Taking OTC Zyrtec and homeopathic such as normal saline rinse. Hypertension  Pt states she just took her BP med about an hour ago. Pt states she slept good last night, woke up early at 5 am and stayed up vs her alarm set for 7 am.  Medications: Carvedilol 12.5 mg 1 tab oral twice a day, Losartan 100 mg 1 tab oral daily takes around 4 pm, takes Amlodipine 5 mg at night       BP at home  2023 137/79 P 66  2023 125/70 P 63  12/3/2023 124/77 P 71  2023 135/82 P 66    2023 123/69 P 63  2023 121/57 P 67      BP Readings from Last 3 Encounters:   23 138/64   23 130/68   10/31/23 (!) 121/57              HCM      DEXA:  normal  Colon screenin  Mammogram: 2022    Tried to reorder for       Recommended updated vaccines. Pt refused and stated her daughter is allergic to Covid 19 vaccine.     Immunization History   Administered Date(s) Administered    COVID-19, PFIZER PURPLE top, DILUTE for use, (age 15 y+), 30mcg/0.3mL 10/23/2021,

## 2024-01-03 ENCOUNTER — HOSPITAL ENCOUNTER (OUTPATIENT)
Dept: CARDIAC REHAB | Age: 79
Setting detail: RECURRING SERIES
Discharge: HOME | End: 2024-01-06
Payer: MEDICARE

## 2024-01-03 PROCEDURE — 93798 PHYS/QHP OP CAR RHAB W/ECG: CPT

## 2024-01-04 VITALS — BODY MASS INDEX: 31.43 KG/M2 | WEIGHT: 174.6 LBS

## 2024-01-04 ASSESSMENT — EXERCISE STRESS TEST
PEAK_HR: 96
PEAK_METS: 2.4
PEAK_BP: 164/70

## 2024-01-08 ENCOUNTER — HOSPITAL ENCOUNTER (OUTPATIENT)
Dept: CARDIAC REHAB | Age: 79
Setting detail: RECURRING SERIES
Discharge: HOME | End: 2024-01-11
Payer: MEDICARE

## 2024-01-08 PROCEDURE — 93798 PHYS/QHP OP CAR RHAB W/ECG: CPT

## 2024-01-08 ASSESSMENT — EXERCISE STRESS TEST
PEAK_METS: 2.4
PEAK_BP: 156/70
PEAK_HR: 94

## 2024-01-10 ENCOUNTER — HOSPITAL ENCOUNTER (OUTPATIENT)
Dept: CARDIAC REHAB | Age: 79
Setting detail: RECURRING SERIES
Discharge: HOME | End: 2024-01-13
Payer: MEDICARE

## 2024-01-10 VITALS — WEIGHT: 174.4 LBS | BODY MASS INDEX: 31.39 KG/M2

## 2024-01-10 PROCEDURE — 93798 PHYS/QHP OP CAR RHAB W/ECG: CPT

## 2024-01-10 ASSESSMENT — EXERCISE STRESS TEST
PEAK_HR: 92
PEAK_METS: 2.4
PEAK_BP: 128/86

## 2024-01-18 ENCOUNTER — HOSPITAL ENCOUNTER (OUTPATIENT)
Dept: CARDIAC REHAB | Age: 79
Setting detail: RECURRING SERIES
Discharge: HOME | End: 2024-01-21
Payer: MEDICARE

## 2024-01-18 VITALS — WEIGHT: 175.2 LBS | BODY MASS INDEX: 31.53 KG/M2

## 2024-01-18 PROCEDURE — 93798 PHYS/QHP OP CAR RHAB W/ECG: CPT

## 2024-01-18 ASSESSMENT — EXERCISE STRESS TEST
PEAK_HR: 92
PEAK_METS: 2.4
PEAK_BP: 130/64

## 2024-01-22 ENCOUNTER — HOSPITAL ENCOUNTER (OUTPATIENT)
Dept: CARDIAC REHAB | Age: 79
Setting detail: RECURRING SERIES
Discharge: HOME | End: 2024-01-25
Payer: MEDICARE

## 2024-01-22 PROCEDURE — 93798 PHYS/QHP OP CAR RHAB W/ECG: CPT

## 2024-01-22 ASSESSMENT — EXERCISE STRESS TEST
PEAK_METS: 2.4
PEAK_HR: 88
PEAK_BP: 134/78

## 2024-01-24 ENCOUNTER — HOSPITAL ENCOUNTER (OUTPATIENT)
Dept: CARDIAC REHAB | Age: 79
Setting detail: RECURRING SERIES
Discharge: HOME | End: 2024-01-27
Payer: MEDICARE

## 2024-01-24 VITALS — WEIGHT: 173.2 LBS | BODY MASS INDEX: 31.17 KG/M2

## 2024-01-24 PROCEDURE — 93798 PHYS/QHP OP CAR RHAB W/ECG: CPT

## 2024-01-24 ASSESSMENT — EJECTION FRACTION: EF_VALUE: 60

## 2024-01-24 ASSESSMENT — EXERCISE STRESS TEST
PEAK_HR: 94
PEAK_BP: 134/78
PEAK_METS: 2.4

## 2024-01-24 ASSESSMENT — LIFESTYLE VARIABLES: SMOKELESS_TOBACCO: NO

## 2024-01-29 ENCOUNTER — HOSPITAL ENCOUNTER (EMERGENCY)
Age: 79
Discharge: HOME | End: 2024-01-29
Attending: EMERGENCY MEDICINE
Payer: MEDICARE

## 2024-01-29 VITALS
HEART RATE: 63 BPM | SYSTOLIC BLOOD PRESSURE: 155 MMHG | DIASTOLIC BLOOD PRESSURE: 65 MMHG | OXYGEN SATURATION: 100 % | RESPIRATION RATE: 16 BRPM | TEMPERATURE: 98.8 F | BODY MASS INDEX: 32.02 KG/M2 | HEIGHT: 62 IN | WEIGHT: 174 LBS

## 2024-01-29 DIAGNOSIS — Z86.79 HISTORY OF CORONARY ARTERY DISEASE: ICD-10-CM

## 2024-01-29 DIAGNOSIS — I10 ELEVATED BLOOD PRESSURE READING WITH DIAGNOSIS OF HYPERTENSION: Primary | ICD-10-CM

## 2024-01-29 LAB
ANION GAP SERPL CALC-SCNC: 4 MMOL/L (ref 2–11)
BASOPHILS # BLD: 0 K/UL (ref 0–0.2)
BASOPHILS NFR BLD: 1 % (ref 0–2)
BUN SERPL-MCNC: 14 MG/DL (ref 8–23)
CALCIUM SERPL-MCNC: 9.4 MG/DL (ref 8.3–10.4)
CHLORIDE SERPL-SCNC: 103 MMOL/L (ref 103–113)
CO2 SERPL-SCNC: 29 MMOL/L (ref 21–32)
CREAT SERPL-MCNC: 0.77 MG/DL (ref 0.6–1)
DIFFERENTIAL METHOD BLD: ABNORMAL
EOSINOPHIL # BLD: 0.1 K/UL (ref 0–0.8)
EOSINOPHIL NFR BLD: 2 % (ref 0.5–7.8)
ERYTHROCYTE [DISTWIDTH] IN BLOOD BY AUTOMATED COUNT: 13.6 % (ref 11.9–14.6)
GLUCOSE SERPL-MCNC: 114 MG/DL (ref 65–100)
HCT VFR BLD AUTO: 35.2 % (ref 35.8–46.3)
HGB BLD-MCNC: 11 G/DL (ref 11.7–15.4)
IMM GRANULOCYTES # BLD AUTO: 0 K/UL (ref 0–0.5)
IMM GRANULOCYTES NFR BLD AUTO: 0 % (ref 0–5)
LYMPHOCYTES # BLD: 1 K/UL (ref 0.5–4.6)
LYMPHOCYTES NFR BLD: 21 % (ref 13–44)
MAGNESIUM SERPL-MCNC: 2 MG/DL (ref 1.8–2.4)
MCH RBC QN AUTO: 27 PG (ref 26.1–32.9)
MCHC RBC AUTO-ENTMCNC: 31.3 G/DL (ref 31.4–35)
MCV RBC AUTO: 86.3 FL (ref 82–102)
MONOCYTES # BLD: 0.7 K/UL (ref 0.1–1.3)
MONOCYTES NFR BLD: 15 % (ref 4–12)
NEUTS SEG # BLD: 3 K/UL (ref 1.7–8.2)
NEUTS SEG NFR BLD: 62 % (ref 43–78)
NRBC # BLD: 0 K/UL (ref 0–0.2)
PLATELET # BLD AUTO: 105 K/UL (ref 150–450)
PMV BLD AUTO: 11.9 FL (ref 9.4–12.3)
POTASSIUM SERPL-SCNC: 3.6 MMOL/L (ref 3.5–5.1)
RBC # BLD AUTO: 4.08 M/UL (ref 4.05–5.2)
SODIUM SERPL-SCNC: 136 MMOL/L (ref 136–146)
WBC # BLD AUTO: 4.9 K/UL (ref 4.3–11.1)

## 2024-01-29 PROCEDURE — 96374 THER/PROPH/DIAG INJ IV PUSH: CPT

## 2024-01-29 PROCEDURE — 6360000002 HC RX W HCPCS: Performed by: EMERGENCY MEDICINE

## 2024-01-29 PROCEDURE — 6370000000 HC RX 637 (ALT 250 FOR IP): Performed by: EMERGENCY MEDICINE

## 2024-01-29 PROCEDURE — 99284 EMERGENCY DEPT VISIT MOD MDM: CPT

## 2024-01-29 PROCEDURE — 93005 ELECTROCARDIOGRAM TRACING: CPT | Performed by: EMERGENCY MEDICINE

## 2024-01-29 PROCEDURE — 80048 BASIC METABOLIC PNL TOTAL CA: CPT

## 2024-01-29 PROCEDURE — 83735 ASSAY OF MAGNESIUM: CPT

## 2024-01-29 PROCEDURE — A4216 STERILE WATER/SALINE, 10 ML: HCPCS | Performed by: EMERGENCY MEDICINE

## 2024-01-29 PROCEDURE — 85025 COMPLETE CBC W/AUTO DIFF WBC: CPT

## 2024-01-29 PROCEDURE — 2580000003 HC RX 258: Performed by: EMERGENCY MEDICINE

## 2024-01-29 RX ORDER — LORAZEPAM 0.5 MG/1
0.5 TABLET ORAL 2 TIMES DAILY
Qty: 10 TABLET | Refills: 0 | OUTPATIENT
Start: 2024-01-29 | End: 2024-01-29

## 2024-01-29 RX ORDER — LORAZEPAM 0.5 MG/1
0.5 TABLET ORAL 2 TIMES DAILY
Qty: 10 TABLET | Refills: 0 | Status: SHIPPED | OUTPATIENT
Start: 2024-01-29 | End: 2024-02-03

## 2024-01-29 RX ORDER — MAGNESIUM HYDROXIDE/ALUMINUM HYDROXICE/SIMETHICONE 120; 1200; 1200 MG/30ML; MG/30ML; MG/30ML
20 SUSPENSION ORAL
Status: COMPLETED | OUTPATIENT
Start: 2024-01-29 | End: 2024-01-29

## 2024-01-29 RX ORDER — AMLODIPINE BESYLATE 10 MG/1
10 TABLET ORAL DAILY
Qty: 5 TABLET | Refills: 0 | Status: SHIPPED | OUTPATIENT
Start: 2024-01-29 | End: 2024-02-03

## 2024-01-29 RX ORDER — AMLODIPINE BESYLATE 10 MG/1
10 TABLET ORAL DAILY
Qty: 5 TABLET | Refills: 0 | OUTPATIENT
Start: 2024-01-29 | End: 2024-01-29

## 2024-01-29 RX ORDER — LIDOCAINE HYDROCHLORIDE 20 MG/ML
10 SOLUTION OROPHARYNGEAL
Status: COMPLETED | OUTPATIENT
Start: 2024-01-29 | End: 2024-01-29

## 2024-01-29 RX ADMIN — LIDOCAINE HYDROCHLORIDE 10 ML: 20 SOLUTION ORAL; TOPICAL at 18:25

## 2024-01-29 RX ADMIN — SODIUM CHLORIDE 0.5 MG: 9 INJECTION INTRAMUSCULAR; INTRAVENOUS; SUBCUTANEOUS at 17:24

## 2024-01-29 RX ADMIN — POTASSIUM BICARBONATE 20 MEQ: 782 TABLET, EFFERVESCENT ORAL at 18:19

## 2024-01-29 RX ADMIN — ALUMINUM HYDROXIDE, MAGNESIUM HYDROXIDE, DIMETHICONE 20 ML: 200; 200; 20 LIQUID ORAL at 18:24

## 2024-01-29 ASSESSMENT — ENCOUNTER SYMPTOMS
VOMITING: 0
COUGH: 0
SHORTNESS OF BREATH: 0
ABDOMINAL PAIN: 0

## 2024-01-29 ASSESSMENT — PAIN DESCRIPTION - DESCRIPTORS: DESCRIPTORS: BURNING

## 2024-01-29 ASSESSMENT — PAIN SCALES - GENERAL
PAINLEVEL_OUTOF10: 0
PAINLEVEL_OUTOF10: 2

## 2024-01-29 ASSESSMENT — PAIN DESCRIPTION - LOCATION: LOCATION: CHEST

## 2024-01-29 ASSESSMENT — PAIN DESCRIPTION - ORIENTATION: ORIENTATION: MID

## 2024-01-29 ASSESSMENT — PAIN - FUNCTIONAL ASSESSMENT
PAIN_FUNCTIONAL_ASSESSMENT: NONE - DENIES PAIN
PAIN_FUNCTIONAL_ASSESSMENT: 0-10

## 2024-01-29 NOTE — ED PROVIDER NOTES
CARDIAC CATH LAB    CATARACT REMOVAL Bilateral 2018    CATARACT REMOVAL Bilateral 2018    CATARACT REMOVAL WITH IMPLANT Bilateral     ,      SECTION      x 2    CHOLECYSTECTOMY      COLONOSCOPY  2019    HYSTERECTOMY (CERVIX STATUS UNKNOWN)      OTHER SURGICAL HISTORY      Renal artery scan-normal    US GUIDED CORE BREAST BIOPSY Left     Benign        Social History     Socioeconomic History    Marital status:    Tobacco Use    Smoking status: Former     Current packs/day: 0.00     Average packs/day: 0.8 packs/day for 10.6 years (8.0 ttl pk-yrs)     Types: Cigarettes     Start date:      Quit date: 1999     Years since quittin.4    Smokeless tobacco: Never   Vaping Use    Vaping Use: Never used   Substance and Sexual Activity    Alcohol use: No     Alcohol/week: 0.0 standard drinks of alcohol    Drug use: No    Sexual activity: Not Currently   Social History Narrative    Currently lives alone (daughter moved to [place], professor at [employer], teaching international students) 2021.     Social Determinants of Health     Financial Resource Strain: Low Risk  (2023)    Overall Financial Resource Strain (CARDIA)     Difficulty of Paying Living Expenses: Not hard at all   Transportation Needs: Unknown (2023)    PRAPARE - Transportation     Lack of Transportation (Non-Medical): No   Physical Activity: Insufficiently Active (2023)    Exercise Vital Sign     Days of Exercise per Week: 2 days     Minutes of Exercise per Session: 60 min   Housing Stability: Unknown (2023)    Housing Stability Vital Sign     Unstable Housing in the Last Year: No        Discharge Medication List as of 2024  6:07 PM        CONTINUE these medications which have NOT CHANGED    Details   losartan (COZAAR) 100 MG tablet TAKE 1 TABLET BY MOUTH DAILY, Disp-90 tablet, R-3Normal      rosuvastatin (CRESTOR) 10 MG tablet Take 1 tablet by mouth daily, Disp-30 tablet, R-11Normal

## 2024-01-29 NOTE — ED TRIAGE NOTES
Patient is concerned for high BP. She checked her BP this morning and it was high. She took her BP medicine. This afternoon she rechecked her BP and it was still high so called EMS. Patient was here previously with HTN and at that visit her K+ and Cl were low, so she has concern for that too. Patient had a recent loss of her son, last Thursday

## 2024-01-29 NOTE — ED NOTES
I have reviewed discharge instructions with the patient.  The patient verbalized understanding.    Patient left ED via Discharge Method: wheelchair to Home with self.    Opportunity for questions and clarification provided.       Patient given 2 scripts.         To continue your aftercare when you leave the hospital, you may receive an automated call from our care team to check in on how you are doing.  This is a free service and part of our promise to provide the best care and service to meet your aftercare needs.” If you have questions, or wish to unsubscribe from this service please call [phone].  Thank you for Choosing our [facility] Emergency Department.

## 2024-01-29 NOTE — DISCHARGE INSTRUCTIONS
I am sorry about the loss of your son.  Prescription for Ativan such that she can increase the dose to 1 and half milligrams twice a day for few days.  This may help out with the blood pressure.  Alternatively, he can increase amlodipine to 10 mg daily for 5 days.  He also may continue to use the clonidine.  Call cardiology to recheck.  Recheck sooner for worse or worrisome symptoms.

## 2024-01-30 LAB
EKG ATRIAL RATE: 67 BPM
EKG DIAGNOSIS: NORMAL
EKG P AXIS: 6 DEGREES
EKG P-R INTERVAL: 166 MS
EKG Q-T INTERVAL: 425 MS
EKG QRS DURATION: 85 MS
EKG QTC CALCULATION (BAZETT): 449 MS
EKG R AXIS: 50 DEGREES
EKG T AXIS: 29 DEGREES
EKG VENTRICULAR RATE: 67 BPM

## 2024-01-30 PROCEDURE — 93010 ELECTROCARDIOGRAM REPORT: CPT | Performed by: INTERNAL MEDICINE

## 2024-01-31 ENCOUNTER — HOSPITAL ENCOUNTER (OUTPATIENT)
Dept: CARDIAC REHAB | Age: 79
Setting detail: RECURRING SERIES
End: 2024-01-31
Payer: MEDICARE

## 2024-02-01 ENCOUNTER — OFFICE VISIT (OUTPATIENT)
Age: 79
End: 2024-02-01
Payer: MEDICARE

## 2024-02-01 VITALS
HEIGHT: 62 IN | HEART RATE: 66 BPM | WEIGHT: 176.4 LBS | DIASTOLIC BLOOD PRESSURE: 70 MMHG | BODY MASS INDEX: 32.46 KG/M2 | SYSTOLIC BLOOD PRESSURE: 120 MMHG

## 2024-02-01 DIAGNOSIS — I71.43 INFRARENAL ABDOMINAL AORTIC ANEURYSM (AAA) WITHOUT RUPTURE (HCC): ICD-10-CM

## 2024-02-01 DIAGNOSIS — E78.5 DYSLIPIDEMIA: ICD-10-CM

## 2024-02-01 DIAGNOSIS — I25.10 CORONARY ARTERY DISEASE INVOLVING NATIVE CORONARY ARTERY OF NATIVE HEART WITHOUT ANGINA PECTORIS: Primary | ICD-10-CM

## 2024-02-01 DIAGNOSIS — I35.0 MILD AORTIC STENOSIS: ICD-10-CM

## 2024-02-01 DIAGNOSIS — I10 ESSENTIAL HYPERTENSION: ICD-10-CM

## 2024-02-01 PROCEDURE — G8399 PT W/DXA RESULTS DOCUMENT: HCPCS | Performed by: INTERNAL MEDICINE

## 2024-02-01 PROCEDURE — G8427 DOCREV CUR MEDS BY ELIG CLIN: HCPCS | Performed by: INTERNAL MEDICINE

## 2024-02-01 PROCEDURE — 1123F ACP DISCUSS/DSCN MKR DOCD: CPT | Performed by: INTERNAL MEDICINE

## 2024-02-01 PROCEDURE — 3074F SYST BP LT 130 MM HG: CPT | Performed by: INTERNAL MEDICINE

## 2024-02-01 PROCEDURE — 99214 OFFICE O/P EST MOD 30 MIN: CPT | Performed by: INTERNAL MEDICINE

## 2024-02-01 PROCEDURE — 3078F DIAST BP <80 MM HG: CPT | Performed by: INTERNAL MEDICINE

## 2024-02-01 PROCEDURE — G8417 CALC BMI ABV UP PARAM F/U: HCPCS | Performed by: INTERNAL MEDICINE

## 2024-02-01 PROCEDURE — 1036F TOBACCO NON-USER: CPT | Performed by: INTERNAL MEDICINE

## 2024-02-01 PROCEDURE — 1090F PRES/ABSN URINE INCON ASSESS: CPT | Performed by: INTERNAL MEDICINE

## 2024-02-01 PROCEDURE — G8484 FLU IMMUNIZE NO ADMIN: HCPCS | Performed by: INTERNAL MEDICINE

## 2024-02-01 ASSESSMENT — ENCOUNTER SYMPTOMS
HEMATEMESIS: 0
HOARSE VOICE: 0
ABDOMINAL PAIN: 0
DOUBLE VISION: 0
EYE REDNESS: 0
STRIDOR: 0
HEMOPTYSIS: 0
WHEEZING: 0
HEMATOCHEZIA: 0

## 2024-02-01 NOTE — PROGRESS NOTES
[facility] CARDIOLOGY  [address] [place] [address], [address]  [place] [address]  PHONE: [phone]          24    NAME:  Aurora Sanchez  : 1945  MRN: [id number]         SUBJECTIVE:   Aurora Sanchez is a 78 y.o. female seen for a visit regarding the following:     Chief Complaint   Patient presents with    Follow-Up from Hospital    Hypertension    Coronary Artery Disease    Hyperlipidemia           HPI:    Cardio problem list:  1.  CAD: NSTEMI  -[facility] 23.  90% mCirc that was stented with a KILEY 3.5X18mm MICHELLE ,  95% mRCA-3.5 x 18 [place] stent    2.  Aortic stenosis  -Echo from 2023 showed an EF at 60 to 65% with mild concentric LVH, mild aortic stenosis, mild AR,   -Echo from 2022 shows an EF at 60 to 65% with mild concentric LVH, diastolic dysfunction, mild aortic stenosis with a mean gradient of 10 and peak gradient of 18 mmHg, mild mitral regurgitation  3.  Abdominal aortic aneurysm-measures 3.6 x 3.8 cm  --In 2019 was 3.2 x 3.8 cm  4.  Hypertension  5.  Hyperlipidemia-statin intolerance  6.  Hyponatremia-aggravated by spironolactone  -previous Patient of Dr. Jan Mcconnell     I saw Ms. Sanchez who is a 78-year-old woman in cardiovascular follow-up  for difficult to treat hypertension, CAD, mild aortic stenosis, abdominal aortic aneurysm, hyperlipidemia with statin intolerance, hyponatremia.     When we last met with her 3 months ago, we made no changes with her medical therapy and she says she was doing well from a cardiac perspective.  She was recently seen in the hospital with significantly elevated blood pressures.  They gave her 1 dose of clonidine and this brought down her pressures.  They also gave her something for anxiety which helped.  She lost her son who was very upset and this was why her blood pressures were shooting.  Blood pressures are a lot better today.    CAD: She was admitted to the hospital between  and 9/10/2023 with chest pain and an elevated

## 2024-02-14 ENCOUNTER — TELEPHONE (OUTPATIENT)
Dept: CARDIAC REHAB | Age: 79
End: 2024-02-14

## 2024-02-19 ENCOUNTER — HOSPITAL ENCOUNTER (OUTPATIENT)
Dept: CARDIAC REHAB | Age: 79
Setting detail: RECURRING SERIES
Discharge: HOME | End: 2024-02-22
Payer: MEDICARE

## 2024-02-19 VITALS — WEIGHT: 178 LBS | BODY MASS INDEX: 32.56 KG/M2

## 2024-02-19 PROCEDURE — 93798 PHYS/QHP OP CAR RHAB W/ECG: CPT

## 2024-02-19 ASSESSMENT — EXERCISE STRESS TEST
PEAK_METS: 2.4
PEAK_HR: 106
PEAK_BP: 136/72

## 2024-02-20 ASSESSMENT — EJECTION FRACTION: EF_VALUE: 60

## 2024-02-20 ASSESSMENT — LIFESTYLE VARIABLES: SMOKELESS_TOBACCO: NO

## 2024-02-20 ASSESSMENT — EXERCISE STRESS TEST: PEAK_BP: 136/72

## 2024-02-21 ENCOUNTER — HOSPITAL ENCOUNTER (OUTPATIENT)
Dept: CARDIAC REHAB | Age: 79
Setting detail: RECURRING SERIES
Discharge: HOME | End: 2024-02-24
Payer: MEDICARE

## 2024-02-21 VITALS — WEIGHT: 179 LBS | BODY MASS INDEX: 32.74 KG/M2

## 2024-02-21 PROCEDURE — 93798 PHYS/QHP OP CAR RHAB W/ECG: CPT

## 2024-02-21 ASSESSMENT — EXERCISE STRESS TEST
PEAK_HR: 102
PEAK_METS: 2.4

## 2024-02-22 ENCOUNTER — HOSPITAL ENCOUNTER (OUTPATIENT)
Dept: CARDIAC REHAB | Age: 79
Setting detail: RECURRING SERIES
Discharge: HOME | End: 2024-02-25
Payer: MEDICARE

## 2024-02-22 PROCEDURE — 93798 PHYS/QHP OP CAR RHAB W/ECG: CPT

## 2024-02-22 ASSESSMENT — EXERCISE STRESS TEST
PEAK_HR: 97
PEAK_METS: 2.4

## 2024-02-26 ENCOUNTER — HOSPITAL ENCOUNTER (OUTPATIENT)
Dept: CARDIAC REHAB | Age: 79
Setting detail: RECURRING SERIES
Discharge: HOME | End: 2024-02-29
Payer: MEDICARE

## 2024-02-26 PROCEDURE — 93798 PHYS/QHP OP CAR RHAB W/ECG: CPT

## 2024-02-26 ASSESSMENT — EXERCISE STRESS TEST
PEAK_HR: 92
PEAK_METS: 2.1

## 2024-02-28 ENCOUNTER — HOSPITAL ENCOUNTER (OUTPATIENT)
Dept: CARDIAC REHAB | Age: 79
Setting detail: RECURRING SERIES
Discharge: HOME | End: 2024-03-02
Payer: MEDICARE

## 2024-02-28 VITALS — BODY MASS INDEX: 32.23 KG/M2 | WEIGHT: 176.2 LBS

## 2024-02-28 PROCEDURE — 93798 PHYS/QHP OP CAR RHAB W/ECG: CPT

## 2024-02-28 ASSESSMENT — EXERCISE STRESS TEST
PEAK_HR: 107
PEAK_METS: 2.4

## 2024-03-06 ENCOUNTER — APPOINTMENT (OUTPATIENT)
Dept: CARDIAC REHAB | Age: 79
End: 2024-03-06
Payer: MEDICARE

## 2024-03-13 ENCOUNTER — HOSPITAL ENCOUNTER (OUTPATIENT)
Dept: CARDIAC REHAB | Age: 79
Setting detail: RECURRING SERIES
Discharge: HOME | End: 2024-03-16
Payer: MEDICARE

## 2024-03-13 VITALS — WEIGHT: 174.2 LBS | BODY MASS INDEX: 31.86 KG/M2

## 2024-03-13 PROCEDURE — 93798 PHYS/QHP OP CAR RHAB W/ECG: CPT

## 2024-03-13 ASSESSMENT — EXERCISE STRESS TEST
PEAK_HR: 96
PEAK_BP: 144/68
PEAK_METS: 2.4

## 2024-03-14 ENCOUNTER — HOSPITAL ENCOUNTER (OUTPATIENT)
Dept: CARDIAC REHAB | Age: 79
Setting detail: RECURRING SERIES
Discharge: HOME | End: 2024-03-17
Payer: MEDICARE

## 2024-03-14 PROCEDURE — 93798 PHYS/QHP OP CAR RHAB W/ECG: CPT

## 2024-03-14 ASSESSMENT — EXERCISE STRESS TEST
PEAK_HR: 90
PEAK_METS: 2.4

## 2024-03-18 ENCOUNTER — HOSPITAL ENCOUNTER (OUTPATIENT)
Dept: CARDIAC REHAB | Age: 79
Setting detail: RECURRING SERIES
Discharge: HOME | End: 2024-03-21
Payer: MEDICARE

## 2024-03-18 PROCEDURE — 93798 PHYS/QHP OP CAR RHAB W/ECG: CPT

## 2024-03-19 ASSESSMENT — EXERCISE STRESS TEST
PEAK_METS: 2.4
PEAK_BP: 114/54
PEAK_HR: 90

## 2024-03-20 ENCOUNTER — HOSPITAL ENCOUNTER (EMERGENCY)
Age: 79
Discharge: HOME | End: 2024-03-20
Payer: MEDICARE

## 2024-03-20 VITALS
BODY MASS INDEX: 32.05 KG/M2 | HEIGHT: 62 IN | RESPIRATION RATE: 18 BRPM | DIASTOLIC BLOOD PRESSURE: 80 MMHG | WEIGHT: 174.16 LBS | SYSTOLIC BLOOD PRESSURE: 154 MMHG | HEART RATE: 75 BPM | TEMPERATURE: 98.4 F | OXYGEN SATURATION: 100 %

## 2024-03-20 DIAGNOSIS — R06.7 SNEEZING: ICD-10-CM

## 2024-03-20 DIAGNOSIS — R09.81 NASAL CONGESTION: Primary | ICD-10-CM

## 2024-03-20 LAB
ANION GAP SERPL CALC-SCNC: 5 MMOL/L (ref 2–11)
BASOPHILS # BLD: 0 K/UL (ref 0–0.2)
BASOPHILS NFR BLD: 0 % (ref 0–2)
BUN SERPL-MCNC: 14 MG/DL (ref 8–23)
CALCIUM SERPL-MCNC: 9.7 MG/DL (ref 8.3–10.4)
CHLORIDE SERPL-SCNC: 102 MMOL/L (ref 103–113)
CO2 SERPL-SCNC: 28 MMOL/L (ref 21–32)
CREAT SERPL-MCNC: 0.69 MG/DL (ref 0.6–1)
DIFFERENTIAL METHOD BLD: ABNORMAL
EOSINOPHIL # BLD: 0.1 K/UL (ref 0–0.8)
EOSINOPHIL NFR BLD: 3 % (ref 0.5–7.8)
ERYTHROCYTE [DISTWIDTH] IN BLOOD BY AUTOMATED COUNT: 13.7 % (ref 11.9–14.6)
FLUAV RNA SPEC QL NAA+PROBE: NOT DETECTED
FLUBV RNA SPEC QL NAA+PROBE: NOT DETECTED
GLUCOSE SERPL-MCNC: 96 MG/DL (ref 65–100)
HCT VFR BLD AUTO: 36.2 % (ref 35.8–46.3)
HGB BLD-MCNC: 11.6 G/DL (ref 11.7–15.4)
IMM GRANULOCYTES # BLD AUTO: 0 K/UL (ref 0–0.5)
IMM GRANULOCYTES NFR BLD AUTO: 0 % (ref 0–5)
LYMPHOCYTES # BLD: 1.6 K/UL (ref 0.5–4.6)
LYMPHOCYTES NFR BLD: 33 % (ref 13–44)
MAGNESIUM SERPL-MCNC: 2.4 MG/DL (ref 1.8–2.4)
MCH RBC QN AUTO: 27.2 PG (ref 26.1–32.9)
MCHC RBC AUTO-ENTMCNC: 32 G/DL (ref 31.4–35)
MCV RBC AUTO: 84.8 FL (ref 82–102)
MONOCYTES # BLD: 0.7 K/UL (ref 0.1–1.3)
MONOCYTES NFR BLD: 14 % (ref 4–12)
NEUTS SEG # BLD: 2.3 K/UL (ref 1.7–8.2)
NEUTS SEG NFR BLD: 50 % (ref 43–78)
NRBC # BLD: 0 K/UL (ref 0–0.2)
PLATELET # BLD AUTO: 118 K/UL (ref 150–450)
PMV BLD AUTO: 11.6 FL (ref 9.4–12.3)
POTASSIUM SERPL-SCNC: 3.7 MMOL/L (ref 3.5–5.1)
RBC # BLD AUTO: 4.27 M/UL (ref 4.05–5.2)
SARS-COV-2 RDRP RESP QL NAA+PROBE: NOT DETECTED
SODIUM SERPL-SCNC: 135 MMOL/L (ref 136–146)
SOURCE: NORMAL
WBC # BLD AUTO: 4.7 K/UL (ref 4.3–11.1)

## 2024-03-20 PROCEDURE — 99283 EMERGENCY DEPT VISIT LOW MDM: CPT

## 2024-03-20 PROCEDURE — 87635 SARS-COV-2 COVID-19 AMP PRB: CPT

## 2024-03-20 PROCEDURE — 87502 INFLUENZA DNA AMP PROBE: CPT

## 2024-03-20 PROCEDURE — 83735 ASSAY OF MAGNESIUM: CPT

## 2024-03-20 PROCEDURE — 85025 COMPLETE CBC W/AUTO DIFF WBC: CPT

## 2024-03-20 PROCEDURE — 80048 BASIC METABOLIC PNL TOTAL CA: CPT

## 2024-03-20 ASSESSMENT — ENCOUNTER SYMPTOMS
TROUBLE SWALLOWING: 0
PHOTOPHOBIA: 0
FACIAL SWELLING: 0
SHORTNESS OF BREATH: 0
ABDOMINAL PAIN: 0
COUGH: 0
VOMITING: 0

## 2024-03-20 ASSESSMENT — PAIN SCALES - GENERAL: PAINLEVEL_OUTOF10: 0

## 2024-03-20 ASSESSMENT — PAIN - FUNCTIONAL ASSESSMENT: PAIN_FUNCTIONAL_ASSESSMENT: 0-10

## 2024-03-20 NOTE — ED PROVIDER NOTES
The patient has verbalized understanding and is in agreement with the treatment plan.    ED Course as of 03/20/24 2324   Wed Mar 20, 2024   1937 7:43 PM  Patient is requesting her electrolytes be checked. [NR]      ED Course User Index  [NR] Juan Warner PA     1 acute illness with systemic symptoms.  Over the counter drug management performed.  Patient was discharged risks and benefits of hospitalization were considered.  Chronic medical problems impacting care include hypertension, hyperlipidemia.  Shared medical decision making was utilized in creating the patients health plan today.  ED attending physician present in department at time of care. Based on current hospital policy, their co-signature is not required on this note.   I independently ordered and reviewed each unique test.             The patient has an Upper Respiratory Infection.  Antibiotics were not prescribed.        History     78-year-old female patient with history of AAA, hypertension, hyperlipidemia presents today with concerns for sneezing that began today.  She also states she has had a little bit of congestion that also began today.  Reports her daughter has similar symptoms and started a few days ago.  She reports she would like to have her electrolytes checked because she thinks they may be off.  She denies any fevers or chest pain or pleuritic pain or hemoptysis or dyspnea or cough.  She denies trismus or drooling or voice changes or difficulty swallowing.  She denies headaches or neck stiffness or rashes.  Patient is primary historian.    The history is provided by the patient. No  was used.       ROS     Review of Systems   Constitutional:  Negative for fever.   HENT:  Positive for congestion and sneezing. Negative for facial swelling and trouble swallowing.    Eyes:  Negative for photophobia and visual disturbance.   Respiratory:  Negative for cough and shortness of breath.    Cardiovascular:  Negative for

## 2024-03-21 NOTE — DISCHARGE INSTRUCTIONS
Your COVID and flu test were negative.  I recommend alternating tylenol and advil every 4 hours for pain, fever, bodyaches.  Use cough drops, warm salt water gargles for sore throat.  Use flonase nasal spray for congestion.  Drink plenty of fluids and do not allow yourself to get dehydrated.  Continue to monitor your symptoms and return for any new or worsening symptoms.  Otherwise please follow up with your primary care provider.    As we discussed, I did not find a life threatening cause of your symptoms today. However, THAT DOES NOT MEAN IT COULD NOT DEVELOP. If you develop ANY new or worsening symptoms, it is critical that you return for re-evaluation. This includes any symptoms that are concerning to you, especially symptoms such as difficulty breathing, chest pain, high fever unresponsive to medication.  If you do not return for re-evaluation, you risk serious complications, including death.

## 2024-03-21 NOTE — ED NOTES
I have reviewed discharge instructions with the patient.  The patient verbalized understanding.    Patient left ED via Discharge Method: ambulatory to Home with family.    Opportunity for questions and clarification provided.       Patient given 0 scripts.         To continue your aftercare when you leave the hospital, you may receive an automated call from our care team to check in on how you are doing.  This is a free service and part of our promise to provide the best care and service to meet your aftercare needs.” If you have questions, or wish to unsubscribe from this service please call [phone].  Thank you for Choosing our [facility] Emergency Department.

## 2024-04-01 ENCOUNTER — APPOINTMENT (OUTPATIENT)
Dept: CT IMAGING | Age: 79
End: 2024-04-01
Payer: MEDICARE

## 2024-04-01 ENCOUNTER — APPOINTMENT (OUTPATIENT)
Dept: GENERAL RADIOLOGY | Age: 79
End: 2024-04-01
Payer: MEDICARE

## 2024-04-01 ENCOUNTER — HOSPITAL ENCOUNTER (EMERGENCY)
Age: 79
Discharge: HOME | End: 2024-04-01
Attending: EMERGENCY MEDICINE
Payer: MEDICARE

## 2024-04-01 VITALS
RESPIRATION RATE: 18 BRPM | BODY MASS INDEX: 32.02 KG/M2 | WEIGHT: 174 LBS | HEIGHT: 62 IN | SYSTOLIC BLOOD PRESSURE: 149 MMHG | DIASTOLIC BLOOD PRESSURE: 61 MMHG | OXYGEN SATURATION: 97 % | TEMPERATURE: 98.3 F | HEART RATE: 62 BPM

## 2024-04-01 DIAGNOSIS — R20.2 PARESTHESIA: ICD-10-CM

## 2024-04-01 DIAGNOSIS — R30.0 DYSURIA: Primary | ICD-10-CM

## 2024-04-01 DIAGNOSIS — R07.9 CHEST PAIN, UNSPECIFIED TYPE: ICD-10-CM

## 2024-04-01 LAB
ALBUMIN SERPL-MCNC: 3.6 G/DL (ref 3.2–4.6)
ALBUMIN/GLOB SERPL: 0.8 (ref 0.4–1.6)
ALP SERPL-CCNC: 80 U/L (ref 50–136)
ALT SERPL-CCNC: 51 U/L (ref 12–65)
ANION GAP SERPL CALC-SCNC: 4 MMOL/L (ref 2–11)
APPEARANCE UR: CLEAR
AST SERPL-CCNC: 35 U/L (ref 15–37)
BACTERIA URNS QL MICRO: NEGATIVE /HPF
BASOPHILS # BLD: 0 K/UL (ref 0–0.2)
BASOPHILS NFR BLD: 1 % (ref 0–2)
BILIRUB SERPL-MCNC: 0.4 MG/DL (ref 0.2–1.1)
BILIRUB UR QL: NEGATIVE
BUN SERPL-MCNC: 12 MG/DL (ref 8–23)
CALCIUM SERPL-MCNC: 9.6 MG/DL (ref 8.3–10.4)
CASTS URNS QL MICRO: NORMAL /LPF
CHLORIDE SERPL-SCNC: 102 MMOL/L (ref 103–113)
CO2 SERPL-SCNC: 28 MMOL/L (ref 21–32)
COLOR UR: NORMAL
CREAT SERPL-MCNC: 0.7 MG/DL (ref 0.6–1)
DIFFERENTIAL METHOD BLD: ABNORMAL
EKG ATRIAL RATE: 60 BPM
EKG DIAGNOSIS: NORMAL
EKG P AXIS: 35 DEGREES
EKG P-R INTERVAL: 174 MS
EKG Q-T INTERVAL: 436 MS
EKG QRS DURATION: 88 MS
EKG QTC CALCULATION (BAZETT): 440 MS
EKG R AXIS: 60 DEGREES
EKG T AXIS: 51 DEGREES
EKG VENTRICULAR RATE: 61 BPM
EOSINOPHIL # BLD: 0.1 K/UL (ref 0–0.8)
EOSINOPHIL NFR BLD: 2 % (ref 0.5–7.8)
EPI CELLS #/AREA URNS HPF: NORMAL /HPF
ERYTHROCYTE [DISTWIDTH] IN BLOOD BY AUTOMATED COUNT: 14 % (ref 11.9–14.6)
GLOBULIN SER CALC-MCNC: 4.8 G/DL (ref 2.8–4.5)
GLUCOSE SERPL-MCNC: 121 MG/DL (ref 65–100)
GLUCOSE UR STRIP.AUTO-MCNC: NEGATIVE MG/DL
HCT VFR BLD AUTO: 36.1 % (ref 35.8–46.3)
HGB BLD-MCNC: 11.2 G/DL (ref 11.7–15.4)
HGB UR QL STRIP: NEGATIVE
IMM GRANULOCYTES # BLD AUTO: 0 K/UL (ref 0–0.5)
IMM GRANULOCYTES NFR BLD AUTO: 0 % (ref 0–5)
KETONES UR QL STRIP.AUTO: NEGATIVE MG/DL
LEUKOCYTE ESTERASE UR QL STRIP.AUTO: NEGATIVE
LYMPHOCYTES # BLD: 1.1 K/UL (ref 0.5–4.6)
LYMPHOCYTES NFR BLD: 21 % (ref 13–44)
MCH RBC QN AUTO: 26.9 PG (ref 26.1–32.9)
MCHC RBC AUTO-ENTMCNC: 31 G/DL (ref 31.4–35)
MCV RBC AUTO: 86.6 FL (ref 82–102)
MONOCYTES # BLD: 0.7 K/UL (ref 0.1–1.3)
MONOCYTES NFR BLD: 14 % (ref 4–12)
MUCOUS THREADS URNS QL MICRO: 0 /LPF
NEUTS SEG # BLD: 3.2 K/UL (ref 1.7–8.2)
NEUTS SEG NFR BLD: 63 % (ref 43–78)
NITRITE UR QL STRIP.AUTO: NEGATIVE
NRBC # BLD: 0 K/UL (ref 0–0.2)
PH UR STRIP: 8 (ref 5–9)
PLATELET # BLD AUTO: 115 K/UL (ref 150–450)
PMV BLD AUTO: 11.3 FL (ref 9.4–12.3)
POTASSIUM SERPL-SCNC: 4.1 MMOL/L (ref 3.5–5.1)
PROT SERPL-MCNC: 8.4 G/DL (ref 6.3–8.2)
PROT UR STRIP-MCNC: NEGATIVE MG/DL
RBC # BLD AUTO: 4.17 M/UL (ref 4.05–5.2)
RBC #/AREA URNS HPF: NORMAL /HPF
SODIUM SERPL-SCNC: 134 MMOL/L (ref 136–146)
SP GR UR REFRACTOMETRY: 1.01 (ref 1–1.02)
TROPONIN I SERPL HS-MCNC: 17.4 PG/ML (ref 0–14)
TROPONIN I SERPL HS-MCNC: 18.1 PG/ML (ref 0–14)
URINE CULTURE IF INDICATED: NORMAL
UROBILINOGEN UR QL STRIP.AUTO: 0.2 EU/DL (ref 0.2–1)
WBC # BLD AUTO: 5.1 K/UL (ref 4.3–11.1)
WBC URNS QL MICRO: NORMAL /HPF

## 2024-04-01 PROCEDURE — 71045 X-RAY EXAM CHEST 1 VIEW: CPT

## 2024-04-01 PROCEDURE — 81001 URINALYSIS AUTO W/SCOPE: CPT

## 2024-04-01 PROCEDURE — 93005 ELECTROCARDIOGRAM TRACING: CPT | Performed by: EMERGENCY MEDICINE

## 2024-04-01 PROCEDURE — 93010 ELECTROCARDIOGRAM REPORT: CPT | Performed by: INTERNAL MEDICINE

## 2024-04-01 PROCEDURE — 80053 COMPREHEN METABOLIC PANEL: CPT

## 2024-04-01 PROCEDURE — 99285 EMERGENCY DEPT VISIT HI MDM: CPT

## 2024-04-01 PROCEDURE — 85025 COMPLETE CBC W/AUTO DIFF WBC: CPT

## 2024-04-01 PROCEDURE — 84484 ASSAY OF TROPONIN QUANT: CPT

## 2024-04-01 PROCEDURE — 94762 N-INVAS EAR/PLS OXIMTRY CONT: CPT

## 2024-04-01 PROCEDURE — 74176 CT ABD & PELVIS W/O CONTRAST: CPT

## 2024-04-01 ASSESSMENT — LIFESTYLE VARIABLES
HOW OFTEN DO YOU HAVE A DRINK CONTAINING ALCOHOL: NEVER
HOW MANY STANDARD DRINKS CONTAINING ALCOHOL DO YOU HAVE ON A TYPICAL DAY: PATIENT DOES NOT DRINK

## 2024-04-01 ASSESSMENT — ENCOUNTER SYMPTOMS
VOMITING: 0
COUGH: 0
ABDOMINAL PAIN: 0
SHORTNESS OF BREATH: 0
DIARRHEA: 0
BACK PAIN: 0
SORE THROAT: 0
CONSTIPATION: 0

## 2024-04-01 ASSESSMENT — PAIN SCALES - GENERAL
PAINLEVEL_OUTOF10: 0
PAINLEVEL_OUTOF10: 1

## 2024-04-01 ASSESSMENT — PAIN DESCRIPTION - ORIENTATION: ORIENTATION: MID

## 2024-04-01 ASSESSMENT — PAIN - FUNCTIONAL ASSESSMENT
PAIN_FUNCTIONAL_ASSESSMENT: 0-10
PAIN_FUNCTIONAL_ASSESSMENT: 0-10

## 2024-04-01 ASSESSMENT — PAIN DESCRIPTION - DESCRIPTORS: DESCRIPTORS: ACHING

## 2024-04-01 ASSESSMENT — PAIN DESCRIPTION - LOCATION: LOCATION: CHEST

## 2024-04-01 NOTE — ED NOTES
Patient mobility status  with mild difficulty. Provider aware     I have reviewed discharge instructions with the patient.  The patient verbalized understanding.    Patient left ED via Discharge Method: wheelchair to Home with Child.    Opportunity for questions and clarification provided.     Patient given 0 scripts.           Austin Dumont RN  04/01/24 7811

## 2024-04-01 NOTE — ED TRIAGE NOTES
Pt coming from home via [facility]. Pt c/o generalized weakness, \"feeling run down,\" after 2 large bowel  movements this morning. Pt also c/o polyuria x2 weeks. Pt denies any melena or dysuria. Pt also secondary c/o pedal edema and numbness in hands/feet. Pt also c/o soreness at bottom of rubs and now c/o substernal chest pain/heaviness 2/10. Pt hx MI with 2 stents     EMS gave 0.4 NTG and pt said it helped a little, 243 ASA since pt took 81mg this morning. EKG unremarkable with EMS     EMS vitals; /70, HR 70, 97% RA, , afebrile

## 2024-04-01 NOTE — ED PROVIDER NOTES
[employer] Emergency Department Provider Note                   PCP:                Betty Allen, APRN - CNP               Age: 78 y.o.      Sex: female     MEDICAL DECISION MAKING  Complexity of Problems Addressed:   1 or more acute illness/injury that poses a threat to life or bodily function    Data Reviewed and Analyzed:  Category 1:    I have reviewed outside records from an external source for any pertinent PMH, ED visits, primary care visits, specialist visits, labs, EKG, and/or radiologic studies.    Category 2:     ED EKG Interpretation  EKG was interpreted in the absence of a cardiologist.    Rate: Rate: Normal  EKG Interpretation: EKG Interpretation: sinus rhythm, no evidence of arrhythmia  ST Segments: Nonspecific ST segments - NO STEMI      I independently ordered and reviewed the labs.    I have reviewed the Radiologist interpretation of the radiologic studies.                Category 3:     Discussion of management or test interpretation:    MDM  Number of Diagnoses or Management Options  Chest pain, unspecified type  Dysuria  Paresthesia  Diagnosis management comments: Patient presents for several weeks of dysuria and frequency.  Patient's urinalysis shows no signs of infection.  Patient's CBC and CMP showed no significant abnormalities.  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  She has no abdominal pain or tenderness.  It also shows that her infrarenal abdominal aorta aneurysm has increased in size slightly from previous measurement but there is no bleeding or leakage.  There is no kidney stones or renal mass.  Patient also complained of numbness which I believe is from anxiety.  Her numbness was to all 4 extremities and resolved in the ED.  Patient had no signs or symptoms to suspect stroke.  Patient also had brief episode of chest pain which did resolve with nitroglycerin.  EKG showed normal sinus rhythm but

## 2024-04-03 ENCOUNTER — TELEPHONE (OUTPATIENT)
Age: 79
End: 2024-04-03

## 2024-04-03 ENCOUNTER — TELEPHONE (OUTPATIENT)
Dept: CARDIAC REHAB | Age: 79
End: 2024-04-03

## 2024-04-03 NOTE — TELEPHONE ENCOUNTER
PT would like to be moved up sooner after a hospital visit. Please call the patient back if the appointment can be moved.

## 2024-04-04 ENCOUNTER — OFFICE VISIT (OUTPATIENT)
Age: 79
End: 2024-04-04
Payer: MEDICARE

## 2024-04-04 VITALS
HEART RATE: 78 BPM | WEIGHT: 179.4 LBS | BODY MASS INDEX: 33.01 KG/M2 | HEIGHT: 62 IN | DIASTOLIC BLOOD PRESSURE: 72 MMHG | SYSTOLIC BLOOD PRESSURE: 138 MMHG

## 2024-04-04 DIAGNOSIS — I71.43 INFRARENAL ABDOMINAL AORTIC ANEURYSM (AAA) WITHOUT RUPTURE (HCC): ICD-10-CM

## 2024-04-04 DIAGNOSIS — I25.10 CORONARY ARTERY DISEASE INVOLVING NATIVE CORONARY ARTERY OF NATIVE HEART WITHOUT ANGINA PECTORIS: Primary | ICD-10-CM

## 2024-04-04 DIAGNOSIS — E78.5 DYSLIPIDEMIA: ICD-10-CM

## 2024-04-04 DIAGNOSIS — I35.0 MILD AORTIC STENOSIS: ICD-10-CM

## 2024-04-04 DIAGNOSIS — I10 ESSENTIAL HYPERTENSION: ICD-10-CM

## 2024-04-04 PROCEDURE — 3078F DIAST BP <80 MM HG: CPT | Performed by: INTERNAL MEDICINE

## 2024-04-04 PROCEDURE — 99214 OFFICE O/P EST MOD 30 MIN: CPT | Performed by: INTERNAL MEDICINE

## 2024-04-04 PROCEDURE — 1090F PRES/ABSN URINE INCON ASSESS: CPT | Performed by: INTERNAL MEDICINE

## 2024-04-04 PROCEDURE — G8427 DOCREV CUR MEDS BY ELIG CLIN: HCPCS | Performed by: INTERNAL MEDICINE

## 2024-04-04 PROCEDURE — G8417 CALC BMI ABV UP PARAM F/U: HCPCS | Performed by: INTERNAL MEDICINE

## 2024-04-04 PROCEDURE — 1036F TOBACCO NON-USER: CPT | Performed by: INTERNAL MEDICINE

## 2024-04-04 PROCEDURE — G8399 PT W/DXA RESULTS DOCUMENT: HCPCS | Performed by: INTERNAL MEDICINE

## 2024-04-04 PROCEDURE — 1123F ACP DISCUSS/DSCN MKR DOCD: CPT | Performed by: INTERNAL MEDICINE

## 2024-04-04 PROCEDURE — 3075F SYST BP GE 130 - 139MM HG: CPT | Performed by: INTERNAL MEDICINE

## 2024-04-04 RX ORDER — CARVEDILOL 25 MG/1
25 TABLET ORAL 2 TIMES DAILY
Qty: 180 TABLET | Refills: 3 | Status: SHIPPED | OUTPATIENT
Start: 2024-04-04

## 2024-04-04 ASSESSMENT — ENCOUNTER SYMPTOMS
STRIDOR: 0
HOARSE VOICE: 0
DOUBLE VISION: 0
HEMATOCHEZIA: 0
HEMATEMESIS: 0
WHEEZING: 0
EYE REDNESS: 0
ABDOMINAL PAIN: 0
HEMOPTYSIS: 0

## 2024-04-04 NOTE — PROGRESS NOTES
high-sensitivity troponin of 15,000.  Findings were consistent with a non-STEMI and she underwent coronary angiography which showed significant mid circumflex and mid RCA disease that was successfully stented with drug-eluting stents.  She had transient high degree AV block during the procedure and required a temporary pacemaker placed.  This was removed at the end of the procedure.  Since then, she has had no further episodes of chest pain in any way.    Aortic stenosis-no syncope, angina or any significant heart failure symptoms such as lower extremity edema orthopnea or PND.  -Some chronic dyspnea on exertion-NYHA class II but nothing new for her.    Hypertension: She has struggled with resistant hypertension for a while but swings in blood pressures and she says they actually got much better when her anxiety was better treated with Ativan twice daily.    -Home blood pressure readings are excellent.  She monitors her blood pressures sometimes 2-3 times a day.  Quite often, her home blood pressure readings are in the 120s to 130s range.  As mentioned above, transiently had elevated pressures-better now.  -No recent headaches or blurry vision.  No recent TIAs or strokelike symptoms.     Hyponatremia: Aggravated by spironolactone.  Used to be an issue also with hydrochlorothiazide in the past.  Even after she cut back spironolactone to just half a pill daily, sodium still below 130-we stopped spironolactone also early October 2022.  Hyponatremia has since resolved      Past Medical History, Past Surgical History, Family history, Social History, and Medications were all reviewed with the patient today and updated as necessary.     Allergies   Allergen Reactions    Latex Other (See Comments) and Dermatitis    Hydrochlorothiazide W-Triamterene Other (See Comments)     Severe dehydration    Acyclovir Other (See Comments)    Cimetidine Other (See Comments)    Citalopram Hydrobromide Other (See Comments)    Dexamethasone

## 2024-04-10 ENCOUNTER — HOSPITAL ENCOUNTER (OUTPATIENT)
Dept: CARDIAC REHAB | Age: 79
Setting detail: RECURRING SERIES
Discharge: HOME | End: 2024-04-13
Payer: MEDICARE

## 2024-04-10 VITALS — WEIGHT: 176 LBS | BODY MASS INDEX: 32.19 KG/M2

## 2024-04-10 PROCEDURE — 93798 PHYS/QHP OP CAR RHAB W/ECG: CPT

## 2024-04-10 ASSESSMENT — EXERCISE STRESS TEST
PEAK_BP: 126/68
PEAK_METS: 2.4
PEAK_HR: 92

## 2024-04-12 ENCOUNTER — TELEPHONE (OUTPATIENT)
Age: 79
End: 2024-04-12

## 2024-04-12 ENCOUNTER — HOSPITAL ENCOUNTER (EMERGENCY)
Age: 79
Discharge: HOME | End: 2024-04-12
Attending: STUDENT IN AN ORGANIZED HEALTH CARE EDUCATION/TRAINING PROGRAM
Payer: MEDICARE

## 2024-04-12 ENCOUNTER — PATIENT MESSAGE (OUTPATIENT)
Age: 79
End: 2024-04-12

## 2024-04-12 VITALS
TEMPERATURE: 98.2 F | WEIGHT: 174 LBS | OXYGEN SATURATION: 99 % | HEIGHT: 62 IN | DIASTOLIC BLOOD PRESSURE: 66 MMHG | HEART RATE: 62 BPM | RESPIRATION RATE: 19 BRPM | SYSTOLIC BLOOD PRESSURE: 134 MMHG | BODY MASS INDEX: 32.02 KG/M2

## 2024-04-12 DIAGNOSIS — I10 HYPERTENSION, UNSPECIFIED TYPE: Primary | ICD-10-CM

## 2024-04-12 LAB
ALBUMIN SERPL-MCNC: 3.8 G/DL (ref 3.2–4.6)
ALBUMIN/GLOB SERPL: 0.8 (ref 0.4–1.6)
ALP SERPL-CCNC: 85 U/L (ref 50–136)
ALT SERPL-CCNC: 41 U/L (ref 12–65)
ANION GAP SERPL CALC-SCNC: 4 MMOL/L (ref 2–11)
APPEARANCE UR: CLEAR
AST SERPL-CCNC: 21 U/L (ref 15–37)
BASOPHILS # BLD: 0 K/UL (ref 0–0.2)
BASOPHILS NFR BLD: 0 % (ref 0–2)
BILIRUB SERPL-MCNC: 0.2 MG/DL (ref 0.2–1.1)
BILIRUB UR QL: NEGATIVE
BILIRUB UR QL: NEGATIVE
BUN SERPL-MCNC: 17 MG/DL (ref 8–23)
CALCIUM SERPL-MCNC: 9.3 MG/DL (ref 8.3–10.4)
CHLORIDE SERPL-SCNC: 106 MMOL/L (ref 103–113)
CO2 SERPL-SCNC: 27 MMOL/L (ref 21–32)
COLOR UR: NORMAL
CREAT SERPL-MCNC: 0.7 MG/DL (ref 0.6–1)
DIFFERENTIAL METHOD BLD: ABNORMAL
EKG ATRIAL RATE: 76 BPM
EKG DIAGNOSIS: NORMAL
EKG P AXIS: 43 DEGREES
EKG P-R INTERVAL: 162 MS
EKG Q-T INTERVAL: 429 MS
EKG QRS DURATION: 105 MS
EKG QTC CALCULATION (BAZETT): 483 MS
EKG R AXIS: 73 DEGREES
EKG T AXIS: 35 DEGREES
EKG VENTRICULAR RATE: 76 BPM
EOSINOPHIL # BLD: 0.1 K/UL (ref 0–0.8)
EOSINOPHIL NFR BLD: 3 % (ref 0.5–7.8)
ERYTHROCYTE [DISTWIDTH] IN BLOOD BY AUTOMATED COUNT: 13.9 % (ref 11.9–14.6)
GLOBULIN SER CALC-MCNC: 4.9 G/DL (ref 2.8–4.5)
GLUCOSE SERPL-MCNC: 119 MG/DL (ref 65–100)
GLUCOSE UR QL STRIP.AUTO: NEGATIVE MG/DL
GLUCOSE UR STRIP.AUTO-MCNC: NEGATIVE MG/DL
HCT VFR BLD AUTO: 37.2 % (ref 35.8–46.3)
HGB BLD-MCNC: 11.6 G/DL (ref 11.7–15.4)
HGB UR QL STRIP: NEGATIVE
IMM GRANULOCYTES # BLD AUTO: 0 K/UL (ref 0–0.5)
IMM GRANULOCYTES NFR BLD AUTO: 0 % (ref 0–5)
KETONES UR QL STRIP.AUTO: NEGATIVE MG/DL
KETONES UR-MCNC: NEGATIVE MG/DL
LEUKOCYTE ESTERASE UR QL STRIP.AUTO: NEGATIVE
LEUKOCYTE ESTERASE UR QL STRIP: NEGATIVE
LYMPHOCYTES # BLD: 1.4 K/UL (ref 0.5–4.6)
LYMPHOCYTES NFR BLD: 29 % (ref 13–44)
MCH RBC QN AUTO: 27 PG (ref 26.1–32.9)
MCHC RBC AUTO-ENTMCNC: 31.2 G/DL (ref 31.4–35)
MCV RBC AUTO: 86.5 FL (ref 82–102)
MONOCYTES # BLD: 0.7 K/UL (ref 0.1–1.3)
MONOCYTES NFR BLD: 15 % (ref 4–12)
NEUTS SEG # BLD: 2.5 K/UL (ref 1.7–8.2)
NEUTS SEG NFR BLD: 53 % (ref 43–78)
NITRITE UR QL STRIP.AUTO: NEGATIVE
NITRITE UR QL: NEGATIVE
NRBC # BLD: 0 K/UL (ref 0–0.2)
PH UR STRIP: 7 (ref 5–9)
PH UR: 6.5 (ref 5–9)
PLATELET # BLD AUTO: 113 K/UL (ref 150–450)
PMV BLD AUTO: 12.3 FL (ref 9.4–12.3)
POTASSIUM SERPL-SCNC: 3.7 MMOL/L (ref 3.5–5.1)
PROT SERPL-MCNC: 8.7 G/DL (ref 6.3–8.2)
PROT UR QL: ABNORMAL MG/DL
PROT UR STRIP-MCNC: NEGATIVE MG/DL
RBC # BLD AUTO: 4.3 M/UL (ref 4.05–5.2)
RBC # UR STRIP: ABNORMAL
SERVICE CMNT-IMP: ABNORMAL
SODIUM SERPL-SCNC: 137 MMOL/L (ref 136–146)
SP GR UR REFRACTOMETRY: 1.01 (ref 1–1.02)
SP GR UR: 1.01 (ref 1–1.02)
TROPONIN I SERPL HS-MCNC: 18.6 PG/ML (ref 0–14)
UROBILINOGEN UR QL STRIP.AUTO: 0.2 EU/DL (ref 0.2–1)
UROBILINOGEN UR QL: 0.2 EU/DL (ref 0.2–1)
WBC # BLD AUTO: 4.9 K/UL (ref 4.3–11.1)

## 2024-04-12 PROCEDURE — 93010 ELECTROCARDIOGRAM REPORT: CPT | Performed by: INTERNAL MEDICINE

## 2024-04-12 PROCEDURE — 81003 URINALYSIS AUTO W/O SCOPE: CPT

## 2024-04-12 PROCEDURE — 99284 EMERGENCY DEPT VISIT MOD MDM: CPT

## 2024-04-12 PROCEDURE — 84484 ASSAY OF TROPONIN QUANT: CPT

## 2024-04-12 PROCEDURE — 93005 ELECTROCARDIOGRAM TRACING: CPT | Performed by: STUDENT IN AN ORGANIZED HEALTH CARE EDUCATION/TRAINING PROGRAM

## 2024-04-12 PROCEDURE — 80053 COMPREHEN METABOLIC PANEL: CPT

## 2024-04-12 PROCEDURE — 87086 URINE CULTURE/COLONY COUNT: CPT

## 2024-04-12 PROCEDURE — 85025 COMPLETE CBC W/AUTO DIFF WBC: CPT

## 2024-04-12 ASSESSMENT — PAIN - FUNCTIONAL ASSESSMENT: PAIN_FUNCTIONAL_ASSESSMENT: NONE - DENIES PAIN

## 2024-04-12 NOTE — TELEPHONE ENCOUNTER
Xavier Bowen MD Keener, Lynn F RN  Caller: Unspecified (Today,  4:51 PM)  I reviewed her labs and they look good-- avoid gatorade.  Increase eplerenone to 2 pills daily.  Yes anxiety certainly drives her pressures.  She would benefit from seeing her primary to treat the anxiety as treatment of this will definitely help blood pressures  Thanks,  AD

## 2024-04-12 NOTE — TELEPHONE ENCOUNTER
Advised patient of Dr. Good's response. Patient verbalized understanding, but states she is not taking eplerenone, and cannot take diuretics. Currently taking amlodipine 5 mg qd, ASA 81 mg qd, carvedilol 25 mg 1 tab q am and 1/2 tab q pm, Plavix 75 mg qd, losartan 100 mg qd, and Crestor 10 mg qd. Advised patient that I will notify cardiologist of above when doctors return, next week and call with further recommendations. Advised patient to go to [facility] ER, if BP remains high or if symptoms increase. Patient verbalized understanding.

## 2024-04-12 NOTE — ED PROVIDER NOTES
[facility]  Emergency Department    DISPOSITION Decision To Discharge 04/12/2024 07:07:33 AM       ICD-10-CM    1. Hypertension, unspecified type  I10         ED Course     ED Course as of 04/12/24 0707   Fri Apr 12, 2024   0554 78-year-old female history of HTN presents with generalized weakness now improved.  Mild hypertension, otherwise vitals reassuring.  Neuro intact; NIH 0.  Low suspicion for endorgan damage on history or physical exam.  EKG without evidence of STEMI.  Will obtain labs and monitor blood pressure [ER]   0554 EKG: Normal sinus rhythm, rate 76.  No STEMI.  PVCs.  Nonspecific ST and T wave changes.  Time: 0541 [ER]   0656 Labs reassuring; troponin at baseline. She has had no chest pain so do not think repeat troponin is warranted. BP has improved to 134/66. UA pending. Had a lengthy discussion with patient and daughter regarding results and findings as well as management of blood pressure and asymptomatic hypertension. She has been under stress recently due to multiple family deaths and believes this is causing her recent high bp's. She already has ativan and clonidine prescribed. [ER]   0705 UA negative; sent for culture. Patient's daughter now admitting that patient is not prescribed clonidine or ativan and has been taking daughter's medications. I offered to write a rx for clonidine which they declined. They plan to reach out to Cardiology to discuss her medications. [ER]      ED Course User Index  [ER] Sam Russo MD     Data Reviewed and Analyzed:  1 acute, uncomplicated illness or injury.  Patient was discharged risks and benefits of hospitalization were considered.  Shared medical decision making was utilized in creating the patients health plan today.    I independently ordered and reviewed each unique test.   The patients assessment required an independent historian: family.  The reason they were needed is important historical information not provided by

## 2024-04-12 NOTE — TELEPHONE ENCOUNTER
----- Message from Lurdes Hernandez MA sent at 4/12/2024  4:17 PM EDT -----  Regarding: FW: Lab results 0n 04/12/2024 from ER visit at [facility].      Contact: [phone]    ----- Message -----  From: Mago Chowdary MA  Sent: 4/12/2024   3:25 PM EDT  To: Lurdes Hernandez MA  Subject: FW: Lab results 0n 04/12/2024 from ER visit #      ----- Message -----  From: Aurora Sanchez  Sent: 4/12/2024   2:51 PM EDT  To: [facility] Cardiology Clinical Staff  Subject: Lab results 0n 04/12/2024 from ER visit at       When awaken to urinate @ 1:00 AM my  blood pressure was 191/105, headache and feeling weak, checked my b/p.  I took clonidine 0.1mg that belonged to my daughter.  My b/p lowered to 143/73, by 2: 00AM.  I then took Amlodpine 5mg and retuned to bed.  IT  seems that my electrolytes are on the lower end of being normal; and I do not  know what to do about high level of protein in my blood.  with in 24 hours I do not have much option left if this spike happens again.  Should I use an electrolyte solution?  I have Gatolyte on hand.  this is related to anxiety??   I did take Lorazepam 1 mg  @ 4:00AM before calling EMS and being transported to ER: b/p started going down  after that. My urine is pale yellow , like when taking B complex , even when I forget to take it.  What to do?    Thanks.  LBThomas

## 2024-04-12 NOTE — ED NOTES
Patient mobility status  with no difficulty. Provider aware     I have reviewed discharge instructions with the patient.  The patient verbalized understanding.    Patient left ED via Discharge Method: ambulatory to Home with Child.    Opportunity for questions and clarification provided.     Patient given 0 scripts.           Valentino Cramer RN  04/12/24 0736

## 2024-04-12 NOTE — ED TRIAGE NOTES
Patient arrives via EMS from home, c/o L shoulder soreness, gen weakness, frequent urination. States checked BP at home and was high.

## 2024-04-12 NOTE — DISCHARGE INSTRUCTIONS
Your blood work today was reassuring.  You may continue to take your blood pressure medicine as prescribed. You may take 1-2 clonidine as needed for persistent high blood pressure. Please up with your primary care doctor and cardiologist for recheck of your blood pressure and to discuss your medications.  Return to the ER for any new or worsening symptoms

## 2024-04-14 LAB
BACTERIA SPEC CULT: NORMAL
SERVICE CMNT-IMP: NORMAL

## 2024-04-15 ENCOUNTER — HOSPITAL ENCOUNTER (OUTPATIENT)
Dept: CARDIAC REHAB | Age: 79
Setting detail: RECURRING SERIES
Discharge: HOME | End: 2024-04-18
Payer: MEDICARE

## 2024-04-15 VITALS — WEIGHT: 173.6 LBS | BODY MASS INDEX: 31.75 KG/M2

## 2024-04-15 PROCEDURE — 93798 PHYS/QHP OP CAR RHAB W/ECG: CPT

## 2024-04-15 ASSESSMENT — EXERCISE STRESS TEST
PEAK_BP: 138/76
PEAK_HR: 101
PEAK_METS: 2.5

## 2024-04-15 NOTE — TELEPHONE ENCOUNTER
Mark Manzanares III, MD Keener, Lynn F RN  Caller: Unspecified (3 days ago,  5:13 PM)  I think eplerenone be a good addition for her.  Will start her on 50 mg once daily.  Thanks  marino

## 2024-04-15 NOTE — TELEPHONE ENCOUNTER
Advised patient of Dr. Good's response and Dr. Manzanares's response. Patient verbalized understanding, but declined eplerenone. She states she feels BP is increased by anxiety and recent increase in stress with 2 deaths in family, and she does not feel she should add another BP med. Systolic BP-120-130 when resting and relaxing. She states she will be seeing doctor for anxiety med to help with high BP. She agrees to call back if BP does not improve with treatment for anxiety or with any further questions or concerns.

## 2024-04-15 NOTE — TELEPHONE ENCOUNTER
Xavier Bowen MD Keener, Lynn F RN  Caller: Unspecified (3 days ago,  5:13 PM)  Eplerenone is a different type of diuretic-she would benefit from taking it.  If she does not take it, her blood pressures would not be controlled.  She might need to seek a second opinion for her blood pressures.  Thanks,  AD

## 2024-04-18 ASSESSMENT — LIFESTYLE VARIABLES: SMOKELESS_TOBACCO: NO

## 2024-04-18 ASSESSMENT — EJECTION FRACTION: EF_VALUE: 60

## 2024-04-18 ASSESSMENT — EXERCISE STRESS TEST: PEAK_BP: 138/76

## 2024-04-19 ENCOUNTER — HOSPITAL ENCOUNTER (OUTPATIENT)
Dept: CARDIAC REHAB | Age: 79
Setting detail: RECURRING SERIES
Discharge: HOME | End: 2024-04-22
Payer: MEDICARE

## 2024-04-19 VITALS — BODY MASS INDEX: 31.97 KG/M2 | WEIGHT: 174.8 LBS

## 2024-04-19 PROCEDURE — 93798 PHYS/QHP OP CAR RHAB W/ECG: CPT

## 2024-04-19 ASSESSMENT — EXERCISE STRESS TEST
PEAK_METS: 2.4
PEAK_BP: 132/70
PEAK_HR: 90

## 2024-04-24 ENCOUNTER — HOSPITAL ENCOUNTER (OUTPATIENT)
Dept: CARDIAC REHAB | Age: 79
Setting detail: RECURRING SERIES
Discharge: HOME | End: 2024-04-27
Payer: MEDICARE

## 2024-04-24 VITALS — BODY MASS INDEX: 32.04 KG/M2 | WEIGHT: 175.2 LBS

## 2024-04-24 PROCEDURE — 93798 PHYS/QHP OP CAR RHAB W/ECG: CPT

## 2024-04-24 ASSESSMENT — EXERCISE STRESS TEST
PEAK_BP: 138/58
PEAK_METS: 2.5
PEAK_HR: 100

## 2024-04-25 ENCOUNTER — APPOINTMENT (OUTPATIENT)
Dept: CARDIAC REHAB | Age: 79
End: 2024-04-25
Payer: MEDICARE

## 2024-04-26 ENCOUNTER — HOSPITAL ENCOUNTER (OUTPATIENT)
Dept: CARDIAC REHAB | Age: 79
Setting detail: RECURRING SERIES
Discharge: HOME | End: 2024-04-29
Payer: MEDICARE

## 2024-04-26 PROCEDURE — 93798 PHYS/QHP OP CAR RHAB W/ECG: CPT

## 2024-04-26 ASSESSMENT — EXERCISE STRESS TEST
PEAK_BP: 134/78
PEAK_METS: 2.5
PEAK_HR: 90

## 2024-04-30 ENCOUNTER — TELEPHONE (OUTPATIENT)
Dept: UROLOGY | Age: 79
End: 2024-04-30

## 2024-04-30 ENCOUNTER — OFFICE VISIT (OUTPATIENT)
Dept: UROLOGY | Age: 79
End: 2024-04-30
Payer: MEDICARE

## 2024-04-30 DIAGNOSIS — N39.41 URGE INCONTINENCE OF URINE: ICD-10-CM

## 2024-04-30 DIAGNOSIS — R35.1 NOCTURIA: ICD-10-CM

## 2024-04-30 DIAGNOSIS — R35.1 NOCTURIA: Primary | ICD-10-CM

## 2024-04-30 LAB
BILIRUBIN, URINE, POC: NEGATIVE
BLOOD URINE, POC: NEGATIVE
GLUCOSE URINE, POC: NEGATIVE
KETONES, URINE, POC: NEGATIVE
LEUKOCYTE ESTERASE, URINE, POC: NEGATIVE
NITRITE, URINE, POC: NEGATIVE
PH, URINE, POC: 6 (ref 4.6–8)
PROTEIN,URINE, POC: 30
SPECIFIC GRAVITY, URINE, POC: 1 (ref 1–1.03)
URINALYSIS CLARITY, POC: NORMAL
URINALYSIS COLOR, POC: NORMAL
UROBILINOGEN, POC: NORMAL

## 2024-04-30 PROCEDURE — G8417 CALC BMI ABV UP PARAM F/U: HCPCS | Performed by: NURSE PRACTITIONER

## 2024-04-30 PROCEDURE — 1090F PRES/ABSN URINE INCON ASSESS: CPT | Performed by: NURSE PRACTITIONER

## 2024-04-30 PROCEDURE — G8428 CUR MEDS NOT DOCUMENT: HCPCS | Performed by: NURSE PRACTITIONER

## 2024-04-30 PROCEDURE — 1123F ACP DISCUSS/DSCN MKR DOCD: CPT | Performed by: NURSE PRACTITIONER

## 2024-04-30 PROCEDURE — 1036F TOBACCO NON-USER: CPT | Performed by: NURSE PRACTITIONER

## 2024-04-30 PROCEDURE — 81003 URINALYSIS AUTO W/O SCOPE: CPT | Performed by: NURSE PRACTITIONER

## 2024-04-30 PROCEDURE — 0509F URINE INCON PLAN DOCD: CPT | Performed by: NURSE PRACTITIONER

## 2024-04-30 PROCEDURE — G8399 PT W/DXA RESULTS DOCUMENT: HCPCS | Performed by: NURSE PRACTITIONER

## 2024-04-30 PROCEDURE — 99204 OFFICE O/P NEW MOD 45 MIN: CPT | Performed by: NURSE PRACTITIONER

## 2024-04-30 ASSESSMENT — ENCOUNTER SYMPTOMS
VOMITING: 0
INDIGESTION: 0
SKIN LESIONS: 0
NAUSEA: 0
DIARRHEA: 0
ABDOMINAL PAIN: 0
BACK PAIN: 1
WHEEZING: 0
SHORTNESS OF BREATH: 0
EYE DISCHARGE: 0
CONSTIPATION: 1
BLOOD IN STOOL: 0
HEARTBURN: 0
COUGH: 0
EYE PAIN: 0

## 2024-04-30 NOTE — PROGRESS NOTES
[facility]OLOG[facility]  [address] [place] [id number]  [phone]          Aurora Sanchez  : 1945    Chief Complaint   Patient presents with    New Patient    Dysuria    Urinary Urgency    Incontinence          HPI     Aurora Sanchez is a 78 y.o. female    Referred by PCP secondary to nocturia, urinary frequency and urge incontinence.  Patient tells me that since January she has had worsening symptoms with nocturia.  She has significant history of heart disease.  Had some stent placements earlier this year.  She has bilateral lower extremity edema daily.  She is wearing compression socks to help with the edema.  At night when she goes to bed she is up and down diuresing this fluid.  There are times that she is unable to make it to the bathroom.  She has had several hospitalizations since January and during her hospitalization she would use the pure wick.  This helped her tremendously and she was able to sleep during the night.    She has lost her son since January and also a niece.  Her daughter from [place] is also moved in with her and is having poor health as well.  Patient tells me she is not having any urinary infections or has not experienced any significant dysuria.  She is not 1 that has had infections in the past either.  She denies any fever chills or hematuria.    Significant history of 3 pregnancies with 3 vaginal deliveries.  She also underwent hysterectomy many years ago.  She does wear pads day and night. Typically during the day the pads stay dry.  Night time she will have to change pads due to the fact that she can not make it the to BR quick enough.     She is here today to establish care to discuss any treatment options available to her for her urinary symptoms.      Past Medical History:   Diagnosis Date    AAA (abdominal aortic aneurysm) (HCC)     infrarenal 3.2 cm    Acute pain of left shoulder 2022    Altered bowel habits 2022    Dr. Fierro

## 2024-05-01 ENCOUNTER — HOSPITAL ENCOUNTER (OUTPATIENT)
Dept: CARDIAC REHAB | Age: 79
Setting detail: RECURRING SERIES
Discharge: HOME | End: 2024-05-04
Payer: MEDICARE

## 2024-05-01 VITALS — BODY MASS INDEX: 32.04 KG/M2 | WEIGHT: 175.2 LBS

## 2024-05-01 PROCEDURE — 93798 PHYS/QHP OP CAR RHAB W/ECG: CPT

## 2024-05-01 ASSESSMENT — EXERCISE STRESS TEST
PEAK_HR: 99
PEAK_METS: 2.5
PEAK_BP: 164/72

## 2024-05-02 ENCOUNTER — APPOINTMENT (OUTPATIENT)
Dept: CARDIAC REHAB | Age: 79
End: 2024-05-02
Payer: MEDICARE

## 2024-05-03 LAB
BACTERIA SPEC CULT: NORMAL
SERVICE CMNT-IMP: NORMAL

## 2024-05-06 ENCOUNTER — HOSPITAL ENCOUNTER (OUTPATIENT)
Dept: CARDIAC REHAB | Age: 79
Setting detail: RECURRING SERIES
Discharge: HOME | End: 2024-05-09
Payer: MEDICARE

## 2024-05-06 PROCEDURE — 93798 PHYS/QHP OP CAR RHAB W/ECG: CPT

## 2024-05-06 ASSESSMENT — EXERCISE STRESS TEST
PEAK_BP: 130/68
PEAK_HR: 88
PEAK_METS: 2.5

## 2024-05-08 ENCOUNTER — HOSPITAL ENCOUNTER (OUTPATIENT)
Dept: CARDIAC REHAB | Age: 79
Setting detail: RECURRING SERIES
Discharge: HOME | End: 2024-05-11
Payer: MEDICARE

## 2024-05-08 PROCEDURE — 93798 PHYS/QHP OP CAR RHAB W/ECG: CPT

## 2024-05-08 ASSESSMENT — EXERCISE STRESS TEST
PEAK_HR: 88
PEAK_METS: 2.5

## 2024-05-09 ENCOUNTER — APPOINTMENT (OUTPATIENT)
Dept: CARDIAC REHAB | Age: 79
End: 2024-05-09
Payer: MEDICARE

## 2024-05-10 ENCOUNTER — HOSPITAL ENCOUNTER (OUTPATIENT)
Dept: MAMMOGRAPHY | Age: 79
End: 2024-05-10
Payer: MEDICARE

## 2024-05-10 DIAGNOSIS — Z12.31 ENCOUNTER FOR SCREENING MAMMOGRAM FOR MALIGNANT NEOPLASM OF BREAST: ICD-10-CM

## 2024-05-10 PROCEDURE — 77067 SCR MAMMO BI INCL CAD: CPT

## 2024-05-15 ENCOUNTER — HOSPITAL ENCOUNTER (OUTPATIENT)
Dept: CARDIAC REHAB | Age: 79
Setting detail: RECURRING SERIES
Discharge: HOME | End: 2024-05-18
Payer: MEDICARE

## 2024-05-15 VITALS — BODY MASS INDEX: 32.37 KG/M2 | WEIGHT: 177 LBS

## 2024-05-15 PROCEDURE — 93798 PHYS/QHP OP CAR RHAB W/ECG: CPT

## 2024-05-15 ASSESSMENT — EXERCISE STRESS TEST
PEAK_METS: 2.5
PEAK_HR: 88
PEAK_BP: 128/70

## 2024-05-16 ASSESSMENT — EXERCISE STRESS TEST
PEAK_HR: 105
PEAK_BP: 128/70
PEAK_BP: 128/70

## 2024-05-16 ASSESSMENT — EJECTION FRACTION: EF_VALUE: 60

## 2024-05-16 ASSESSMENT — LIFESTYLE VARIABLES: SMOKELESS_TOBACCO: NO

## 2024-05-22 ENCOUNTER — HOSPITAL ENCOUNTER (OUTPATIENT)
Dept: CARDIAC REHAB | Age: 79
Setting detail: RECURRING SERIES
Discharge: HOME | End: 2024-05-25
Payer: MEDICARE

## 2024-05-22 VITALS — BODY MASS INDEX: 32.56 KG/M2 | WEIGHT: 178 LBS

## 2024-05-22 PROCEDURE — 93798 PHYS/QHP OP CAR RHAB W/ECG: CPT

## 2024-05-22 ASSESSMENT — PATIENT HEALTH QUESTIONNAIRE - PHQ9
1. LITTLE INTEREST OR PLEASURE IN DOING THINGS: SEVERAL DAYS
4. FEELING TIRED OR HAVING LITTLE ENERGY: MORE THAN HALF THE DAYS
2. FEELING DOWN, DEPRESSED OR HOPELESS: NOT AT ALL
3. TROUBLE FALLING OR STAYING ASLEEP: MORE THAN HALF THE DAYS
5. POOR APPETITE OR OVEREATING: NOT AT ALL
SUM OF ALL RESPONSES TO PHQ QUESTIONS 1-9: 8
9. THOUGHTS THAT YOU WOULD BE BETTER OFF DEAD, OR OF HURTING YOURSELF: NOT AT ALL
10. IF YOU CHECKED OFF ANY PROBLEMS, HOW DIFFICULT HAVE THESE PROBLEMS MADE IT FOR YOU TO DO YOUR WORK, TAKE CARE OF THINGS AT HOME, OR GET ALONG WITH OTHER PEOPLE: VERY DIFFICULT
7. TROUBLE CONCENTRATING ON THINGS, SUCH AS READING THE NEWSPAPER OR WATCHING TELEVISION: SEVERAL DAYS
SUM OF ALL RESPONSES TO PHQ QUESTIONS 1-9: 8
6. FEELING BAD ABOUT YOURSELF - OR THAT YOU ARE A FAILURE OR HAVE LET YOURSELF OR YOUR FAMILY DOWN: NOT AT ALL
SUM OF ALL RESPONSES TO PHQ9 QUESTIONS 1 & 2: 1
8. MOVING OR SPEAKING SO SLOWLY THAT OTHER PEOPLE COULD HAVE NOTICED. OR THE OPPOSITE, BEING SO FIGETY OR RESTLESS THAT YOU HAVE BEEN MOVING AROUND A LOT MORE THAN USUAL: MORE THAN HALF THE DAYS
SUM OF ALL RESPONSES TO PHQ QUESTIONS 1-9: 8
SUM OF ALL RESPONSES TO PHQ QUESTIONS 1-9: 8

## 2024-05-22 ASSESSMENT — EXERCISE STRESS TEST
PEAK_BP: 132/70
PEAK_HR: 92
PEAK_METS: 2.5

## 2024-05-31 ENCOUNTER — HOSPITAL ENCOUNTER (OUTPATIENT)
Dept: CARDIAC REHAB | Age: 79
Setting detail: RECURRING SERIES
End: 2024-05-31
Payer: MEDICARE

## 2024-05-31 PROCEDURE — 93798 PHYS/QHP OP CAR RHAB W/ECG: CPT

## 2024-05-31 ASSESSMENT — EXERCISE STRESS TEST
PEAK_BP: 142/64
PEAK_METS: 2.5
PEAK_HR: 89

## 2024-06-05 ENCOUNTER — HOSPITAL ENCOUNTER (OUTPATIENT)
Dept: CARDIAC REHAB | Age: 79
Setting detail: RECURRING SERIES
Discharge: HOME | End: 2024-06-08
Payer: MEDICARE

## 2024-06-05 VITALS — WEIGHT: 177.4 LBS | BODY MASS INDEX: 32.45 KG/M2

## 2024-06-05 PROCEDURE — 93798 PHYS/QHP OP CAR RHAB W/ECG: CPT

## 2024-06-05 ASSESSMENT — EXERCISE STRESS TEST
PEAK_METS: 2.4
PEAK_BP: 148/64
PEAK_HR: 88

## 2024-06-05 NOTE — PROGRESS NOTES
Cardiac Rehabilitation Nutrition Education    Patient attended cardiac rehab nutrition education class.  Topics included: role of nutrition in managing heart disease, [place] Healthy Eating Plate, Mediterranean diet, principles of a heart healthy diet, foods/beverages effect on metabolic parameters (blood pressure, lipids, glucose, weight), food sources and portion size of carbohydrate, fat, protein, discussed fiber and benefits of a plant-based eating pattern at length, examples of heart healthy meals and snacks, limiting sodium, added sugars, saturated fat, food label reading. Additionally covered importance of adequate sleep, stress management, and aerobic exercise for overall health.     Learners: patient   Method: group class, copy of power point with space to take notes provided.     Encouraged lifestyle modifications and to follow up with any questions for RD during rehab sessions.    Jane Betancourt, MS, RD, LDN   Cardiopulmonary Rehab Dietitian  [facility]

## 2024-06-07 ENCOUNTER — HOSPITAL ENCOUNTER (OUTPATIENT)
Dept: CARDIAC REHAB | Age: 79
Setting detail: RECURRING SERIES
Discharge: HOME | End: 2024-06-10
Payer: MEDICARE

## 2024-06-07 PROCEDURE — 93798 PHYS/QHP OP CAR RHAB W/ECG: CPT

## 2024-06-07 ASSESSMENT — EXERCISE STRESS TEST
PEAK_BP: 140/72
PEAK_HR: 83
PEAK_METS: 2.5

## 2024-06-12 ENCOUNTER — HOSPITAL ENCOUNTER (OUTPATIENT)
Dept: CARDIAC REHAB | Age: 79
Setting detail: RECURRING SERIES
Discharge: HOME | End: 2024-06-15
Payer: MEDICARE

## 2024-06-12 VITALS — BODY MASS INDEX: 32.41 KG/M2 | WEIGHT: 177.2 LBS

## 2024-06-12 PROCEDURE — 93798 PHYS/QHP OP CAR RHAB W/ECG: CPT

## 2024-06-12 ASSESSMENT — EXERCISE STRESS TEST
PEAK_METS: 2.5
PEAK_BP: 110/68
PEAK_HR: 96

## 2024-06-13 DIAGNOSIS — I10 ESSENTIAL HYPERTENSION: ICD-10-CM

## 2024-06-13 NOTE — TELEPHONE ENCOUNTER
MEDICATION REFILL REQUEST      Name of Medication:  Amlodipine  Dose:  5 mg  Frequency:  QD  Quantity:  90  Days' supply:  90 with 3 refills      Pharmacy Name/Location:  [id number]

## 2024-06-14 ENCOUNTER — HOSPITAL ENCOUNTER (OUTPATIENT)
Dept: CARDIAC REHAB | Age: 79
Setting detail: RECURRING SERIES
Discharge: HOME | End: 2024-06-17
Payer: MEDICARE

## 2024-06-14 PROCEDURE — 93798 PHYS/QHP OP CAR RHAB W/ECG: CPT

## 2024-06-14 RX ORDER — AMLODIPINE BESYLATE 5 MG/1
5 TABLET ORAL DAILY
Qty: 90 TABLET | Refills: 3 | Status: SHIPPED | OUTPATIENT
Start: 2024-06-14

## 2024-06-14 ASSESSMENT — EJECTION FRACTION: EF_VALUE: 60

## 2024-06-14 ASSESSMENT — LIFESTYLE VARIABLES: SMOKELESS_TOBACCO: NO

## 2024-06-14 ASSESSMENT — EXERCISE STRESS TEST
PEAK_BP: 110/68
PEAK_METS: 2.5

## 2024-06-14 NOTE — TELEPHONE ENCOUNTER
Requested Prescriptions     Pending Prescriptions Disp Refills    amLODIPine (NORVASC) 5 MG tablet 90 tablet 3     Sig: Take 1 tablet by mouth daily

## 2024-06-19 ENCOUNTER — OFFICE VISIT (OUTPATIENT)
Dept: INTERNAL MEDICINE CLINIC | Facility: CLINIC | Age: 79
End: 2024-06-19
Payer: MEDICARE

## 2024-06-19 VITALS
DIASTOLIC BLOOD PRESSURE: 62 MMHG | WEIGHT: 179.8 LBS | HEART RATE: 65 BPM | BODY MASS INDEX: 33.09 KG/M2 | SYSTOLIC BLOOD PRESSURE: 137 MMHG | OXYGEN SATURATION: 99 % | HEIGHT: 62 IN | TEMPERATURE: 98.4 F

## 2024-06-19 DIAGNOSIS — R73.03 PREDIABETES: ICD-10-CM

## 2024-06-19 DIAGNOSIS — I10 ESSENTIAL HYPERTENSION: ICD-10-CM

## 2024-06-19 DIAGNOSIS — Z00.00 MEDICARE ANNUAL WELLNESS VISIT, SUBSEQUENT: Primary | ICD-10-CM

## 2024-06-19 DIAGNOSIS — I25.10 CORONARY ARTERY DISEASE INVOLVING NATIVE CORONARY ARTERY OF NATIVE HEART WITHOUT ANGINA PECTORIS: ICD-10-CM

## 2024-06-19 DIAGNOSIS — D69.6 THROMBOCYTOPENIA (HCC): ICD-10-CM

## 2024-06-19 DIAGNOSIS — E78.2 MIXED HYPERLIPIDEMIA: ICD-10-CM

## 2024-06-19 PROCEDURE — 1123F ACP DISCUSS/DSCN MKR DOCD: CPT | Performed by: NURSE PRACTITIONER

## 2024-06-19 PROCEDURE — 3075F SYST BP GE 130 - 139MM HG: CPT | Performed by: NURSE PRACTITIONER

## 2024-06-19 PROCEDURE — G0439 PPPS, SUBSEQ VISIT: HCPCS | Performed by: NURSE PRACTITIONER

## 2024-06-19 PROCEDURE — 3078F DIAST BP <80 MM HG: CPT | Performed by: NURSE PRACTITIONER

## 2024-06-19 RX ORDER — ASPIRIN 81 MG/1
81 TABLET ORAL DAILY
COMMUNITY
Start: 2024-05-31

## 2024-06-19 SDOH — ECONOMIC STABILITY: FOOD INSECURITY: WITHIN THE PAST 12 MONTHS, THE FOOD YOU BOUGHT JUST DIDN'T LAST AND YOU DIDN'T HAVE MONEY TO GET MORE.: NEVER TRUE

## 2024-06-19 SDOH — ECONOMIC STABILITY: FOOD INSECURITY: WITHIN THE PAST 12 MONTHS, YOU WORRIED THAT YOUR FOOD WOULD RUN OUT BEFORE YOU GOT MONEY TO BUY MORE.: NEVER TRUE

## 2024-06-19 ASSESSMENT — ENCOUNTER SYMPTOMS
SORE THROAT: 0
CONSTIPATION: 0
ABDOMINAL PAIN: 0
RHINORRHEA: 0
SINUS PAIN: 0
DIARRHEA: 0
VOMITING: 0
BACK PAIN: 0
SHORTNESS OF BREATH: 0
COUGH: 0
EYE PAIN: 0
NAUSEA: 0

## 2024-06-19 ASSESSMENT — PATIENT HEALTH QUESTIONNAIRE - PHQ9
SUM OF ALL RESPONSES TO PHQ QUESTIONS 1-9: 0
SUM OF ALL RESPONSES TO PHQ QUESTIONS 1-9: 0
2. FEELING DOWN, DEPRESSED OR HOPELESS: NOT AT ALL
SUM OF ALL RESPONSES TO PHQ9 QUESTIONS 1 & 2: 0
SUM OF ALL RESPONSES TO PHQ QUESTIONS 1-9: 0
SUM OF ALL RESPONSES TO PHQ QUESTIONS 1-9: 0
1. LITTLE INTEREST OR PLEASURE IN DOING THINGS: NOT AT ALL

## 2024-06-19 NOTE — PROGRESS NOTES
(!) Walking/Balance (uses a cane)  Select all that apply: (!) Laundry, Shopping (does some of her laundry and outsources the rest; uses [employer] for groceries)  Interventions:  Patient comments: Denies any need for referrals      Still follows up with cardiologist.             Objective   Vitals:    06/19/24 1336 06/19/24 1355   BP: (!) 154/67 137/62   Site: Right Upper Arm Right Upper Arm   Position: Sitting Sitting   Cuff Size: Large Adult Large Adult   Pulse: 70 65   Temp: 98.4 °F (36.9 °C)    TempSrc: Temporal    SpO2: 99%    Weight: 81.6 kg (179 lb 12.8 oz)    Height: 1.575 m (5' 2\")       Body mass index is 32.89 kg/m².               Allergies   Allergen Reactions    Latex Other (See Comments) and Dermatitis    Hydrochlorothiazide W-Triamterene Other (See Comments)     Severe dehydration    Acyclovir Other (See Comments)    Cimetidine Other (See Comments)    Citalopram Hydrobromide Other (See Comments)    Dexamethasone Other (See Comments)     Severe dehydration    Epinephrine Other (See Comments)    Hydrochlorothiazide Other (See Comments)     Hypokalemia, Hyponatremia    Pravastatin Other (See Comments)     \" GI upset \" \" just about put a hole in my gut \"    Statins Other (See Comments)     GI upset    Diazepam Anxiety     Prior to Visit Medications    Medication Sig Taking? Authorizing Provider   aspirin 81 MG EC tablet Take 1 tablet by mouth daily Yes Provider, MD Pham   amLODIPine (NORVASC) 5 MG tablet Take 1 tablet by mouth daily Yes Xavier Bowen MD   carvedilol (COREG) 25 MG tablet Take 1 tablet by mouth 2 times daily  Patient taking differently: Take by mouth 2 times daily 1 tab QAM and 1/2 tab QPM Yes Xavier Bowen MD   losartan (COZAAR) 100 MG tablet TAKE 1 TABLET BY MOUTH DAILY Yes Xavier Bowen MD   rosuvastatin (CRESTOR) 10 MG tablet Take 1 tablet by mouth daily Yes Xavier Bowen MD   clopidogrel (PLAVIX) 75 MG tablet Take 1 tablet by mouth daily Yes

## 2024-07-12 ENCOUNTER — OFFICE VISIT (OUTPATIENT)
Dept: INTERNAL MEDICINE CLINIC | Facility: CLINIC | Age: 79
End: 2024-07-12

## 2024-07-12 VITALS
WEIGHT: 180.3 LBS | DIASTOLIC BLOOD PRESSURE: 64 MMHG | HEART RATE: 62 BPM | TEMPERATURE: 98.2 F | SYSTOLIC BLOOD PRESSURE: 128 MMHG | OXYGEN SATURATION: 99 % | BODY MASS INDEX: 33.18 KG/M2 | HEIGHT: 62 IN

## 2024-07-12 DIAGNOSIS — F41.1 GAD (GENERALIZED ANXIETY DISORDER): ICD-10-CM

## 2024-07-12 DIAGNOSIS — R73.03 PREDIABETES: ICD-10-CM

## 2024-07-12 DIAGNOSIS — E78.2 MIXED HYPERLIPIDEMIA: ICD-10-CM

## 2024-07-12 DIAGNOSIS — I10 ESSENTIAL HYPERTENSION: Primary | ICD-10-CM

## 2024-07-12 DIAGNOSIS — I25.10 CORONARY ARTERY DISEASE INVOLVING NATIVE CORONARY ARTERY OF NATIVE HEART WITHOUT ANGINA PECTORIS: ICD-10-CM

## 2024-07-12 LAB
ALBUMIN SERPL-MCNC: 3.8 G/DL (ref 3.2–4.6)
ALBUMIN/GLOB SERPL: 0.9 (ref 1–1.9)
ALP SERPL-CCNC: 78 U/L (ref 35–104)
ALT SERPL-CCNC: 35 U/L (ref 12–65)
ANION GAP SERPL CALC-SCNC: 13 MMOL/L (ref 9–18)
AST SERPL-CCNC: 26 U/L (ref 15–37)
BASOPHILS # BLD: 0 K/UL (ref 0–0.2)
BASOPHILS NFR BLD: 1 % (ref 0–2)
BILIRUB SERPL-MCNC: 0.2 MG/DL (ref 0–1.2)
BUN SERPL-MCNC: 16 MG/DL (ref 8–23)
CALCIUM SERPL-MCNC: 9.3 MG/DL (ref 8.8–10.2)
CHLORIDE SERPL-SCNC: 102 MMOL/L (ref 98–107)
CHOLEST SERPL-MCNC: 158 MG/DL (ref 0–200)
CO2 SERPL-SCNC: 22 MMOL/L (ref 20–28)
CREAT SERPL-MCNC: 0.79 MG/DL (ref 0.6–1.1)
DIFFERENTIAL METHOD BLD: ABNORMAL
EOSINOPHIL # BLD: 0.1 K/UL (ref 0–0.8)
EOSINOPHIL NFR BLD: 2 % (ref 0.5–7.8)
ERYTHROCYTE [DISTWIDTH] IN BLOOD BY AUTOMATED COUNT: 13.8 % (ref 11.9–14.6)
EST. AVERAGE GLUCOSE BLD GHB EST-MCNC: 130 MG/DL
GLOBULIN SER CALC-MCNC: 4.1 G/DL (ref 2.3–3.5)
GLUCOSE SERPL-MCNC: 97 MG/DL (ref 70–99)
HBA1C MFR BLD: 6.2 % (ref 0–5.6)
HCT VFR BLD AUTO: 37.4 % (ref 35.8–46.3)
HDLC SERPL-MCNC: 52 MG/DL (ref 40–60)
HDLC SERPL: 3.1 (ref 0–5)
HGB BLD-MCNC: 11.4 G/DL (ref 11.7–15.4)
IMM GRANULOCYTES # BLD AUTO: 0 K/UL (ref 0–0.5)
IMM GRANULOCYTES NFR BLD AUTO: 0 % (ref 0–5)
LDLC SERPL CALC-MCNC: 92 MG/DL (ref 0–100)
LYMPHOCYTES # BLD: 1.3 K/UL (ref 0.5–4.6)
LYMPHOCYTES NFR BLD: 26 % (ref 13–44)
MCH RBC QN AUTO: 26.7 PG (ref 26.1–32.9)
MCHC RBC AUTO-ENTMCNC: 30.5 G/DL (ref 31.4–35)
MCV RBC AUTO: 87.6 FL (ref 82–102)
MONOCYTES # BLD: 0.8 K/UL (ref 0.1–1.3)
MONOCYTES NFR BLD: 16 % (ref 4–12)
NEUTS SEG # BLD: 2.7 K/UL (ref 1.7–8.2)
NEUTS SEG NFR BLD: 55 % (ref 43–78)
NRBC # BLD: 0 K/UL (ref 0–0.2)
PLATELET # BLD AUTO: 96 K/UL (ref 150–450)
PMV BLD AUTO: 13 FL (ref 9.4–12.3)
POTASSIUM SERPL-SCNC: 3.8 MMOL/L (ref 3.5–5.1)
PROT SERPL-MCNC: 7.9 G/DL (ref 6.3–8.2)
RBC # BLD AUTO: 4.27 M/UL (ref 4.05–5.2)
SODIUM SERPL-SCNC: 137 MMOL/L (ref 136–145)
TRIGL SERPL-MCNC: 73 MG/DL (ref 0–150)
TSH, 3RD GENERATION: 2.31 UIU/ML (ref 0.27–4.2)
VLDLC SERPL CALC-MCNC: 15 MG/DL (ref 6–23)
WBC # BLD AUTO: 4.9 K/UL (ref 4.3–11.1)

## 2024-07-12 RX ORDER — CARVEDILOL 12.5 MG/1
12.5 TABLET ORAL 2 TIMES DAILY
COMMUNITY
Start: 2024-06-27

## 2024-07-12 ASSESSMENT — ENCOUNTER SYMPTOMS
EYE PAIN: 0
CONSTIPATION: 0
RHINORRHEA: 0
SINUS PAIN: 0
ABDOMINAL PAIN: 0
SHORTNESS OF BREATH: 0
NAUSEA: 0
SORE THROAT: 0
BACK PAIN: 0
COUGH: 0
VOMITING: 0
DIARRHEA: 0

## 2024-07-15 DIAGNOSIS — D64.9 ANEMIA, UNSPECIFIED TYPE: Primary | ICD-10-CM

## 2024-07-15 RX ORDER — FERROUS SULFATE 325(65) MG
325 TABLET ORAL DAILY
Qty: 90 TABLET | Refills: 1 | Status: SHIPPED | OUTPATIENT
Start: 2024-07-15

## 2024-07-15 RX ORDER — DOCUSATE SODIUM 100 MG/1
100 CAPSULE, LIQUID FILLED ORAL DAILY PRN
Qty: 90 CAPSULE | Refills: 1 | Status: SHIPPED | OUTPATIENT
Start: 2024-07-15

## 2024-08-06 ENCOUNTER — OFFICE VISIT (OUTPATIENT)
Dept: INTERNAL MEDICINE CLINIC | Facility: CLINIC | Age: 79
End: 2024-08-06
Payer: MEDICARE

## 2024-08-06 VITALS
TEMPERATURE: 98.1 F | HEART RATE: 68 BPM | WEIGHT: 177.5 LBS | BODY MASS INDEX: 32.66 KG/M2 | RESPIRATION RATE: 16 BRPM | HEIGHT: 62 IN | SYSTOLIC BLOOD PRESSURE: 130 MMHG | OXYGEN SATURATION: 100 % | DIASTOLIC BLOOD PRESSURE: 62 MMHG

## 2024-08-06 DIAGNOSIS — H92.02 LEFT EAR PAIN: Primary | ICD-10-CM

## 2024-08-06 DIAGNOSIS — M54.9 DORSALGIA: ICD-10-CM

## 2024-08-06 DIAGNOSIS — R14.0 BLOATING: ICD-10-CM

## 2024-08-06 DIAGNOSIS — J30.9 ALLERGIC RHINITIS, UNSPECIFIED SEASONALITY, UNSPECIFIED TRIGGER: ICD-10-CM

## 2024-08-06 DIAGNOSIS — R11.0 NAUSEA: ICD-10-CM

## 2024-08-06 DIAGNOSIS — R10.11 RIGHT UPPER QUADRANT ABDOMINAL PAIN: ICD-10-CM

## 2024-08-06 PROCEDURE — G8427 DOCREV CUR MEDS BY ELIG CLIN: HCPCS | Performed by: NURSE PRACTITIONER

## 2024-08-06 PROCEDURE — 3078F DIAST BP <80 MM HG: CPT | Performed by: NURSE PRACTITIONER

## 2024-08-06 PROCEDURE — 99213 OFFICE O/P EST LOW 20 MIN: CPT | Performed by: NURSE PRACTITIONER

## 2024-08-06 PROCEDURE — 1123F ACP DISCUSS/DSCN MKR DOCD: CPT | Performed by: NURSE PRACTITIONER

## 2024-08-06 PROCEDURE — 1036F TOBACCO NON-USER: CPT | Performed by: NURSE PRACTITIONER

## 2024-08-06 PROCEDURE — 3075F SYST BP GE 130 - 139MM HG: CPT | Performed by: NURSE PRACTITIONER

## 2024-08-06 PROCEDURE — G8417 CALC BMI ABV UP PARAM F/U: HCPCS | Performed by: NURSE PRACTITIONER

## 2024-08-06 PROCEDURE — G8399 PT W/DXA RESULTS DOCUMENT: HCPCS | Performed by: NURSE PRACTITIONER

## 2024-08-06 PROCEDURE — 1090F PRES/ABSN URINE INCON ASSESS: CPT | Performed by: NURSE PRACTITIONER

## 2024-08-06 RX ORDER — ONDANSETRON 4 MG/1
4 TABLET, ORALLY DISINTEGRATING ORAL 3 TIMES DAILY PRN
Qty: 20 TABLET | Refills: 0 | Status: SHIPPED | OUTPATIENT
Start: 2024-08-06

## 2024-08-06 RX ORDER — SIMETHICONE 80 MG
80 TABLET,CHEWABLE ORAL 4 TIMES DAILY PRN
Qty: 30 TABLET | Refills: 0 | Status: SHIPPED | OUTPATIENT
Start: 2024-08-06

## 2024-08-06 RX ORDER — CETIRIZINE HYDROCHLORIDE 10 MG/1
10 TABLET ORAL DAILY PRN
Qty: 30 TABLET | Refills: 0 | Status: SHIPPED | OUTPATIENT
Start: 2024-08-06 | End: 2024-09-05

## 2024-08-06 RX ORDER — TIZANIDINE 2 MG/1
2 TABLET ORAL
Qty: 30 TABLET | Refills: 0 | Status: SHIPPED | OUTPATIENT
Start: 2024-08-06

## 2024-08-06 ASSESSMENT — ENCOUNTER SYMPTOMS
NAUSEA: 1
SINUS PAIN: 0
SORE THROAT: 0
BACK PAIN: 0
DIARRHEA: 1
RHINORRHEA: 0
ABDOMINAL PAIN: 1
SHORTNESS OF BREATH: 0
EYE PAIN: 0
CONSTIPATION: 0
VOMITING: 0
COUGH: 0

## 2024-08-06 NOTE — PROGRESS NOTES
[facility]  [address]  [facility] [address]  Tel# [phone]  Fax# [phone]     Betty Allen DNP, FNP-BC  Family Nurse Practitioner            Date of Visit: 2024     Aurora Sanchez (: 1945) is a 79 y.o. female established patient, here for evaluation of the following chief complaint(s):    Chief Complaint   Patient presents with    Other     Acute Care  Left earache for approximately 2 days. Patient is also having discomfort RLQ (gallbladder was removed in the 90's she thinks.)         Patient Care Team:  Betty Allen APRN - CNP as PCP - General  Betty Allen APRN - CNP as PCP - Empaneled Provider  Siddharth Neal MD as Physician  Darin Morgan MD as Physician         History of Present Illness      Acute Visit        Left ear pain  Presents here with complaint of left ear pain, occurs intermittently, started suddenly about 5 days ago.  Associated with nasal congestion, low grade fever T 99. States she took OTC Oscillum. States her nasal congestion went away.          Bloating  Started this past Friday. Had eaten take out - black beans, yellow rice ([employer]).  The next day, ate oatmeal and yogurt at around  am.  BP went up 186/100 around 10 am, took Clonidine, rechecked BP, went down to 134/82 at 4pm.  Ate chicken broth with corn bread and green beans that night.   Had eaten chicken broth, yesterday and blended lentils with rice.  Describes as bloading, gas pains, right back pain. Has nausea at times.  Last BM this morning, soft, formed and then watery stool.  States this morning upon awakening, woke up around 6:45 am, BP was 158/82, after large BM,   BP went down tto 126/72 P 69 (states 10 mins after her BM).  Had taken her BP meds at 7:15 am  Pt states she had H Pylori in the past. States she will call and follow up with Dr. Márquez.             Social History     Substance and Sexual Activity   Alcohol Use No    Alcohol/week: 0.0 standard drinks

## 2024-08-14 ENCOUNTER — HOSPITAL ENCOUNTER (EMERGENCY)
Age: 79
Discharge: HOME | End: 2024-08-14
Attending: EMERGENCY MEDICINE
Payer: MEDICARE

## 2024-08-14 VITALS
RESPIRATION RATE: 18 BRPM | TEMPERATURE: 98 F | SYSTOLIC BLOOD PRESSURE: 172 MMHG | OXYGEN SATURATION: 98 % | HEART RATE: 65 BPM | DIASTOLIC BLOOD PRESSURE: 80 MMHG

## 2024-08-14 DIAGNOSIS — I10 ESSENTIAL HYPERTENSION: Primary | ICD-10-CM

## 2024-08-14 DIAGNOSIS — F43.29 PROLONGED GRIEF REACTION: ICD-10-CM

## 2024-08-14 LAB
ALBUMIN SERPL-MCNC: 3.8 G/DL (ref 3.2–4.6)
ALBUMIN/GLOB SERPL: 0.9 (ref 1–1.9)
ALP SERPL-CCNC: 88 U/L (ref 35–104)
ALT SERPL-CCNC: 31 U/L (ref 12–65)
ANION GAP SERPL CALC-SCNC: 11 MMOL/L (ref 9–18)
AST SERPL-CCNC: 26 U/L (ref 15–37)
BASOPHILS # BLD: 0 K/UL (ref 0–0.2)
BASOPHILS NFR BLD: 1 % (ref 0–2)
BILIRUB SERPL-MCNC: <0.2 MG/DL (ref 0–1.2)
BUN SERPL-MCNC: 13 MG/DL (ref 8–23)
CALCIUM SERPL-MCNC: 9.6 MG/DL (ref 8.8–10.2)
CHLORIDE SERPL-SCNC: 101 MMOL/L (ref 98–107)
CO2 SERPL-SCNC: 25 MMOL/L (ref 20–28)
CREAT SERPL-MCNC: 0.7 MG/DL (ref 0.6–1.1)
DIFFERENTIAL METHOD BLD: ABNORMAL
EOSINOPHIL # BLD: 0.1 K/UL (ref 0–0.8)
EOSINOPHIL NFR BLD: 2 % (ref 0.5–7.8)
ERYTHROCYTE [DISTWIDTH] IN BLOOD BY AUTOMATED COUNT: 14 % (ref 11.9–14.6)
GLOBULIN SER CALC-MCNC: 4.2 G/DL (ref 2.3–3.5)
GLUCOSE SERPL-MCNC: 102 MG/DL (ref 70–99)
HCT VFR BLD AUTO: 37.3 % (ref 35.8–46.3)
HGB BLD-MCNC: 11.9 G/DL (ref 11.7–15.4)
IMM GRANULOCYTES # BLD AUTO: 0 K/UL (ref 0–0.5)
IMM GRANULOCYTES NFR BLD AUTO: 0 % (ref 0–5)
LYMPHOCYTES # BLD: 1.4 K/UL (ref 0.5–4.6)
LYMPHOCYTES NFR BLD: 28 % (ref 13–44)
MCH RBC QN AUTO: 27.5 PG (ref 26.1–32.9)
MCHC RBC AUTO-ENTMCNC: 31.9 G/DL (ref 31.4–35)
MCV RBC AUTO: 86.3 FL (ref 82–102)
MONOCYTES # BLD: 0.7 K/UL (ref 0.1–1.3)
MONOCYTES NFR BLD: 13 % (ref 4–12)
NEUTS SEG # BLD: 2.9 K/UL (ref 1.7–8.2)
NEUTS SEG NFR BLD: 56 % (ref 43–78)
NRBC # BLD: 0 K/UL (ref 0–0.2)
PLATELET # BLD AUTO: 107 K/UL (ref 150–450)
PMV BLD AUTO: 12.4 FL (ref 9.4–12.3)
POTASSIUM SERPL-SCNC: 3.9 MMOL/L (ref 3.5–5.1)
PROT SERPL-MCNC: 8 G/DL (ref 6.3–8.2)
RBC # BLD AUTO: 4.32 M/UL (ref 4.05–5.2)
SODIUM SERPL-SCNC: 137 MMOL/L (ref 136–145)
WBC # BLD AUTO: 5.1 K/UL (ref 4.3–11.1)

## 2024-08-14 PROCEDURE — 80053 COMPREHEN METABOLIC PANEL: CPT

## 2024-08-14 PROCEDURE — 85025 COMPLETE CBC W/AUTO DIFF WBC: CPT

## 2024-08-14 PROCEDURE — 99283 EMERGENCY DEPT VISIT LOW MDM: CPT

## 2024-08-14 RX ORDER — AMLODIPINE BESYLATE 10 MG/1
10 TABLET ORAL DAILY
Qty: 30 TABLET | Refills: 0 | Status: SHIPPED | OUTPATIENT
Start: 2024-08-14 | End: 2024-09-13

## 2024-08-14 ASSESSMENT — ENCOUNTER SYMPTOMS
COUGH: 0
NAUSEA: 0
COLOR CHANGE: 0
ABDOMINAL PAIN: 0
RHINORRHEA: 0
VOMITING: 0
BACK PAIN: 0
DIARRHEA: 0

## 2024-08-14 ASSESSMENT — PAIN - FUNCTIONAL ASSESSMENT: PAIN_FUNCTIONAL_ASSESSMENT: NONE - DENIES PAIN

## 2024-08-14 NOTE — ED PROVIDER NOTES
of right lung due to infectious organism 2023    Polyp of cecum 2022    Prediabetes     Vitamin D deficiency         Past Surgical History:   Procedure Laterality Date    BREAST BIOPSY  3/14/2014    BREAST LUMPECTOMY Left 3/14/2014    fibrocystic mastopathy, intraductal hyperplasia, apocrine metaplasia    CARDIAC PROCEDURE N/A 2023    Left heart cath / coronary angiography performed by Hu Lopez MD at [facility] CARDIAC CATH LAB    CARDIAC PROCEDURE N/A 2023    Percutaneous coronary intervention performed by Hu Lopez MD at [facility] CARDIAC CATH LAB    CARDIAC PROCEDURE N/A 2023    Intravascular ultrasound performed by Hu Lopez MD at [facility] CARDIAC CATH LAB    CARDIAC PROCEDURE N/A 2023    Insert temporary pacemaker performed by Hu Lopez MD at [facility] CARDIAC CATH LAB    CATARACT REMOVAL Bilateral 2018    CATARACT REMOVAL Bilateral 2018    CATARACT REMOVAL WITH IMPLANT Bilateral     2019     SECTION      x 2    CHOLECYSTECTOMY      COLONOSCOPY  2019    HYSTERECTOMY (CERVIX STATUS UNKNOWN)      OTHER SURGICAL HISTORY      Renal artery scan-normal    US GUIDED CORE BREAST BIOPSY Left     Benign        Social History     Socioeconomic History    Marital status:    Tobacco Use    Smoking status: Former     Current packs/day: 0.00     Average packs/day: 0.8 packs/day for 10.6 years (8.0 ttl pk-yrs)     Types: Cigarettes     Start date:      Quit date: 1999     Years since quittin.0    Smokeless tobacco: Never   Vaping Use    Vaping status: Never Used   Substance and Sexual Activity    Alcohol use: No     Alcohol/week: 0.0 standard drinks of alcohol    Drug use: No    Sexual activity: Not Currently   Social History Narrative    Currently lives alone (daughter moved to [place], 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     Social Determinants of Health     Financial Resource Strain: Low Risk  (2024)    Overall Financial

## 2024-08-14 NOTE — DISCHARGE INSTRUCTIONS
Continue current medications but increase your Norvasc to 10 mg daily.  Follow-up with [facility] cardiology when called with appointment time for recheck and further blood pressure medication adjustment.  Follow-up with your primary care doctor for possibly starting an antidepressant to help with your prolonged grief reaction.  Call the number below if you wish to find a new primary care doctor for help with this issue.  Return if any new, worsening or concerning symptoms

## 2024-08-14 NOTE — ED TRIAGE NOTES
Pt brought in by EMS from home c/o fluctuating bP, per pt evening high 218/102 and am low 114/64. Per pt carvedilol dose increased earlier in the month and pt experienced drop in HR so pt decreased her dose back to original a couple weeks ago. Pt denies N/V/D/F, blurry vision or weakness.

## 2024-08-25 ENCOUNTER — APPOINTMENT (OUTPATIENT)
Dept: GENERAL RADIOLOGY | Age: 79
End: 2024-08-25
Payer: MEDICARE

## 2024-08-25 ENCOUNTER — HOSPITAL ENCOUNTER (EMERGENCY)
Age: 79
Discharge: HOME | End: 2024-08-25
Attending: GENERAL PRACTICE
Payer: MEDICARE

## 2024-08-25 VITALS
TEMPERATURE: 98.9 F | HEART RATE: 58 BPM | WEIGHT: 174 LBS | RESPIRATION RATE: 17 BRPM | DIASTOLIC BLOOD PRESSURE: 59 MMHG | SYSTOLIC BLOOD PRESSURE: 136 MMHG | BODY MASS INDEX: 31.83 KG/M2 | OXYGEN SATURATION: 100 %

## 2024-08-25 DIAGNOSIS — R07.89 CHEST WALL PAIN: Primary | ICD-10-CM

## 2024-08-25 LAB
ALBUMIN SERPL-MCNC: 3.5 G/DL (ref 3.2–4.6)
ALBUMIN/GLOB SERPL: 0.8 (ref 1–1.9)
ALP SERPL-CCNC: 80 U/L (ref 35–104)
ALT SERPL-CCNC: 22 U/L (ref 12–65)
ANION GAP SERPL CALC-SCNC: 11 MMOL/L (ref 9–18)
AST SERPL-CCNC: 24 U/L (ref 15–37)
BASOPHILS # BLD: 0 K/UL (ref 0–0.2)
BASOPHILS NFR BLD: 1 % (ref 0–2)
BILIRUB SERPL-MCNC: <0.2 MG/DL (ref 0–1.2)
BUN SERPL-MCNC: 13 MG/DL (ref 8–23)
CALCIUM SERPL-MCNC: 9.1 MG/DL (ref 8.8–10.2)
CHLORIDE SERPL-SCNC: 101 MMOL/L (ref 98–107)
CO2 SERPL-SCNC: 22 MMOL/L (ref 20–28)
CREAT SERPL-MCNC: 0.66 MG/DL (ref 0.6–1.1)
DIFFERENTIAL METHOD BLD: ABNORMAL
EKG ATRIAL RATE: 70 BPM
EKG DIAGNOSIS: NORMAL
EKG P AXIS: 58 DEGREES
EKG P-R INTERVAL: 173 MS
EKG Q-T INTERVAL: 417 MS
EKG QRS DURATION: 84 MS
EKG QTC CALCULATION (BAZETT): 450 MS
EKG R AXIS: 73 DEGREES
EKG T AXIS: 41 DEGREES
EKG VENTRICULAR RATE: 70 BPM
EOSINOPHIL # BLD: 0.1 K/UL (ref 0–0.8)
EOSINOPHIL NFR BLD: 2 % (ref 0.5–7.8)
ERYTHROCYTE [DISTWIDTH] IN BLOOD BY AUTOMATED COUNT: 14 % (ref 11.9–14.6)
GLOBULIN SER CALC-MCNC: 4.2 G/DL (ref 2.3–3.5)
GLUCOSE SERPL-MCNC: 124 MG/DL (ref 70–99)
HCT VFR BLD AUTO: 33.6 % (ref 35.8–46.3)
HGB BLD-MCNC: 10.5 G/DL (ref 11.7–15.4)
IMM GRANULOCYTES # BLD AUTO: 0 K/UL (ref 0–0.5)
IMM GRANULOCYTES NFR BLD AUTO: 0 % (ref 0–5)
LYMPHOCYTES # BLD: 1.3 K/UL (ref 0.5–4.6)
LYMPHOCYTES NFR BLD: 27 % (ref 13–44)
MCH RBC QN AUTO: 26.7 PG (ref 26.1–32.9)
MCHC RBC AUTO-ENTMCNC: 31.3 G/DL (ref 31.4–35)
MCV RBC AUTO: 85.5 FL (ref 82–102)
MONOCYTES # BLD: 0.7 K/UL (ref 0.1–1.3)
MONOCYTES NFR BLD: 15 % (ref 4–12)
NEUTS SEG # BLD: 2.8 K/UL (ref 1.7–8.2)
NEUTS SEG NFR BLD: 56 % (ref 43–78)
NRBC # BLD: 0 K/UL (ref 0–0.2)
NT PRO BNP: 50 PG/ML (ref 0–450)
PLATELET # BLD AUTO: 114 K/UL (ref 150–450)
PMV BLD AUTO: 11.4 FL (ref 9.4–12.3)
POTASSIUM SERPL-SCNC: 3.8 MMOL/L (ref 3.5–5.1)
PROT SERPL-MCNC: 7.8 G/DL (ref 6.3–8.2)
RBC # BLD AUTO: 3.93 M/UL (ref 4.05–5.2)
SODIUM SERPL-SCNC: 134 MMOL/L (ref 136–145)
TROPONIN T SERPL HS-MCNC: 11 NG/L (ref 0–14)
TROPONIN T SERPL HS-MCNC: 11 NG/L (ref 0–14)
WBC # BLD AUTO: 4.9 K/UL (ref 4.3–11.1)

## 2024-08-25 PROCEDURE — 83880 ASSAY OF NATRIURETIC PEPTIDE: CPT

## 2024-08-25 PROCEDURE — 84484 ASSAY OF TROPONIN QUANT: CPT

## 2024-08-25 PROCEDURE — 93005 ELECTROCARDIOGRAM TRACING: CPT | Performed by: GENERAL PRACTICE

## 2024-08-25 PROCEDURE — 6370000000 HC RX 637 (ALT 250 FOR IP): Performed by: GENERAL PRACTICE

## 2024-08-25 PROCEDURE — 80053 COMPREHEN METABOLIC PANEL: CPT

## 2024-08-25 PROCEDURE — 71045 X-RAY EXAM CHEST 1 VIEW: CPT

## 2024-08-25 PROCEDURE — 99285 EMERGENCY DEPT VISIT HI MDM: CPT

## 2024-08-25 PROCEDURE — 85025 COMPLETE CBC W/AUTO DIFF WBC: CPT

## 2024-08-25 RX ORDER — LORAZEPAM 1 MG/1
1 TABLET ORAL ONCE
Status: COMPLETED | OUTPATIENT
Start: 2024-08-25 | End: 2024-08-25

## 2024-08-25 RX ADMIN — LORAZEPAM 1 MG: 1 TABLET ORAL at 16:50

## 2024-08-25 NOTE — ED PROVIDER NOTES
radiation to her neck shoulder or jaw.  She denies any shortness of breath or cough.  Patient denies any nausea or vomiting.  Pain is not able to be reproduced according to her.  Movement of her extremities or torso or deep breathing or coughing or sneezing does not appear to exacerbate it.  No exertional pain.        ROS     Review of Systems   All other systems reviewed and are negative.       Physical Exam     Vitals signs and nursing note reviewed:  Vitals:    08/25/24 1202 08/25/24 1321 08/25/24 1432 08/25/24 1600   BP: (!) 141/63 (!) 152/74 135/63 (!) 136/59   Pulse: 63 64 64 58   Resp: 16  18 17   Temp:       SpO2: 99% 100% 100% 100%   Weight:          Physical Exam  Constitutional:       General: She is not in acute distress.  HENT:      Head: Normocephalic and atraumatic.      Nose: Nose normal.      Mouth/Throat:      Mouth: Mucous membranes are moist.   Eyes:      Extraocular Movements: Extraocular movements intact.      Pupils: Pupils are equal, round, and reactive to light.   Cardiovascular:      Rate and Rhythm: Normal rate and regular rhythm.      Heart sounds: Normal heart sounds.   Pulmonary:      Effort: No respiratory distress.      Breath sounds: No wheezing.   Abdominal:      General: Abdomen is flat.      Palpations: Abdomen is soft.      Tenderness: There is no abdominal tenderness.   Musculoskeletal:         General: No swelling or tenderness.      Cervical back: Normal range of motion.   Skin:     Findings: No erythema or rash.   Neurological:      General: No focal deficit present.      Mental Status: She is oriented to person, place, and time.   Psychiatric:         Mood and Affect: Mood normal.         Behavior: Behavior normal.        Procedures     Procedures    Orders Placed This Encounter   Procedures    XR CHEST PORTABLE    CBC with Auto Differential    Troponin now then q90 min for 2 occurances    Comprehensive Metabolic Panel w/ Reflex to MG    Brain Natriuretic Peptide    EKG 12  - 4.6 g/dL    Globulin 4.2 (H) 2.3 - 3.5 g/dL    Albumin/Globulin Ratio 0.8 (L) 1.0 - 1.9     Brain Natriuretic Peptide   Result Value Ref Range    NT Pro-BNP 50 0 - 450 PG/ML   EKG 12 Lead   Result Value Ref Range    Ventricular Rate 70 BPM    Atrial Rate 70 BPM    P-R Interval 173 ms    QRS Duration 84 ms    Q-T Interval 417 ms    QTc Calculation (Bazett) 450 ms    P Axis 58 degrees    R Axis 73 degrees    T Axis 41 degrees    Diagnosis       Sinus rhythm      Confirmed by Mariusz Davis MD ([id number]) on 8/25/2024 1:03:19 PM           XR CHEST PORTABLE   Final Result   Findings/impression: The exam is somewhat limited due to underpenetration.   Accounting for differences in technique likely stable exam compared to the prior   without new acute finding. Consider dedicated PA/lateral chest when clinically   feasible.      Electronically signed by Law De                   No results for input(s): \"COVID19\" in the last 72 hours.    Voice dictation software was used during the making of this note.  This software is not perfect and grammatical and other typographical errors may be present.  This note has not been completely proofread for errors.     Josse Hall DO  08/25/24 0504

## 2024-08-25 NOTE — ED TRIAGE NOTES
Pt arrived via EMS from home. Pt c/o CP that started this morning. Pt had NSR with PVCS. 204/104 initial pressure. Pt has bilateral leg edema X 2 weeks. /88, 324 ASA,  1 nitro with relief. Pt took clonidine at 0930.

## 2024-08-25 NOTE — ED NOTES
Patient mobility status  with no difficulty. Provider aware     I have reviewed discharge instructions with the patient.  The patient verbalized understanding.    Patient left ED via Discharge Method: ambulatory to Home with Friend.    Opportunity for questions and clarification provided.     Patient given 0 scripts.           Valentino Cramer RN  08/25/24 [id number]

## 2024-08-27 ENCOUNTER — OFFICE VISIT (OUTPATIENT)
Age: 79
End: 2024-08-27
Payer: MEDICARE

## 2024-08-27 VITALS
SYSTOLIC BLOOD PRESSURE: 115 MMHG | DIASTOLIC BLOOD PRESSURE: 58 MMHG | WEIGHT: 176 LBS | BODY MASS INDEX: 32.39 KG/M2 | HEIGHT: 62 IN | HEART RATE: 76 BPM

## 2024-08-27 DIAGNOSIS — I10 ESSENTIAL HYPERTENSION: ICD-10-CM

## 2024-08-27 DIAGNOSIS — E78.5 DYSLIPIDEMIA: ICD-10-CM

## 2024-08-27 DIAGNOSIS — I71.43 INFRARENAL ABDOMINAL AORTIC ANEURYSM (AAA) WITHOUT RUPTURE (HCC): ICD-10-CM

## 2024-08-27 DIAGNOSIS — I25.10 CORONARY ARTERY DISEASE INVOLVING NATIVE CORONARY ARTERY OF NATIVE HEART WITHOUT ANGINA PECTORIS: Primary | ICD-10-CM

## 2024-08-27 DIAGNOSIS — I35.0 MILD AORTIC STENOSIS: ICD-10-CM

## 2024-08-27 PROCEDURE — 99214 OFFICE O/P EST MOD 30 MIN: CPT | Performed by: INTERNAL MEDICINE

## 2024-08-27 PROCEDURE — 1090F PRES/ABSN URINE INCON ASSESS: CPT | Performed by: INTERNAL MEDICINE

## 2024-08-27 PROCEDURE — G8427 DOCREV CUR MEDS BY ELIG CLIN: HCPCS | Performed by: INTERNAL MEDICINE

## 2024-08-27 PROCEDURE — 1036F TOBACCO NON-USER: CPT | Performed by: INTERNAL MEDICINE

## 2024-08-27 PROCEDURE — G8399 PT W/DXA RESULTS DOCUMENT: HCPCS | Performed by: INTERNAL MEDICINE

## 2024-08-27 PROCEDURE — 3078F DIAST BP <80 MM HG: CPT | Performed by: INTERNAL MEDICINE

## 2024-08-27 PROCEDURE — G8417 CALC BMI ABV UP PARAM F/U: HCPCS | Performed by: INTERNAL MEDICINE

## 2024-08-27 PROCEDURE — 3074F SYST BP LT 130 MM HG: CPT | Performed by: INTERNAL MEDICINE

## 2024-08-27 PROCEDURE — 1123F ACP DISCUSS/DSCN MKR DOCD: CPT | Performed by: INTERNAL MEDICINE

## 2024-08-27 RX ORDER — CARVEDILOL 25 MG/1
25 TABLET ORAL 2 TIMES DAILY
Qty: 180 TABLET | Refills: 3 | Status: CANCELLED | OUTPATIENT
Start: 2024-08-27

## 2024-08-27 RX ORDER — ROSUVASTATIN CALCIUM 10 MG/1
10 TABLET, COATED ORAL DAILY
Qty: 90 TABLET | Refills: 3 | Status: SHIPPED | OUTPATIENT
Start: 2024-08-27

## 2024-08-27 RX ORDER — LOSARTAN POTASSIUM 100 MG/1
100 TABLET ORAL DAILY
Qty: 90 TABLET | Refills: 3 | Status: SHIPPED | OUTPATIENT
Start: 2024-08-27

## 2024-08-27 RX ORDER — CARVEDILOL 12.5 MG/1
12.5 TABLET ORAL 2 TIMES DAILY
Qty: 180 TABLET | Refills: 3 | Status: SHIPPED | OUTPATIENT
Start: 2024-08-27

## 2024-08-27 RX ORDER — DOXAZOSIN 4 MG/1
4 TABLET ORAL DAILY
Qty: 90 TABLET | Refills: 3 | Status: SHIPPED | OUTPATIENT
Start: 2024-08-27

## 2024-08-27 ASSESSMENT — ENCOUNTER SYMPTOMS
ABDOMINAL PAIN: 0
STRIDOR: 0
HEMOPTYSIS: 0
DOUBLE VISION: 0
HOARSE VOICE: 0
WHEEZING: 0
HEMATEMESIS: 0
EYE REDNESS: 0
HEMATOCHEZIA: 0

## 2024-08-27 NOTE — PROGRESS NOTES
[facility] CARDIOLOGY  [address] [place] [address], [address]  [place] [address]  PHONE: [phone]          24    NAME:  Aurora Sanchez  : 1945  MRN: [id number]         SUBJECTIVE:   Aurora Sanchez is a 79 y.o. female seen for a visit regarding the following:     Chief Complaint   Patient presents with    3 Month Follow-Up    Coronary Artery Disease    Hypertension           HPI:    Cardio problem list:  1.  CAD: NSTEMI  -[facility] 23.  90% mCirc that was stented with a KILEY 3.5X18mm MICHELLE ,  95% mRCA-3.5 x 18 [place] stent    2.  Aortic stenosis  -Echo from 2023 showed an EF at 60 to 65% with mild concentric LVH, mild aortic stenosis, mild AR,   -Echo from 2022 shows an EF at 60 to 65% with mild concentric LVH, diastolic dysfunction, mild aortic stenosis with a mean gradient of 10 and peak gradient of 18 mmHg, mild mitral regurgitation  3.  Abdominal aortic aneurysm-  -CT abdomen- 4.3 cm 2024  --measures 3.6 x 3.8 cm  --In 2019 was 3.2 x 3.8 cm  4.  Hypertension  5.  Hyperlipidemia-statin intolerance  6.  Hyponatremia-aggravated by spironolactone  -previous Patient of Dr. Jan Mcconnell     I saw Ms. Sanchez who is a 79-year-old woman in cardiovascular follow-up  for difficult to treat hypertension, CAD, mild aortic stenosis, abdominal aortic aneurysm, hyperlipidemia with statin intolerance, hyponatremia.     When we last met with her in 2024, we increased her carvedilol to 25 mg twice daily for better control of blood pressures especially with growth noted in her abdominal aortic aneurysm.  Pressures then trended downward substantially so she cut back her carvedilol back to 12.5 mg twice daily and she has done reasonably well with this.    CAD:  Background: She was admitted to the hospital between  and 9/10/2023 with chest pain and an elevated high-sensitivity troponin of 15,000.  Findings were consistent with a non-STEMI and she underwent coronary angiography which showed

## 2024-09-09 ENCOUNTER — TELEPHONE (OUTPATIENT)
Age: 79
End: 2024-09-09

## 2024-09-11 DIAGNOSIS — I35.0 NONRHEUMATIC AORTIC VALVE STENOSIS: Primary | ICD-10-CM

## 2024-09-11 RX ORDER — CLOPIDOGREL BISULFATE 75 MG/1
75 TABLET ORAL DAILY
Qty: 90 TABLET | Refills: 3 | Status: SHIPPED | OUTPATIENT
Start: 2024-09-11

## 2024-09-24 ENCOUNTER — TELEPHONE (OUTPATIENT)
Age: 79
End: 2024-09-24

## 2024-09-30 ENCOUNTER — TELEPHONE (OUTPATIENT)
Age: 79
End: 2024-09-30

## 2024-09-30 DIAGNOSIS — I10 ESSENTIAL HYPERTENSION: ICD-10-CM

## 2024-09-30 NOTE — TELEPHONE ENCOUNTER
I spoke w/pt.she said that she held her Cardura Friday and Saturday and her BP fluctuated so much Sunday she had to take the Cardura.Current /88 hr=76.She has a slight headache.When she was taking the Cardura consistently her BP was dropping to 111-116/60-67 and she just felt bad.She said her feet are also staying swollen.She read this is side effect of both Cardura and Norvasc.She is currently on Coreg 12.5mg bid,Cardura 4mg qday,Amlodipine 5mg qhs,Losartan 100mg qday.Is there analternative med she could try?

## 2024-09-30 NOTE — TELEPHONE ENCOUNTER
I'm experiencing the following symptoms after taking this medication, Doxazosin:  low B/P which means I can't take my next scheduled medication; abdominal pain; increased lower back pain which is causing increased limited mobility; swollen legs, feet, and fingers; extreme weakness and fatigue; wet, runny nose; and headache.     I have been feeling so bad lately that I am afraid to leave the house.     Please adviseAurora

## 2024-10-01 DIAGNOSIS — I10 ESSENTIAL HYPERTENSION: ICD-10-CM

## 2024-10-01 RX ORDER — DOXAZOSIN 1 MG/1
1 TABLET ORAL DAILY
Qty: 90 TABLET | Refills: 3 | Status: SHIPPED | OUTPATIENT
Start: 2024-10-01 | End: 2025-09-26

## 2024-10-01 RX ORDER — AMLODIPINE BESYLATE 5 MG/1
5 TABLET ORAL DAILY
Qty: 90 TABLET | Refills: 3 | Status: SHIPPED | OUTPATIENT
Start: 2024-10-01 | End: 2025-09-26

## 2024-10-01 RX ORDER — DOXAZOSIN 2 MG/1
2 TABLET ORAL DAILY
Qty: 90 TABLET | Refills: 3 | Status: SHIPPED | OUTPATIENT
Start: 2024-10-01 | End: 2024-10-01

## 2024-10-01 NOTE — TELEPHONE ENCOUNTER
Requested Prescriptions     Pending Prescriptions Disp Refills    doxazosin (CARDURA) 2 MG tablet 90 tablet 3     Sig: Take 1 tablet by mouth daily      Verified rx. Last OV 8/27/24. Erx to pharm on file.

## 2024-10-01 NOTE — TELEPHONE ENCOUNTER
Pt states that she got her doxazosin (CARDURA) 2 MG tablet to the [facility] on [address] and states they are closed and would like [facility] called [facility] the [facility] [address] [phone]    Pt would like the rx to be changed to a 1 mg tablet instead of a 2 mg tablet

## 2024-10-01 NOTE — TELEPHONE ENCOUNTER
Arbaham Cobian MD  You15 hours ago (5:15 PM)       She can try taking half of the doxazosin once daily but continue all the other meds.  Lower extremity edema is more likely from uncontrolled blood pressures rather than a side effect of the meds.  Thanks,  AD   I called and informed pt.of MD response and she v/u.Med escribed as below:  Requested Prescriptions     Signed Prescriptions Disp Refills    doxazosin (CARDURA) 2 MG tablet 90 tablet 3     Sig: Take 1 tablet by mouth daily     Authorizing Provider: ABRAHAM COBIAN     Ordering User: CLAUDIA JOHNSON

## 2024-11-18 ENCOUNTER — APPOINTMENT (OUTPATIENT)
Dept: GENERAL RADIOLOGY | Age: 79
End: 2024-11-18
Payer: MEDICARE

## 2024-11-18 ENCOUNTER — HOSPITAL ENCOUNTER (EMERGENCY)
Age: 79
Discharge: HOME | End: 2024-11-18
Attending: EMERGENCY MEDICINE
Payer: MEDICARE

## 2024-11-18 VITALS
OXYGEN SATURATION: 97 % | DIASTOLIC BLOOD PRESSURE: 78 MMHG | HEIGHT: 62 IN | HEART RATE: 61 BPM | WEIGHT: 173 LBS | TEMPERATURE: 98.4 F | SYSTOLIC BLOOD PRESSURE: 155 MMHG | BODY MASS INDEX: 31.83 KG/M2 | RESPIRATION RATE: 20 BRPM

## 2024-11-18 DIAGNOSIS — F41.1 ANXIETY STATE: ICD-10-CM

## 2024-11-18 DIAGNOSIS — I1A.0 RESISTANT HYPERTENSION: Primary | ICD-10-CM

## 2024-11-18 LAB
ALBUMIN SERPL-MCNC: 3.7 G/DL (ref 3.2–4.6)
ALBUMIN/GLOB SERPL: 0.8 (ref 1–1.9)
ALP SERPL-CCNC: 91 U/L (ref 35–104)
ALT SERPL-CCNC: 20 U/L (ref 8–45)
ANION GAP SERPL CALC-SCNC: 12 MMOL/L (ref 7–16)
AST SERPL-CCNC: 20 U/L (ref 15–37)
BASOPHILS # BLD: 0 K/UL (ref 0–0.2)
BASOPHILS NFR BLD: 0 % (ref 0–2)
BILIRUB SERPL-MCNC: 0.3 MG/DL (ref 0–1.2)
BILIRUB UR QL: NEGATIVE
BUN SERPL-MCNC: 12 MG/DL (ref 8–23)
CALCIUM SERPL-MCNC: 9.4 MG/DL (ref 8.8–10.2)
CHLORIDE SERPL-SCNC: 100 MMOL/L (ref 98–107)
CO2 SERPL-SCNC: 25 MMOL/L (ref 20–29)
CREAT SERPL-MCNC: 0.68 MG/DL (ref 0.6–1.1)
DIFFERENTIAL METHOD BLD: ABNORMAL
EKG ATRIAL RATE: 66 BPM
EKG DIAGNOSIS: NORMAL
EKG P AXIS: 30 DEGREES
EKG P-R INTERVAL: 170 MS
EKG Q-T INTERVAL: 452 MS
EKG QRS DURATION: 88 MS
EKG QTC CALCULATION (BAZETT): 474 MS
EKG R AXIS: 66 DEGREES
EKG T AXIS: 35 DEGREES
EKG VENTRICULAR RATE: 66 BPM
EOSINOPHIL # BLD: 0.1 K/UL (ref 0–0.8)
EOSINOPHIL NFR BLD: 1 % (ref 0.5–7.8)
ERYTHROCYTE [DISTWIDTH] IN BLOOD BY AUTOMATED COUNT: 13.6 % (ref 11.9–14.6)
GLOBULIN SER CALC-MCNC: 4.6 G/DL (ref 2.3–3.5)
GLUCOSE SERPL-MCNC: 114 MG/DL (ref 70–99)
GLUCOSE UR QL STRIP.AUTO: NEGATIVE MG/DL
HCT VFR BLD AUTO: 36.9 % (ref 35.8–46.3)
HGB BLD-MCNC: 11.7 G/DL (ref 11.7–15.4)
IMM GRANULOCYTES # BLD AUTO: 0 K/UL (ref 0–0.5)
IMM GRANULOCYTES NFR BLD AUTO: 0 % (ref 0–5)
KETONES UR-MCNC: NEGATIVE MG/DL
LEUKOCYTE ESTERASE UR QL STRIP: NEGATIVE
LYMPHOCYTES # BLD: 1.1 K/UL (ref 0.5–4.6)
LYMPHOCYTES NFR BLD: 30 % (ref 13–44)
MAGNESIUM SERPL-MCNC: 1.9 MG/DL (ref 1.8–2.4)
MCH RBC QN AUTO: 27 PG (ref 26.1–32.9)
MCHC RBC AUTO-ENTMCNC: 31.7 G/DL (ref 31.4–35)
MCV RBC AUTO: 85.2 FL (ref 82–102)
MONOCYTES # BLD: 0.6 K/UL (ref 0.1–1.3)
MONOCYTES NFR BLD: 16 % (ref 4–12)
NEUTS SEG # BLD: 2 K/UL (ref 1.7–8.2)
NEUTS SEG NFR BLD: 53 % (ref 43–78)
NITRITE UR QL: NEGATIVE
NRBC # BLD: 0 K/UL (ref 0–0.2)
PH UR: 7 (ref 5–9)
PLATELET # BLD AUTO: 99 K/UL (ref 150–450)
PMV BLD AUTO: 11.4 FL (ref 9.4–12.3)
POTASSIUM SERPL-SCNC: 3.7 MMOL/L (ref 3.5–5.1)
PROT SERPL-MCNC: 8.3 G/DL (ref 6.3–8.2)
PROT UR QL: 100 MG/DL
RBC # BLD AUTO: 4.33 M/UL (ref 4.05–5.2)
RBC # UR STRIP: NEGATIVE
SERVICE CMNT-IMP: ABNORMAL
SODIUM SERPL-SCNC: 137 MMOL/L (ref 136–145)
SP GR UR: 1.02 (ref 1–1.02)
TROPONIN T SERPL HS-MCNC: 15 NG/L (ref 0–14)
UROBILINOGEN UR QL: 0.2 EU/DL (ref 0.2–1)
WBC # BLD AUTO: 3.8 K/UL (ref 4.3–11.1)

## 2024-11-18 PROCEDURE — 85025 COMPLETE CBC W/AUTO DIFF WBC: CPT

## 2024-11-18 PROCEDURE — 99285 EMERGENCY DEPT VISIT HI MDM: CPT

## 2024-11-18 PROCEDURE — 94761 N-INVAS EAR/PLS OXIMETRY MLT: CPT

## 2024-11-18 PROCEDURE — 81003 URINALYSIS AUTO W/O SCOPE: CPT

## 2024-11-18 PROCEDURE — 83735 ASSAY OF MAGNESIUM: CPT

## 2024-11-18 PROCEDURE — 84484 ASSAY OF TROPONIN QUANT: CPT

## 2024-11-18 PROCEDURE — 93005 ELECTROCARDIOGRAM TRACING: CPT | Performed by: EMERGENCY MEDICINE

## 2024-11-18 PROCEDURE — 93010 ELECTROCARDIOGRAM REPORT: CPT | Performed by: INTERNAL MEDICINE

## 2024-11-18 PROCEDURE — 80053 COMPREHEN METABOLIC PANEL: CPT

## 2024-11-18 PROCEDURE — 71046 X-RAY EXAM CHEST 2 VIEWS: CPT

## 2024-11-18 ASSESSMENT — ENCOUNTER SYMPTOMS: CHEST TIGHTNESS: 1

## 2024-11-18 ASSESSMENT — PAIN SCALES - GENERAL
PAINLEVEL_OUTOF10: 0
PAINLEVEL_OUTOF10: 1

## 2024-11-18 ASSESSMENT — PAIN - FUNCTIONAL ASSESSMENT: PAIN_FUNCTIONAL_ASSESSMENT: 0-10

## 2024-11-18 ASSESSMENT — PAIN DESCRIPTION - LOCATION: LOCATION: CHEST

## 2024-11-18 ASSESSMENT — PAIN DESCRIPTION - DESCRIPTORS: DESCRIPTORS: PRESSURE

## 2024-11-18 NOTE — ED PROVIDER NOTES
needed for Flatulence (bloating, gas)    TIZANIDINE (ZANAFLEX) 2 MG TABLET    Take 1 tablet by mouth nightly as needed (muscle spasm)        Results from this emergency department visit:      Results for orders placed or performed during the hospital encounter of 11/18/24   XR CHEST (2 VW)    Narrative    Chest X-ray    INDICATION: Pain    COMPARISON: 8/25/2024    TECHNIQUE: PA and lateral views of the chest were obtained.    FINDINGS: The lungs are clear. There are no infiltrates or effusions.  Moderate cardiomegaly. The bony thorax is intact.        Impression    1. No acute findings in the chest  2. Moderate cardiomegaly. Stable.      Electronically signed by Grace Helton   CBC with Auto Differential   Result Value Ref Range    WBC 3.8 (L) 4.3 - 11.1 K/uL    RBC 4.33 4.05 - 5.2 M/uL    Hemoglobin 11.7 11.7 - 15.4 g/dL    Hematocrit 36.9 35.8 - 46.3 %    MCV 85.2 82.0 - 102.0 FL    MCH 27.0 26.1 - 32.9 PG    MCHC 31.7 31.4 - 35.0 g/dL    RDW 13.6 11.9 - 14.6 %    Platelets 99 (L) 150 - 450 K/uL    MPV 11.4 9.4 - 12.3 FL    nRBC 0.00 0.0 - 0.2 K/uL    Differential Type AUTOMATED      Neutrophils % 53 43 - 78 %    Lymphocytes % 30 13 - 44 %    Monocytes % 16 (H) 4.0 - 12.0 %    Eosinophils % 1 0.5 - 7.8 %    Basophils % 0 0.0 - 2.0 %    Immature Granulocytes % 0 0.0 - 5.0 %    Neutrophils Absolute 2.0 1.7 - 8.2 K/UL    Lymphocytes Absolute 1.1 0.5 - 4.6 K/UL    Monocytes Absolute 0.6 0.1 - 1.3 K/UL    Eosinophils Absolute 0.1 0.0 - 0.8 K/UL    Basophils Absolute 0.0 0.0 - 0.2 K/UL    Immature Granulocytes Absolute 0.0 0.0 - 0.5 K/UL   Troponin   Result Value Ref Range    Troponin T 15.0 (H) 0 - 14 ng/L   Magnesium   Result Value Ref Range    Magnesium 1.9 1.8 - 2.4 mg/dL   Comprehensive Metabolic Panel   Result Value Ref Range    Sodium 137 136 - 145 mmol/L    Potassium 3.7 3.5 - 5.1 mmol/L    Chloride 100 98 - 107 mmol/L    CO2 25 20 - 29 mmol/L    Anion Gap 12 7 - 16 mmol/L    Glucose 114 (H) 70 - 99 mg/dL

## 2024-11-18 NOTE — ED TRIAGE NOTES
Pt presents with complaint of elevated heart rate and blood pressure that started around 3:00 AM today.  Pt states she has taken Magnesium, lorazepam, and clonidine with no improvement.  She also endorses intermittent Chest Pressure.   Writer received Cancer Risk Management Program referral, referred for:    family HX of breast cancer      Reviewed for appropriate plan, and sent to New Patient Scheduling for completion.

## 2024-11-18 NOTE — CARE COORDINATION
SW met with patient who confirmed demographic information, states that she lives alone and has grandchildren who check in on her. Patient is insured and established with primary care. Patient uses a cane to ambulate, is independent in her ADLs, and denies any falls. Patient asked SW about private caregivers - states that she needs light housekeeping and transportation services. Patient's  was a  (she has benefits through the [facility]) and the patient has LTC insurance. SW talked to the patient about these resources and recommended follow up with the [facility] as well as her LTC policy to discuss benefits and utilization. ER workup pending. SW will continue to follow.     Nova Skelton, [facility]  Medical Social Worker  [facility]

## 2024-11-18 NOTE — DISCHARGE INSTRUCTIONS
Call your cardiology office and schedule outpatient follow-up appointment.  Keep your blood pressure twice daily while making adjustments to your medications and take these results to your cardiologist.  Increase your Cardura/doxazosin to 2 mg at noon daily until you see your cardiologist and let your blood pressure reading is less than 110 systolic.

## 2024-12-02 ENCOUNTER — OFFICE VISIT (OUTPATIENT)
Age: 79
End: 2024-12-02
Payer: MEDICARE

## 2024-12-02 VITALS
DIASTOLIC BLOOD PRESSURE: 78 MMHG | HEART RATE: 68 BPM | WEIGHT: 174 LBS | HEIGHT: 62 IN | SYSTOLIC BLOOD PRESSURE: 138 MMHG | BODY MASS INDEX: 32.02 KG/M2

## 2024-12-02 DIAGNOSIS — I10 ESSENTIAL HYPERTENSION: ICD-10-CM

## 2024-12-02 DIAGNOSIS — I25.10 CORONARY ARTERY DISEASE INVOLVING NATIVE CORONARY ARTERY OF NATIVE HEART WITHOUT ANGINA PECTORIS: ICD-10-CM

## 2024-12-02 DIAGNOSIS — I71.43 INFRARENAL ABDOMINAL AORTIC ANEURYSM (AAA) WITHOUT RUPTURE (HCC): ICD-10-CM

## 2024-12-02 DIAGNOSIS — I35.0 NONRHEUMATIC AORTIC VALVE STENOSIS: Primary | ICD-10-CM

## 2024-12-02 PROCEDURE — 1160F RVW MEDS BY RX/DR IN RCRD: CPT | Performed by: INTERNAL MEDICINE

## 2024-12-02 PROCEDURE — G8399 PT W/DXA RESULTS DOCUMENT: HCPCS | Performed by: INTERNAL MEDICINE

## 2024-12-02 PROCEDURE — 1126F AMNT PAIN NOTED NONE PRSNT: CPT | Performed by: INTERNAL MEDICINE

## 2024-12-02 PROCEDURE — G8417 CALC BMI ABV UP PARAM F/U: HCPCS | Performed by: INTERNAL MEDICINE

## 2024-12-02 PROCEDURE — 99214 OFFICE O/P EST MOD 30 MIN: CPT | Performed by: INTERNAL MEDICINE

## 2024-12-02 PROCEDURE — G8484 FLU IMMUNIZE NO ADMIN: HCPCS | Performed by: INTERNAL MEDICINE

## 2024-12-02 PROCEDURE — 1036F TOBACCO NON-USER: CPT | Performed by: INTERNAL MEDICINE

## 2024-12-02 PROCEDURE — 1159F MED LIST DOCD IN RCRD: CPT | Performed by: INTERNAL MEDICINE

## 2024-12-02 PROCEDURE — 3078F DIAST BP <80 MM HG: CPT | Performed by: INTERNAL MEDICINE

## 2024-12-02 PROCEDURE — 1123F ACP DISCUSS/DSCN MKR DOCD: CPT | Performed by: INTERNAL MEDICINE

## 2024-12-02 PROCEDURE — 3075F SYST BP GE 130 - 139MM HG: CPT | Performed by: INTERNAL MEDICINE

## 2024-12-02 PROCEDURE — G8427 DOCREV CUR MEDS BY ELIG CLIN: HCPCS | Performed by: INTERNAL MEDICINE

## 2024-12-02 PROCEDURE — 1090F PRES/ABSN URINE INCON ASSESS: CPT | Performed by: INTERNAL MEDICINE

## 2024-12-02 RX ORDER — ACETAMINOPHEN 160 MG
2000 TABLET,DISINTEGRATING ORAL DAILY
COMMUNITY

## 2024-12-02 ASSESSMENT — ENCOUNTER SYMPTOMS
HEMOPTYSIS: 0
HOARSE VOICE: 0
WHEEZING: 0
HEMATEMESIS: 0
HEMATOCHEZIA: 0
DOUBLE VISION: 0
STRIDOR: 0
ABDOMINAL PAIN: 0
EYE REDNESS: 0

## 2024-12-02 NOTE — PROGRESS NOTES
[facility] CARDIOLOGY  [address] [place] [address], [address]  [place] [address]  PHONE: [phone]          24    NAME:  Aurora Sanchez  : 1945  MRN: [id number]         SUBJECTIVE:   Aurora Sanchez is a 79 y.o. female seen for a visit regarding the following:     Chief Complaint   Patient presents with    Coronary Artery Disease    Hypertension    Follow-Up from Hospital     CAD, HTN           HPI:    Cardio problem list:  1.  CAD: NSTEMI  -[facility] 23.  90% mCirc that was stented with a KILEY 3.5X18mm MICHELLE ,  95% mRCA-3.5 x 18 [place]iley stent    2.  Aortic stenosis  -Echo from 2023 showed an EF at 60 to 65% with mild concentric LVH, mild aortic stenosis, mild AR,   -Echo from 2022 shows an EF at 60 to 65% with mild concentric LVH, diastolic dysfunction, mild aortic stenosis with a mean gradient of 10 and peak gradient of 18 mmHg, mild mitral regurgitation  3.  Abdominal aortic aneurysm-  -CT abdomen- 4.3 cm 2024  --measures 3.6 x 3.8 cm  --In 2019 was 3.2 x 3.8 cm  4.  Hypertension  5.  Hyperlipidemia-statin intolerance  6.  Hyponatremia-aggravated by spironolactone  -previous Patient of Dr. Jan Mcconnell     I saw Ms. Sanchez who is a 79-year-old woman in cardiovascular follow-up  for difficult to treat hypertension, CAD, mild aortic stenosis, abdominal aortic aneurysm, hyperlipidemia with statin intolerance, hyponatremia.     When we last met with her in 2024, we made no significant changes with her medical therapy.  We had previously increased her carvedilol to 25 mg twice daily for better blood pressure control especially with her abdominal aortic aneurysm but this was dropping her pressures too low so we backed off on the dose to 12.5 mg twice daily.    CAD:  Background: She was admitted to the hospital between  and 9/10/2023 with chest pain and an elevated high-sensitivity troponin of 15,000.  Findings were consistent with a non-STEMI and she underwent coronary angiography

## 2025-01-16 ENCOUNTER — OFFICE VISIT (OUTPATIENT)
Dept: INTERNAL MEDICINE CLINIC | Facility: CLINIC | Age: 80
End: 2025-01-16

## 2025-01-16 VITALS
WEIGHT: 179 LBS | HEIGHT: 62 IN | SYSTOLIC BLOOD PRESSURE: 140 MMHG | DIASTOLIC BLOOD PRESSURE: 60 MMHG | TEMPERATURE: 97.9 F | HEART RATE: 63 BPM | RESPIRATION RATE: 18 BRPM | OXYGEN SATURATION: 100 % | BODY MASS INDEX: 32.94 KG/M2

## 2025-01-16 DIAGNOSIS — I10 ESSENTIAL HYPERTENSION: ICD-10-CM

## 2025-01-16 DIAGNOSIS — E78.2 MIXED HYPERLIPIDEMIA: ICD-10-CM

## 2025-01-16 DIAGNOSIS — F41.1 GAD (GENERALIZED ANXIETY DISORDER): ICD-10-CM

## 2025-01-16 DIAGNOSIS — R73.03 PREDIABETES: ICD-10-CM

## 2025-01-16 DIAGNOSIS — D64.9 ANEMIA, UNSPECIFIED TYPE: ICD-10-CM

## 2025-01-16 DIAGNOSIS — E78.2 MIXED HYPERLIPIDEMIA: Primary | ICD-10-CM

## 2025-01-16 DIAGNOSIS — I35.0 NONRHEUMATIC AORTIC VALVE STENOSIS: ICD-10-CM

## 2025-01-16 LAB
ALBUMIN SERPL-MCNC: 3.8 G/DL (ref 3.2–4.6)
ALBUMIN/GLOB SERPL: 0.9 (ref 1–1.9)
ALP SERPL-CCNC: 80 U/L (ref 35–104)
ALT SERPL-CCNC: 28 U/L (ref 8–45)
ANION GAP SERPL CALC-SCNC: 10 MMOL/L (ref 7–16)
AST SERPL-CCNC: 21 U/L (ref 15–37)
BASOPHILS # BLD: 0.03 K/UL (ref 0–0.2)
BASOPHILS NFR BLD: 0.6 % (ref 0–2)
BILIRUB SERPL-MCNC: 0.2 MG/DL (ref 0–1.2)
BUN SERPL-MCNC: 17 MG/DL (ref 8–23)
CALCIUM SERPL-MCNC: 9.5 MG/DL (ref 8.8–10.2)
CHLORIDE SERPL-SCNC: 103 MMOL/L (ref 98–107)
CHOLEST SERPL-MCNC: 169 MG/DL (ref 0–200)
CO2 SERPL-SCNC: 26 MMOL/L (ref 20–29)
CREAT SERPL-MCNC: 0.69 MG/DL (ref 0.6–1.1)
DIFFERENTIAL METHOD BLD: ABNORMAL
EOSINOPHIL # BLD: 0.14 K/UL (ref 0–0.8)
EOSINOPHIL NFR BLD: 2.9 % (ref 0.5–7.8)
ERYTHROCYTE [DISTWIDTH] IN BLOOD BY AUTOMATED COUNT: 13.2 % (ref 11.9–14.6)
EST. AVERAGE GLUCOSE BLD GHB EST-MCNC: 128 MG/DL
GLOBULIN SER CALC-MCNC: 4.1 G/DL (ref 2.3–3.5)
GLUCOSE SERPL-MCNC: 99 MG/DL (ref 70–99)
HBA1C MFR BLD: 6.1 % (ref 0–5.6)
HCT VFR BLD AUTO: 35.2 % (ref 35.8–46.3)
HDLC SERPL-MCNC: 52 MG/DL (ref 40–60)
HDLC SERPL: 3.2 (ref 0–5)
HGB BLD-MCNC: 11 G/DL (ref 11.7–15.4)
IMM GRANULOCYTES # BLD AUTO: 0.02 K/UL (ref 0–0.5)
IMM GRANULOCYTES NFR BLD AUTO: 0.4 % (ref 0–5)
LDLC SERPL CALC-MCNC: 100 MG/DL (ref 0–100)
LYMPHOCYTES # BLD: 1.4 K/UL (ref 0.5–4.6)
LYMPHOCYTES NFR BLD: 29.1 % (ref 13–44)
MCH RBC QN AUTO: 27.2 PG (ref 26.1–32.9)
MCHC RBC AUTO-ENTMCNC: 31.3 G/DL (ref 31.4–35)
MCV RBC AUTO: 87.1 FL (ref 82–102)
MONOCYTES # BLD: 0.68 K/UL (ref 0.1–1.3)
MONOCYTES NFR BLD: 14.1 % (ref 4–12)
NEUTS SEG # BLD: 2.54 K/UL (ref 1.7–8.2)
NEUTS SEG NFR BLD: 52.9 % (ref 43–78)
NRBC # BLD: 0 K/UL (ref 0–0.2)
PLATELET # BLD AUTO: 96 K/UL (ref 150–450)
PMV BLD AUTO: 12.8 FL (ref 9.4–12.3)
POTASSIUM SERPL-SCNC: 3.7 MMOL/L (ref 3.5–5.1)
PROT SERPL-MCNC: 7.9 G/DL (ref 6.3–8.2)
RBC # BLD AUTO: 4.04 M/UL (ref 4.05–5.2)
SODIUM SERPL-SCNC: 139 MMOL/L (ref 136–145)
TRIGL SERPL-MCNC: 82 MG/DL (ref 0–150)
TSH, 3RD GENERATION: 3.44 UIU/ML (ref 0.27–4.2)
VLDLC SERPL CALC-MCNC: 16 MG/DL (ref 6–23)
WBC # BLD AUTO: 4.8 K/UL (ref 4.3–11.1)

## 2025-01-16 RX ORDER — FERROUS SULFATE 325(65) MG
325 TABLET ORAL DAILY
Qty: 90 TABLET | Refills: 1 | Status: SHIPPED | OUTPATIENT
Start: 2025-01-16

## 2025-01-16 SDOH — HEALTH STABILITY: PHYSICAL HEALTH: ON AVERAGE, HOW MANY MINUTES DO YOU ENGAGE IN EXERCISE AT THIS LEVEL?: PATIENT UNABLE TO ANSWER

## 2025-01-16 SDOH — ECONOMIC STABILITY: FOOD INSECURITY: WITHIN THE PAST 12 MONTHS, THE FOOD YOU BOUGHT JUST DIDN'T LAST AND YOU DIDN'T HAVE MONEY TO GET MORE.: NEVER TRUE

## 2025-01-16 SDOH — HEALTH STABILITY: PHYSICAL HEALTH: ON AVERAGE, HOW MANY DAYS PER WEEK DO YOU ENGAGE IN MODERATE TO STRENUOUS EXERCISE (LIKE A BRISK WALK)?: 7 DAYS

## 2025-01-16 SDOH — ECONOMIC STABILITY: FOOD INSECURITY: WITHIN THE PAST 12 MONTHS, YOU WORRIED THAT YOUR FOOD WOULD RUN OUT BEFORE YOU GOT MONEY TO BUY MORE.: NEVER TRUE

## 2025-01-16 ASSESSMENT — ANXIETY QUESTIONNAIRES
GAD7 TOTAL SCORE: 0
5. BEING SO RESTLESS THAT IT IS HARD TO SIT STILL: NOT AT ALL
4. TROUBLE RELAXING: NOT AT ALL
6. BECOMING EASILY ANNOYED OR IRRITABLE: NOT AT ALL
1. FEELING NERVOUS, ANXIOUS, OR ON EDGE: NOT AT ALL
IF YOU CHECKED OFF ANY PROBLEMS ON THIS QUESTIONNAIRE, HOW DIFFICULT HAVE THESE PROBLEMS MADE IT FOR YOU TO DO YOUR WORK, TAKE CARE OF THINGS AT HOME, OR GET ALONG WITH OTHER PEOPLE: NOT DIFFICULT AT ALL
3. WORRYING TOO MUCH ABOUT DIFFERENT THINGS: NOT AT ALL
2. NOT BEING ABLE TO STOP OR CONTROL WORRYING: NOT AT ALL
7. FEELING AFRAID AS IF SOMETHING AWFUL MIGHT HAPPEN: NOT AT ALL

## 2025-01-16 ASSESSMENT — ENCOUNTER SYMPTOMS
BACK PAIN: 0
COUGH: 0
SINUS PAIN: 0
NAUSEA: 0
DIARRHEA: 0
CONSTIPATION: 0
SORE THROAT: 0
SHORTNESS OF BREATH: 0
ABDOMINAL PAIN: 0
RHINORRHEA: 0
EYE PAIN: 0
VOMITING: 0

## 2025-01-16 ASSESSMENT — PATIENT HEALTH QUESTIONNAIRE - PHQ9
SUM OF ALL RESPONSES TO PHQ QUESTIONS 1-9: 0
1. LITTLE INTEREST OR PLEASURE IN DOING THINGS: NOT AT ALL
SUM OF ALL RESPONSES TO PHQ QUESTIONS 1-9: 0
2. FEELING DOWN, DEPRESSED OR HOPELESS: NOT AT ALL
SUM OF ALL RESPONSES TO PHQ9 QUESTIONS 1 & 2: 0

## 2025-01-16 NOTE — PROGRESS NOTES
(call 911 or present to Emergency Dept) for any chest pains, palpitations or shortness of breath. Advised on falls precautions.    Cont to fu with cardiologist, psych.      Orders Placed This Encounter   Procedures    CBC with Auto Differential     Standing Status:   Future     Number of Occurrences:   1     Standing Expiration Date:   4/16/2025    Comprehensive Metabolic Panel     Standing Status:   Future     Number of Occurrences:   1     Standing Expiration Date:   4/16/2025    Hemoglobin A1C     Standing Status:   Future     Number of Occurrences:   1     Standing Expiration Date:   4/16/2025    Lipid Panel     Standing Status:   Future     Number of Occurrences:   1     Standing Expiration Date:   4/16/2025     Order Specific Question:   Is Patient Fasting?/# of Hours     Answer:   Yes    TSH     Standing Status:   Future     Number of Occurrences:   1     Standing Expiration Date:   4/16/2025                  Follow Up  Return in about 5 months (around 6/24/2025), or if symptoms worsen or fail to improve, for Medicare Wellness, Subsq.             Betty Allen DNP, FNP-BC

## 2025-03-17 ENCOUNTER — OFFICE VISIT (OUTPATIENT)
Age: 80
End: 2025-03-17
Payer: MEDICARE

## 2025-03-17 VITALS
HEART RATE: 70 BPM | DIASTOLIC BLOOD PRESSURE: 60 MMHG | WEIGHT: 177 LBS | HEIGHT: 62 IN | SYSTOLIC BLOOD PRESSURE: 130 MMHG | BODY MASS INDEX: 32.57 KG/M2

## 2025-03-17 DIAGNOSIS — I35.0 NONRHEUMATIC AORTIC VALVE STENOSIS: ICD-10-CM

## 2025-03-17 DIAGNOSIS — I10 ESSENTIAL HYPERTENSION: ICD-10-CM

## 2025-03-17 DIAGNOSIS — I71.43 INFRARENAL ABDOMINAL AORTIC ANEURYSM (AAA) WITHOUT RUPTURE: ICD-10-CM

## 2025-03-17 DIAGNOSIS — I25.10 CORONARY ARTERY DISEASE INVOLVING NATIVE CORONARY ARTERY OF NATIVE HEART WITHOUT ANGINA PECTORIS: Primary | ICD-10-CM

## 2025-03-17 DIAGNOSIS — E78.5 DYSLIPIDEMIA: ICD-10-CM

## 2025-03-17 PROCEDURE — G8427 DOCREV CUR MEDS BY ELIG CLIN: HCPCS | Performed by: INTERNAL MEDICINE

## 2025-03-17 PROCEDURE — G8399 PT W/DXA RESULTS DOCUMENT: HCPCS | Performed by: INTERNAL MEDICINE

## 2025-03-17 PROCEDURE — G8417 CALC BMI ABV UP PARAM F/U: HCPCS | Performed by: INTERNAL MEDICINE

## 2025-03-17 PROCEDURE — 3075F SYST BP GE 130 - 139MM HG: CPT | Performed by: INTERNAL MEDICINE

## 2025-03-17 PROCEDURE — 3078F DIAST BP <80 MM HG: CPT | Performed by: INTERNAL MEDICINE

## 2025-03-17 PROCEDURE — 1160F RVW MEDS BY RX/DR IN RCRD: CPT | Performed by: INTERNAL MEDICINE

## 2025-03-17 PROCEDURE — 1126F AMNT PAIN NOTED NONE PRSNT: CPT | Performed by: INTERNAL MEDICINE

## 2025-03-17 PROCEDURE — 1123F ACP DISCUSS/DSCN MKR DOCD: CPT | Performed by: INTERNAL MEDICINE

## 2025-03-17 PROCEDURE — 1036F TOBACCO NON-USER: CPT | Performed by: INTERNAL MEDICINE

## 2025-03-17 PROCEDURE — 1159F MED LIST DOCD IN RCRD: CPT | Performed by: INTERNAL MEDICINE

## 2025-03-17 PROCEDURE — 99214 OFFICE O/P EST MOD 30 MIN: CPT | Performed by: INTERNAL MEDICINE

## 2025-03-17 PROCEDURE — 1090F PRES/ABSN URINE INCON ASSESS: CPT | Performed by: INTERNAL MEDICINE

## 2025-03-17 RX ORDER — CARVEDILOL 12.5 MG/1
12.5 TABLET ORAL 2 TIMES DAILY
Qty: 180 TABLET | Refills: 3 | Status: SHIPPED | OUTPATIENT
Start: 2025-03-17

## 2025-03-17 RX ORDER — ROSUVASTATIN CALCIUM 10 MG/1
10 TABLET, COATED ORAL DAILY
Qty: 90 TABLET | Refills: 3 | Status: SHIPPED | OUTPATIENT
Start: 2025-03-17

## 2025-03-17 RX ORDER — AMLODIPINE BESYLATE 5 MG/1
5 TABLET ORAL DAILY
Qty: 90 TABLET | Refills: 3 | Status: SHIPPED | OUTPATIENT
Start: 2025-03-17 | End: 2026-03-12

## 2025-03-17 RX ORDER — LOSARTAN POTASSIUM 100 MG/1
100 TABLET ORAL DAILY
Qty: 90 TABLET | Refills: 3 | Status: SHIPPED | OUTPATIENT
Start: 2025-03-17

## 2025-03-17 ASSESSMENT — ENCOUNTER SYMPTOMS
DOUBLE VISION: 0
ABDOMINAL PAIN: 0
EYE REDNESS: 0
STRIDOR: 0
HOARSE VOICE: 0
WHEEZING: 0
HEMATEMESIS: 0
HEMATOCHEZIA: 0
HEMOPTYSIS: 0

## 2025-03-17 NOTE — PROGRESS NOTES
failure symptoms.  Some trace lower extremity edema resolved with compression stockings    Dyslipidemia  Continue 10mg daily. Lipids have been well controlled with this and at goal    Infrarenal abdominal aortic aneurysm (AAA) without rupture (HCC)  Being monitored-continue beta-blocker therapy. Measured 4.3 cm    H/O non-ST elevation myocardial infarction (NSTEMI)  S/p PCI-peak high sensitivity troponin was 15,000       Overall Impression  She has done well for the most since she last saw us.  No significant spikes in her blood pressure like she was having before associated with anxiety and stress.  Pressures here are great in the clinic and they range between 10 8-1 50s systolic at home with an average pressure in the 130s range.  Diastolic pressures have been well-controlled and heart rates for the most 50s to 70s range.  We would only need to see her in follow-up in about 6 months time    On this date I have spent 40 minutes personally reviewing previous notes/outside records, test results and face to face time with the patient discussing the diagnosis and importance of compliance with the treatment plan as well as documenting on the day of the visit.       Elements of this note have been dictated using speech recognition software. As a result, errors of speech recognition may have occurred.    Return in about 6 months (around 9/17/2025) for cad, htn, hld.      Thank you for allowing us to participate in the care of your patient.  If you have any further questions, please do not hesitate to contact us.  Sincerely,        Xavier Good MD   3/17/2025

## 2025-04-25 ENCOUNTER — TELEPHONE (OUTPATIENT)
Age: 80
End: 2025-04-25

## 2025-05-07 ENCOUNTER — APPOINTMENT (OUTPATIENT)
Dept: CT IMAGING | Age: 80
DRG: 552 | End: 2025-05-07
Payer: MEDICARE

## 2025-05-07 ENCOUNTER — HOSPITAL ENCOUNTER (INPATIENT)
Age: 80
LOS: 1 days | Discharge: NURSING, REHAB OR LONG-TERM CARE | DRG: 552 | End: 2025-05-09
Attending: HOSPITALIST | Admitting: HOSPITALIST
Payer: MEDICARE

## 2025-05-07 ENCOUNTER — APPOINTMENT (OUTPATIENT)
Dept: GENERAL RADIOLOGY | Age: 80
DRG: 552 | End: 2025-05-07
Payer: MEDICARE

## 2025-05-07 DIAGNOSIS — I63.9 CEREBROVASCULAR ACCIDENT (CVA), UNSPECIFIED MECHANISM (HCC): ICD-10-CM

## 2025-05-07 DIAGNOSIS — R42 VERTIGO: ICD-10-CM

## 2025-05-07 DIAGNOSIS — I16.0 HYPERTENSIVE URGENCY: ICD-10-CM

## 2025-05-07 DIAGNOSIS — R42 DIZZINESS: Primary | ICD-10-CM

## 2025-05-07 DIAGNOSIS — M48.02 CERVICAL STENOSIS OF SPINAL CANAL: ICD-10-CM

## 2025-05-07 DIAGNOSIS — R55 NEAR SYNCOPE: ICD-10-CM

## 2025-05-07 LAB
ANION GAP SERPL CALC-SCNC: 13 MMOL/L (ref 7–16)
APPEARANCE UR: CLEAR
BACTERIA URNS QL MICRO: NEGATIVE /HPF
BASOPHILS # BLD: 0.02 K/UL (ref 0–0.2)
BASOPHILS NFR BLD: 0.4 % (ref 0–2)
BILIRUB UR QL: NEGATIVE
BUN SERPL-MCNC: 7 MG/DL (ref 8–23)
CALCIUM SERPL-MCNC: 9.6 MG/DL (ref 8.8–10.2)
CASTS URNS QL MICRO: 0 /LPF
CHLORIDE SERPL-SCNC: 99 MMOL/L (ref 98–107)
CO2 SERPL-SCNC: 25 MMOL/L (ref 20–29)
COLOR UR: ABNORMAL
CREAT SERPL-MCNC: 0.66 MG/DL (ref 0.6–1.1)
CRYSTALS URNS QL MICRO: 0 /LPF
DIFFERENTIAL METHOD BLD: ABNORMAL
EOSINOPHIL # BLD: 0.04 K/UL (ref 0–0.8)
EOSINOPHIL NFR BLD: 0.7 % (ref 0.5–7.8)
EPI CELLS #/AREA URNS HPF: ABNORMAL /HPF
ERYTHROCYTE [DISTWIDTH] IN BLOOD BY AUTOMATED COUNT: 13.5 % (ref 11.9–14.6)
GLUCOSE SERPL-MCNC: 106 MG/DL (ref 70–99)
GLUCOSE UR STRIP.AUTO-MCNC: NEGATIVE MG/DL
HCT VFR BLD AUTO: 39.2 % (ref 35.8–46.3)
HGB BLD-MCNC: 12.4 G/DL (ref 11.7–15.4)
HGB UR QL STRIP: NEGATIVE
HYALINE CASTS URNS QL MICRO: ABNORMAL /LPF
IMM GRANULOCYTES # BLD AUTO: 0.01 K/UL (ref 0–0.5)
IMM GRANULOCYTES NFR BLD AUTO: 0.2 % (ref 0–5)
KETONES UR QL STRIP.AUTO: NEGATIVE MG/DL
LEUKOCYTE ESTERASE UR QL STRIP.AUTO: NEGATIVE
LYMPHOCYTES # BLD: 1.3 K/UL (ref 0.5–4.6)
LYMPHOCYTES NFR BLD: 23.6 % (ref 13–44)
MAGNESIUM SERPL-MCNC: 2 MG/DL (ref 1.8–2.4)
MCH RBC QN AUTO: 27.1 PG (ref 26.1–32.9)
MCHC RBC AUTO-ENTMCNC: 31.6 G/DL (ref 31.4–35)
MCV RBC AUTO: 85.6 FL (ref 82–102)
MONOCYTES # BLD: 0.63 K/UL (ref 0.1–1.3)
MONOCYTES NFR BLD: 11.5 % (ref 4–12)
MUCOUS THREADS URNS QL MICRO: 0 /LPF
NEUTS SEG # BLD: 3.5 K/UL (ref 1.7–8.2)
NEUTS SEG NFR BLD: 63.6 % (ref 43–78)
NITRITE UR QL STRIP.AUTO: NEGATIVE
NRBC # BLD: 0 K/UL (ref 0–0.2)
PH UR STRIP: 7 (ref 5–9)
PLATELET # BLD AUTO: 101 K/UL (ref 150–450)
PMV BLD AUTO: 11.1 FL (ref 9.4–12.3)
POTASSIUM SERPL-SCNC: 3.7 MMOL/L (ref 3.5–5.1)
PROT UR STRIP-MCNC: 30 MG/DL
RBC # BLD AUTO: 4.58 M/UL (ref 4.05–5.2)
RBC #/AREA URNS HPF: ABNORMAL /HPF
SODIUM SERPL-SCNC: 137 MMOL/L (ref 136–145)
SP GR UR REFRACTOMETRY: 1 (ref 1–1.02)
TROPONIN T SERPL HS-MCNC: 16.3 NG/L (ref 0–14)
TROPONIN T SERPL HS-MCNC: 17.5 NG/L (ref 0–14)
URINE CULTURE IF INDICATED: ABNORMAL
UROBILINOGEN UR QL STRIP.AUTO: 0.2 EU/DL (ref 0.2–1)
WBC # BLD AUTO: 5.5 K/UL (ref 4.3–11.1)
WBC URNS QL MICRO: ABNORMAL /HPF

## 2025-05-07 PROCEDURE — 70450 CT HEAD/BRAIN W/O DYE: CPT

## 2025-05-07 PROCEDURE — 72125 CT NECK SPINE W/O DYE: CPT

## 2025-05-07 PROCEDURE — G0378 HOSPITAL OBSERVATION PER HR: HCPCS

## 2025-05-07 PROCEDURE — 93005 ELECTROCARDIOGRAM TRACING: CPT | Performed by: NURSE PRACTITIONER

## 2025-05-07 PROCEDURE — 71046 X-RAY EXAM CHEST 2 VIEWS: CPT

## 2025-05-07 PROCEDURE — 99291 CRITICAL CARE FIRST HOUR: CPT

## 2025-05-07 PROCEDURE — 6360000002 HC RX W HCPCS: Performed by: NURSE PRACTITIONER

## 2025-05-07 PROCEDURE — 2580000003 HC RX 258: Performed by: NURSE PRACTITIONER

## 2025-05-07 PROCEDURE — 84484 ASSAY OF TROPONIN QUANT: CPT

## 2025-05-07 PROCEDURE — 6370000000 HC RX 637 (ALT 250 FOR IP): Performed by: HOSPITALIST

## 2025-05-07 PROCEDURE — 81001 URINALYSIS AUTO W/SCOPE: CPT

## 2025-05-07 PROCEDURE — 80048 BASIC METABOLIC PNL TOTAL CA: CPT

## 2025-05-07 PROCEDURE — 96375 TX/PRO/DX INJ NEW DRUG ADDON: CPT

## 2025-05-07 PROCEDURE — 83735 ASSAY OF MAGNESIUM: CPT

## 2025-05-07 PROCEDURE — 96374 THER/PROPH/DIAG INJ IV PUSH: CPT

## 2025-05-07 PROCEDURE — 85025 COMPLETE CBC W/AUTO DIFF WBC: CPT

## 2025-05-07 PROCEDURE — 6360000004 HC RX CONTRAST MEDICATION: Performed by: NURSE PRACTITIONER

## 2025-05-07 PROCEDURE — 6370000000 HC RX 637 (ALT 250 FOR IP): Performed by: NURSE PRACTITIONER

## 2025-05-07 PROCEDURE — 70496 CT ANGIOGRAPHY HEAD: CPT

## 2025-05-07 RX ORDER — ONDANSETRON 4 MG/1
4 TABLET, ORALLY DISINTEGRATING ORAL EVERY 8 HOURS PRN
Status: DISCONTINUED | OUTPATIENT
Start: 2025-05-07 | End: 2025-05-09 | Stop reason: HOSPADM

## 2025-05-07 RX ORDER — CARVEDILOL 6.25 MG/1
12.5 TABLET ORAL 2 TIMES DAILY WITH MEALS
Status: DISCONTINUED | OUTPATIENT
Start: 2025-05-08 | End: 2025-05-08

## 2025-05-07 RX ORDER — SODIUM CHLORIDE 0.9 % (FLUSH) 0.9 %
5-40 SYRINGE (ML) INJECTION EVERY 12 HOURS SCHEDULED
Status: DISCONTINUED | OUTPATIENT
Start: 2025-05-07 | End: 2025-05-09 | Stop reason: HOSPADM

## 2025-05-07 RX ORDER — LOSARTAN POTASSIUM 50 MG/1
100 TABLET ORAL DAILY
Status: DISCONTINUED | OUTPATIENT
Start: 2025-05-08 | End: 2025-05-09 | Stop reason: HOSPADM

## 2025-05-07 RX ORDER — MECLIZINE HYDROCHLORIDE 25 MG/1
25 TABLET ORAL
Status: COMPLETED | OUTPATIENT
Start: 2025-05-07 | End: 2025-05-07

## 2025-05-07 RX ORDER — LORAZEPAM 2 MG/ML
0.5 INJECTION INTRAMUSCULAR EVERY 6 HOURS PRN
Status: DISCONTINUED | OUTPATIENT
Start: 2025-05-07 | End: 2025-05-07

## 2025-05-07 RX ORDER — LABETALOL HYDROCHLORIDE 5 MG/ML
10 INJECTION, SOLUTION INTRAVENOUS EVERY 4 HOURS PRN
Status: DISCONTINUED | OUTPATIENT
Start: 2025-05-07 | End: 2025-05-09 | Stop reason: HOSPADM

## 2025-05-07 RX ORDER — AMLODIPINE BESYLATE 5 MG/1
5 TABLET ORAL DAILY
Status: DISCONTINUED | OUTPATIENT
Start: 2025-05-08 | End: 2025-05-07

## 2025-05-07 RX ORDER — ROSUVASTATIN CALCIUM 20 MG/1
40 TABLET, COATED ORAL NIGHTLY
Status: DISCONTINUED | OUTPATIENT
Start: 2025-05-07 | End: 2025-05-09 | Stop reason: HOSPADM

## 2025-05-07 RX ORDER — LABETALOL HYDROCHLORIDE 5 MG/ML
10 INJECTION, SOLUTION INTRAVENOUS ONCE
Status: COMPLETED | OUTPATIENT
Start: 2025-05-07 | End: 2025-05-07

## 2025-05-07 RX ORDER — LORAZEPAM 1 MG/1
1 TABLET ORAL 2 TIMES DAILY PRN
Status: DISCONTINUED | OUTPATIENT
Start: 2025-05-07 | End: 2025-05-08 | Stop reason: SDUPTHER

## 2025-05-07 RX ORDER — AMLODIPINE BESYLATE 10 MG/1
10 TABLET ORAL NIGHTLY
Status: DISCONTINUED | OUTPATIENT
Start: 2025-05-08 | End: 2025-05-09 | Stop reason: HOSPADM

## 2025-05-07 RX ORDER — DOXAZOSIN 4 MG/1
2 TABLET ORAL DAILY
Status: DISCONTINUED | OUTPATIENT
Start: 2025-05-08 | End: 2025-05-08

## 2025-05-07 RX ORDER — IOPAMIDOL 755 MG/ML
100 INJECTION, SOLUTION INTRAVASCULAR
Status: COMPLETED | OUTPATIENT
Start: 2025-05-07 | End: 2025-05-07

## 2025-05-07 RX ORDER — POLYETHYLENE GLYCOL 3350 17 G/17G
17 POWDER, FOR SOLUTION ORAL DAILY PRN
Status: DISCONTINUED | OUTPATIENT
Start: 2025-05-07 | End: 2025-05-09 | Stop reason: HOSPADM

## 2025-05-07 RX ORDER — ASPIRIN 81 MG/1
81 TABLET ORAL DAILY
Status: DISCONTINUED | OUTPATIENT
Start: 2025-05-08 | End: 2025-05-09 | Stop reason: HOSPADM

## 2025-05-07 RX ORDER — SODIUM CHLORIDE 0.9 % (FLUSH) 0.9 %
5-40 SYRINGE (ML) INJECTION PRN
Status: DISCONTINUED | OUTPATIENT
Start: 2025-05-07 | End: 2025-05-09 | Stop reason: HOSPADM

## 2025-05-07 RX ORDER — LORAZEPAM 1 MG/1
1 TABLET ORAL 2 TIMES DAILY PRN
Status: DISCONTINUED | OUTPATIENT
Start: 2025-05-07 | End: 2025-05-09 | Stop reason: HOSPADM

## 2025-05-07 RX ORDER — CLOPIDOGREL BISULFATE 75 MG/1
75 TABLET ORAL DAILY
Status: DISCONTINUED | OUTPATIENT
Start: 2025-05-08 | End: 2025-05-09 | Stop reason: HOSPADM

## 2025-05-07 RX ORDER — ENOXAPARIN SODIUM 100 MG/ML
40 INJECTION SUBCUTANEOUS DAILY
Status: DISCONTINUED | OUTPATIENT
Start: 2025-05-08 | End: 2025-05-09 | Stop reason: HOSPADM

## 2025-05-07 RX ORDER — SODIUM CHLORIDE 9 MG/ML
INJECTION, SOLUTION INTRAVENOUS PRN
Status: DISCONTINUED | OUTPATIENT
Start: 2025-05-07 | End: 2025-05-09 | Stop reason: HOSPADM

## 2025-05-07 RX ORDER — ONDANSETRON 2 MG/ML
4 INJECTION INTRAMUSCULAR; INTRAVENOUS EVERY 6 HOURS PRN
Status: DISCONTINUED | OUTPATIENT
Start: 2025-05-07 | End: 2025-05-09 | Stop reason: HOSPADM

## 2025-05-07 RX ADMIN — IOPAMIDOL 100 ML: 755 INJECTION, SOLUTION INTRAVENOUS at 21:26

## 2025-05-07 RX ADMIN — LORAZEPAM 1 MG: 1 TABLET ORAL at 23:57

## 2025-05-07 RX ADMIN — LORAZEPAM 0.5 MG: 2 INJECTION INTRAMUSCULAR; INTRAVENOUS at 18:23

## 2025-05-07 RX ADMIN — MECLIZINE HYDROCHLORIDE 25 MG: 25 TABLET ORAL at 19:51

## 2025-05-07 RX ADMIN — AMLODIPINE BESYLATE 10 MG: 10 TABLET ORAL at 23:57

## 2025-05-07 RX ADMIN — LABETALOL HYDROCHLORIDE 10 MG: 5 INJECTION, SOLUTION INTRAVENOUS at 19:55

## 2025-05-07 ASSESSMENT — PAIN - FUNCTIONAL ASSESSMENT: PAIN_FUNCTIONAL_ASSESSMENT: NONE - DENIES PAIN

## 2025-05-07 NOTE — ED TRIAGE NOTES
Patient arrives to the ED with granddaughter. C/O Dizzy spells. Onset today while lying flat at the dentist office today. Reports she was unable to drive due to dizziness. Reports congestion.

## 2025-05-07 NOTE — ED PROVIDER NOTES
Range    Magnesium 2.0 1.8 - 2.4 mg/dL   Troponin   Result Value Ref Range    Troponin T 17.5 (H) 0 - 14 ng/L   Urinalysis with Reflex to Culture    Specimen: Urine   Result Value Ref Range    Color, UA YELLOW/STRAW      Appearance CLEAR      Specific Gravity, UA 1.005 1.001 - 1.023      pH, Urine 7.0 5.0 - 9.0      Protein, UA 30 (A) NEG mg/dL    Glucose, Ur Negative NEG mg/dL    Ketones, Urine Negative NEG mg/dL    Bilirubin, Urine Negative NEG      Blood, Urine Negative NEG      Urobilinogen, Urine 0.2 0.2 - 1.0 EU/dL    Nitrite, Urine Negative NEG      Leukocyte Esterase, Urine Negative NEG      Urine Culture if Indicated CULTURE NOT INDICATED BY UA RESULT      WBC, UA 0-4 U4 /hpf    RBC, UA 0-5 U5 /hpf    BACTERIA, URINE Negative NEG /hpf    Epithelial Cells, UA 0-5 U5 /hpf    Hyaline Casts, UA 0-2 /lpf    Casts 0 0 /lpf    Crystals 0 0 /LPF    Mucus, UA 0 0 /lpf   EKG 12 Lead   Result Value Ref Range    Ventricular Rate 89 BPM    Atrial Rate 85 BPM    P-R Interval 180 ms    QRS Duration 83 ms    Q-T Interval 383 ms    QTc Calculation (Bazett) 466 ms    P Axis 67 degrees    R Axis 77 degrees    T Axis 22 degrees    Diagnosis Sinus rhythm  Biatrial enlargement            CTA HEAD NECK W CONTRAST   Final Result   No definite aneurysm, dissection, high-grade stenosis, or occlusive disease.      Electronically signed by Christopher Trevino      CT HEAD WO CONTRAST   Final Result   Senescent changes. No intracranial bleed.      Electronically signed by [employer]      CT CERVICAL SPINE WO CONTRAST   Final Result   No acute fracture. Degenerative spondyloarthropathy of the cervical   spine.         Electronically signed by [employer]      XR CHEST (2 VW)   Final Result   1. Negative examination for acute or active pulmonary parenchymal disease.   2. Compared to the examination done 3/25/2024      Electronically signed by Grace Helton      MRI BRAIN W WO CONTRAST    (Results Pending)   MRI CERVICAL SPINE W WO 
[facility]

## 2025-05-08 ENCOUNTER — APPOINTMENT (OUTPATIENT)
Dept: MRI IMAGING | Age: 80
DRG: 552 | End: 2025-05-08
Payer: MEDICARE

## 2025-05-08 ENCOUNTER — APPOINTMENT (OUTPATIENT)
Dept: NON INVASIVE DIAGNOSTICS | Age: 80
DRG: 552 | End: 2025-05-08
Attending: HOSPITALIST
Payer: MEDICARE

## 2025-05-08 PROBLEM — M48.02 CERVICAL STENOSIS OF SPINAL CANAL: Status: ACTIVE | Noted: 2025-05-08

## 2025-05-08 LAB
ANION GAP SERPL CALC-SCNC: 11 MMOL/L (ref 7–16)
BUN SERPL-MCNC: 7 MG/DL (ref 8–23)
CALCIUM SERPL-MCNC: 9.5 MG/DL (ref 8.8–10.2)
CHLORIDE SERPL-SCNC: 102 MMOL/L (ref 98–107)
CHOLEST SERPL-MCNC: 195 MG/DL (ref 0–200)
CO2 SERPL-SCNC: 26 MMOL/L (ref 20–29)
CREAT SERPL-MCNC: 0.74 MG/DL (ref 0.6–1.1)
ECHO AO ASC DIAM: 2.9 CM
ECHO AO ASCENDING AORTA INDEX: 1.62 CM/M2
ECHO AO ROOT DIAM: 2.7 CM
ECHO AO ROOT INDEX: 1.51 CM/M2
ECHO AR MAX VEL PISA: 2.4 M/S
ECHO AV AREA PEAK VELOCITY: 1.4 CM2
ECHO AV AREA VTI: 1.4 CM2
ECHO AV AREA/BSA PEAK VELOCITY: 0.8 CM2/M2
ECHO AV AREA/BSA VTI: 0.8 CM2/M2
ECHO AV CUSP MM: 1 CM
ECHO AV MEAN GRADIENT: 17 MMHG
ECHO AV MEAN VELOCITY: 1.9 M/S
ECHO AV PEAK GRADIENT: 30 MMHG
ECHO AV PEAK VELOCITY: 2.7 M/S
ECHO AV REGURGITANT PHT: 582 MS
ECHO AV VELOCITY RATIO: 0.81
ECHO AV VTI: 58.4 CM
ECHO BSA: 1.84 M2
ECHO EST RA PRESSURE: 3 MMHG
ECHO IVC EXP: 1.3 CM
ECHO LA AREA 2C: 17.3 CM2
ECHO LA AREA 4C: 22.1 CM2
ECHO LA DIAMETER INDEX: 2.01 CM/M2
ECHO LA DIAMETER: 3.6 CM
ECHO LA MAJOR AXIS: 6.1 CM
ECHO LA MINOR AXIS: 6.3 CM
ECHO LA TO AORTIC ROOT RATIO: 1.33
ECHO LA VOL BP: 51 ML (ref 22–52)
ECHO LA VOL MOD A2C: 40 ML (ref 22–52)
ECHO LA VOL MOD A4C: 64 ML (ref 22–52)
ECHO LA VOL/BSA BIPLANE: 28 ML/M2 (ref 16–34)
ECHO LA VOLUME INDEX MOD A2C: 22 ML/M2 (ref 16–34)
ECHO LA VOLUME INDEX MOD A4C: 36 ML/M2 (ref 16–34)
ECHO LV E' LATERAL VELOCITY: 8.39 CM/S
ECHO LV E' SEPTAL VELOCITY: 7.7 CM/S
ECHO LV EDV A2C: 130 ML
ECHO LV EDV A4C: 88 ML
ECHO LV EDV INDEX A4C: 49 ML/M2
ECHO LV EDV NDEX A2C: 73 ML/M2
ECHO LV EF PHYSICIAN: 70 %
ECHO LV EJECTION FRACTION A2C: 81 %
ECHO LV EJECTION FRACTION A4C: 65 %
ECHO LV EJECTION FRACTION BIPLANE: 72 % (ref 55–100)
ECHO LV ESV A2C: 25 ML
ECHO LV ESV A4C: 31 ML
ECHO LV ESV INDEX A2C: 14 ML/M2
ECHO LV ESV INDEX A4C: 17 ML/M2
ECHO LV FRACTIONAL SHORTENING: 35 % (ref 28–44)
ECHO LV INTERNAL DIMENSION DIASTOLE INDEX: 2.07 CM/M2
ECHO LV INTERNAL DIMENSION DIASTOLIC: 3.7 CM (ref 3.9–5.3)
ECHO LV INTERNAL DIMENSION SYSTOLIC INDEX: 1.34 CM/M2
ECHO LV INTERNAL DIMENSION SYSTOLIC: 2.4 CM
ECHO LV IVSD: 1.1 CM (ref 0.6–0.9)
ECHO LV MASS 2D: 120.8 G (ref 67–162)
ECHO LV MASS INDEX 2D: 67.5 G/M2 (ref 43–95)
ECHO LV POSTERIOR WALL DIASTOLIC: 1 CM (ref 0.6–0.9)
ECHO LV RELATIVE WALL THICKNESS RATIO: 0.54
ECHO LVOT AREA: 1.8 CM2
ECHO LVOT AV VTI INDEX: 0.79
ECHO LVOT DIAM: 1.5 CM
ECHO LVOT MEAN GRADIENT: 11 MMHG
ECHO LVOT PEAK GRADIENT: 19 MMHG
ECHO LVOT PEAK VELOCITY: 2.2 M/S
ECHO LVOT STROKE VOLUME INDEX: 45.4 ML/M2
ECHO LVOT SV: 81.2 ML
ECHO LVOT VTI: 46 CM
ECHO MV A VELOCITY: 0.99 M/S
ECHO MV AREA VTI: 2.6 CM2
ECHO MV E DECELERATION TIME (DT): 239 MS
ECHO MV E VELOCITY: 0.66 M/S
ECHO MV E/A RATIO: 0.67
ECHO MV E/E' LATERAL: 7.87
ECHO MV E/E' RATIO (AVERAGED): 8.22
ECHO MV E/E' SEPTAL: 8.57
ECHO MV LVOT VTI INDEX: 0.67
ECHO MV MAX VELOCITY: 1.2 M/S
ECHO MV MEAN GRADIENT: 2 MMHG
ECHO MV MEAN VELOCITY: 0.7 M/S
ECHO MV PEAK GRADIENT: 5 MMHG
ECHO MV REGURGITANT PEAK GRADIENT: 13 MMHG
ECHO MV REGURGITANT PEAK VELOCITY: 1.8 M/S
ECHO MV VTI: 31 CM
ECHO PULMONARY ARTERY END DIASTOLIC PRESSURE: 3 MMHG
ECHO PV ACCELERATION TIME (AT): 169 MS
ECHO PV MAX VELOCITY: 1 M/S
ECHO PV PEAK GRADIENT: 4 MMHG
ECHO PV REGURGITANT MAX VELOCITY: 0.8 M/S
ECHO RA AREA 4C: 10.8 CM2
ECHO RIGHT VENTRICULAR SYSTOLIC PRESSURE (RVSP): 16 MMHG
ECHO RV BASAL DIMENSION: 3 CM
ECHO RV FREE WALL PEAK S': 19.4 CM/S
ECHO RV INTERNAL DIMENSION: 3.8 CM
ECHO RV LONGITUDINAL DIMENSION: 8.8 CM
ECHO RV MID DIMENSION: 2 CM
ECHO RV TAPSE: 2.5 CM (ref 1.7–?)
ECHO TV REGURGITANT MAX VELOCITY: 1.81 M/S
ECHO TV REGURGITANT PEAK GRADIENT: 13 MMHG
EKG ATRIAL RATE: 85 BPM
EKG DIAGNOSIS: NORMAL
EKG P AXIS: 67 DEGREES
EKG P-R INTERVAL: 180 MS
EKG Q-T INTERVAL: 383 MS
EKG QRS DURATION: 83 MS
EKG QTC CALCULATION (BAZETT): 466 MS
EKG R AXIS: 77 DEGREES
EKG T AXIS: 22 DEGREES
EKG VENTRICULAR RATE: 89 BPM
ERYTHROCYTE [DISTWIDTH] IN BLOOD BY AUTOMATED COUNT: 13.5 % (ref 11.9–14.6)
EST. AVERAGE GLUCOSE BLD GHB EST-MCNC: 130 MG/DL
GLUCOSE SERPL-MCNC: 106 MG/DL (ref 70–99)
HBA1C MFR BLD: 6.1 % (ref 0–5.6)
HCT VFR BLD AUTO: 39 % (ref 35.8–46.3)
HDLC SERPL-MCNC: 48 MG/DL (ref 40–60)
HDLC SERPL: 4.1 (ref 0–5)
HGB BLD-MCNC: 12.3 G/DL (ref 11.7–15.4)
LDLC SERPL CALC-MCNC: 126 MG/DL (ref 0–100)
MAGNESIUM SERPL-MCNC: 1.9 MG/DL (ref 1.8–2.4)
MCH RBC QN AUTO: 27.2 PG (ref 26.1–32.9)
MCHC RBC AUTO-ENTMCNC: 31.5 G/DL (ref 31.4–35)
MCV RBC AUTO: 86.3 FL (ref 82–102)
NRBC # BLD: 0 K/UL (ref 0–0.2)
PLATELET # BLD AUTO: 98 K/UL (ref 150–450)
PMV BLD AUTO: 11.5 FL (ref 9.4–12.3)
POTASSIUM SERPL-SCNC: 3.4 MMOL/L (ref 3.5–5.1)
RBC # BLD AUTO: 4.52 M/UL (ref 4.05–5.2)
SODIUM SERPL-SCNC: 139 MMOL/L (ref 136–145)
TRIGL SERPL-MCNC: 107 MG/DL (ref 0–150)
TROPONIN T SERPL HS-MCNC: 20.2 NG/L (ref 0–14)
VLDLC SERPL CALC-MCNC: 21 MG/DL (ref 6–23)
WBC # BLD AUTO: 5 K/UL (ref 4.3–11.1)

## 2025-05-08 PROCEDURE — 93306 TTE W/DOPPLER COMPLETE: CPT

## 2025-05-08 PROCEDURE — A9579 GAD-BASE MR CONTRAST NOS,1ML: HCPCS | Performed by: HOSPITALIST

## 2025-05-08 PROCEDURE — 2500000003 HC RX 250 WO HCPCS: Performed by: HOSPITALIST

## 2025-05-08 PROCEDURE — 6370000000 HC RX 637 (ALT 250 FOR IP): Performed by: HOSPITALIST

## 2025-05-08 PROCEDURE — 80048 BASIC METABOLIC PNL TOTAL CA: CPT

## 2025-05-08 PROCEDURE — 97165 OT EVAL LOW COMPLEX 30 MIN: CPT

## 2025-05-08 PROCEDURE — 80061 LIPID PANEL: CPT

## 2025-05-08 PROCEDURE — 1100000000 HC RM PRIVATE

## 2025-05-08 PROCEDURE — 72156 MRI NECK SPINE W/O & W/DYE: CPT

## 2025-05-08 PROCEDURE — 84484 ASSAY OF TROPONIN QUANT: CPT

## 2025-05-08 PROCEDURE — 85027 COMPLETE CBC AUTOMATED: CPT

## 2025-05-08 PROCEDURE — 93010 ELECTROCARDIOGRAM REPORT: CPT | Performed by: INTERNAL MEDICINE

## 2025-05-08 PROCEDURE — 83036 HEMOGLOBIN GLYCOSYLATED A1C: CPT

## 2025-05-08 PROCEDURE — 97535 SELF CARE MNGMENT TRAINING: CPT

## 2025-05-08 PROCEDURE — 97530 THERAPEUTIC ACTIVITIES: CPT

## 2025-05-08 PROCEDURE — 97161 PT EVAL LOW COMPLEX 20 MIN: CPT

## 2025-05-08 PROCEDURE — 36415 COLL VENOUS BLD VENIPUNCTURE: CPT

## 2025-05-08 PROCEDURE — 2580000003 HC RX 258

## 2025-05-08 PROCEDURE — 6360000002 HC RX W HCPCS

## 2025-05-08 PROCEDURE — 70553 MRI BRAIN STEM W/O & W/DYE: CPT

## 2025-05-08 PROCEDURE — 6360000004 HC RX CONTRAST MEDICATION: Performed by: HOSPITALIST

## 2025-05-08 PROCEDURE — 83735 ASSAY OF MAGNESIUM: CPT

## 2025-05-08 PROCEDURE — 6370000000 HC RX 637 (ALT 250 FOR IP)

## 2025-05-08 RX ORDER — POTASSIUM CHLORIDE 7.45 MG/ML
10 INJECTION INTRAVENOUS PRN
Status: DISCONTINUED | OUTPATIENT
Start: 2025-05-08 | End: 2025-05-09 | Stop reason: HOSPADM

## 2025-05-08 RX ORDER — DOXAZOSIN 4 MG/1
2 TABLET ORAL DAILY
Status: DISCONTINUED | OUTPATIENT
Start: 2025-05-08 | End: 2025-05-09 | Stop reason: HOSPADM

## 2025-05-08 RX ORDER — CARVEDILOL 6.25 MG/1
6.25 TABLET ORAL 2 TIMES DAILY WITH MEALS
Status: DISCONTINUED | OUTPATIENT
Start: 2025-05-08 | End: 2025-05-09 | Stop reason: HOSPADM

## 2025-05-08 RX ORDER — POTASSIUM CHLORIDE 1500 MG/1
40 TABLET, EXTENDED RELEASE ORAL PRN
Status: DISCONTINUED | OUTPATIENT
Start: 2025-05-08 | End: 2025-05-09 | Stop reason: HOSPADM

## 2025-05-08 RX ADMIN — ROSUVASTATIN 40 MG: 20 TABLET, FILM COATED ORAL at 20:32

## 2025-05-08 RX ADMIN — SODIUM CHLORIDE, PRESERVATIVE FREE 10 ML: 5 INJECTION INTRAVENOUS at 08:33

## 2025-05-08 RX ADMIN — GADOTERIDOL 20 ML: 279.3 INJECTION, SOLUTION INTRAVENOUS at 12:03

## 2025-05-08 RX ADMIN — LORAZEPAM 2 MG: 2 INJECTION INTRAMUSCULAR; INTRAVENOUS at 10:47

## 2025-05-08 RX ADMIN — CLOPIDOGREL BISULFATE 75 MG: 75 TABLET, FILM COATED ORAL at 08:32

## 2025-05-08 RX ADMIN — SODIUM CHLORIDE, PRESERVATIVE FREE 10 ML: 5 INJECTION INTRAVENOUS at 00:14

## 2025-05-08 RX ADMIN — LOSARTAN POTASSIUM 100 MG: 50 TABLET, FILM COATED ORAL at 08:32

## 2025-05-08 RX ADMIN — SODIUM CHLORIDE, PRESERVATIVE FREE 10 ML: 5 INJECTION INTRAVENOUS at 20:33

## 2025-05-08 RX ADMIN — AMLODIPINE BESYLATE 10 MG: 10 TABLET ORAL at 20:32

## 2025-05-08 RX ADMIN — CARVEDILOL 6.25 MG: 6.25 TABLET, FILM COATED ORAL at 08:32

## 2025-05-08 RX ADMIN — CARVEDILOL 6.25 MG: 6.25 TABLET, FILM COATED ORAL at 17:08

## 2025-05-08 RX ADMIN — POTASSIUM BICARBONATE 40 MEQ: 782 TABLET, EFFERVESCENT ORAL at 05:31

## 2025-05-08 RX ADMIN — ASPIRIN 81 MG: 81 TABLET, COATED ORAL at 08:32

## 2025-05-08 ASSESSMENT — PAIN SCALES - GENERAL
PAINLEVEL_OUTOF10: 0

## 2025-05-08 NOTE — ED NOTES
TRANSFER - OUT REPORT:    Verbal report given to RAGHAVENDRA Hearn on Aurora Sanchez  being transferred to room 372 for routine progression of patient care       Report consisted of patient's Situation, Background, Assessment and   Recommendations(SBAR).     Information from the following report(s) Nurse Handoff Report, ED Encounter Summary, ED SBAR, Adult Overview, MAR, Recent Results, and Neuro Assessment was reviewed with the receiving nurse.           Lines:   Peripheral IV 05/07/25 Right Antecubital (Active)        Opportunity for questions and clarification was provided.      Patient transported with:  Registered Nurse

## 2025-05-08 NOTE — PLAN OF CARE
Problem: Discharge Planning  Goal: Discharge to home or other facility with appropriate resources  5/8/2025 0911 by Dahiana Jimenez, RN  Outcome: Progressing  Flowsheets (Taken 5/8/2025 0701)  Discharge to home or other facility with appropriate resources:   Arrange for needed discharge resources and transportation as appropriate   Identify barriers to discharge with patient and caregiver   Identify discharge learning needs (meds, wound care, etc)   Refer to discharge planning if patient needs post-hospital services based on physician order or complex needs related to functional status, cognitive ability or social support system  5/8/2025 0547 by Dhiraj Leblanc, RN  Outcome: Progressing  Flowsheets (Taken 5/7/2025 2312)  Discharge to home or other facility with appropriate resources:   Identify barriers to discharge with patient and caregiver   Arrange for needed discharge resources and transportation as appropriate   Identify discharge learning needs (meds, wound care, etc)     Problem: Skin/Tissue Integrity  Goal: Skin integrity remains intact  Description: 1.  Monitor for areas of redness and/or skin breakdown2.  Assess vascular access sites hourly3.  Every 4-6 hours minimum:  Change oxygen saturation probe site4.  Every 4-6 hours:  If on nasal continuous positive airway pressure, respiratory therapy assess nares and determine need for appliance change or resting period  5/8/2025 0911 by Dahiana Jimenez, RN  Outcome: Progressing  Flowsheets (Taken 5/8/2025 0701)  Skin Integrity Remains Intact:   Monitor for areas of redness and/or skin breakdown   Turn and reposition as indicated   Pressure redistribution bed/mattress (bed type)   Check visual cues for pain   Monitor skin under medical devices  5/8/2025 0547 by Dhiraj Leblanc, RN  Outcome: Progressing  Flowsheets (Taken 5/7/2025 2312)  Skin Integrity Remains Intact:   Monitor for areas of redness and/or skin breakdown   Assess vascular access sites hourly

## 2025-05-08 NOTE — FLOWSHEET NOTE
05/08/25 7720   NIH Stroke Scale   NIH Stroke Scale Assessed Yes   Interval Hand-off/Transfer   Level of Consciousness (1a) 0   LOC Questions (1b) 0   LOC Commands (1c) 0   Best Gaze (2) 0   Visual (3) 0   Facial Palsy (4) 0   Motor Arm, Left (5a) 0   Motor Arm, Right (5b) 0   Motor Leg, Left (6a) 0   Motor Leg, Right (6b) 0   Limb Ataxia (7) 0   Sensory (8) (!) 1   Best Language (9) 0   Dysarthria (10) 0   Extinction and Inattention (11) 0   Total 1

## 2025-05-08 NOTE — H&P
Ref Range    Ventricular Rate 89 BPM    Atrial Rate 85 BPM    P-R Interval 180 ms    QRS Duration 83 ms    Q-T Interval 383 ms    QTc Calculation (Bazett) 466 ms    P Axis 67 degrees    R Axis 77 degrees    T Axis 22 degrees    Diagnosis Sinus rhythm  Biatrial enlargement      Basic Metabolic Panel    Collection Time: 05/07/25  6:14 PM   Result Value Ref Range    Sodium 137 136 - 145 mmol/L    Potassium 3.7 3.5 - 5.1 mmol/L    Chloride 99 98 - 107 mmol/L    CO2 25 20 - 29 mmol/L    Anion Gap 13 7 - 16 mmol/L    Glucose 106 (H) 70 - 99 mg/dL    BUN 7 (L) 8 - 23 MG/DL    Creatinine 0.66 0.60 - 1.10 MG/DL    Est, Glom Filt Rate 89 >60 ml/min/1.73m2    Calcium 9.6 8.8 - 10.2 MG/DL   CBC with Auto Differential    Collection Time: 05/07/25  6:14 PM   Result Value Ref Range    WBC 5.5 4.3 - 11.1 K/uL    RBC 4.58 4.05 - 5.2 M/uL    Hemoglobin 12.4 11.7 - 15.4 g/dL    Hematocrit 39.2 35.8 - 46.3 %    MCV 85.6 82.0 - 102.0 FL    MCH 27.1 26.1 - 32.9 PG    MCHC 31.6 31.4 - 35.0 g/dL    RDW 13.5 11.9 - 14.6 %    Platelets 101 (L) 150 - 450 K/uL    MPV 11.1 9.4 - 12.3 FL    nRBC 0.00 0.0 - 0.2 K/uL    Differential Type AUTOMATED      Neutrophils % 63.6 43.0 - 78.0 %    Lymphocytes % 23.6 13.0 - 44.0 %    Monocytes % 11.5 4.0 - 12.0 %    Eosinophils % 0.7 0.5 - 7.8 %    Basophils % 0.4 0.0 - 2.0 %    Immature Granulocytes % 0.2 0.0 - 5.0 %    Neutrophils Absolute 3.50 1.70 - 8.20 K/UL    Lymphocytes Absolute 1.30 0.50 - 4.60 K/UL    Monocytes Absolute 0.63 0.10 - 1.30 K/UL    Eosinophils Absolute 0.04 0.00 - 0.80 K/UL    Basophils Absolute 0.02 0.00 - 0.20 K/UL    Immature Granulocytes Absolute 0.01 0.0 - 0.5 K/UL   Troponin    Collection Time: 05/07/25  6:14 PM   Result Value Ref Range    Troponin T 16.3 (H) 0 - 14 ng/L   Magnesium    Collection Time: 05/07/25  6:14 PM   Result Value Ref Range    Magnesium 2.0 1.8 - 2.4 mg/dL   Troponin    Collection Time: 05/07/25  7:57 PM   Result Value Ref Range    Troponin T 17.5 (H) 0 - 14

## 2025-05-08 NOTE — INTERDISCIPLINARY ROUNDS
Interdisciplinary team rounds were held 5/8/2025 with the following team members:Nursing, Pastoral Care, Physical Therapy, Physician, and Clinical Coordinator and the patient.    Plan of care discussed. See clinical pathway and/or care plan for interventions and desired outcomes.

## 2025-05-08 NOTE — FLOWSHEET NOTE
05/08/25 0701   NIH Stroke Scale   NIH Stroke Scale Assessed Yes   Interval Hand-off/Transfer   Level of Consciousness (1a) 0   LOC Questions (1b) 0   LOC Commands (1c) 0   Best Gaze (2) 0   Visual (3) 0   Facial Palsy (4) 0   Motor Arm, Left (5a) 0   Motor Arm, Right (5b) 0   Motor Leg, Left (6a) 0   Motor Leg, Right (6b) 0   Limb Ataxia (7) 0   Sensory (8) (!) 1   Best Language (9) 0   Dysarthria (10) 0   Extinction and Inattention (11) 0   Total 1

## 2025-05-09 ENCOUNTER — HOSPITAL ENCOUNTER (INPATIENT)
Age: 80
LOS: 7 days | Discharge: HOME HEALTH | DRG: 057 | End: 2025-05-16
Attending: STUDENT IN AN ORGANIZED HEALTH CARE EDUCATION/TRAINING PROGRAM | Admitting: STUDENT IN AN ORGANIZED HEALTH CARE EDUCATION/TRAINING PROGRAM
Payer: MEDICARE

## 2025-05-09 VITALS
HEART RATE: 75 BPM | OXYGEN SATURATION: 95 % | DIASTOLIC BLOOD PRESSURE: 82 MMHG | BODY MASS INDEX: 32.11 KG/M2 | WEIGHT: 174.5 LBS | HEIGHT: 62 IN | SYSTOLIC BLOOD PRESSURE: 146 MMHG | RESPIRATION RATE: 19 BRPM | TEMPERATURE: 98 F

## 2025-05-09 PROBLEM — R42 VERTIGO: Status: ACTIVE | Noted: 2025-05-09

## 2025-05-09 LAB
ANION GAP SERPL CALC-SCNC: 13 MMOL/L (ref 7–16)
BASOPHILS # BLD: 0.02 K/UL (ref 0–0.2)
BASOPHILS NFR BLD: 0.3 % (ref 0–2)
BUN SERPL-MCNC: 14 MG/DL (ref 8–23)
CALCIUM SERPL-MCNC: 9.2 MG/DL (ref 8.8–10.2)
CHLORIDE SERPL-SCNC: 99 MMOL/L (ref 98–107)
CO2 SERPL-SCNC: 25 MMOL/L (ref 20–29)
CREAT SERPL-MCNC: 0.71 MG/DL (ref 0.6–1.1)
DIFFERENTIAL METHOD BLD: ABNORMAL
EOSINOPHIL # BLD: 0.1 K/UL (ref 0–0.8)
EOSINOPHIL NFR BLD: 1.7 % (ref 0.5–7.8)
ERYTHROCYTE [DISTWIDTH] IN BLOOD BY AUTOMATED COUNT: 13.3 % (ref 11.9–14.6)
GLUCOSE SERPL-MCNC: 107 MG/DL (ref 70–99)
HCT VFR BLD AUTO: 37 % (ref 35.8–46.3)
HGB BLD-MCNC: 12 G/DL (ref 11.7–15.4)
IMM GRANULOCYTES # BLD AUTO: 0.01 K/UL (ref 0–0.5)
IMM GRANULOCYTES NFR BLD AUTO: 0.2 % (ref 0–5)
LYMPHOCYTES # BLD: 1.06 K/UL (ref 0.5–4.6)
LYMPHOCYTES NFR BLD: 18.5 % (ref 13–44)
MCH RBC QN AUTO: 27.7 PG (ref 26.1–32.9)
MCHC RBC AUTO-ENTMCNC: 32.4 G/DL (ref 31.4–35)
MCV RBC AUTO: 85.5 FL (ref 82–102)
MONOCYTES # BLD: 0.71 K/UL (ref 0.1–1.3)
MONOCYTES NFR BLD: 12.4 % (ref 4–12)
NEUTS SEG # BLD: 3.82 K/UL (ref 1.7–8.2)
NEUTS SEG NFR BLD: 66.9 % (ref 43–78)
NRBC # BLD: 0 K/UL (ref 0–0.2)
PLATELET # BLD AUTO: 95 K/UL (ref 150–450)
PMV BLD AUTO: 12.2 FL (ref 9.4–12.3)
POTASSIUM SERPL-SCNC: 3.9 MMOL/L (ref 3.5–5.1)
RBC # BLD AUTO: 4.33 M/UL (ref 4.05–5.2)
SODIUM SERPL-SCNC: 137 MMOL/L (ref 136–145)
WBC # BLD AUTO: 5.7 K/UL (ref 4.3–11.1)

## 2025-05-09 PROCEDURE — 6360000002 HC RX W HCPCS: Performed by: HOSPITALIST

## 2025-05-09 PROCEDURE — 97166 OT EVAL MOD COMPLEX 45 MIN: CPT

## 2025-05-09 PROCEDURE — 99222 1ST HOSP IP/OBS MODERATE 55: CPT | Performed by: STUDENT IN AN ORGANIZED HEALTH CARE EDUCATION/TRAINING PROGRAM

## 2025-05-09 PROCEDURE — 36415 COLL VENOUS BLD VENIPUNCTURE: CPT

## 2025-05-09 PROCEDURE — 92610 EVALUATE SWALLOWING FUNCTION: CPT

## 2025-05-09 PROCEDURE — 97535 SELF CARE MNGMENT TRAINING: CPT

## 2025-05-09 PROCEDURE — 80048 BASIC METABOLIC PNL TOTAL CA: CPT

## 2025-05-09 PROCEDURE — 1180000000 HC REHAB R&B

## 2025-05-09 PROCEDURE — 6370000000 HC RX 637 (ALT 250 FOR IP)

## 2025-05-09 PROCEDURE — 6370000000 HC RX 637 (ALT 250 FOR IP): Performed by: STUDENT IN AN ORGANIZED HEALTH CARE EDUCATION/TRAINING PROGRAM

## 2025-05-09 PROCEDURE — 6370000000 HC RX 637 (ALT 250 FOR IP): Performed by: HOSPITALIST

## 2025-05-09 PROCEDURE — 97530 THERAPEUTIC ACTIVITIES: CPT

## 2025-05-09 PROCEDURE — 85025 COMPLETE CBC W/AUTO DIFF WBC: CPT

## 2025-05-09 PROCEDURE — 2500000003 HC RX 250 WO HCPCS: Performed by: HOSPITALIST

## 2025-05-09 RX ORDER — CARVEDILOL 6.25 MG/1
6.25 TABLET ORAL 2 TIMES DAILY WITH MEALS
Status: CANCELLED | OUTPATIENT
Start: 2025-05-09

## 2025-05-09 RX ORDER — MECLIZINE HYDROCHLORIDE 25 MG/1
12.5 TABLET ORAL 3 TIMES DAILY PRN
Status: DISCONTINUED | OUTPATIENT
Start: 2025-05-09 | End: 2025-05-09 | Stop reason: HOSPADM

## 2025-05-09 RX ORDER — POLYETHYLENE GLYCOL 3350 17 G/17G
17 POWDER, FOR SOLUTION ORAL DAILY PRN
Status: DISCONTINUED | OUTPATIENT
Start: 2025-05-09 | End: 2025-05-16 | Stop reason: HOSPADM

## 2025-05-09 RX ORDER — CLOPIDOGREL BISULFATE 75 MG/1
75 TABLET ORAL DAILY
Status: CANCELLED | OUTPATIENT
Start: 2025-05-10

## 2025-05-09 RX ORDER — MULTIVITAMIN WITH IRON
1 TABLET ORAL DAILY
Status: DISCONTINUED | OUTPATIENT
Start: 2025-05-09 | End: 2025-05-16 | Stop reason: HOSPADM

## 2025-05-09 RX ORDER — MECLIZINE HCL 12.5 MG 12.5 MG/1
12.5 TABLET ORAL 3 TIMES DAILY PRN
Qty: 21 TABLET | Refills: 0 | Status: ON HOLD | OUTPATIENT
Start: 2025-05-09 | End: 2025-05-16

## 2025-05-09 RX ORDER — ONDANSETRON 4 MG/1
4 TABLET, ORALLY DISINTEGRATING ORAL EVERY 8 HOURS PRN
Status: DISCONTINUED | OUTPATIENT
Start: 2025-05-09 | End: 2025-05-16 | Stop reason: HOSPADM

## 2025-05-09 RX ORDER — HYDRALAZINE HYDROCHLORIDE 10 MG/1
10 TABLET, FILM COATED ORAL 3 TIMES DAILY PRN
Status: DISCONTINUED | OUTPATIENT
Start: 2025-05-09 | End: 2025-05-16 | Stop reason: HOSPADM

## 2025-05-09 RX ORDER — LOSARTAN POTASSIUM 50 MG/1
100 TABLET ORAL DAILY
Status: DISCONTINUED | OUTPATIENT
Start: 2025-05-10 | End: 2025-05-16 | Stop reason: HOSPADM

## 2025-05-09 RX ORDER — ASPIRIN 81 MG/1
81 TABLET ORAL DAILY
Status: DISCONTINUED | OUTPATIENT
Start: 2025-05-10 | End: 2025-05-16 | Stop reason: HOSPADM

## 2025-05-09 RX ORDER — AMLODIPINE BESYLATE 5 MG/1
5 TABLET ORAL NIGHTLY
Status: DISCONTINUED | OUTPATIENT
Start: 2025-05-09 | End: 2025-05-11

## 2025-05-09 RX ORDER — CLOPIDOGREL BISULFATE 75 MG/1
75 TABLET ORAL DAILY
Status: DISCONTINUED | OUTPATIENT
Start: 2025-05-10 | End: 2025-05-16 | Stop reason: HOSPADM

## 2025-05-09 RX ORDER — CARVEDILOL 6.25 MG/1
6.25 TABLET ORAL 2 TIMES DAILY WITH MEALS
Status: DISCONTINUED | OUTPATIENT
Start: 2025-05-09 | End: 2025-05-16 | Stop reason: HOSPADM

## 2025-05-09 RX ORDER — VITAMIN B COMPLEX
2000 TABLET ORAL DAILY
Status: DISCONTINUED | OUTPATIENT
Start: 2025-05-09 | End: 2025-05-16 | Stop reason: HOSPADM

## 2025-05-09 RX ORDER — SENNA AND DOCUSATE SODIUM 50; 8.6 MG/1; MG/1
1 TABLET, FILM COATED ORAL
Status: DISCONTINUED | OUTPATIENT
Start: 2025-05-09 | End: 2025-05-12

## 2025-05-09 RX ORDER — ENOXAPARIN SODIUM 100 MG/ML
40 INJECTION SUBCUTANEOUS DAILY
Status: CANCELLED | OUTPATIENT
Start: 2025-05-10

## 2025-05-09 RX ORDER — MECLIZINE HCL 12.5 MG 12.5 MG/1
12.5 TABLET ORAL 3 TIMES DAILY PRN
Status: DISCONTINUED | OUTPATIENT
Start: 2025-05-09 | End: 2025-05-16 | Stop reason: HOSPADM

## 2025-05-09 RX ORDER — LORAZEPAM 1 MG/1
0.5 TABLET ORAL 2 TIMES DAILY PRN
Status: DISCONTINUED | OUTPATIENT
Start: 2025-05-09 | End: 2025-05-09

## 2025-05-09 RX ORDER — SODIUM PHOSPHATE, DIBASIC AND SODIUM PHOSPHATE, MONOBASIC 7; 19 G/230ML; G/230ML
1 ENEMA RECTAL DAILY PRN
Status: DISCONTINUED | OUTPATIENT
Start: 2025-05-09 | End: 2025-05-16 | Stop reason: HOSPADM

## 2025-05-09 RX ORDER — SODIUM CHLORIDE 0.9 % (FLUSH) 0.9 %
5-40 SYRINGE (ML) INJECTION EVERY 12 HOURS SCHEDULED
Status: CANCELLED | OUTPATIENT
Start: 2025-05-09

## 2025-05-09 RX ORDER — ACETAMINOPHEN 325 MG/1
650 TABLET ORAL EVERY 4 HOURS PRN
Status: DISCONTINUED | OUTPATIENT
Start: 2025-05-09 | End: 2025-05-09 | Stop reason: SDUPTHER

## 2025-05-09 RX ORDER — CARBOXYMETHYLCELLULOSE SODIUM 10 MG/ML
1 GEL OPHTHALMIC 3 TIMES DAILY PRN
Status: DISCONTINUED | OUTPATIENT
Start: 2025-05-09 | End: 2025-05-16 | Stop reason: HOSPADM

## 2025-05-09 RX ORDER — MECLIZINE HYDROCHLORIDE 25 MG/1
12.5 TABLET ORAL 3 TIMES DAILY PRN
Status: CANCELLED | OUTPATIENT
Start: 2025-05-09

## 2025-05-09 RX ORDER — ENOXAPARIN SODIUM 100 MG/ML
40 INJECTION SUBCUTANEOUS DAILY
Status: DISCONTINUED | OUTPATIENT
Start: 2025-05-10 | End: 2025-05-16 | Stop reason: HOSPADM

## 2025-05-09 RX ORDER — ACETAMINOPHEN 325 MG/1
650 TABLET ORAL EVERY 4 HOURS PRN
Status: DISCONTINUED | OUTPATIENT
Start: 2025-05-09 | End: 2025-05-16 | Stop reason: HOSPADM

## 2025-05-09 RX ORDER — CARVEDILOL 6.25 MG/1
6.25 TABLET ORAL 2 TIMES DAILY WITH MEALS
Qty: 60 TABLET | Refills: 3 | Status: ON HOLD | OUTPATIENT
Start: 2025-05-09

## 2025-05-09 RX ORDER — AMLODIPINE BESYLATE 10 MG/1
10 TABLET ORAL NIGHTLY
Qty: 30 TABLET | Refills: 3 | Status: ON HOLD | OUTPATIENT
Start: 2025-05-09

## 2025-05-09 RX ORDER — ASPIRIN 81 MG/1
81 TABLET ORAL DAILY
Status: CANCELLED | OUTPATIENT
Start: 2025-05-10

## 2025-05-09 RX ORDER — LORAZEPAM 1 MG/1
1 TABLET ORAL 2 TIMES DAILY PRN
Status: DISCONTINUED | OUTPATIENT
Start: 2025-05-09 | End: 2025-05-16 | Stop reason: HOSPADM

## 2025-05-09 RX ORDER — LORAZEPAM 0.5 MG/1
0.5 TABLET ORAL 2 TIMES DAILY PRN
Status: CANCELLED | OUTPATIENT
Start: 2025-05-09

## 2025-05-09 RX ORDER — CALCIUM CARBONATE 500 MG/1
500 TABLET, CHEWABLE ORAL 3 TIMES DAILY PRN
Status: DISCONTINUED | OUTPATIENT
Start: 2025-05-09 | End: 2025-05-16 | Stop reason: HOSPADM

## 2025-05-09 RX ORDER — SODIUM PHOSPHATE, DIBASIC AND SODIUM PHOSPHATE, MONOBASIC 7; 19 G/230ML; G/230ML
1 ENEMA RECTAL DAILY PRN
Status: DISCONTINUED | OUTPATIENT
Start: 2025-05-09 | End: 2025-05-09 | Stop reason: SDUPTHER

## 2025-05-09 RX ORDER — SODIUM CHLORIDE 0.9 % (FLUSH) 0.9 %
5-40 SYRINGE (ML) INJECTION PRN
Status: CANCELLED | OUTPATIENT
Start: 2025-05-09

## 2025-05-09 RX ORDER — LOSARTAN POTASSIUM 50 MG/1
100 TABLET ORAL DAILY
Status: CANCELLED | OUTPATIENT
Start: 2025-05-10

## 2025-05-09 RX ORDER — DOXAZOSIN 1 MG/1
2 TABLET ORAL DAILY
Status: DISCONTINUED | OUTPATIENT
Start: 2025-05-09 | End: 2025-05-16 | Stop reason: HOSPADM

## 2025-05-09 RX ORDER — ROSUVASTATIN CALCIUM 20 MG/1
40 TABLET, COATED ORAL NIGHTLY
Status: CANCELLED | OUTPATIENT
Start: 2025-05-09

## 2025-05-09 RX ORDER — AMLODIPINE BESYLATE 10 MG/1
10 TABLET ORAL NIGHTLY
Status: CANCELLED | OUTPATIENT
Start: 2025-05-09

## 2025-05-09 RX ORDER — ROSUVASTATIN CALCIUM 20 MG/1
40 TABLET, COATED ORAL NIGHTLY
Status: DISCONTINUED | OUTPATIENT
Start: 2025-05-09 | End: 2025-05-10

## 2025-05-09 RX ORDER — BISACODYL 10 MG
10 SUPPOSITORY, RECTAL RECTAL DAILY PRN
Status: DISCONTINUED | OUTPATIENT
Start: 2025-05-09 | End: 2025-05-16 | Stop reason: HOSPADM

## 2025-05-09 RX ADMIN — DOXAZOSIN 2 MG: 1 TABLET ORAL at 14:48

## 2025-05-09 RX ADMIN — LOSARTAN POTASSIUM 100 MG: 50 TABLET, FILM COATED ORAL at 07:12

## 2025-05-09 RX ADMIN — AMLODIPINE BESYLATE 5 MG: 5 TABLET ORAL at 21:54

## 2025-05-09 RX ADMIN — LORAZEPAM 1 MG: 1 TABLET ORAL at 02:40

## 2025-05-09 RX ADMIN — CARVEDILOL 6.25 MG: 6.25 TABLET, FILM COATED ORAL at 17:24

## 2025-05-09 RX ADMIN — LORAZEPAM 1 MG: 1 TABLET ORAL at 23:26

## 2025-05-09 RX ADMIN — CARVEDILOL 6.25 MG: 6.25 TABLET, FILM COATED ORAL at 07:13

## 2025-05-09 RX ADMIN — CLOPIDOGREL BISULFATE 75 MG: 75 TABLET, FILM COATED ORAL at 07:13

## 2025-05-09 RX ADMIN — LABETALOL HYDROCHLORIDE 10 MG: 5 INJECTION INTRAVENOUS at 00:05

## 2025-05-09 RX ADMIN — SODIUM CHLORIDE, PRESERVATIVE FREE 10 ML: 5 INJECTION INTRAVENOUS at 07:36

## 2025-05-09 RX ADMIN — ACETAMINOPHEN 650 MG: 325 TABLET ORAL at 14:22

## 2025-05-09 RX ADMIN — VITAMIN D, TAB 1000IU (100/BT) 2000 UNITS: 25 TAB at 14:18

## 2025-05-09 RX ADMIN — LORAZEPAM 0.5 MG: 1 TABLET ORAL at 14:16

## 2025-05-09 ASSESSMENT — PAIN SCALES - GENERAL
PAINLEVEL_OUTOF10: 0
PAINLEVEL_OUTOF10: 3
PAINLEVEL_OUTOF10: 6

## 2025-05-09 ASSESSMENT — PAIN DESCRIPTION - DESCRIPTORS: DESCRIPTORS: ACHING;PENETRATING

## 2025-05-09 ASSESSMENT — PAIN - FUNCTIONAL ASSESSMENT: PAIN_FUNCTIONAL_ASSESSMENT: PREVENTS OR INTERFERES SOME ACTIVE ACTIVITIES AND ADLS

## 2025-05-09 ASSESSMENT — PAIN DESCRIPTION - ORIENTATION: ORIENTATION: LOWER;POSTERIOR

## 2025-05-09 ASSESSMENT — PAIN DESCRIPTION - LOCATION: LOCATION: BACK

## 2025-05-09 NOTE — PROGRESS NOTES
TRANSFER - IN REPORT:    Verbal report received from [facility] on Aurora Sanchez  being received from [facility] for routine progression of patient care      Report consisted of patient's Situation, Background, Assessment and   Recommendations(SBAR).     Information from the following report(s) Nurse Handoff Report was reviewed with the receiving nurse.    Opportunity for questions and clarification was provided.      Assessment completed upon patient's arrival to unit and care assumed.      Arrival on unit 1222.

## 2025-05-09 NOTE — PROGRESS NOTES
Hospitalist Progress Note   Admit Date:  2025  5:49 PM   Name:  Aurora Sanchez   Age:  79 y.o.  Sex:  female  :  1945   MRN:  [id number]   Room:  [facility]/    Presenting/Chief Complaint: Dizziness (Patient arrives to the ED with granddaughter. C/O Dizzy spells. Onset today while lying flat at the dentist office today. Reports she was unable to drive due to dizziness. Reports congestion. )     Reason(s) for Admission: Dizziness [R42]  Vertigo [R42]  Near syncope [R55]  Hypertensive urgency [I16.0]  Cerebrovascular accident (CVA), unspecified mechanism (HCC) [I63.9]     Hospital Course:   Aurora Sanchez is a 79 y.o. female with medical history of chronic gastritis, prediabetes mellitus, anxiety, aortic stenosis, diverticulosis, dyslipidemia, poorly controlled hypertension, and coronary artery disease who presents to the ED with dizziness.  Admitted for dizziness of unclear etiology.    Patient reports she was at the dentist office and upon lying flat her symptoms started and progressively got worse upon sitting up.  Blood pressure was 190/100, patient was brought to the ER for further evaluation.    In the ER, blood pressure was still elevated.  Patient was given a dose of labetalol.  Patient remained dizzy and was given a dose of meclizine which did not help.  CT head was negative.  CTA head/neck revealed moderate canal stenosis.  Hospitalist was asked to admit patient for stroke workup.    Subjective & 24hr Events:   No overnight events. Room air.  Brain/cervical MRI pending.  Cardiology consulted for aortic stenosis.  PT, OT pending.    Assessment & Plan:     Dizziness  Etiology: BPPV, labyrinthitis, stroke, arrhythmias, or Ménière disease  - CT head negative  - CT head/neck negative  - CT cervical spine revealed moderate canal stenosis.  - Brain MRI pending.  - PT and OT consulted.    History of mild aortic stenosis  Coronary artery disease  - Denies angina, syncope, or dyspnea on 
       Hospitalist Progress Note   Admit Date:  2025  5:49 PM   Name:  Aurora Sanchez   Age:  79 y.o.  Sex:  female  :  1945   MRN:  [id number]   Room:  [facility]/    Presenting/Chief Complaint: Dizziness (Patient arrives to the ED with granddaughter. C/O Dizzy spells. Onset today while lying flat at the dentist office today. Reports she was unable to drive due to dizziness. Reports congestion. )     Reason(s) for Admission: Dizziness [R42]  Vertigo [R42]  Near syncope [R55]  Hypertensive urgency [I16.0]  Cerebrovascular accident (CVA), unspecified mechanism (HCC) [I63.9]  Cervical stenosis of spinal canal [M48.02]     Hospital Course:   Aurora Sanchez is a 79 y.o. female with medical history of chronic gastritis, prediabetes mellitus, anxiety, aortic stenosis, diverticulosis, dyslipidemia, poorly controlled hypertension, and coronary artery disease who presents to the ED with dizziness.  Admitted for dizziness of unclear etiology.    Patient reports she was at the dentist office and upon lying flat her symptoms started and progressively got worse upon sitting up.  Blood pressure was 190/100, patient was brought to the ER for further evaluation.    In the ER, blood pressure was still elevated.  Patient was given a dose of labetalol.  Patient remained dizzy and was given a dose of meclizine which did not help.  CT head was negative.  CTA head/neck revealed moderate canal stenosis.  MRI cervical spine with multilevel neuroforaminal narrowing and cervical spine disease.  Brain MRI without acute stroke but does reveal single punctate chronic lacunar infarction left cerebral hemisphere.  Echo on  EF 65 to 70%.  Aortic sclerosis without stenosis.  Physical and Occupational Therapy consulted.  Recommending inpatient rehab.    Subjective & 24hr Events:   No overnight events. Room air.  Patient is medically stable for discharge. Pending inpatient rehab evaluation.    Assessment & Plan: 
  Physician Progress Note      PATIENT:               MARIANA SALINAS  [facility] #:                  [id number]  :                       1945  ADMIT DATE:       2025 5:49 PM  DISCH DATE:  RESPONDING  PROVIDER #:        Dhiraj Edwards MD          QUERY TEXT:    Per MRI Single punctate chronic lacunar infarction in the left cerebellar   hemisphere. Please clarify the relationship, if any, between the prior CVA and   dizziness.    The clinical indicators include:  - ED Physician:\" Working diagnosis of benign positional vertigo, vertigo,   rule out central cause, but her symptoms are definitely peripheral in nature.    UTI, orthostasis, dehydration, hypertensive urgency, hypertensive crisis.    Will treat empirically with labetalol 10 mg IV.  Patient also requesting her   Ativan that she feels very anxious and normally takes around this time.  Will   give Ativan 0.5 mg IV.\"  -:\"Patient reports she was at the dentist office and upon lying flat her   symptoms started and progressively got worse upon sitting up.  Blood pressure   was 190/100, patient was brought to the ER for further evaluation.\"  and  \"Hypertensive urgency\"  - IM-\"In the ER, blood pressure was still elevated.  Patient was given a   dose of labetalol.  Patient remained dizzy and was given a dose of meclizine   which did not help.  CT head was negative.  CTA head/neck revealed moderate   canal stenosis.  MRI cervical spine with multilevel neuroforaminal narrowing   and cervical spine disease.  Brain MRI without acute stroke but does reveal   single punctate chronic lacunar infarction left cerebral hemisphere.  Echo on    EF 65 to 70%.  Aortic sclerosis without stenosis.  Physical and   Occupational Therapy consulted.  Recommending inpatient rehab\"  Options provided:  -- Dizziness is a sequela of prior CVA  -- Dizziness due to Hypertensive urgency.  -- Dizziness is not a sequela of prior CVA or HTN urgency. It is due to,   please specify the 
  SPEECH LANGUAGE PATHOLOGY: DYSPHAGIA Initial Assessment    Acknowledge Order  I  Therapy Time  I   Charges     I  Rehab Caseload Tracker      NAME: Aurora Sanchez  : 1945  MRN: [id number]    ADMISSION DATE: 2025  PRIMARY DIAGNOSIS: Dizziness    ICD-10: Treatment Diagnosis: R13.12 Dysphagia, Oropharyngeal Phase    RECOMMENDATIONS   Diet:    Regular Consistency  Thin Liquids    Medication: as tolerated   Compensatory Swallowing Strategies:   Upright as possible for all oral intake   Therapeutic Intervention:   Patient/family education   Patient continues to require skilled intervention:  No. Please re-consult if new concerns arise.      Anticipated Discharge Needs: No additional speech therapy is indicated.      ASSESSMENT    Patient presents with oropharyngeal swallow function that is within normal limits. No signs or symptoms of dysphagia identified with liquids, solids, or mixed consistencies.     Recommend regular diet and thin liquids. Medications with liquid wash. No additional speech therapy indicated at this time.     GENERAL    Subjective: Patient alert and oriented x4. Up in bedside chair; reporting some dizziness     Reason for Consult:  CVA workup     History of Present Injury/Illness: Ms. Sanchez  has a past medical history of AAA (abdominal aortic aneurysm), Acute pain of left shoulder, Altered bowel habits, Anxiety, Carotid atherosclerosis, bilateral, Chronic fatigue, Chronic gastritis, Colon polyps, Diverticulosis, Diverticulosis of colon, Diverticulosis of intestine without bleeding, Essential hypertension, Gastric intestinal metaplasia, Gastritis, GERD (gastroesophageal reflux disease), H/O esophagogastroduodenoscopy, H/O non-ST elevation myocardial infarction (NSTEMI), Hemorrhoids, Hiatal hernia, History of colonoscopy, History of diverticulitis, Hx of colonoscopy, Hyperlipidemia, Internal hemorrhoids, Mild aortic stenosis, Personal history of colonic polyps, Pneumonia of right 
4 Eyes Skin Assessment     NAME:  Aurora Sanchez  YOB: 1945  MEDICAL RECORD NUMBER:  [id number]    The patient is being assessed for  Admission    I agree that at least one RN has performed a thorough Head to Toe Skin Assessment on the patient. ALL assessment sites listed below have been assessed.      Areas assessed by both nurses:    Head, Face, Ears, Shoulders, Back, Chest, Arms, Elbows, Hands, Sacrum. Buttock, Coccyx, Ischium, and Legs. Feet and Heels        Does the Patient have a Wound? No noted wound(s)       Jeff Prevention initiated by RN: No  Wound Care Orders initiated by RN: No    Pressure Injury (Stage 3,4, Unstageable, DTI, NWPT, and Complex wounds) if present, place Wound referral order by RN under : No    New Ostomies, if present place, Ostomy referral order under : No     Nurse 1 eSignature: Electronically signed by Dhiraj Leblanc RN on 5/8/25 at 12:10 AM EDT    **SHARE this note so that the co-signing nurse can place an eSignature**    Nurse 2 eSignature: Electronically signed by Susie Larry RN on 5/8/25 at 6:38 AM EDT   
ACUTE PHYSICAL THERAPY GOALS:   (Developed with and agreed upon by patient and/or caregiver.)    LTG:  (1.)Ms. Sanchez will move from supine to sit and sit to supine  in bed with CONTACT GUARD ASSIST within 7 treatment day(s).    (2.)Ms. Sanchez will transfer from bed to chair and chair to bed with CONTACT GUARD ASSIST using the least restrictive device within 7 treatment day(s).    (3.)Ms. Sanchez will ambulate with CONTACT GUARD ASSIST for 50 feet with the least restrictive device within 7 treatment day(s).  (4)Ms. Sanchez will be independent with Hep to increase safety on her feet in 7 days.  (5)Ms. Sanchez will go up 3 steps min assist in 7 days.  ________________________________________________________________________________________________      PHYSICAL THERAPY Initial Assessment and AM  (Link to Caseload Tracking: PT Visit Days : 1  Acknowledge Orders  Time In/Out  PT Charge Capture  Rehab Caseload Tracker    Aurora Sanchez is a 79 y.o. female   PRIMARY DIAGNOSIS: Dizziness  Dizziness [R42]  Vertigo [R42]  Near syncope [R55]  Hypertensive urgency [I16.0]  Cerebrovascular accident (CVA), unspecified mechanism (HCC) [I63.9]       Reason for Referral: Other abnormalities of gait and mobility (R26.89)  Dizziness and Giddiness (R42)  Observation: Payor: MEDICARE / Plan: MEDICARE PART A AND B / Product Type: *No Product type* /     ASSESSMENT:     REHAB RECOMMENDATIONS:   Recommendation to date pending progress:  Setting:  Inpatient Rehab Facility  Continued physical therapy recommended at discharge.    Equipment:    To Be Determined     ASSESSMENT:  Ms. Sanchez presents with decreased functional mobility and gait limited admitted with above diagnosis.  [facility]  She uses a cane outside home and rollator on the main floor.      This am, she reports no dizziness at rest.  She reports increased dizziness with turning head.  She needed some assistance with 
ACUTE PHYSICAL THERAPY GOALS:   (Developed with and agreed upon by patient and/or caregiver.)    LTG:  (1.)Ms. Sanchez will move from supine to sit and sit to supine  in bed with CONTACT GUARD ASSIST within 7 treatment day(s).    (2.)Ms. Sanchez will transfer from bed to chair and chair to bed with CONTACT GUARD ASSIST using the least restrictive device within 7 treatment day(s).  Met 5/9  (3.)Ms. Sanchez will ambulate with CONTACT GUARD ASSIST for 50 feet with the least restrictive device within 7 treatment day(s).Met 5/5  (4)Ms. Sanchez will be independent with Hep to increase safety on her feet in 7 days.  (5)Ms. Sanchez will go up 3 steps min assist in 7 days.  ________________________________________________________________________________________________      PHYSICAL THERAPY Daily Note and AM  (Link to Caseload Tracking: PT Visit Days : 2  Acknowledge Orders  Time In/Out  PT Charge Capture  Rehab Caseload Tracker    Aurora Sanchez is a 79 y.o. female   PRIMARY DIAGNOSIS: Dizziness  Dizziness [R42]  Vertigo [R42]  Near syncope [R55]  Hypertensive urgency [I16.0]  Cerebrovascular accident (CVA), unspecified mechanism (HCC) [I63.9]  Cervical stenosis of spinal canal [M48.02]       Reason for Referral: Other abnormalities of gait and mobility (R26.89)  Dizziness and Giddiness (R42)  Inpatient: Payor: MEDICARE / Plan: MEDICARE PART A AND B / Product Type: *No Product type* /     ASSESSMENT:     REHAB RECOMMENDATIONS:   Recommendation to date pending progress:  Setting:  Inpatient Rehab Facility  Continued physical therapy recommended at discharge.    Equipment:    To Be Determined     ASSESSMENT:  Ms. Sanchez presents with decreased functional mobility and gait limited admitted with above diagnosis.  [facility].  She uses a cane outside home and rollator on the main floor.      5/5 : Pt was up in the chair on PT arrival & she worked on repeated transfers, along 
Patient voiced concerns about her medications/dosages being correct compared to what she is taking at home. Patient states she does not take 12.5mg of carvedilol, but only takes 6.25mg. patient also states she only takes her doxyzosin PRN in the afternoon depending on how high her BP is. Patient concerned with taking lovenox and baby aspirin while already on plavix. Per patient request, lovenox and doxyzosin held at this time. Only 6.25 of the 12.5 mg of ordered carvedilol given at this time per patient request. Dr Edwards notified of patient dosages and concerns.  
Pt reports diarrhea prior to admission, cdif r/o order placed. Pt had 2 formed stools 05/8 with no diarrhea present. Cdif r/o order discontinued due to lack of loose/watery stools.  
Rounds by the MD.  Agree with patient to not take the lovenox and ASA EC.  Ate breakfast well  
SPEECH LANGUAGE PATHOLOGY: ATTEMPT     NAME: Aurora Sanchez  : 1945  MRN: [id number]    ADMISSION DATE: 2025  PRIMARY DIAGNOSIS: Dizziness    Speech Therapy attempted. Patient meeting with case management at time of SLP attempt. Will follow up at later time/date.     Heather Valdez, [place]  2025 8:46 AM    
SPEECH LANGUAGE PATHOLOGY: ATTEMPT     NAME: Aurora Sanchez  : 1945  MRN: [id number]    ADMISSION DATE: 2025  PRIMARY DIAGNOSIS: Dizziness    Consult received and chart reviewed. Attempted to see patient for initial assessment, but she is currently off unit for MRI. Will try again later as schedule permits and patient available/appropriate to be seen.    Addendum: 12:35 - 2nd attempt. Patient remains off floor.     Susie Lopez, SLP  2025 11:32 AM  
Spiritual Health History and Assessment/Progress Note  [facility]      Room # 372/01    Name: Aurora Sanchez           Age: 79 y.o.    Gender: female          MRN: [id number]  Moravian: Anglican       Preferred Language: English      Date: 05/08/25  Visit Time: Begin Time: 1050 End Time : 1100  Complexity of Encounter: Low      Visit Summary:  met with patient in response to referral and IDT rounding. Patient expressed they had the following spiritual resources: personal sense of spirituality and spiritual community. Patient stated they did have a sense of social support. Social support includes children and other family members. Patient expressed concern about diagnosis.  provided active listening, discussion of illness, calming presence, and prayer. 's plan of care includes follow up at patient's request. Family or visitors were not present during visit.        Encounter Overview/Reason: Initial Encounter  Service Provided For: Patient     Patient was available.    Maribel, Belief, Meaning:   Patient identifies as spiritual, is connected with a maribel tradition or spiritual practice, and has beliefs or practices that help with coping during difficult times  Family/Friends No family/friends present    Importance and Influence:  Patient does not have beliefs influential to healthcare decision-making identified during this visit  Family/Friends No family/friends present    Community:  Patient   is connected with a spiritual community, , indicated that they feel well-supported. Support system includes Family members   Family/Friends   No family/friends present    Assessment and Plan of Care:   Emotions Expressed by Patient:   Assessment: Calm, Hopeful, Passive, Peaceful    Interventions by :   Intervention: Active listening, Discussed belief system/Sabianism practices/maribel, Sustaining Presence/Ministry of presence, Prayer (assurance of)/[place], Explored/Affirmed 
TRANSFER - IN REPORT:    Verbal report received from María MABRY on Aurora Sanchez  being [facility] for routine progression of patient care      Report consisted of patient's Situation, Background, Assessment and   Recommendations(SBAR).     Information from the following report(s) ED Encounter Summary, ED SBAR, Adult Overview, MAR, and Recent Results was reviewed with the receiving nurse.    Opportunity for questions and clarification was provided.      Assessment completed upon patient's arrival to unit and care assumed.    
TRANSFER - OUT REPORT:    Verbal report given to RAGHAVENDRA Johnson on Aurora Sanchez  being transferred to [facility] for routine progression of patient care       Report consisted of patient's Situation, Background, Assessment and   Recommendations(SBAR).     Information from the following report(s) Nurse Handoff Report, Index, ED Encounter Summary, ED SBAR, Adult Overview, MAR, Recent Results, Med Rec Status, Neuro Assessment, and Event Log was reviewed with the receiving nurse.           Lines:   Peripheral IV 05/07/25 Right Antecubital (Active)   Site Assessment Clean, dry & intact 05/09/25 0705   Line Status Normal saline locked 05/09/25 0705   Line Care Connections checked and tightened 05/09/25 0705   Phlebitis Assessment No symptoms 05/09/25 0705   Infiltration Assessment 0 05/09/25 0705   Alcohol Cap Used Yes 05/09/25 0705   Dressing Status Clean, dry & intact 05/09/25 0705   Dressing Type Transparent 05/09/25 0705        Opportunity for questions and clarification was provided.             
Dominance R [x] L []      COGNITION/  PERCEPTION: INTACT IMPAIRED   (See Comments)   Orientation [x]     Vision [x]     Hearing [x]     Cognition  [x]     Perception [x]       MOBILITY: I Mod I S SBA CGA Min Mod Max Total  NT x2 Comments:   Bed Mobility    Rolling [] [] [] [] [] [] [] [] [] [] []    Supine to Sit [] [] [] [] [] [x] [] [] [] [] []    Scooting [] [] [] [] [] [x] [] [] [] [] []    Sit to Supine [] [] [] [] [] [] [x] [] [] [] []    Transfers    Sit to Stand [] [] [] [] [] [x] [] [] [] [] [] RW   Bed to Chair [] [] [] [] [] [x] [] [] [] [] []    Stand to Sit [] [] [] [] [] [x] [] [] [] [] []    Tub/Shower [] [] [] [] [] [] [] [] [] [] []     Toilet [] [] [] [] [] [] [] [] [] [] []      [] [] [] [] [] [] [] [] [] [] []    I=Independent, Mod I=Modified Independent, S=Supervision/Setup, SBA=Standby Assistance, CGA=Contact Guard Assistance, Min=Minimal Assistance, Mod=Moderate Assistance, Max=Maximal Assistance, Total=Total Assistance, NT=Not Tested    ACTIVITIES OF DAILY LIVING: I Mod I S SBA CGA Min Mod Max Total NT Comments   BASIC ADLs:              Upper Body Bathing  [] [] [] [] [] [] [] [] [] [x]     Lower Body Bathing [] [] [] [] [] [] [] [] [] [x]     Toileting [] [] [] [] [] [] [] [] [] [] External catheter   Upper Body Dressing [] [] [] [] [x] [] [] [] [] [] Don gown as robe   Lower Body Dressing [] [] [] [] [] [] [] [x] [] []  socks   Feeding [] [] [x] [] [] [] [] [] [] []    Grooming [] [] [] [] [x] [] [] [] [] []    Personal Device Care [] [] [] [] [] [] [] [] [] []    Functional Mobility [] [] [] [] [] [x] [] [] [] [] RW   I=Independent, Mod I=Modified Independent, S=Supervision/Setup, SBA=Standby Assistance, CGA=Contact Guard Assistance, Min=Minimal Assistance, Mod=Moderate Assistance, Max=Maximal Assistance, Total=Total Assistance, NT=Not Tested    PLAN:   FREQUENCY/DURATION   OT Plan of Care: 3 times/week for duration of hospital stay or until stated goals are met, whichever comes

## 2025-05-09 NOTE — ACP (ADVANCE CARE PLANNING)
Advance Care Planning     Advance Care Planning Activator (Inpatient)  Conversation Note      Health Care Decision Maker: Reji requesting jeanneestephania ballard to update HCPOA, current one is not correct    Current Designated Health Care Decision Maker:     Primary Decision Maker: Isha Sanchez - [facility] - [phone]      Care Preferences Full Code per MD order

## 2025-05-09 NOTE — CARE COORDINATION
Case Management Assessment  Initial Evaluation    Date/Time of Evaluation: 5/9/2025 2:15 PM  Assessment Completed by: RODO LIND    If patient is discharged prior to next notation, then this note serves as note for discharge by case management.    Patient Name: Aurora Sanchez                   YOB: 1945  Diagnosis:   CVA (cerebral vascular accident) (HCC) [I63.9]  Vertigo [R42]                   Date / Time: 5/9/2025 12:34 PM    Patient Admission Status: REHAB IP   Readmission Risk (Low < 19, Mod (19-27), High > 27): Readmission Risk Score: 15.2        Current PCP: Betty Allen APRN - CNP  PCP verified by CM? (P) Yes    Chart Reviewed: Yes      History Provided by: (P) Patient  Patient Orientation: (P) Alert and Oriented    Patient Cognition: (P) Alert    Hospitalization in the last 30 days (Readmission):  No    If yes, Readmission Assessment in CM Navigator will be completed.    Advance Directives:      Code Status: Full Code   Patient's Primary Decision Maker is: (P) Named in Scanned ACP Document (this is not correct and referral placed to jeanne to correct hcpoa)    Primary Decision Maker: Isha Sanchez - Child - [phone]    Discharge Planning:    Patient lives with: (P) Alone Type of Home: (P) House  Primary Care Giver: (P) Self  Patient Support Systems include: (P) Children, Family Members   Current Financial resources: (P) Medicare, Other (Comment) ([place] and [place])  Current community resources: (P) None  Current services prior to admission: (P) Durable Medical Equipment            Current DME: (P) Cane, Walker            Type of Home Care services:       ADLS  Prior functional level: (P) Independent in ADLs/IADLs  Current functional level: (P) Assistance with the following:, Other (see comment) (TBD by therapy)    PT AM-PAC:   /24  OT AM-PAC:   /24    Family can provide assistance at DC: (P) Other (comment) (daughter lives in [facility] close - unknown how  Oriented   Cognition Alert   History Provided By Patient   Primary Caregiver Self   Accompanied By/Relationship no one at this time   Support Systems Children;Family Members   Patient's Healthcare Decision Maker is: Named in Scanned ACP Document  (this is not correct and referral placed to jeanne to correct hcpoa)   PCP Verified by CM Yes   Last Visit to PCP Within last 3 months   Prior Functional Level Independent in ADLs/IADLs   Current Functional Level Assistance with the following:;Other (see comment)  (TBD by therapy)   Can patient return to prior living arrangement Unknown at present   Ability to make needs known: Good   Family able to assist with home care needs: Other (comment)  (daughter lives in [facility] close - unknown how much help she can provide)   Would you like for me to discuss the discharge plan with any other family members/significant others, and if so, who? Yes  (daughter Isha)   Financial Resources Medicare;Other (Comment)  ([place])   Community Resources None   CM/SW Referral Other (see comment)  (pending)   Social/Functional History   Lives With Alone   Type of Home Condo   Home Layout Two level   Home Access Level entry   Bathroom Shower/Tub Tub/Shower unit   Bathroom Toilet Standard   Bathroom Equipment Shower chair;Commode   Bathroom Accessibility Accessible   Home Equipment Cane;Rollator   Receives Help From Family   Prior Level of Assist for ADLs Independent   Prior Level of Assist for Homemaking Independent   Homemaking Responsibilities Yes   Ambulation Assistance Independent   Prior Level of Assist for Transfers Independent   Active  Yes   Occupation Retired   Discharge Planning   Type of Residence House   Living Arrangements Alone   Current Services Prior To Admission Durable Medical Equipment   Current DME Prior to Arrival Cane;Walker   Potential Assistance Purchasing Medications No   Patient expects to be discharged to: House   One/Two Story

## 2025-05-09 NOTE — PLAN OF CARE
Problem: Cardiovascular - Adult  Goal: Maintains optimal cardiac output and hemodynamic stability  Outcome: Progressing  Flowsheets (Taken 5/8/2025 1907)  Maintains optimal cardiac output and hemodynamic stability:   Monitor blood pressure and heart rate   Monitor urine output and notify Licensed Independent Practitioner for values outside of normal range   Administer fluid and/or volume expanders as ordered

## 2025-05-09 NOTE — CARE COORDINATION
Pt chart reviewed and discussed in Critical Care Interdisciplinary Rounds. LOS 1 days. Pt admitted with dizziness, vertigo, near syncope, hypertensive urgency, CVA, and cervical stenosis of spinal canal. Per MD, referral sent to th [facility] IRC per pt request; medically stable for discharge today pending acceptance.    MELIZA AMOS contacted [address] [place] [address] IRC liaison to follow-up on referral. Pt pending review.    Addendum:   CM contacted by [address] [place] [address] IRC and pt approved. Liaison requested transport at 1200. Liaison reports plan to contact pt.     CM scheduled transportation for approximately 1200.     RN notified.      DC/POC to [address] [place] [address] IR to improve strength, mobility, and function to return home safely.     CM team will continue to follow for potential discharge needs that may arise.     Marimar Aceves, MSW, LBSW  ICU   [facility]    
Pt chart reviewed for initial CM assessment. CM attempted to complete assessment and pt in MRI. Pt admitted with dizziness, vertigo, near syncope, hypertensive urgency, and CVA. Per chart review, pt alert and oriented x 4. Pt insured through [place] and [place]. PCP Betty Allen, last visit 1/16/25. Pt lives alone in two level condo with bedroom/bathroom upstairs.     CM to follow-up with pt tomorrow to complete assessment.       05/08/25 1221   Service Assessment   Patient Orientation Alert and Oriented;Person;Place;Situation;Self  (per chart review; pt in MRI)   Cognition Alert   History Provided By Medical Record   Primary Caregiver Self   Accompanied By/Relationship None   Support Systems Children   Patient's Healthcare Decision Maker is: Patient Declined (Legal Next of Kin Remains as Decision Maker)   PCP Verified by CM Yes  (Betty Allen, APRN-C)   Last Visit to PCP Within last 6 months  (1/16/2025)   Prior Functional Level Independent in ADLs/IADLs   Current Functional Level Other (see comment)  (pending PT/OT evals)   Can patient return to prior living arrangement Unknown at present   Ability to make needs known: Good   Family able to assist with home care needs: Other (comment)  (unable to assess)   Would you like for me to discuss the discharge plan with any other family members/significant others, and if so, who? No   Financial Resources Medicare;[facility] (Comment[facility]  [facility]   Community Resources None   CM/SW Referral Other (see comment)  (discharge planning; CVA work-up)   Social/Functional History   Lives With Alone   Type of Home Condo   Home Layout Two level   Home Access Level entry   Bathroom Shower/Tub Tub/Shower unit   Bathroom Equipment Shower chair   Home Equipment Rollator;Cane   Prior Level of Assist for ADLs Independent   Prior Level of Assist for Transfers Independent   Occupation Retired   Discharge Planning   Potential Assistance Needed Other (Comment)  (IRC)   Services At/After Discharge 
Pt is for discharge today to 9th floor IRC as planned.  Transport via [employer] around 1200.  Packet prepared to go with pt to facility.  Patient, facility, and RN updated on anticipated transport time. Pt verbalized agreement with discharge plan.     CM provided [employer] and [facility] broch[facility] to assist pt with obtaining in-home aides to assist with bathing.     RN to call report to [phone] for Room 914.     Please notify CM for any needs that may arise.    Marimar Aceves, NICCI, SW    [facility]         [phone]   Service Assessment   Patient's Healthcare Decision Maker is: Legal Next of Kin  (HCPOA document on file  and named decision maker unable to make decisions for pt)   Social/Functional History   Lives With Alone   Type of Home Condo   Home Layout Two level   Home Access Level entry   Bathroom Shower/Tub Tub/Shower unit   Bathroom Equipment Shower chair   Home Equipment Rollator;Cane   Prior Level of Assist for ADLs Independent   Prior Level of Assist for Transfers Independent   Occupation Retired   Services At/After Discharge   Transition of Care Consult (CM Consult) Discharge Planning;Transportation Assistance;Other  (Inpatient rehabilitation)   Services At/After Discharge Inpatient rehab;Transport;In ambulance   Andreas Resource Information Provided? Yes   Mode of Transport at Discharge S   Hospital Transport Time of Discharge 1200   Confirm Follow Up Transport Other (see comment)  ([employer])   Condition of Participation: Discharge Planning   The Plan for Transition of Care is related to the following treatment goals: IRC to improve strength, function, and mobility to return home safely.   The Patient and/or Patient Representative was provided with a Choice of Provider? Patient   The Patient and/Or Patient Representative agree with the Discharge Plan? Yes   Freedom of Choice list was provided with basic dialogue that supports the patient's 
  Bathroom Shower/Tub Tub/Shower unit   Bathroom Equipment Shower chair   Home Equipment Rollator;Cane   Receives Help From Family   Prior Level of Assist for ADLs Independent   Prior Level of Assist for Homemaking Independent   Homemaking Responsibilities Yes   Ambulation Assistance Independent  (with cane)   Prior Level of Assist for Transfers Independent   Active  Yes   Mode of Transportation Car   Occupation Retired   Discharge Planning   Type of Residence Acute Rehab   Living Arrangements Alone   Current Services Prior To Admission Durable Medical Equipment   Current DME Prior to Arrival Cane;Walker   Potential Assistance Needed Other (Comment);Transportation  (IRC)   DME Ordered? No   Potential Assistance Purchasing Medications No   Type of Home Care Services None   Patient expects to be discharged to: Acute rehab   History of falls? 0   Services At/After Discharge   Transition of Care Consult (CM Consult) Discharge Planning;Transportation Assistance;Acute Rehab   Condition of Participation: Discharge Planning   The Plan for Transition of Care is related to the following treatment goals: Anticipate IRC at to improve strength, function, and mobility to return home safely.       
Puree and Moderately Thick Liquids

## 2025-05-09 NOTE — PROGRESS NOTES
4 Eyes Skin Assessment     NAME:  Aurora Sanchez  YOB: 1945  MEDICAL RECORD NUMBER:  [id number]    The patient is being assessed for  Admission    I agree that at least one RN has performed a thorough Head to Toe Skin Assessment on the patient. ALL assessment sites listed below have been assessed.      Areas assessed by both nurses:    Head, Face, Ears, Shoulders, Back, Chest, Arms, Elbows, Hands, Sacrum. Buttock, Coccyx, Ischium, Legs. Feet and Heels, and Under Medical Devices         Does the Patient have a Wound? No noted wound(s)       Jeff Prevention initiated by RN: Yes  Wound Care Orders initiated by RN: No    Areas if several small moles left upper back, heels dry, intact, nails appear WNL. Elecrodes removed. Creases under breasts intact.  PIV RUE.     Pressure Injury (Stage 3,4, Unstageable, DTI, NWPT, and Complex wounds) if present, place Wound referral order by RN under : No    New Ostomies, if present place, Ostomy referral order under : No     Nurse 1 eSignature: Electronically signed by Elizabeth Garcia RN on 5/9/25 at 3:25 PM EDT    **SHARE this note so that the co-signing nurse can place an eSignature**    Nurse 2 eSignature: Electronically signed by Alka Holland RN on 5/9/25 at 4:46 PM EDT

## 2025-05-09 NOTE — PROGRESS NOTES
Reviewed with pt the changes in times of some meds today are related to when she was discharged from one facility and driven to the next via medical transport; this process lends itself to unavoidable changes in the pt's day, med schedules and meals given the goal of entering rehab. Reviewed the scheduling of transportation, prioritization of pt care as well as best efforts towards pt care with the intention that delays are very limited.

## 2025-05-09 NOTE — H&P
[facility]  Inpatient Rehab Program    Admission History and Physical Exam  INPATIENT REHABILITATION CENTER      IRC Admission Date: 5/9/2025  Primary Care Provider: Betty Allen APRN - CNP  Specialty Group / Referring Service: Medicine      Chief Complaint : Vertigo  Admitting Diagnosis:   CVA (cerebral vascular accident) (HCC) [I63.9]  Vertigo [R42]    Principal Problem:    Vertigo  Resolved Problems:    * No resolved hospital problems. *      Acute Rehab Diagnoses:  Encounter for rehabilitation [Z51.89]   Abnormality of gait and mobility [R26.9]  Decreased independence for activities of daily living (ADL) [Z78.9]  Physical debility / deconditioning [R53.81]      Medical Dx:  Past Medical History:   Diagnosis Date    AAA (abdominal aortic aneurysm)     infrarenal 3.2 cm    Acute pain of left shoulder 01/31/2022    Altered bowel habits 08/08/2022    Dr. Sri M[facility] of [facility]    Anxiety     Carotid atherosclerosis, bilateral     Chronic fatigue     Chronic gastritis 07/2019    with intestinal metaplasia, repeat EGD 7/2022    Colon polyps     Repeat colonoscopy 7/2022    Diverticulosis 07/2019    Seen on colonoscopy    Diverticulosis of colon 09/07/2022    Colonoscopy    Diverticulosis of intestine without bleeding 09/07/2022    Colonoscopy    Essential hypertension     Gastric intestinal metaplasia     [facility]    Gastritis 09/07/2022    EGD [facility]    GERD (gastroesophageal reflux disease)     H/O esophagogastroduodenoscopy 09/07/2022    by Dr. MAY Márquez    H/O non-ST elevation myocardial infarction (NSTEMI) 09/20/2023    Hemorrhoids 07/2019    Colonoscopy    Hiatal hernia     [facility]    History of colonoscopy 09/07/2022    [facility] Repeat in 3 years    History of diverticulitis 05/21/2016    Hx of colonoscopy 09/07/2022    3mm Cecum poly, repeat in 5 years; Dr. MAY Márquez    Hyperlipidemia     Internal hemorrhoids     Seen on colonoscopy    Mild aortic  Troponin T 16.3 (H) 0 - 14 ng/L   Magnesium    Collection Time: 05/07/25  6:14 PM   Result Value Ref Range    Magnesium 2.0 1.8 - 2.4 mg/dL   Troponin    Collection Time: 05/07/25  7:57 PM   Result Value Ref Range    Troponin T 17.5 (H) 0 - 14 ng/L   Urinalysis with Reflex to Culture    Collection Time: 05/07/25  7:57 PM    Specimen: Urine   Result Value Ref Range    Color, UA YELLOW/STRAW      Appearance CLEAR      Specific Gravity, UA 1.005 1.001 - 1.023      pH, Urine 7.0 5.0 - 9.0      Protein, UA 30 (A) NEG mg/dL    Glucose, Ur Negative NEG mg/dL    Ketones, Urine Negative NEG mg/dL    Bilirubin, Urine Negative NEG      Blood, Urine Negative NEG      Urobilinogen, Urine 0.2 0.2 - 1.0 EU/dL    Nitrite, Urine Negative NEG      Leukocyte Esterase, Urine Negative NEG      Urine Culture if Indicated CULTURE NOT INDICATED BY UA RESULT      WBC, UA 0-4 U4 /hpf    RBC, UA 0-5 U5 /hpf    BACTERIA, URINE Negative NEG /hpf    Epithelial Cells, UA 0-5 U5 /hpf    Hyaline Casts, UA 0-2 /lpf    Casts 0 0 /lpf    Crystals 0 0 /LPF    Mucus, UA 0 0 /lpf   Basic Metabolic Panel w/ Reflex to MG    Collection Time: 05/08/25  3:34 AM   Result Value Ref Range    Sodium 139 136 - 145 mmol/L    Potassium 3.4 (L) 3.5 - 5.1 mmol/L    Chloride 102 98 - 107 mmol/L    CO2 26 20 - 29 mmol/L    Anion Gap 11 7 - 16 mmol/L    Glucose 106 (H) 70 - 99 mg/dL    BUN 7 (L) 8 - 23 MG/DL    Creatinine 0.74 0.60 - 1.10 MG/DL    Est, Glom Filt Rate 82 >60 ml/min/1.73m2    Calcium 9.5 8.8 - 10.2 MG/DL   CBC    Collection Time: 05/08/25  3:34 AM   Result Value Ref Range    WBC 5.0 4.3 - 11.1 K/uL    RBC 4.52 4.05 - 5.2 M/uL    Hemoglobin 12.3 11.7 - 15.4 g/dL    Hematocrit 39.0 35.8 - 46.3 %    MCV 86.3 82.0 - 102.0 FL    MCH 27.2 26.1 - 32.9 PG    MCHC 31.5 31.4 - 35.0 g/dL    RDW 13.5 11.9 - 14.6 %    Platelets 98 (L) 150 - 450 K/uL    MPV 11.5 9.4 - 12.3 FL    nRBC 0.00 0.0 - 0.2 K/uL   Hemoglobin A1c    Collection Time: 05/08/25  3:34 AM   Result

## 2025-05-09 NOTE — PLAN OF CARE
Problem: Discharge Planning  Goal: Discharge to home or other facility with appropriate resources  5/9/2025 0654 by Yakelin Perez RN  Outcome: Progressing  5/9/2025 0545 by Dhiraj Leblanc RN  Outcome: Adequate for Discharge  Flowsheets (Taken 5/8/2025 1907)  Discharge to home or other facility with appropriate resources:   Identify barriers to discharge with patient and caregiver   Identify discharge learning needs (meds, wound care, etc)   Arrange for needed discharge resources and transportation as appropriate     Problem: Skin/Tissue Integrity  Goal: Skin integrity remains intact  Description: 1.  Monitor for areas of redness and/or skin breakdown2.  Assess vascular access sites hourly3.  Every 4-6 hours minimum:  Change oxygen saturation probe site4.  Every 4-6 hours:  If on nasal continuous positive airway pressure, respiratory therapy assess nares and determine need for appliance change or resting period  5/9/2025 0654 by Yakelin Perez RN  Outcome: Progressing  5/9/2025 0545 by Dhiraj Leblanc RN  Outcome: Adequate for Discharge  Flowsheets (Taken 5/8/2025 1907)  Skin Integrity Remains Intact:   Monitor for areas of redness and/or skin breakdown   Assess vascular access sites hourly   Every 4-6 hours minimum:  Change oxygen saturation probe site     Problem: Safety - Adult  Goal: Free from fall injury  5/9/2025 0654 by Yakelin Perez RN  Outcome: Progressing  5/9/2025 0545 by Dhiraj Leblanc RN  Outcome: Adequate for Discharge     Problem: Pain  Goal: Verbalizes/displays adequate comfort level or baseline comfort level  5/9/2025 0654 by Yakelin Perez RN  Outcome: Progressing  5/9/2025 0545 by Dhiraj Leblanc RN  Outcome: Adequate for Discharge  Flowsheets (Taken 5/8/2025 1901)  Verbalizes/displays adequate comfort level or baseline comfort level:   Assess pain using appropriate pain scale   Encourage patient to monitor pain and request assistance   Administer analgesics based on type and severity of pain and

## 2025-05-09 NOTE — PRE-CERTIFICATION NOTE
Alone  Expected Services Upon Discharge: Home Health: PT, OT, and Nursing    Acute Inpatient Rehabilitation Disclosure Statement provided to patient. Patient verbalized understanding. yes    I have reviewed and concur with the findings and results of the pre-admission screening assessment completed by the Inpatient Rehabilitation Admissions Coordinator.

## 2025-05-09 NOTE — ACP (ADVANCE CARE PLANNING)
Advance Care Planning   General Advance Care Planning (ACP) Conversation    Date of Conversation: 5/7/2025  Conducted with: Patient with Decision Making Capacity  Other persons present: None    Healthcare Decision Maker:    Primary Decision Maker: Isha Sanchez - [facility] - [phone]    Today we documented Decision Maker(s) consistent with Legal Next of Kin hierarchy.  Content/Action Overview:  No LW HCPOA on file. Daughter Isha legal NOK unless documentation provided.    Length of Voluntary ACP Conversation in minutes:  <16 minutes (Non-Billable)    NICCI YEAGER

## 2025-05-09 NOTE — PROGRESS NOTES
[facility] OCCUPATIONAL THERAPY INITIAL EVALUATION  OT Individual Minutes  Time In: 1402  Time Out: 1453  Minutes: 51               HPI (per MD report): \"Aurora Sanchez is a 79 y.o. female with medical history of chronic gastritis, prediabetes mellitus, anxiety, aortic stenosis, diverticulosis, dyslipidemia, poorly controlled hypertension, and coronary artery disease who presents to the ED with dizziness.  Admitted for dizziness of unclear etiology.     Patient reports she was at the dentist office and upon lying flat her symptoms started and progressively got worse upon sitting up.  Blood pressure was 190/100, patient was brought to the ER for further evaluation.     In the ER, blood pressure was still elevated.  Patient was given a dose of labetalol.  Patient remained dizzy and was given a dose of meclizine which did not help.  CT head was negative.  CTA head/neck revealed moderate canal stenosis.  MRI cervical spine with multilevel neuroforaminal narrowing and cervical spine disease.  Brain MRI without acute stroke but does reveal single punctate chronic lacunar infarction left cerebral hemisphere.  Echo on 5/8 EF 65 to 70%.  Aortic sclerosis without stenosis.\"  Past Medical History:   Diagnosis Date    AAA (abdominal aortic aneurysm)     infrarenal 3.2 cm    Acute pain of left shoulder 01/31/2022    Altered bowel habits 08/08/2022    Dr. Sri M[facility]quez of [facility]    Anxiety     Carotid atherosclerosis, bilateral     Chronic fatigue     Chronic gastritis 07/2019    with intestinal metaplasia, repeat EGD 7/2022    Colon polyps     Repeat colonoscopy 7/2022    Diverticulosis 07/2019    Seen on colonoscopy    Diverticulosis of colon 09/07/2022    Colonoscopy    Diverticulosis of intestine without bleeding 09/07/2022    Colonoscopy    Essential hypertension     Gastric intestinal metaplasia     [facility]    Gastritis 09/07/2022    [facility]GD [facility]    GERD (gastroesophageal reflux disease)     H/O

## 2025-05-10 PROCEDURE — 97535 SELF CARE MNGMENT TRAINING: CPT

## 2025-05-10 PROCEDURE — 97161 PT EVAL LOW COMPLEX 20 MIN: CPT

## 2025-05-10 PROCEDURE — 6370000000 HC RX 637 (ALT 250 FOR IP): Performed by: STUDENT IN AN ORGANIZED HEALTH CARE EDUCATION/TRAINING PROGRAM

## 2025-05-10 PROCEDURE — 6360000002 HC RX W HCPCS: Performed by: STUDENT IN AN ORGANIZED HEALTH CARE EDUCATION/TRAINING PROGRAM

## 2025-05-10 PROCEDURE — 1180000000 HC REHAB R&B

## 2025-05-10 PROCEDURE — 97116 GAIT TRAINING THERAPY: CPT

## 2025-05-10 PROCEDURE — 97530 THERAPEUTIC ACTIVITIES: CPT

## 2025-05-10 PROCEDURE — 97110 THERAPEUTIC EXERCISES: CPT

## 2025-05-10 RX ORDER — ROSUVASTATIN CALCIUM 5 MG/1
10 TABLET, COATED ORAL NIGHTLY
Status: DISCONTINUED | OUTPATIENT
Start: 2025-05-10 | End: 2025-05-16 | Stop reason: HOSPADM

## 2025-05-10 RX ADMIN — MECLIZINE 12.5 MG: 12.5 TABLET ORAL at 05:46

## 2025-05-10 RX ADMIN — LOSARTAN POTASSIUM 100 MG: 50 TABLET, FILM COATED ORAL at 08:51

## 2025-05-10 RX ADMIN — DOCUSATE SODIUM 50 MG AND SENNOSIDES 8.6 MG 1 TABLET: 8.6; 5 TABLET, FILM COATED ORAL at 21:41

## 2025-05-10 RX ADMIN — CLOPIDOGREL BISULFATE 75 MG: 75 TABLET, FILM COATED ORAL at 08:51

## 2025-05-10 RX ADMIN — MECLIZINE 12.5 MG: 12.5 TABLET ORAL at 14:27

## 2025-05-10 RX ADMIN — CARVEDILOL 6.25 MG: 6.25 TABLET, FILM COATED ORAL at 17:24

## 2025-05-10 RX ADMIN — DOXAZOSIN 1 MG: 1 TABLET ORAL at 13:04

## 2025-05-10 RX ADMIN — LORAZEPAM 1 MG: 1 TABLET ORAL at 17:39

## 2025-05-10 RX ADMIN — CARVEDILOL 6.25 MG: 6.25 TABLET, FILM COATED ORAL at 08:51

## 2025-05-10 RX ADMIN — ENOXAPARIN SODIUM 40 MG: 100 INJECTION SUBCUTANEOUS at 08:50

## 2025-05-10 RX ADMIN — AMLODIPINE BESYLATE 5 MG: 5 TABLET ORAL at 21:41

## 2025-05-10 RX ADMIN — ASPIRIN 81 MG: 81 TABLET ORAL at 08:52

## 2025-05-10 RX ADMIN — VITAMIN D, TAB 1000IU (100/BT) 2000 UNITS: 25 TAB at 08:51

## 2025-05-10 RX ADMIN — ROSUVASTATIN CALCIUM 10 MG: 5 TABLET, FILM COATED ORAL at 17:24

## 2025-05-10 ASSESSMENT — PAIN SCALES - GENERAL
PAINLEVEL_OUTOF10: 0
PAINLEVEL_OUTOF10: 0

## 2025-05-10 NOTE — CONSULTS
[facility] Hospitalist Consult   Admit Date:  2025 12:34 PM   Name:  Aurora Sanchez   Age:  79 y.o.  Sex:  female  :  1945   MRN:  [id number]   Room:      Presenting/Chief Complaint: No chief complaint on file.    Reason(s) for Admission: CVA (cerebral vascular accident) (HCC) [I63.9]  Vertigo [R42]     Hospitalists consulted by Destinee Crouch DO for: Medical management    History of Presenting Illness:   79-year-old female history of chronic gastritis, aortic stenosis, anxiety, prediabetes, diverticulosis, poorly controlled hypertension, hyperlipidemia, coronary artery disease initially presented with the emergency department with dizziness.  Episodes of dizziness with unclear etiology.  Patient was apparently at her dentist office and upon lying flat her symptoms started to progressively get worse once sitting up.  Blood pressure when she arrived was 190/100.  Patient's status post dose of labetalol in the ED.  Patient remained dizzy was given a dose of meclizine which did not help as well.  CT head negative.  CTA head and neck revealed moderate canal stenosis.  MRI cervical spine with multilevel neuroforaminal narrowing and cervical spine disease.  Brain MRI without acute stroke but did reveal single punctate chronic lacunar infarct left cerebellar hemisphere.  Echo  with ejection fraction of 65 to 70%.  Aortic sclerosis without stenosis.  PT/OT recommended inpatient rehab.  [place] for her inpatient rehab stay.  Hospitalist service consulted for medical management.      Assessment & Plan:   Dizziness  - Possibly secondary to BPPV, labyrinthitis, Ménière disease-CT head negative  - CT head and neck negative  - CT cervical spine with moderate canal stenosis  - Brain MRI without acute stroke, chronic single punctate lacunar infarct in the left cerebellar hemisphere  - Meclizine as needed  - PT/OT  - Continue inpatient rehab    History of aortic stenosis,  clopidogrel (PLAVIX) tablet 75 mg  75 mg Oral Daily    doxazosin (CARDURA) tablet 2 mg  2 mg Oral Daily    losartan (COZAAR) tablet 100 mg  100 mg Oral Daily    meclizine (ANTIVERT) tablet 12.5 mg  12.5 mg Oral TID PRN    rosuvastatin (CRESTOR) tablet 40 mg  40 mg Oral Nightly    amLODIPine (NORVASC) tablet 5 mg  5 mg Oral Nightly    Vitamin D (CHOLECALCIFEROL) tablet 2,000 Units  2,000 Units Oral Daily    multivitamin 1 tablet  1 tablet Oral Daily    LORazepam (ATIVAN) tablet 1 mg  1 mg Oral BID PRN       Signed:  Cheikh Dickey MD    Part of this note may have been written by using a voice dictation software.  The note has been proof read but may still contain some grammatical/other typographical errors.

## 2025-05-10 NOTE — PROGRESS NOTES
Advance Care Planning     Advance Care Planning Inpatient Note  [facility] Department    Today's Date: 5/10/2025  Unit: [facility] INPATIENT REHAB UNIT    Received request from patient.  Upon review of chart and communication with care team, patient's decision making abilities are not in question.. Patient was/were present in the room during visit.    Goals of ACP Conversation:  Discuss advance care planning documents  Facilitate a discussion related to patient's goals of care as they align with the patient's values and beliefs.    Health Care Decision Makers:       Primary Decision Maker: Isha Sanchez [facility] - [phone]  Summary:  No Decision Maker named by patient at this time    Advance Care Planning Documents (Patient Wishes):  Healthcare Power of /Advance Directive Appointment of Health Care Agent     Assessment:   visited with patient earlier and patient requested HC POA update.  visited patient at a later time to discuss HC POA. Patient was very interested in updating documents as soon as possible as prior primary agent is no longer able to make health care decisions on behalf of the patient.  and patient unable to finish document at this time. Postponed until it can be completed at a later date.    Interventions:  Provided education on documents for clarity and greater understanding  Reviewed but did not complete ACP document    Care Preferences Communicated:   No    Outcomes/Plan:  Complete ACP document at a later date.    Electronically signed by Chaplain Jeannie on 5/10/2025 at 5:02 PM

## 2025-05-10 NOTE — PROGRESS NOTES
[facility] OCCUPATIONAL THERAPY DAILY NOTE  OT Individual Minutes  Time In: 0836  Time Out: 0948  Minutes: 72               Subjective: Pt agreeable to treatment. \"I would like to have water run over me\"  Pain: No pain expressed.  Interdisciplinary Communication: Collaborated with RN regarding pt status and pt performance.   Precautions: Falls and Posey Alarm, and dizziness with movements at times  Lines:IV  O2 Device: RA      FUNCTIONAL MOBILITY:       Bed Mobility CGA    Sit to Stand CGA    Transfer  CGA  Transfer Type: SPT  Equipment: Grab Bars and RW    Ambulation CGA and Lisa  Equipment: RW      ACTIVITIES OF DAILY LIVING:       Eating       Oral Hygiene       Shower/Bathe Partial/moderate assistance UB at SUP seated on shower bench, LB at Lisa for distal BLE seated   Upper Body  Dressing Supervision or touching assistance SUP sitting on shower bench   Lower Body Dressing Partial/moderate assistance Lisa to thread BLE into garments, CGA to pull garments over hips in standing to FWW   Donning/[place] [employer] Dependent TD to don compression socks this day   Toileting Hygiene Supervision or touching assistance SBA with use of grab bars and FWW   Toilet Transfer Supervision or touching assistance CGA with FWW   Education Adaptive ADL Techniques, Benefits of OT, Energy Conservation, Pacing, Functional Transfer Training, and Safety Awareness     Assessment: Patient requested increased assistance with LB ADLs to reach distal BLE due to increased dizziness at times. Pt would benefit from training with long handled sponge and reacher for increased I with ADLs. Pt tolerated session well. Pt demonstrated good participation in OT treatment. Pt continues to benefit from skilled OT services to address remaining deficits and progress toward baseline level of independence and safety. Patient ended session seated in recliner with call bell and needs within reach, with chair/bed alarm activated, and with legs elevated.   Plan:

## 2025-05-10 NOTE — PROGRESS NOTES
PHYSICAL THERAPY EXAMINATION    PT Individual Minutes  Time In: 1312  Time Out: 1413  Minutes: 61                   Total Treatment Time:  61 Minutes         HPI:  (per MD report): \"Aurora Sanchez is a 79 y.o. female with medical history of chronic gastritis, prediabetes mellitus, anxiety, aortic stenosis, diverticulosis, dyslipidemia, poorly controlled hypertension, and coronary artery disease who presents to the ED with dizziness.  Admitted for dizziness of unclear etiology.     Patient reports she was at the dentist office and upon lying flat her symptoms started and progressively got worse upon sitting up.  Blood pressure was 190/100, patient was brought to the ER for further evaluation.     In the ER, blood pressure was still elevated.  Patient was given a dose of labetalol.  Patient remained dizzy and was given a dose of meclizine which did not help.  CT head was negative.  CTA head/neck revealed moderate canal stenosis.  MRI cervical spine with multilevel neuroforaminal narrowing and cervical spine disease.  Brain MRI without acute stroke but does reveal single punctate chronic lacunar infarction left cerebral hemisphere.  Echo on 5/8 EF 65 to 70%.  Aortic sclerosis without stenosis.\"    Past Medical History:   Past Medical History:   Diagnosis Date    AAA (abdominal aortic aneurysm)     infrarenal 3.2 cm    Acute pain of left shoulder 01/31/2022    Altered bowel habits 08/08/2022    Dr. Sri M[facility]quez of [facility]    Anxiety     Carotid atherosclerosis, bilateral     Chronic fatigue     Chronic gastritis 07/2019    with intestinal metaplasia, repeat EGD 7/2022    Colon polyps     Repeat colonoscopy 7/2022    Diverticulosis 07/2019    Seen on colonoscopy    Diverticulosis of colon 09/07/2022    Colonoscopy    Diverticulosis of intestine without bleeding 09/07/2022    Colonoscopy    Essential hypertension     Gastric intestinal metaplasia     [facility]    Gastritis 09/07/2022    EGD [facility]    Fine Motor Coordination Intact Intact   Tone Normal Normal   Sensation Light Touch Intact and Proprioception Intact Light Touch Intact and Proprioception Intact   Strength Hip -3/5 to 4/5, knee 5/5, ankle 5/5 DF, PF 4/5 Hip -3/5 to 4/5, knee 5/5, ankle 5/5 DF, PF 4/5         PRIMARY MODE OF LOCOMOTION: Household Ambulation Only    Functional Assessment:    Balance  Comments   Static Sitting Good:  Pt. able to maintain balance w/o UE support;  exhibits some postural sway    Dynamic Sitting Fair - accepts minimal challenge;  can maintain balance while turning head/trunk    Static Standing Fair:  Pt. requires UE support;  may need occasional min A    Dynamic Standing Poor - unable to accept challenge or move without LOB         BED MOBILITY & TRANSFERS Quality Indicator Score Assistance Required  Comments. Increased time and effort to complete with altered body mechanics in order to prevent onset of vertigo type dizziness. Patient limiting cervical rotation and flexion during bed mobility and transfers.     Rolling 4    Rolling to Left: Supervision   Rolling to Right: Supervision    Supine to Sit 4    Stand by assistance    Sit to Supine 4   Supervision    Sit to/from Stand 4   Sit to Stand: Contact guard assistance   Stand to Sit: Contact guard assistance       Bed to/from Chair 4       Stand Pivot Transfers: Contact guard assistance (with RW)          Car Transfer 88   Unable to assess        WHEELCHAIR MOBILITY/MANAGEMENT Quality Indicator Score Assistance Required  Comments   Able to Propel 50' w/ 2 Turns  NA     NA   Able to Propel 150'  NA   NA   Wheelchair management  Wheelchair Size: 18x16  Wheelchair Type: Standard  Wheelchair Cushion: Standard  Pressure Relief Type:  (sit<>Stand with RW)  Level of Assistance for Pressure Relief Activities: Contact guard assistance  Wheelchair Parts Management: No  Propulsion: No         AMBULATION Quality Indicator Score AMBULATION Assessment   Assistive device Prior to

## 2025-05-10 NOTE — PROGRESS NOTES
Spiritual Health History and Assessment/Progress Note  [facility]      Room # 914/01    Name: Aurora Sanchez           Age: 79 y.o.    Gender: female          MRN: [id number]  Jewish: Bahai       Preferred Language: English      Date: 05/10/25  Visit Time: Begin Time: 1100 End Time : 1140  Complexity of Encounter: Moderate      Visit Summary:  met with patient in response to referral. Patient expressed they had the following spiritual resources: personal sense of spirituality, helpful spiritual practice, and spiritual community. Patient stated they did have a sense of social support. Social support included family members.  provided pastoral presence, active listening, prayer, theological discussion, and coping strategies. 's plan of care includes follow up at patient's request. Family or visitors were not present during visit. Patient was in a chair when  entered the room. Patient was receptive to the visit. Patient shared a life review and spiritual reflection with the  during the visit. Patient shared about how she has seen God work in her life and has been encouraged.  offered and ended the visit with prayer.    Referral/Consult From: Patient   Encounter Overview/Reason: Follow-up  Service Provided For: Patient     Patient was available.    Maribel, Belief, Meaning:   Patient identifies as spiritual, is connected with a maribel tradition or spiritual practice, and has beliefs or practices that help with coping during difficult times  Family/Friends No family/friends present      Importance and Influence:  Patient does not have beliefs influential to healthcare decision-making identified during this visit  Family/Friends No family/friends present    Community:  Patient   is connected with a spiritual community,  indicated that they feel well-supported.  Support system includes Family members   Family/Friends   No family/friends

## 2025-05-11 PROCEDURE — 6370000000 HC RX 637 (ALT 250 FOR IP): Performed by: STUDENT IN AN ORGANIZED HEALTH CARE EDUCATION/TRAINING PROGRAM

## 2025-05-11 PROCEDURE — 6360000002 HC RX W HCPCS: Performed by: STUDENT IN AN ORGANIZED HEALTH CARE EDUCATION/TRAINING PROGRAM

## 2025-05-11 PROCEDURE — 6370000000 HC RX 637 (ALT 250 FOR IP): Performed by: HOSPITALIST

## 2025-05-11 PROCEDURE — 1180000000 HC REHAB R&B

## 2025-05-11 RX ORDER — AMLODIPINE BESYLATE 10 MG/1
10 TABLET ORAL NIGHTLY
Status: DISCONTINUED | OUTPATIENT
Start: 2025-05-11 | End: 2025-05-16 | Stop reason: HOSPADM

## 2025-05-11 RX ORDER — AMLODIPINE BESYLATE 5 MG/1
5 TABLET ORAL ONCE
Status: COMPLETED | OUTPATIENT
Start: 2025-05-11 | End: 2025-05-11

## 2025-05-11 RX ADMIN — ENOXAPARIN SODIUM 40 MG: 100 INJECTION SUBCUTANEOUS at 09:37

## 2025-05-11 RX ADMIN — AMLODIPINE BESYLATE 5 MG: 5 TABLET ORAL at 01:19

## 2025-05-11 RX ADMIN — DOXAZOSIN 1 MG: 1 TABLET ORAL at 13:22

## 2025-05-11 RX ADMIN — CLOPIDOGREL BISULFATE 75 MG: 75 TABLET, FILM COATED ORAL at 07:40

## 2025-05-11 RX ADMIN — VITAMIN D, TAB 1000IU (100/BT) 2000 UNITS: 25 TAB at 07:40

## 2025-05-11 RX ADMIN — LORAZEPAM 1 MG: 1 TABLET ORAL at 17:46

## 2025-05-11 RX ADMIN — ROSUVASTATIN CALCIUM 10 MG: 5 TABLET, FILM COATED ORAL at 20:48

## 2025-05-11 RX ADMIN — AMLODIPINE BESYLATE 10 MG: 10 TABLET ORAL at 20:47

## 2025-05-11 RX ADMIN — ASPIRIN 81 MG: 81 TABLET ORAL at 07:40

## 2025-05-11 RX ADMIN — BENZOCAINE AND MENTHOL 1 LOZENGE: 15; 2.6 LOZENGE ORAL at 07:45

## 2025-05-11 RX ADMIN — CARVEDILOL 6.25 MG: 6.25 TABLET, FILM COATED ORAL at 17:41

## 2025-05-11 RX ADMIN — MECLIZINE 12.5 MG: 12.5 TABLET ORAL at 01:26

## 2025-05-11 RX ADMIN — LORAZEPAM 1 MG: 1 TABLET ORAL at 01:19

## 2025-05-11 RX ADMIN — CARVEDILOL 6.25 MG: 6.25 TABLET, FILM COATED ORAL at 07:40

## 2025-05-11 RX ADMIN — MECLIZINE 12.5 MG: 12.5 TABLET ORAL at 20:51

## 2025-05-11 RX ADMIN — LOSARTAN POTASSIUM 100 MG: 50 TABLET, FILM COATED ORAL at 07:39

## 2025-05-11 ASSESSMENT — PAIN SCALES - GENERAL
PAINLEVEL_OUTOF10: 0
PAINLEVEL_OUTOF10: 0

## 2025-05-11 NOTE — PROGRESS NOTES
B=184/77, HR=91. Patient continues to refuse PRN PO Hydralazine. Hospitalist, Dr. IGOR Cox, notified.

## 2025-05-11 NOTE — PROGRESS NOTES
Hospitalist Progress Note   Admit Date:  2025 12:34 PM   Name:  Aurora Sanchez   Age:  79 y.o.  Sex:  female  :  1945   MRN:  [id number]   Room:      Presenting/Chief Complaint: No chief complaint on file.     Reason(s) for Admission: CVA (cerebral vascular accident) (HCC) [I63.9]  Vertigo [R42]     Hospital Course:   79-year-old female history of chronic gastritis, aortic stenosis, anxiety, prediabetes, diverticulosis, poorly controlled hypertension, hyperlipidemia, coronary artery disease initially presented with the emergency department with dizziness. Episodes of dizziness with unclear etiology. Patient was apparently at her dentist office and upon lying flat her symptoms started to progressively get worse once sitting up. Blood pressure when she arrived was 190/100. Patient's status post dose of labetalol in the ED. Patient remained dizzy was given a dose of meclizine which did not help as well. CT head negative. CTA head and neck revealed moderate canal stenosis. MRI cervical spine with multilevel neuroforaminal narrowing and cervical spine disease. Brain MRI without acute stroke but did reveal single punctate chronic lacunar infarct left cerebellar hemisphere. Echo  with ejection fraction of 65 to 70%. Aortic sclerosis without stenosis. PT/OT recommended inpatient rehab. [place] for her inpatient rehab stay. Hospitalist service consulted for medical management.     Subjective & 24hr Events:   Patient was seen and examined at bedside.  Overnight patient's blood pressure elevated however patient was refusing as needed medications to bring blood pressure under control.  No new complaints from patient today.  Continuing rehab.      Assessment & Plan:   Dizziness  - Possibly secondary to BPPV, labyrinthitis, Ménière disease-CT head negative  - CT head and neck negative  - CT cervical spine with moderate canal stenosis  - Brain MRI without acute stroke, chronic

## 2025-05-11 NOTE — PROGRESS NOTES
2348: CO=798/98, HR=95.   0011: SD=422/83, HR=84.   0045: EX=672/80, HR=74.   Patient refused Hydralazine 10mg po PRN TID (for SBP>180, DBP>110).   PTA meds include Amlodipine 10mg nightly, only 5mg ordered and administered previously in this shift. Patient requested an additional 5mg Amlodipine po r/t HTN. Hospitalist, Dr. IGOR Cox, notified and aware of BP and patient request. One time dose of Amlodipine 5mg po ordered per Dr. IGOR Cox. Patient denies pain and discomfort. No outward s/sx of acute distress noted. Will continue current plan of care.

## 2025-05-12 PROBLEM — R26.89 IMBALANCE: Status: ACTIVE | Noted: 2025-05-12

## 2025-05-12 PROBLEM — Z78.9 DECREASED INDEPENDENCE WITH ACTIVITIES OF DAILY LIVING: Status: ACTIVE | Noted: 2025-05-07

## 2025-05-12 PROBLEM — I35.8 HEART MURMUR, AORTIC: Status: ACTIVE | Noted: 2025-05-12

## 2025-05-12 PROBLEM — Z74.09 IMPAIRED FUNCTIONAL MOBILITY, BALANCE, GAIT, AND ENDURANCE: Status: ACTIVE | Noted: 2025-05-07

## 2025-05-12 PROBLEM — R26.9 GAIT ABNORMALITY: Status: ACTIVE | Noted: 2025-05-12

## 2025-05-12 LAB
ANION GAP SERPL CALC-SCNC: 11 MMOL/L (ref 7–16)
BASOPHILS # BLD: 0.02 K/UL (ref 0–0.2)
BASOPHILS NFR BLD: 0.5 % (ref 0–2)
BUN SERPL-MCNC: 17 MG/DL (ref 8–23)
CALCIUM SERPL-MCNC: 8.9 MG/DL (ref 8.8–10.2)
CHLORIDE SERPL-SCNC: 102 MMOL/L (ref 98–107)
CO2 SERPL-SCNC: 23 MMOL/L (ref 20–29)
CREAT SERPL-MCNC: 0.64 MG/DL (ref 0.6–1.1)
DIFFERENTIAL METHOD BLD: ABNORMAL
EOSINOPHIL # BLD: 0.15 K/UL (ref 0–0.8)
EOSINOPHIL NFR BLD: 3.7 % (ref 0.5–7.8)
ERYTHROCYTE [DISTWIDTH] IN BLOOD BY AUTOMATED COUNT: 13.4 % (ref 11.9–14.6)
GLUCOSE SERPL-MCNC: 95 MG/DL (ref 70–99)
HCT VFR BLD AUTO: 33.8 % (ref 35.8–46.3)
HGB BLD-MCNC: 11.1 G/DL (ref 11.7–15.4)
IMM GRANULOCYTES # BLD AUTO: 0.01 K/UL (ref 0–0.5)
IMM GRANULOCYTES NFR BLD AUTO: 0.2 % (ref 0–5)
LYMPHOCYTES # BLD: 1.22 K/UL (ref 0.5–4.6)
LYMPHOCYTES NFR BLD: 30.2 % (ref 13–44)
MAGNESIUM SERPL-MCNC: 2 MG/DL (ref 1.8–2.4)
MCH RBC QN AUTO: 27.3 PG (ref 26.1–32.9)
MCHC RBC AUTO-ENTMCNC: 32.8 G/DL (ref 31.4–35)
MCV RBC AUTO: 83.3 FL (ref 82–102)
MONOCYTES # BLD: 0.7 K/UL (ref 0.1–1.3)
MONOCYTES NFR BLD: 17.3 % (ref 4–12)
NEUTS SEG # BLD: 1.94 K/UL (ref 1.7–8.2)
NEUTS SEG NFR BLD: 48.1 % (ref 43–78)
NRBC # BLD: 0 K/UL (ref 0–0.2)
PLATELET # BLD AUTO: 105 K/UL (ref 150–450)
PMV BLD AUTO: 11.9 FL (ref 9.4–12.3)
POTASSIUM SERPL-SCNC: 3.7 MMOL/L (ref 3.5–5.1)
RBC # BLD AUTO: 4.06 M/UL (ref 4.05–5.2)
SODIUM SERPL-SCNC: 136 MMOL/L (ref 136–145)
WBC # BLD AUTO: 4 K/UL (ref 4.3–11.1)

## 2025-05-12 PROCEDURE — 6370000000 HC RX 637 (ALT 250 FOR IP): Performed by: STUDENT IN AN ORGANIZED HEALTH CARE EDUCATION/TRAINING PROGRAM

## 2025-05-12 PROCEDURE — 97535 SELF CARE MNGMENT TRAINING: CPT

## 2025-05-12 PROCEDURE — 1180000000 HC REHAB R&B

## 2025-05-12 PROCEDURE — 85025 COMPLETE CBC W/AUTO DIFF WBC: CPT

## 2025-05-12 PROCEDURE — 80048 BASIC METABOLIC PNL TOTAL CA: CPT

## 2025-05-12 PROCEDURE — 97110 THERAPEUTIC EXERCISES: CPT

## 2025-05-12 PROCEDURE — 6360000002 HC RX W HCPCS: Performed by: STUDENT IN AN ORGANIZED HEALTH CARE EDUCATION/TRAINING PROGRAM

## 2025-05-12 PROCEDURE — 36415 COLL VENOUS BLD VENIPUNCTURE: CPT

## 2025-05-12 PROCEDURE — 97116 GAIT TRAINING THERAPY: CPT

## 2025-05-12 PROCEDURE — 83735 ASSAY OF MAGNESIUM: CPT

## 2025-05-12 PROCEDURE — 99232 SBSQ HOSP IP/OBS MODERATE 35: CPT | Performed by: STUDENT IN AN ORGANIZED HEALTH CARE EDUCATION/TRAINING PROGRAM

## 2025-05-12 PROCEDURE — 97530 THERAPEUTIC ACTIVITIES: CPT

## 2025-05-12 PROCEDURE — APPSS15 APP SPLIT SHARED TIME 0-15 MINUTES: Performed by: PHYSICIAN ASSISTANT

## 2025-05-12 RX ADMIN — ROSUVASTATIN CALCIUM 10 MG: 5 TABLET, FILM COATED ORAL at 20:17

## 2025-05-12 RX ADMIN — LOSARTAN POTASSIUM 100 MG: 50 TABLET, FILM COATED ORAL at 08:18

## 2025-05-12 RX ADMIN — LORAZEPAM 1 MG: 1 TABLET ORAL at 08:55

## 2025-05-12 RX ADMIN — DOXAZOSIN 2 MG: 1 TABLET ORAL at 12:37

## 2025-05-12 RX ADMIN — CARVEDILOL 6.25 MG: 6.25 TABLET, FILM COATED ORAL at 08:19

## 2025-05-12 RX ADMIN — AMLODIPINE BESYLATE 10 MG: 10 TABLET ORAL at 20:17

## 2025-05-12 RX ADMIN — LORAZEPAM 1 MG: 1 TABLET ORAL at 20:17

## 2025-05-12 RX ADMIN — ASPIRIN 81 MG: 81 TABLET ORAL at 08:18

## 2025-05-12 RX ADMIN — B-COMPLEX W/ C & FOLIC ACID TAB 1 TABLET: TAB at 08:18

## 2025-05-12 RX ADMIN — CARVEDILOL 6.25 MG: 6.25 TABLET, FILM COATED ORAL at 17:27

## 2025-05-12 RX ADMIN — MECLIZINE 12.5 MG: 12.5 TABLET ORAL at 17:27

## 2025-05-12 RX ADMIN — ENOXAPARIN SODIUM 40 MG: 100 INJECTION SUBCUTANEOUS at 08:21

## 2025-05-12 RX ADMIN — VITAMIN D, TAB 1000IU (100/BT) 2000 UNITS: 25 TAB at 08:18

## 2025-05-12 RX ADMIN — CLOPIDOGREL BISULFATE 75 MG: 75 TABLET, FILM COATED ORAL at 08:18

## 2025-05-12 ASSESSMENT — PAIN SCALES - GENERAL: PAINLEVEL_OUTOF10: 0

## 2025-05-12 NOTE — CARE COORDINATION
RN CM in room, patient sitting up in chair eating lunch, Alert and oriented, no family present at this time. Reminded patient about bedside rounds in the morning. Patients daughter is in [place] and she wants her present via phone. No needs at this time and will continue to follow for any needs.

## 2025-05-12 NOTE — PROGRESS NOTES
PHYSICAL THERAPY DAILY NOTE    PT Individual Minutes  Time In: 1300  Time Out: 1349  Minutes: 49                 Total Treatment Time:  49 Minutes    Pt. Seen for: PM, Gait Training, Therapeutic Exercise, and Transfer Training     Subjective: \" I still get dizzy when getting up and down.\"         Objective:  Restrictions/Precautions: General Precautions, Fall Risk, Bed Alarm  Activity Level: Up with Assist, Up as Tolerated  Required Braces or Orthoses?: No              Other Position/Activity Restrictions: Possible onset of vertigo type dizziness during supine<>sit<>stand         GROSS ASSESSMENT           COGNITION Daily Assessment    Overall Orientation Status: Within Normal Limits   Overall Cognitive Status: WNL        BED/MAT MOBILITY Daily Assessment     Supine to Sit: Unable to assess  Sit to Supine: Unable to assess        TRANSFERS Daily Assessment    Sit to Stand: Stand by assistance  Stand to Sit: Stand by assistance  Stand Pivot Transfers: Stand by assistance          GAIT Daily Assessment    Surface: Level tile  Device: Rolling Walker;Rollator  Assistance: Stand by assistance  Distance: 150 x 2  Comments: attempted using rollator vs rolling walker   patient alot better with rolling walker than rollator.  More Ambulation?: No              STEPS/STAIRS Daily Assessment    Stairs?: Yes   # Steps : 4  Stairs Height: 6\"  Rails: Right ascending  Device: No Device  Assistance: Contact guard assistance          BALANCE Daily Assessment    Static Sitting: Good:  Pt. able to maintain balance w/o UE support;  exhibits some postural sway  Dynamic Sitting: Good - accepts moderate challenge;  can maintain balance while picking object off the floor  Static Standing: Good:  Pt. able to maintain balance w/o UE support;  exhibits some postural sway  Dynamic Standing: Good - accepts moderate challenge;  can maintain balance while picking object off the floor       WHEELCHAIR MOBILITY Daily Assessment

## 2025-05-12 NOTE — PROGRESS NOTES
PHYSICAL THERAPY DAILY NOTE    PT Individual Minutes  Time In: 0919  Time Out: 1003  Minutes: 44                 Total Treatment Time:  44 Minutes    Pt. Seen for: AM, Gait Training, Therapeutic Exercise, and Transfer Training     Subjective: \" I think I only feel dizzy when my head was positioned weird at the dentist office.\"         Objective:  Restrictions/Precautions: General Precautions, Fall Risk, Bed Alarm  Activity Level: Up with Assist, Up as Tolerated  Required Braces or Orthoses?: No              Other Position/Activity Restrictions: Possible onset of vertigo type dizziness during supine<>sit<>stand         GROSS ASSESSMENT           COGNITION Daily Assessment    Overall Orientation Status: Within Normal Limits   Overall Cognitive Status: WNL        BED/MAT MOBILITY Daily Assessment     Supine to Sit: Unable to assess  Sit to Supine: Unable to assess        TRANSFERS Daily Assessment    Sit to Stand: Contact guard assistance  Stand to Sit: Contact guard assistance  Stand Pivot Transfers: Contact guard assistance          GAIT Daily Assessment    Surface: Level tile  Device: Rolling Walker  Assistance: Contact guard assistance  Distance: 120 x 2  More Ambulation?: No              STEPS/STAIRS Daily Assessment    Stairs?: No              BALANCE Daily Assessment    Static Sitting: Good:  Pt. able to maintain balance w/o UE support;  exhibits some postural sway  Dynamic Sitting: Good - accepts moderate challenge;  can maintain balance while picking object off the floor  Static Standing: Good:  Pt. able to maintain balance w/o UE support;  exhibits some postural sway  Dynamic Standing: Good - accepts moderate challenge;  can maintain balance while picking object off the floor       WHEELCHAIR MOBILITY Daily Assessment                          LOWER EXTREMITY EXERCISES   Rode motomed x 10 minutes.     Pain level: no pain noted during session  Pain Location:    Pain Interventions:     Vital Signs:  BP (!)

## 2025-05-12 NOTE — PROGRESS NOTES
[facility] OCCUPATIONAL THERAPY DAILY NOTE  OT Individual Minutes  Time In: 1123  Time Out: 1214  Minutes: 51               Subjective: Pt agreeable to treatment. \"That would be so much easier\" in regards to tub t/f bench training this date.   Pain: No pain expressed.  Interdisciplinary Communication: Collaborated with RN regarding pt status and pt performance.   Precautions: Falls and Posey Alarm, and dizziness with movements at times  Lines:IV  O2 Device: RA    FUNCTIONAL MOBILITY:       Bed Mobility NT    Sit to Stand SBA    Transfer  SBA and CGA  Transfer Type: toilet/tub shower  Equipment: Grab Bars and RW    Ambulation SBA and CGA  Equipment: RW  In room for ADL's pt close SBA-CGA for t/f's and mobility with FWW with no LOB      - Range of Motion - Self-Care - Strengthening   Pt required to use restroom prior to therapy beginning and then post session. Pt performed functional mobility in room with close SBA with FWW with no LOB. Pt performed toileting with SBA for pants/anterior cares with no LOB. Pt stood sinkside for hand hygiene with SBAwith no LOB FWW. Pt completed toileting x2 this date.    Pt completed 10 minutes on the upper body ergometer, ½ forward and ½ backward, with light and moderate resistance to increase upper body strength and activity tolerance for integration into functional tasks. Rest break in between.     Pt educated on tub shower transfer utilizing tub transfer bench for increased safety with ADL at discharge according to pt's home environment. Pt educated on transfer set up and technique. Pt practiced transfer into/out of tub shower with SBA. Pt preferred ths over how she's been completing at home. Discussed use of stainless steel grab bars or granite grab bars for shower as pt doesn't have any at home. Handouts provided of recommended DME.   Recommend utilizing tub t/f bench shower chair at d/c.    Therapist introduced and provided AE for LB dressing d/t dizziness with positional head

## 2025-05-12 NOTE — PROGRESS NOTES
[place] [address]  Tel: [phone]     Physical Medicine and Rehabilitation Progress Note      Aurora Sanchez  Admit Date: 5/9/2025  Admit Diagnosis: CVA (cerebral vascular accident) ([facility]) [I63.9]  Vertigo [R42]    Subjective     Patient seen this morning during break in therapy. Patient reports bowels moving, voiding without issue. Appreciate Hospitalist assistance for medical management. AM labs reviewed. Dizziness somewhat improved, trying to take less meclizine. All questions and concerns were addressed at this time.    Objective:     Current Facility-Administered Medications   Medication Dose Route Frequency    melatonin tablet 6 mg  6 mg Oral Nightly PRN    amLODIPine (NORVASC) tablet 10 mg  10 mg Oral Nightly    rosuvastatin (CRESTOR) tablet 10 mg  10 mg Oral Nightly    acetaminophen (TYLENOL) tablet 650 mg  650 mg Oral Q4H PRN    polyethylene glycol (GLYCOLAX) packet 17 g  17 g Oral Daily PRN    sodium phosphate (FLEET) rectal enema 1 enema  1 enema Rectal Daily PRN    bisacodyl (DULCOLAX) suppository 10 mg  10 mg Rectal Daily PRN    calcium carbonate (TUMS) chewable tablet 500 mg  500 mg Oral TID PRN    hydrALAZINE (APRESOLINE) tablet 10 mg  10 mg Oral TID PRN    ondansetron (ZOFRAN-ODT) disintegrating tablet 4 mg  4 mg Oral Q8H PRN    medicated lip ointment (BLISTEX)   Topical PRN    carboxymethylcellulose (THERATEARS) 1 % ophthalmic gel 1 drop  1 drop Both Eyes TID PRN    Benzocaine-Menthol (CEPACOL SORE THROAT) 15-2.6 MG lozenge 1 lozenge  1 lozenge Oral Q2H PRN    enoxaparin (LOVENOX) injection 40 mg  40 mg SubCUTAneous Daily    aspirin EC tablet 81 mg  81 mg Oral Daily    carvedilol (COREG) tablet 6.25 mg  6.25 mg Oral BID with meals    clopidogrel (PLAVIX) tablet 75 mg  75 mg Oral Daily    doxazosin (CARDURA) tablet 2 mg  2 mg Oral Daily    losartan (COZAAR) tablet 100 mg  100 mg Oral Daily    meclizine (ANTIVERT) tablet 12.5

## 2025-05-12 NOTE — PROGRESS NOTES
[facility] OCCUPATIONAL THERAPY DAILY NOTE  OT Individual Minutes  Time In: 0746  Time Out: 0832  Minutes: 46               Subjective: Pt agreeable to treatment. \"I can feel my blood pressure rising.\" No c/o dizziness during therapy session.   Pain: No pain expressed.  Interdisciplinary Communication: Collaborated with RN regarding pt status and pt performance.   Precautions: Falls and Posey Alarm, and dizziness with movements at times  Lines:IV  O2 Device: RA    Vitals:   Semi supine- /80 (105), HR 74  Seated in recliner chair following session- /76 (104), HR 86  RN aware of pt status.    FUNCTIONAL MOBILITY:       Bed Mobility SBA    Sit to Stand CGA    Transfer  CGA  Transfer Type: SPT  Equipment: RW Few steps with FWW to recliner chair; EOB > BSC > recliner chair    Ambulation NT  Equipment: NA      ACTIVITIES OF DAILY LIVING:       Eating Independent MOD I seated   Oral Hygiene Setup or clean-up assistance S/U A seated to don deoderant   Shower/Bathe Supervision or touching assistance Pt participated in partial sponge bath this date seated: UB SPV/ anterior/posterior cares CGA standing   Upper Body  Dressing Partial/moderate assistance MIN A to don bra while seated, to don/doff overhead shirt SPV seated   Lower Body Dressing Partial/moderate assistance MOD A to thread/unthread BLE's d/t leaning forward c/o dizziniess/prevention; benefit from AE training for LB; pt standing CGA FWW for pants management over hips   Toileting Hygiene Supervision or touching assistance CGA FWW BSC for pants management/anterior/posterior cares   Toilet Transfer Supervision or touching assistance CGA FWW to BSC for urgency and limited by HTN   Education Adaptive ADL Techniques, Benefits of OT, Energy Conservation, Pacing, Functional Transfer Training, Rolling Walker Management, and Safety Awareness     Assessment: Therapist provided encouragement and therapeutic listening during session d/t frustrations of being hospital. Pt

## 2025-05-12 NOTE — PROGRESS NOTES
Spiritual Health Volunteer (Ebony) visited patient.  Volunteer provided active listening, companionship and prayer.

## 2025-05-12 NOTE — PROGRESS NOTES
Hospitalist Progress Note   Admit Date:  2025 12:34 PM   Name:  Aurora Sanchez   Age:  79 y.o.  Sex:  female  :  1945   MRN:  [id number]   Room:      Presenting/Chief Complaint: No chief complaint on file.     Reason(s) for Admission: CVA (cerebral vascular accident) (HCC) [I63.9]  Vertigo [R42]     Hospital Course:   79-year-old female history of chronic gastritis, aortic stenosis, anxiety, prediabetes, diverticulosis, poorly controlled hypertension, hyperlipidemia, coronary artery disease initially presented with the emergency department with dizziness. Episodes of dizziness with unclear etiology. Patient was apparently at her dentist office and upon lying flat her symptoms started to progressively get worse once sitting up. Blood pressure when she arrived was 190/100. Patient's status post dose of labetalol in the ED. Patient remained dizzy was given a dose of meclizine which did not help as well. CT head negative. CTA head and neck revealed moderate canal stenosis. MRI cervical spine with multilevel neuroforaminal narrowing and cervical spine disease. Brain MRI without acute stroke but did reveal single punctate chronic lacunar infarct left cerebellar hemisphere. Echo  with ejection fraction of 65 to 70%. Aortic sclerosis without stenosis. PT/OT recommended inpatient rehab. [place] for her inpatient rehab stay. Hospitalist service consulted for medical management.     Subjective & 24hr Events:   Patient was seen and examined at bedside.  No overnight events.  All questions answered.  Patient blood pressure this morning 148/71.  Patient doing well with no new complaints.    Assessment & Plan:   Dizziness  - Possibly secondary to BPPV, labyrinthitis, Ménière disease-CT head negative  - CT head and neck negative  - CT cervical spine with moderate canal stenosis  - Brain MRI without acute stroke, chronic single punctate lacunar infarct in the left cerebellar     Differential Type AUTOMATED      Neutrophils % 48.1 43.0 - 78.0 %    Lymphocytes % 30.2 13.0 - 44.0 %    Monocytes % 17.3 (H) 4.0 - 12.0 %    Eosinophils % 3.7 0.5 - 7.8 %    Basophils % 0.5 0.0 - 2.0 %    Immature Granulocytes % 0.2 0.0 - 5.0 %    Neutrophils Absolute 1.94 1.70 - 8.20 K/UL    Lymphocytes Absolute 1.22 0.50 - 4.60 K/UL    Monocytes Absolute 0.70 0.10 - 1.30 K/UL    Eosinophils Absolute 0.15 0.00 - 0.80 K/UL    Basophils Absolute 0.02 0.00 - 0.20 K/UL    Immature Granulocytes Absolute 0.01 0.0 - 0.5 K/UL   Magnesium    Collection Time: 05/12/25  3:19 AM   Result Value Ref Range    Magnesium 2.0 1.8 - 2.4 mg/dL       No results for input(s): \"COVID19\" in the last 72 hours.    Current Meds:  Current Facility-Administered Medications   Medication Dose Route Frequency    melatonin tablet 6 mg  6 mg Oral Nightly PRN    amLODIPine (NORVASC) tablet 10 mg  10 mg Oral Nightly    rosuvastatin (CRESTOR) tablet 10 mg  10 mg Oral Nightly    acetaminophen (TYLENOL) tablet 650 mg  650 mg Oral Q4H PRN    polyethylene glycol (GLYCOLAX) packet 17 g  17 g Oral Daily PRN    sennosides-docusate sodium (SENOKOT-S) 8.6-50 MG tablet 1 tablet  1 tablet Oral QHS    sodium phosphate (FLEET) rectal enema 1 enema  1 enema Rectal Daily PRN    bisacodyl (DULCOLAX) suppository 10 mg  10 mg Rectal Daily PRN    calcium carbonate (TUMS) chewable tablet 500 mg  500 mg Oral TID PRN    hydrALAZINE (APRESOLINE) tablet 10 mg  10 mg Oral TID PRN    ondansetron (ZOFRAN-ODT) disintegrating tablet 4 mg  4 mg Oral Q8H PRN    medicated lip ointment (BLISTEX)   Topical PRN    carboxymethylcellulose (THERATEARS) 1 % ophthalmic gel 1 drop  1 drop Both Eyes TID PRN    Benzocaine-Menthol (CEPACOL SORE THROAT) 15-2.6 MG lozenge 1 lozenge  1 lozenge Oral Q2H PRN    enoxaparin (LOVENOX) injection 40 mg  40 mg SubCUTAneous Daily    aspirin EC tablet 81 mg  81 mg Oral Daily    carvedilol (COREG) tablet 6.25 mg  6.25 mg Oral BID with meals

## 2025-05-12 NOTE — ACP (ADVANCE CARE PLANNING)
Advance Care Planning     Advance Care Planning Inpatient Note  [facility] Department    Today's Date: 5/12/2025  Unit: [facility] INPATIENT REHAB UNIT    Received request from IDT Member and patient.  Upon review of chart and communication with care team, patient's decision making abilities are not in question.. Patient was/were present in the room during visit.    Goals of ACP Conversation:  Discuss advance care planning documents    Health Care Decision Makers:       Primary Decision Maker: Isha Sanchez - Child - [phone]    Secondary Decision Maker: Maria Ines Dong - Brother/Sister - [phone]  Summary:  Completed New Documents  Updated Healthcare Decision Maker    Advance Care Planning Documents (Patient Wishes):  Healthcare Power of /Advance Directive Appointment of Health Care Agent     Assessment:  Patient had a HCPOA from 2017 on file and requested a new document to new different agents (new agents listed above).    Interventions:  Provided education on documents for clarity and greater understanding  Assisted in the completion of documents according to patient's wishes at this time  Encouraged ongoing ACP conversation with future decision makers and loved ones    Care Preferences Communicated:   No    Outcomes/Plan:  ACP Discussion: Completed  New advance directive completed.  Returned original document(s) to patient, as well as copies for distribution to appointed agents  Copy of advance directive given to staff to scan into medical record.  Routed ACP note to attending provider or other IDT member.    Electronically signed by Chaplain Lavelle on 5/12/2025 at 3:40 PM

## 2025-05-12 NOTE — PROGRESS NOTES
Advance Care Planning     Advance Care Planning Inpatient Note  [facility] Department    Today's Date: 5/12/2025  Unit: [facility] INPATIENT REHAB UNIT    Received request from IDT Member and patient.  Upon review of chart and communication with care team, patient's decision making abilities are not in question.. Patient was/were present in the room during visit.    Goals of ACP Conversation:  Discuss advance care planning documents    Health Care Decision Makers:       Primary Decision Maker: Isha Sanchez - Child - [phone]    Secondary Decision Maker: Maria Ines Dong - Brother/Sister - [phone]  Summary:  Completed New Documents  Updated Healthcare Decision Maker    Advance Care Planning Documents (Patient Wishes):  Healthcare Power of /Advance Directive Appointment of Health Care Agent     Assessment:  Patient had a HCPOA from 2017 on file and requested a new document to new different agents (new agents listed above).    Interventions:  Provided education on documents for clarity and greater understanding  Assisted in the completion of documents according to patient's wishes at this time  Encouraged ongoing ACP conversation with future decision makers and loved ones    Care Preferences Communicated:   No    Outcomes/Plan:  ACP Discussion: Completed  New advance directive completed.  Returned original document(s) to patient, as well as copies for distribution to appointed agents  Copy of advance directive given to staff to scan into medical record.  Routed ACP note to attending provider or other IDT member.    Electronically signed by Chaplain Lavelle on 5/12/2025 at 3:40 PM

## 2025-05-13 PROCEDURE — 97535 SELF CARE MNGMENT TRAINING: CPT

## 2025-05-13 PROCEDURE — 6370000000 HC RX 637 (ALT 250 FOR IP): Performed by: STUDENT IN AN ORGANIZED HEALTH CARE EDUCATION/TRAINING PROGRAM

## 2025-05-13 PROCEDURE — 97530 THERAPEUTIC ACTIVITIES: CPT

## 2025-05-13 PROCEDURE — 99233 SBSQ HOSP IP/OBS HIGH 50: CPT | Performed by: STUDENT IN AN ORGANIZED HEALTH CARE EDUCATION/TRAINING PROGRAM

## 2025-05-13 PROCEDURE — 6360000002 HC RX W HCPCS: Performed by: STUDENT IN AN ORGANIZED HEALTH CARE EDUCATION/TRAINING PROGRAM

## 2025-05-13 PROCEDURE — 1180000000 HC REHAB R&B

## 2025-05-13 PROCEDURE — 97110 THERAPEUTIC EXERCISES: CPT

## 2025-05-13 PROCEDURE — 97116 GAIT TRAINING THERAPY: CPT

## 2025-05-13 RX ORDER — LACTOBACILLUS RHAMNOSUS GG 10B CELL
1 CAPSULE ORAL
Status: DISCONTINUED | OUTPATIENT
Start: 2025-05-14 | End: 2025-05-16 | Stop reason: HOSPADM

## 2025-05-13 RX ADMIN — CARVEDILOL 6.25 MG: 6.25 TABLET, FILM COATED ORAL at 07:47

## 2025-05-13 RX ADMIN — LOSARTAN POTASSIUM 100 MG: 50 TABLET, FILM COATED ORAL at 07:47

## 2025-05-13 RX ADMIN — DOXAZOSIN 2 MG: 1 TABLET ORAL at 13:20

## 2025-05-13 RX ADMIN — MECLIZINE 12.5 MG: 12.5 TABLET ORAL at 13:21

## 2025-05-13 RX ADMIN — CLOPIDOGREL BISULFATE 75 MG: 75 TABLET, FILM COATED ORAL at 07:47

## 2025-05-13 RX ADMIN — ENOXAPARIN SODIUM 40 MG: 100 INJECTION SUBCUTANEOUS at 07:46

## 2025-05-13 RX ADMIN — ROSUVASTATIN CALCIUM 10 MG: 5 TABLET, FILM COATED ORAL at 19:53

## 2025-05-13 RX ADMIN — CARVEDILOL 6.25 MG: 6.25 TABLET, FILM COATED ORAL at 16:22

## 2025-05-13 RX ADMIN — MECLIZINE 12.5 MG: 12.5 TABLET ORAL at 19:56

## 2025-05-13 RX ADMIN — VITAMIN D, TAB 1000IU (100/BT) 2000 UNITS: 25 TAB at 07:46

## 2025-05-13 RX ADMIN — LORAZEPAM 1 MG: 1 TABLET ORAL at 07:46

## 2025-05-13 RX ADMIN — LORAZEPAM 1 MG: 1 TABLET ORAL at 19:55

## 2025-05-13 RX ADMIN — ASPIRIN 81 MG: 81 TABLET ORAL at 07:47

## 2025-05-13 RX ADMIN — AMLODIPINE BESYLATE 10 MG: 10 TABLET ORAL at 19:53

## 2025-05-13 ASSESSMENT — PAIN SCALES - GENERAL: PAINLEVEL_OUTOF10: 0

## 2025-05-13 NOTE — PROGRESS NOTES
PHYSICAL THERAPY DAILY NOTE    PT Individual Minutes  Time In: 1350  Time Out: 1438  Minutes: 48                 Total Treatment Time:  48 Minutes    Pt. Seen for: PM, Gait Training, Therapeutic Exercise, and Transfer Training     Subjective: \" I am just scared about going home alone. I dont want neck surgery.\"         Objective:  Restrictions/Precautions: General Precautions, Fall Risk, Bed Alarm  Activity Level: Up with Assist, Up as Tolerated  Required Braces or Orthoses?: No              Other Position/Activity Restrictions: Possible onset of vertigo type dizziness during supine<>sit<>stand         GROSS ASSESSMENT           COGNITION Daily Assessment    Overall Orientation Status: Within Normal Limits   Overall Cognitive Status: WNL        BED/MAT MOBILITY Daily Assessment     Supine to Sit: Unable to assess  Sit to Supine: Unable to assess        TRANSFERS Daily Assessment    Sit to Stand: Supervision  Stand to Sit: Supervision  Stand Pivot Transfers: Supervision          GAIT Daily Assessment    Surface: Level tile  Device: Rolling Walker  Assistance: Supervision  Distance: 250 x 1  More Ambulation?: No              STEPS/STAIRS Daily Assessment    Stairs?: Yes   # Steps : 16  Stairs Height: 6\"  Rails: Right ascending  Curbs: 6\"  Device: Rolling walker  Assistance: Supervision          BALANCE Daily Assessment    Static Sitting: Good:  Pt. able to maintain balance w/o UE support;  exhibits some postural sway  Dynamic Sitting: Good - accepts moderate challenge;  can maintain balance while picking object off the floor  Static Standing: Good:  Pt. able to maintain balance w/o UE support;  exhibits some postural sway  Dynamic Standing: Good - accepts moderate challenge;  can maintain balance while picking object off the floor       WHEELCHAIR MOBILITY Daily Assessment                          LOWER EXTREMITY EXERCISES        Pain level: no pain or dizziness noted during session  Pain Location:    Pain

## 2025-05-13 NOTE — CARE COORDINATION
RN CM in room for bedside rounds. Patient sitting up in bed alert and oriented, daughter Isha Sanchez present via phone. Per therapy patent will need a rolling walker. She has hand outs from therapy regarding tub transfer bench, stair lift and bars. HH needs will be RN, PT, OT, list will be given. DC date will be 5/16. RN CM will continue to monitor for POC or DC needs.     1422 Update - rolling walker, information on [employer] home device and HH options list given to patient

## 2025-05-13 NOTE — PROGRESS NOTES
[facility] OCCUPATIONAL THERAPY DAILY NOTE  OT Individual Minutes  Time In: 0831  Time Out: 1000  Minutes: 89               Subjective: Pt agreeable to treatment. \"No, I only feel that when I change positions\" in regards to dizziness with upright.  Pain: RN notified  and RUE pain at shoulder anterior-reported that has \"been going on for a while\" .  Interdisciplinary Communication: Collaborated with RN regarding pt status, pt performance, and handoff communication.   Precautions: Falls and Posey Alarm, and dizziness with movements at times  Lines:IV  O2 Device: RA      FUNCTIONAL MOBILITY:       Bed Mobility Supervision    Sit to Stand SBA    Transfer  SBA  Transfer Type:  toilet/shower/w/c/recliner chair  Equipment: Grab Bars and RW    Ambulation SBA and CGA  Equipment: RW Close SBA in room for ADL's     ACTIVITIES OF DAILY LIVING:       Oral Hygiene Setup or clean-up assistance S/U A seated donning deoderant   Shower/Bathe Partial/moderate assistance MIN A for washing in between toes per pt preference; pt seated with use of tub t/f bench/HHSH/GB/long handled sponge, SPV seated/standing SBA   Upper Body  Dressing Setup or clean-up assistance S/U A seated to don/doff   Lower Body Dressing Supervision or touching assistance Use of reacher to don/doff thread LE's brief/pants seated, education provided; standing SBA FWW for pants management over hips   Donning/[place] Footwear Supervision or touching assistance SPV seated to don stockings/doffed with reacher socks, donned velcro shoes seated   Toileting Hygiene Supervision or touching assistance SBA with FWW for pants management/tamiko cares   Toilet Transfer Supervision or touching assistance SBA toilet t/f with FWW   Education Adaptive ADL Techniques, AE/DME Training, Benefits of OT, Energy Conservation, Pacing, Functional Transfer Training, Rolling Walker Management, and Safety Awareness     Pt completed 10 minutes on the upper body ergometer, ½ forward and ½ backward, with

## 2025-05-13 NOTE — CARE COORDINATION
RN CM in room for bedside rounds. Patient sitting up in bed alert and oriented, daughter  Isha Sanchez present via phone. Per therapy patent will need a rolling walker. She has hand outs from therapy regarding tub transfer bench, stair lift and bars. HH needs will be RN, PT, OT, list will be given. DC date will be 5/16. RN CM will continue to monitor for POC or DC needs.

## 2025-05-13 NOTE — PROGRESS NOTES
PHYSICAL THERAPY DAILY NOTE    PT Individual Minutes  Time In: 1121  Time Out: 1204  Minutes: 43                 Total Treatment Time:  43 Minutes    Pt. Seen for: AM, Gait Training, Transfer Training, and Other     Subjective: Pt. Reports her dizziness started after having a root canal done.  States she was on a procedure table in a position of cervical extension & rotation for an extended period of time (>1 hour).  Reports that after she attempted to arise from the chair she had an immediate onset of dizziness which resulted in this hospitalization.  Pt. Reports she has had 1 such episode in the past, again while receiving dental work.  In her first incident, the dizziness resolved within a hour.         Objective:  Restrictions/Precautions: General Precautions, Fall Risk, Bed Alarm  Activity Level: Up with Assist, Up as Tolerated  Required Braces or Orthoses?: No              Other Position/Activity Restrictions: Possible onset of vertigo type dizziness during supine<>sit<>stand         GROSS ASSESSMENT   Performed positional testing this AM.    Seated VAT +L (provoked dizziness)  Attempted to perform bed supported [place].  Upon coming to testing position w/ cervical extension & L rotation, pt. C/o diplopia.  Further positional testing was immediately ceased & pt. Positioned comfortably in supported in bed.        COGNITION Daily Assessment        Overall Cognitive Status: WNL        BED/MAT MOBILITY Daily Assessment     Supine to Sit: Modified independent  Sit to Supine: Modified independent        TRANSFERS Daily Assessment   Toilet transfer supervision Sit to Stand: Stand by assistance  Stand to Sit: Stand by assistance          GAIT Daily Assessment    Surface: Level tile  Device: Rolling Walker  Assistance: Supervision  Gait Deviations: Slow Marissa;Decreased step length  Distance: 12'x2              STEPS/STAIRS Daily Assessment     NT         BALANCE Daily Assessment    Static Sitting: Good:  Pt.

## 2025-05-13 NOTE — PROGRESS NOTES
[place] [address]  Tel: [phone]     Physical Medicine and Rehabilitation Progress Note      Aurora Sanchez  Admit Date: 5/9/2025  Admit Diagnosis: CVA (cerebral vascular accident) (HCC) [I63.9]  Vertigo [R42]    Subjective     Patient seen this afternoon after PT session. 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   Team care conference held and attended  All questions and concerns were addressed at this time.    Objective:     Current Facility-Administered Medications   Medication Dose Route Frequency    [START ON 5/14/2025] lactobacillus (CULTURELLE) capsule 1 capsule  1 capsule Oral Daily with breakfast    melatonin tablet 6 mg  6 mg Oral Nightly PRN    amLODIPine (NORVASC) tablet 10 mg  10 mg Oral Nightly    rosuvastatin (CRESTOR) tablet 10 mg  10 mg Oral Nightly    acetaminophen (TYLENOL) tablet 650 mg  650 mg Oral Q4H PRN    polyethylene glycol (GLYCOLAX) packet 17 g  17 g Oral Daily PRN    sodium phosphate (FLEET) rectal enema 1 enema  1 enema Rectal Daily PRN    bisacodyl (DULCOLAX) suppository 10 mg  10 mg Rectal Daily PRN    calcium carbonate (TUMS) chewable tablet 500 mg  500 mg Oral TID PRN    hydrALAZINE (APRESOLINE) tablet 10 mg  10 mg Oral TID PRN    ondansetron (ZOFRAN-ODT) disintegrating tablet 4 mg  4 mg Oral Q8H PRN    medicated lip ointment (BLISTEX)   Topical PRN    carboxymethylcellulose (THERATEARS) 1 % ophthalmic gel 1 drop  1 drop Both Eyes TID PRN    Benzocaine-Menthol (CEPACOL SORE THROAT) 15-2.6 MG lozenge 1 lozenge  1 lozenge Oral Q2H PRN    enoxaparin (LOVENOX) injection 40 mg  40 mg SubCUTAneous Daily    aspirin EC tablet 81 mg  81 mg Oral Daily    carvedilol (COREG) tablet 6.25 mg  6.25 mg Oral BID with meals    clopidogrel (PLAVIX) tablet 75 mg  75 mg Oral Daily    doxazosin (CARDURA) tablet 2 mg  2  admit): 05/11/25  -Bladder management: Monitor urinary output and urinary function/dysfunction. Does have the potential for underlying bladder dysfunction given immobility. Therefore, requires continued routine monitoring with appropriate bladder management as indicated to avoid urinary tract complications.            Principal Problem:    Vertigo  Active Problems:    Prediabetes    Primary hypertension    Thrombocytopenia    Mixed hyperlipidemia    Heart murmur, aortic    Impaired functional mobility, balance, gait, and endurance    Decreased activities of daily living (ADL)    Imbalance    Gait abnormality  Resolved Problems:    * No resolved hospital problems. *             Current Risks:   **Risk for Delirium given prolonged hospital stay.   **Risks for falls given recent immobility, weakness and fatigue                Fall precautions in place. Chair and bed alarms are set daily and nightly to discourage patient from getting up unattended. Discussed strategy to reduce the risk of falls which include understanding patient’s known fall risk factors and management of the risk factors identified, such as modification of home environment, avoiding or decreasing psychoactive medications. Sitting for 3-5 minutes prior to standing for blood pressure acclimation and avoiding orthostasis. Encouraged continuing exercise therapies which were/will be taught and implemented at [facility], particularly balance, strength, and gait training.  Avoid if possible the following medications which can contribute to delirium: benzodiazepines, antihistamines, and anticholinergics  Avoid sleep disruption by rescheduling early morning blood draws, avoiding middle of the night vital signs or disturbances, minimizing noise.   Keep lights on and blinds open during the day; lights and TV off at night; minimize tethers including urinary catheters; use glasses, hearing aids, and dentures as appropriate.  Avoid long periods of sleep during the day.

## 2025-05-13 NOTE — PATIENT CARE CONFERENCE
[facility] [place], [place]  INPATIENT REHABILITATION  TEAM CONFERENCE NOTE    Conference Date: 2025  Admit Date: 2025 12:34 PM  Patient Name: Aurora Sanchez    MRN: [id number]    : 1945 (79 y.o.)  Rehabilitation Admitting Diagnosis: CVA (cerebral vascular accident) ([facility]) [I63.9]  Vertigo [R42]           Team Members Present at Conference:  : Arlette Toure CM  Nurse: Dulce Winn  Occupational Therapist:Kelly Mejia OTR/L  Physical Therapist: Mónica Valdez PTA; Cosigner: Ludy Hartman PT  Speech Therapist: N/A    Isha Sanchez on phone in attandance    ---------------------------------------------------------------------------------------------------    Case Management:  Patient's PLOF: Independent with ADLs  Patient's Prior Living Situation: Lives alone  Baseline Supervision/Assistance: None  Current issues/needs regarding patient and family discharge status: Lives alone with no support at home    ---------------------------------------------------------------------------------------------------    Functional Assessment:  Most Recent Mobility Scores Per Physical Therapy:   Bed/Mat Mobility Bridging: Modified independent   Rolling to Left: Supervision  Rolling to Right: Supervision  Supine to Sit: Unable to assess  Sit to Supine: Unable to assess  Scooting: Stand by assistance  Bed Mobility Comments: Increased time and effort to complete going from sitting bedside to long sitting to supine with head and shoulders elevated on wedge and pillows. Supine positiong with head and shoulders elevated on wedge and pillows to rolling to R to sitting. No onset of dizziness. Patient holding head still and limiting cervical rotation and flexion   Transfers Sit to Stand: Stand by assistance  Stand to Sit: Stand by assistance  Stand Pivot Transfers: Stand by assistance  Car Transfer: Unable to assess  Comment: Increased time and effort to complete with patient limiting

## 2025-05-13 NOTE — PROGRESS NOTES
Discussed with Dr. Crouch. Hospitalist team will sign off at this time. Please call us if any questions/concerns arise.     Herminia Cifuentes, DO

## 2025-05-14 PROCEDURE — 97530 THERAPEUTIC ACTIVITIES: CPT

## 2025-05-14 PROCEDURE — 97110 THERAPEUTIC EXERCISES: CPT

## 2025-05-14 PROCEDURE — 6370000000 HC RX 637 (ALT 250 FOR IP): Performed by: STUDENT IN AN ORGANIZED HEALTH CARE EDUCATION/TRAINING PROGRAM

## 2025-05-14 PROCEDURE — 1180000000 HC REHAB R&B

## 2025-05-14 PROCEDURE — 99223 1ST HOSP IP/OBS HIGH 75: CPT | Performed by: STUDENT IN AN ORGANIZED HEALTH CARE EDUCATION/TRAINING PROGRAM

## 2025-05-14 PROCEDURE — 6370000000 HC RX 637 (ALT 250 FOR IP): Performed by: PHYSICIAN ASSISTANT

## 2025-05-14 PROCEDURE — 97116 GAIT TRAINING THERAPY: CPT

## 2025-05-14 PROCEDURE — 6360000002 HC RX W HCPCS: Performed by: STUDENT IN AN ORGANIZED HEALTH CARE EDUCATION/TRAINING PROGRAM

## 2025-05-14 PROCEDURE — 97535 SELF CARE MNGMENT TRAINING: CPT

## 2025-05-14 RX ADMIN — MECLIZINE 12.5 MG: 12.5 TABLET ORAL at 20:37

## 2025-05-14 RX ADMIN — CARVEDILOL 6.25 MG: 6.25 TABLET, FILM COATED ORAL at 08:05

## 2025-05-14 RX ADMIN — AMLODIPINE BESYLATE 10 MG: 10 TABLET ORAL at 20:37

## 2025-05-14 RX ADMIN — LORAZEPAM 1 MG: 1 TABLET ORAL at 20:37

## 2025-05-14 RX ADMIN — Medication 1 CAPSULE: at 08:04

## 2025-05-14 RX ADMIN — ROSUVASTATIN CALCIUM 10 MG: 5 TABLET, FILM COATED ORAL at 20:37

## 2025-05-14 RX ADMIN — ENOXAPARIN SODIUM 40 MG: 100 INJECTION SUBCUTANEOUS at 08:05

## 2025-05-14 RX ADMIN — CLOPIDOGREL BISULFATE 75 MG: 75 TABLET, FILM COATED ORAL at 08:05

## 2025-05-14 RX ADMIN — ASPIRIN 81 MG: 81 TABLET ORAL at 08:04

## 2025-05-14 RX ADMIN — VITAMIN D, TAB 1000IU (100/BT) 2000 UNITS: 25 TAB at 08:04

## 2025-05-14 RX ADMIN — LOSARTAN POTASSIUM 100 MG: 50 TABLET, FILM COATED ORAL at 08:04

## 2025-05-14 RX ADMIN — DOXAZOSIN 2 MG: 1 TABLET ORAL at 13:34

## 2025-05-14 RX ADMIN — CARVEDILOL 6.25 MG: 6.25 TABLET, FILM COATED ORAL at 18:04

## 2025-05-14 RX ADMIN — LORAZEPAM 1 MG: 1 TABLET ORAL at 08:49

## 2025-05-14 NOTE — PROGRESS NOTES
PHYSICAL THERAPY DAILY NOTE    PT Individual Minutes  Time In: 1521  Time Out: 1619  Minutes: 58                 Total Treatment Time:  58 Minutes    Pt. Seen for: PM, Gait Training, Therapeutic Exercise, and Transfer Training     Subjective: \" I guess they are kicking me out of here. I dont have anyone to even take me home.\"         Objective:  Restrictions/Precautions: General Precautions, Fall Risk, Bed Alarm  Activity Level: Up with Assist, Up as Tolerated  Required Braces or Orthoses?: No              Other Position/Activity Restrictions: Possible onset of vertigo type dizziness during supine<>sit<>stand         GROSS ASSESSMENT           COGNITION Daily Assessment    Overall Orientation Status: Within Normal Limits   Overall Cognitive Status: WNL        BED/MAT MOBILITY Daily Assessment     Supine to Sit: Independent  Sit to Supine: Independent        TRANSFERS Daily Assessment    Sit to Stand: Independent  Stand to Sit: Independent  Stand Pivot Transfers: Supervision          GAIT Daily Assessment    Surface: Level tile  Device: Rolling Walker  Assistance: Supervision  Distance: 200 x 1  More Ambulation?: No              STEPS/STAIRS Daily Assessment    Stairs?: No              BALANCE Daily Assessment    Static Sitting: Good:  Pt. able to maintain balance w/o UE support;  exhibits some postural sway  Dynamic Sitting: Good - accepts moderate challenge;  can maintain balance while picking object off the floor  Static Standing: Good:  Pt. able to maintain balance w/o UE support;  exhibits some postural sway  Dynamic Standing: Good - accepts moderate challenge;  can maintain balance while picking object off the floor       WHEELCHAIR MOBILITY Daily Assessment                          LOWER EXTREMITY EXERCISES   STANDING EXERCISES Sets Reps Comments   Heel Raises 1 20    Toe Raises 1 20    Hip Flexion/Marching 1 20    Hamstring Curls 1 15    Hip Abduction 1 15    Hip Extension 1 10    Mini Squats 1 10    Patient  given written HEP for home.       Pain level: no pain noted just soreness   Pain Location:  right shoulder and neck  Pain Interventions:     Vital Signs:  BP (!) 170/78   Pulse 66   Temp 97.7 °F (36.5 °C) (Oral)   Resp 17   Ht 1.575 m (5' 2.01\")   Wt 79.2 kg (174 lb 8 oz)   SpO2 99%   BMI 31.91 kg/m²       Education:    Education Given To: Patient  Education Provided: Safety;Mobility Training  Education Provided Comments: Body mechanics with using  walker  Education Method: Verbal  Education Outcome: Verbalized understanding     Interdisciplinary Communication:     Pt. Left in recliner call bell in reach needs in reach bed/chair alarm activated         Assessment: Patient seems frustrated about discharge date but when asked what was she afraid of she declined to answer.          Plan of Care: Continue with plan of care.    Mónica Valdez, PTA  5/14/2025

## 2025-05-14 NOTE — PROGRESS NOTES
[facility] OCCUPATIONAL THERAPY DAILY NOTE  OT Individual Minutes  Time In: 1301  Time Out: 1349  Minutes: 48               Subjective: Pt agreeable to treatment.   Pain: no c/o pain during session.  Interdisciplinary Communication: Collaborated with RN regarding pt status and pt performance.   Precautions: Falls and Posey Alarm, and dizziness with movements at times     FUNCTIONAL MOBILITY:       Bed Mobility Supervision    Sit to Stand Supervision    Transfer  Supervision  Transfer Type:     Equipment: RW    Ambulation SBA  Equipment: RW       - Activity Tolerance - Balance - Coordination - Range of Motion - Strengthening - Visual-Perceptual      Pt completed laundry management task utilizing laundry items, top loading washer, and front-loading dryer in preparation for IADL tasks at d/c. Pt put items into washer then transferred them to dryer utilizing reacher. Discussed recommendations to improve safety and conserve energy including:  Washing small loads at a time  Washing items in a large mesh laundry bag to reduce time/effort spent retrieving items from washer and dryer  Utilizing reacher to reduce bending while retrieving items from washer/dryer   Utilizing cart or rollator to support laundry basket and reduce lifting effort     Pt stood for task 5:45 minutes, SBA for task.     Pt engaged in Sequence board game while standing to address standing balance/tolerance, visual perceptual skills, cognition, coordination, and activity tolerance for integration into functional tasks. Pt required few cues following initial game instructions. Task graded up with B wrist weights of 1lb to complete task for additional BUE strengthening.   Pt stood for 2 minutes and then 8 minutes with SBA, no LOB, no bracing against table standing.    Pt required to use restroom following with SBA for toileting.         Education   Benefits of OT, Functional Transfer Training, Rolling Walker Management, and Safety Awareness     Assessment: Pt  demonstrated good participation in OT treatment. Pt continues to benefit from skilled OT services to address remaining deficits and progress toward baseline level of independence and safety. Patient ended session supine in bed with call bell and needs within reach and with chair/bed alarm activated.   Plan: Continue OT POC.     ARAVIND HAGER OT   5/14/2025

## 2025-05-14 NOTE — PROGRESS NOTES
[facility] OCCUPATIONAL THERAPY DAILY NOTE  OT Individual Minutes  Time In: 0745  Time Out: 0831  Minutes: 46               Subjective: Pt agreeable to treatment. \"I'll do a sponge bath today.\"  Pain: No pain expressed.  Interdisciplinary Communication: Collaborated with PT and RN regarding pt status, pt performance, and handoff communication.   Precautions: Falls and Posey Alarm, and dizziness with movements at times      FUNCTIONAL MOBILITY:       Bed Mobility NT    Sit to Stand Supervision    Transfer  Supervision  Transfer Type: Recliner chair/w/c  Equipment: Rollator    Ambulation SBA  Equipment: Rollator In room for ADL's no LOB noted     ACTIVITIES OF DAILY LIVING:       Oral Hygiene Setup or clean-up assistance S/U A seated to don deoderant / comb hair with noted BUE use with RUE d/t weakness R shoulder   Shower/Bathe Supervision or touching assistance Sponge bath preference this date, pt seated/standing with SPV for washing body   Upper Body  Dressing Setup or clean-up assistance S/U A seated to don/doff overhead shirt   Lower Body Dressing Supervision or touching assistance SPV intermittent use of reacher for threading/unthreading, figure four at times   Donning/[employer] Footwear Setup or clean-up assistance S/U A to don/doff shoes and stockings   Education Adaptive ADL Techniques, AE/DME Training, Benefits of OT, Energy Conservation, Pacing, Functional Transfer Training, Rolling Walker Management, and Safety Awareness     Assessment: Pt demonstrated good participation in OT treatment. Pt continues to benefit from skilled OT services to address remaining deficits and progress toward baseline level of independence and safety. Patient ended session seated in w/c with PT.   Plan: Continue OT POC.     ARAVIND HAGER OT   5/14/2025

## 2025-05-14 NOTE — PROGRESS NOTES
PHYSICAL THERAPY DAILY NOTE    PT Individual Minutes  Time In: 0831  Time Out: 0919  Minutes: 48                 Total Treatment Time:  48 Minutes    Pt. Seen for: AM, Gait Training, Therapeutic Exercise, and Transfer Training     Subjective: \" I have a lot on plate right now.\"         Objective:  Restrictions/Precautions: General Precautions, Fall Risk, Bed Alarm  Activity Level: Up with Assist, Up as Tolerated  Required Braces or Orthoses?: No              Other Position/Activity Restrictions: Possible onset of vertigo type dizziness during supine<>sit<>stand         GROSS ASSESSMENT           COGNITION Daily Assessment    Overall Orientation Status: Within Normal Limits   Overall Cognitive Status: WNL        BED/MAT MOBILITY Daily Assessment     Supine to Sit: Unable to assess  Sit to Supine: Unable to assess        TRANSFERS Daily Assessment    Sit to Stand: Independent  Stand to Sit: Independent  Stand Pivot Transfers: Supervision          GAIT Daily Assessment    Surface: Level tile  Device: Rolling Walker  Assistance: Supervision  Distance: 250 x 2  More Ambulation?: No              STEPS/STAIRS Daily Assessment    Stairs?: No              BALANCE Daily Assessment    Static Sitting: Good:  Pt. able to maintain balance w/o UE support;  exhibits some postural sway  Dynamic Sitting: Good - accepts moderate challenge;  can maintain balance while picking object off the floor  Static Standing: Good:  Pt. able to maintain balance w/o UE support;  exhibits some postural sway  Dynamic Standing: Good - accepts moderate challenge;  can maintain balance while picking object off the floor       WHEELCHAIR MOBILITY Daily Assessment                          LOWER EXTREMITY EXERCISES        Pain level: no pain noted during session . She did voice that she had no dizziness last night and slept good.  Pain Location:    Pain Interventions:     Vital Signs:  BP (!) 148/65   Pulse 80   Temp 98.4 °F (36.9 °C) (Oral)   Resp 19

## 2025-05-14 NOTE — CARE COORDINATION
RN BETTE  in room per patient request to talk about her HH options and list. Patient asked to have how HH works explained to her. After our conversation she requested referral sent to [facility]. Referral sent and RAGHAVENDRA AMOS will continue to follow for any DC needs.

## 2025-05-15 PROBLEM — R26.9 GAIT ABNORMALITY: Status: RESOLVED | Noted: 2025-05-12 | Resolved: 2025-05-15

## 2025-05-15 PROBLEM — Z74.09 IMPAIRED FUNCTIONAL MOBILITY, BALANCE, GAIT, AND ENDURANCE: Status: RESOLVED | Noted: 2025-05-07 | Resolved: 2025-05-15

## 2025-05-15 PROBLEM — Z78.9 DECREASED ACTIVITIES OF DAILY LIVING (ADL): Status: RESOLVED | Noted: 2025-05-07 | Resolved: 2025-05-15

## 2025-05-15 PROCEDURE — 6360000002 HC RX W HCPCS: Performed by: STUDENT IN AN ORGANIZED HEALTH CARE EDUCATION/TRAINING PROGRAM

## 2025-05-15 PROCEDURE — 99239 HOSP IP/OBS DSCHRG MGMT >30: CPT | Performed by: STUDENT IN AN ORGANIZED HEALTH CARE EDUCATION/TRAINING PROGRAM

## 2025-05-15 PROCEDURE — 97110 THERAPEUTIC EXERCISES: CPT

## 2025-05-15 PROCEDURE — 6370000000 HC RX 637 (ALT 250 FOR IP): Performed by: STUDENT IN AN ORGANIZED HEALTH CARE EDUCATION/TRAINING PROGRAM

## 2025-05-15 PROCEDURE — 97530 THERAPEUTIC ACTIVITIES: CPT

## 2025-05-15 PROCEDURE — 97535 SELF CARE MNGMENT TRAINING: CPT

## 2025-05-15 PROCEDURE — 1180000000 HC REHAB R&B

## 2025-05-15 PROCEDURE — 97116 GAIT TRAINING THERAPY: CPT

## 2025-05-15 PROCEDURE — 6370000000 HC RX 637 (ALT 250 FOR IP): Performed by: PHYSICIAN ASSISTANT

## 2025-05-15 RX ORDER — MECLIZINE HCL 12.5 MG 12.5 MG/1
12.5 TABLET ORAL 3 TIMES DAILY PRN
Qty: 21 TABLET | Refills: 0 | Status: SHIPPED | OUTPATIENT
Start: 2025-05-15 | End: 2025-05-22

## 2025-05-15 RX ADMIN — MECLIZINE 12.5 MG: 12.5 TABLET ORAL at 20:24

## 2025-05-15 RX ADMIN — AMLODIPINE BESYLATE 10 MG: 10 TABLET ORAL at 20:14

## 2025-05-15 RX ADMIN — CLOPIDOGREL BISULFATE 75 MG: 75 TABLET, FILM COATED ORAL at 07:51

## 2025-05-15 RX ADMIN — MECLIZINE 12.5 MG: 12.5 TABLET ORAL at 07:56

## 2025-05-15 RX ADMIN — ENOXAPARIN SODIUM 40 MG: 100 INJECTION SUBCUTANEOUS at 07:51

## 2025-05-15 RX ADMIN — ASPIRIN 81 MG: 81 TABLET ORAL at 07:51

## 2025-05-15 RX ADMIN — CARVEDILOL 6.25 MG: 6.25 TABLET, FILM COATED ORAL at 17:44

## 2025-05-15 RX ADMIN — LOSARTAN POTASSIUM 100 MG: 50 TABLET, FILM COATED ORAL at 07:51

## 2025-05-15 RX ADMIN — LORAZEPAM 1 MG: 1 TABLET ORAL at 20:24

## 2025-05-15 RX ADMIN — VITAMIN D, TAB 1000IU (100/BT) 2000 UNITS: 25 TAB at 07:51

## 2025-05-15 RX ADMIN — LORAZEPAM 1 MG: 1 TABLET ORAL at 07:56

## 2025-05-15 RX ADMIN — CARVEDILOL 6.25 MG: 6.25 TABLET, FILM COATED ORAL at 07:50

## 2025-05-15 RX ADMIN — ROSUVASTATIN CALCIUM 10 MG: 5 TABLET, FILM COATED ORAL at 20:14

## 2025-05-15 RX ADMIN — Medication 1 CAPSULE: at 07:51

## 2025-05-15 ASSESSMENT — PAIN SCALES - GENERAL
PAINLEVEL_OUTOF10: 0

## 2025-05-15 NOTE — DISCHARGE SUMMARY
[facility]  Inpatient Rehab Program      PHYSICAL MEDICINE & REHABILITATION DISCHARGE SUMMARY     Date: 5/15/2025  Admission Date: 5/9/2025  Discharge Date: 5/15/2025    Primary Care Provider: Betty Allen APRN - CNP       Admitting Diagnosis:   CVA (cerebral vascular accident) (HCC) [I63.9]  Vertigo [R42]    Principal Problem:    Vertigo  Active Problems:    Prediabetes    Primary hypertension    Thrombocytopenia    Mixed hyperlipidemia    Heart murmur, aortic    Imbalance  Resolved Problems:    Impaired functional mobility, balance, gait, and endurance    Decreased activities of daily living (ADL)    Gait abnormality      Past Medical History:   Diagnosis Date    AAA (abdominal aortic aneurysm)     infrarenal 3.2 cm    Acute pain of left shoulder 01/31/2022    Altered bowel habits 08/08/2022    Dr. Sri M[facility]quez of [facility]    Anxiety     Carotid atherosclerosis, bilateral     Chronic fatigue     Chronic gastritis 07/2019    with intestinal metaplasia, repeat EGD 7/2022    Colon polyps     Repeat colonoscopy 7/2022    Diverticulosis 07/2019    Seen on colonoscopy    Diverticulosis of colon 09/07/2022    Colonoscopy    Diverticulosis of intestine without bleeding 09/07/2022    Colonoscopy    Essential hypertension     Gastric intestinal metaplasia     [facility]    Gastritis 09/07/2022    EGD [facility]    GERD (gastroesophageal reflux disease)     H/O esophagogastroduodenoscopy 09/07/2022    by Dr. MAY Márquez    H/O non-ST elevation myocardial infarction (NSTEMI) 09/20/2023    Hemorrhoids 07/2019    Colonoscopy    Hiatal hernia     [facility]    History of colonoscopy 09/07/2022    [facility] Repeat in 3 years    History of diverticulitis 05/21/2016    Hx of colonoscopy 09/07/2022    3mm Cecum poly, repeat in 5 years; Dr. MAY Márquez    Hyperlipidemia     Internal hemorrhoids     Seen on colonoscopy    Mild aortic stenosis     Personal history of colonic polyps 2019

## 2025-05-15 NOTE — CARE COORDINATION
RN CM in room, patient sitting up in bed, aware of pending DC tomorrow, [facility] per her choice from HH options list and to take home her RW that was delivered. Patient v/u. She states her grandchild will come and take her home tomorrow, they can pick her up tomorrow afternoon at 4. RAGHAVENDRA AMOS will continue to monitor for DC needs.

## 2025-05-15 NOTE — CONSULTS
VASCULAR SURGERY   [address] [address]  [phone] FAX: [phone]        Aurora Sanchez  : 1945    Reason for visit: Dizziness    Chief Complaint: 79 y.o. female currently admitted to rehab, vascular consulted for complaints of positional dizziness.  She reports that first episode of dizziness occurred after dental procedure where her neck was rotated to the right and extended. Dizziness resolved 30 minutes following the procedure. Next episode was 1 week ago during second dental procedure requiring same neck extension with right rotation. This time dizziness persisted until yesterday evening. Denies previous episode prior to these two episodes of dizziness. CTA head and neck reviewed with dominant right vertebral artery without stenosis and diminutive left vertebral artery with tortuosity of the V3 segment. Additionally dizziness was improving and completely resolved yesterday after starting Meclizine.     Plan:   Rotation vertebral artery occlusion: Right neck rotation with extension likely causing temporary right vertebral artery occlusion and inadequate contralateral compensation given diminutive left vertebral artery. She requires additional dental procedure. Recommend avoiding right neck rotation with neck extension. Additional avoid these provocative movement to avoid recurrence of symptoms. Additionally recommend continue Meclizine. Case also discussed with patients daughter, Dr. Isha Sanchez, at the patient request. The patient and daughter voiced understanding of these recommendations.     No results found for this or any previous visit.      Physical Examination:   BP: (!) 142/60, Pain 0-10: Pain Level: 0, Location: scattered;     General: No acute distress  HENT: AT  CV: Regular rhythm.    LUNG: No respiratory distress on RA  Abdominal: No distension.    Past Medical History:   Diagnosis Date    AAA (abdominal aortic aneurysm)     infrarenal  2,000 Units at 25 0804    multivitamin 1 tablet  1 tablet Oral Daily Tim Crouchal N, DO   1 tablet at 25 0818    LORazepam (ATIVAN) tablet 1 mg  1 mg Oral BID PRN Destinee Crouch N, DO   1 mg at 25 0849       Past Surgical History:   Procedure Laterality Date    BREAST BIOPSY  3/14/2014    BREAST LUMPECTOMY Left 3/14/2014    fibrocystic mastopathy, intraductal hyperplasia, apocrine metaplasia    CARDIAC PROCEDURE N/A 2023    Left heart cath / coronary angiography performed by Hu Lopez MD at [facility] CARDIAC CATH LAB    CARDIAC PROCEDURE N/A 2023    Percutaneous coronary intervention performed by Hu Lopez MD at [facility] CARDIAC CATH LAB    CARDIAC PROCEDURE N/A 2023    Intravascular ultrasound performed by Hu Lopez MD at [facility] CARDIAC CATH LAB    CARDIAC PROCEDURE N/A 2023    Insert temporary pacemaker performed by Hu Lopez MD at [facility] CARDIAC CATH LAB    CATARACT REMOVAL Bilateral 2018    CATARACT REMOVAL Bilateral 2018    CATARACT REMOVAL WITH IMPLANT Bilateral     2019     SECTION      x 2    CHOLECYSTECTOMY      COLONOSCOPY  2019    HYSTERECTOMY (CERVIX STATUS UNKNOWN)      OTHER SURGICAL HISTORY  2015    Renal artery scan-normal    US GUIDED CORE BREAST BIOPSY Left     Benign       Metal implants or AICD: no    Dye allergy: no     Smoker:  Tobacco Use      Smoking status: Former        Packs/day: 0.00        Years: 0.8 packs/day for 10.6 years (8.0 ttl pk-yrs)        Types: Cigarettes        Start date:         Quit date: 1999        Years since quittin.7      Smokeless tobacco: Never      Referred by: No ref. provider found    PCP:Betty Allen, APRN - CNP     Clarence Tran MD    Elements of this note have been dictated using speech recognition software. As a result, errors of speech recognition may have occurred.

## 2025-05-15 NOTE — PROGRESS NOTES
PHYSICAL THERAPY DISCHARGE SUMMARY    PT Individual Minutes  Time In: 1304  Time Out: 1344  Minutes: 40                   Total Treatment Time:  40 Minutes           Precautions at discharge:      Restrictions/Precautions: Fall Risk, Other (Comment) (avoid cervical extension with rotation)  Activity Level: Up as Tolerated  Required Braces or Orthoses?: No               Other Position/Activity Restrictions: Possible onset of vertigo type dizziness during supine<>sit<>stand    Impairments:    Decreased LE strength  [x]     Decreased strength trunk/core  [x]     Decreased AROM   []     Decreased PROM  []    Decreased endurance  []     Decreased balance sitting  []     Decreased balance standing  [x]     Pain   [x]     Slow ambulation velocity  [x]    Decreased coordination  []    Decreased safety awareness  []       Functional Limitations:   Decreased independence with bed mobility  []     Decreased independence with functional transfers  []     Decreased independence with ambulation  []     Decreased independence with stair negotiation  []               Objective:     Vital Signs: BP (!) 151/70   Pulse 80   Temp 97.5 °F (36.4 °C) (Oral)   Resp 20   Ht 1.575 m (5' 2.01\")   Wt 79.2 kg (174 lb 8 oz)   SpO2 98%   BMI 31.91 kg/m²       Pain level: 0 to 4 out of 10  Pain location: cervical spine and lumbar spine area  Pain interventions: Medication per order, rest, positioning,relaxation,        Patient education:    Education Given To: Patient  Education Provided: Safety;Transfer Training;Fall Prevention Strategies;Precautions;Mobility Training  Education Method: Verbal;Demonstration  Barriers to Learning: None  Education Outcome: Verbalized understanding;Demonstrated understanding    Interdisciplinary Communication: spoke with rehab team regarding D/C plans     Cognition:   Overall Orientation Status: Within Normal Limits  Overall Cognitive Status: WNL    Lower Extremity Function:      LLE RLE   ROM WFL WFL   Fine

## 2025-05-15 NOTE — PROGRESS NOTES
[facility] OCCUPATIONAL THERAPY DAILY NOTE  OT Individual Minutes  Time In: 1120  Time Out: 1202  Minutes: 42               Subjective: Pt agreeable to treatment. \"I want to work on my arms\".   Pain: No pain expressed.  Interdisciplinary Communication: Collaborated with RN and CM  regarding pt status, pt performance, and handoff communication.   Precautions: Falls and Brogue Alarm     FUNCTIONAL MOBILITY:       Bed Mobility NT    Sit to Stand Supervision    Transfer  Supervision  Transfer Type:  toilet/recliner chair  Equipment: Grab Bars and Rollator    Ambulation SBA  Equipment: Rollator  In room/out of room with w/c follow to ensure pt safety with SBA 4WW with no LOB      - Activity Tolerance - Balance - Coordination - Range of Motion - Self-Care - Strengthening - Visual-Perceptual    Pt completed 10 minutes on the upper body ergometer, ½ forward and ½ backward, with light and moderate resistance to increase upper body strength and activity tolerance for integration into functional tasks.    Pt engaged in game of [place] while in standing to address standing balance/tolerance, ROM, coordination, and activity tolerance for integration into I/ADLs. Pt practiced retrieving bean bags and throwing them at board placed ~5.5 feet away. Pt demonstrated good balance and coordination throughout. No bracing against surfaces/CGA to ensure pt safety- close SBA. Pt completed task 2 sets of 24 reps. Pt with hitting target with 90% accuracy first round, 95% second round.     Task graded up and pt then ambulated to target with SBA FWW and utilized reacher to retrieve weighted bean bags. Discussed falls prevention.     Pt required to use restroom prior/post session for toileting. Pt performed toileting with SPV no LOB, SPV transfers with no LOB 4WW; hand hygiene standing with no LOB.        Education   Benefits of OT, Functional Transfer Training, and Safety Awareness     Assessment: Patient and therapist discussed home safety and

## 2025-05-15 NOTE — PROGRESS NOTES
PHYSICAL THERAPY DAILY NOTE    PT Individual Minutes  Time In: 0932  Time Out: 1019  Minutes: 47                 Total Treatment Time:  47 Minutes    Pt. Seen for: AM, Gait Training, Therapeutic Exercise, and Transfer Training     Subjective: \" I slept good last night.\"         Objective:  Restrictions/Precautions: General Precautions, Fall Risk, Bed Alarm  Activity Level: Up with Assist, Up as Tolerated  Required Braces or Orthoses?: No              Other Position/Activity Restrictions: Possible onset of vertigo type dizziness during supine<>sit<>stand         GROSS ASSESSMENT           COGNITION Daily Assessment    Overall Orientation Status: Within Normal Limits   Overall Cognitive Status: WNL        BED/MAT MOBILITY Daily Assessment     Supine to Sit: Independent  Sit to Supine: Independent        TRANSFERS Daily Assessment    Sit to Stand: Independent  Stand to Sit: Independent  Stand Pivot Transfers: Modified independent          GAIT Daily Assessment    Surface: Level tile  Device: Rolling Walker  Assistance: Modified Independent  Distance: 250  More Ambulation?: No              STEPS/STAIRS Daily Assessment    Stairs?: Yes   # Steps : 16  Stairs Height: 6\"  Rails: Right ascending  Device: No Device          BALANCE Daily Assessment    Static Sitting: Good:  Pt. able to maintain balance w/o UE support;  exhibits some postural sway  Dynamic Sitting: Good - accepts moderate challenge;  can maintain balance while picking object off the floor  Static Standing: Good:  Pt. able to maintain balance w/o UE support;  exhibits some postural sway  Dynamic Standing: Good - accepts moderate challenge;  can maintain balance while picking object off the floor       WHEELCHAIR MOBILITY Daily Assessment                          LOWER EXTREMITY EXERCISES   Rode motomed x 10 minutes     Pain level: no pain noted during session  Pain Location:    Pain Interventions:     Vital Signs:  /66   Pulse 66   Temp 97.9 °F (36.6  °C) (Oral)   Resp 16   Ht 1.575 m (5' 2.01\")   Wt 79.2 kg (174 lb 8 oz)   SpO2 96%   BMI 31.91 kg/m²       Education:          Interdisciplinary Communication: Informed nurse patient had bowel movement at the end of session. Patient was independent with hygiene and clothes management.    Pt. Left in recliner call bell in reach needs in reach bed/chair alarm activated         Assessment: Patient making good progress with mobility.         Plan of Care: Continue with plan of care. Patient given information on a stair chair lift for home. She has 17 steps at home to bedroom.    Mónica Valdez, PTA  5/15/2025

## 2025-05-15 NOTE — PROGRESS NOTES
[facility] OCCUPATIONAL THERAPY DAILY NOTE  OT Individual Minutes  Time In: 0746  Time Out: 0830  Minutes: 44               Subjective: Pt agreeable to treatment. \"Am I leaving tomorrow?\" Discussed with pt and CM-told pt to discuss with MD.  Pain: No pain expressed.  Interdisciplinary Communication: Collaborated with RN regarding pt status.   Precautions: Falls and Posey Alarm, and dizziness with movements at times       FUNCTIONAL MOBILITY:       Bed Mobility Modified Independent    Sit to Stand Supervision    Transfer  Supervision  Transfer Type:  shower/recliner chair/EOB  Equipment: RW    Ambulation SBA  Equipment: RW In room for ADL's     ACTIVITIES OF DAILY LIVING:       Oral Hygiene Setup or clean-up assistance S/U A seated to don deoderant   Shower/Bathe Supervision or touching assistance use of tub t/f bench/HHSH/GB/Long handled sponge-seated/standing SPV   Upper Body  Dressing Supervision or touching assistance seated doffed overhead shirt S/u A, standing SPV   Lower Body Dressing Supervision or touching assistance SPV to don/doff pants/brief standing/seated, no required use of AE   Donning/[place] Footwear Setup or clean-up assistance S/U A to don shoes and stockings   Education Adaptive ADL Techniques, AE/DME Training, Benefits of OT, Energy Conservation, Pacing, Functional Transfer Training, Rolling Walker Management, and Safety Awareness     Assessment: Patient with no c/o dizziness with upright with ADL performance. Pt demonstrated good participation in OT treatment. Pt continues to benefit from skilled OT services to address remaining deficits and progress toward baseline level of independence and safety. Patient ended session seated in recliner with call bell and needs within reach and with chair/bed alarm activated.   Plan: Continue OT POC.     ARAVIND HAGER OT   5/15/2025

## 2025-05-16 VITALS
WEIGHT: 174.5 LBS | HEIGHT: 62 IN | TEMPERATURE: 97.5 F | OXYGEN SATURATION: 98 % | RESPIRATION RATE: 18 BRPM | SYSTOLIC BLOOD PRESSURE: 148 MMHG | HEART RATE: 81 BPM | DIASTOLIC BLOOD PRESSURE: 65 MMHG | BODY MASS INDEX: 32.11 KG/M2

## 2025-05-16 PROCEDURE — 6360000002 HC RX W HCPCS: Performed by: STUDENT IN AN ORGANIZED HEALTH CARE EDUCATION/TRAINING PROGRAM

## 2025-05-16 PROCEDURE — 6370000000 HC RX 637 (ALT 250 FOR IP): Performed by: STUDENT IN AN ORGANIZED HEALTH CARE EDUCATION/TRAINING PROGRAM

## 2025-05-16 PROCEDURE — 97535 SELF CARE MNGMENT TRAINING: CPT

## 2025-05-16 PROCEDURE — 6370000000 HC RX 637 (ALT 250 FOR IP): Performed by: PHYSICIAN ASSISTANT

## 2025-05-16 RX ADMIN — CLOPIDOGREL BISULFATE 75 MG: 75 TABLET, FILM COATED ORAL at 08:01

## 2025-05-16 RX ADMIN — MECLIZINE 12.5 MG: 12.5 TABLET ORAL at 08:05

## 2025-05-16 RX ADMIN — VITAMIN D, TAB 1000IU (100/BT) 2000 UNITS: 25 TAB at 08:01

## 2025-05-16 RX ADMIN — LORAZEPAM 1 MG: 1 TABLET ORAL at 08:05

## 2025-05-16 RX ADMIN — MECLIZINE 12.5 MG: 12.5 TABLET ORAL at 16:01

## 2025-05-16 RX ADMIN — DOXAZOSIN 2 MG: 1 TABLET ORAL at 16:01

## 2025-05-16 RX ADMIN — LOSARTAN POTASSIUM 100 MG: 50 TABLET, FILM COATED ORAL at 08:01

## 2025-05-16 RX ADMIN — ASPIRIN 81 MG: 81 TABLET ORAL at 08:02

## 2025-05-16 RX ADMIN — CARVEDILOL 6.25 MG: 6.25 TABLET, FILM COATED ORAL at 08:01

## 2025-05-16 RX ADMIN — ENOXAPARIN SODIUM 40 MG: 100 INJECTION SUBCUTANEOUS at 08:02

## 2025-05-16 RX ADMIN — Medication 1 CAPSULE: at 08:01

## 2025-05-16 ASSESSMENT — PAIN SCALES - GENERAL: PAINLEVEL_OUTOF10: 0

## 2025-05-16 NOTE — PROGRESS NOTES
[facility] OCCUPATIONAL THERAPY DISCHARGE NOTE  OT Individual Minutes  Time In: 0833  Time Out: 0915  Minutes: 42               GOALS:  Long Term Goals:  Time Frame for Long Term Goals: 10 days   LTG 1: Patient will dress UB with Setup Assistance using AE/DME PRN. Goal met 5/16/25  LTG 2: Patient will dress LB with Setup Assistance using AE/DME PRN. Goal met 5/16/25  LTG 3: Patient will don footwear with Setup Assistance using AE/DME PRN. Goal met 5/16/25  LTG 4: Patient will bathe with Supervision using AE/DME PRN. Goal met 5/16/25  LTG 5: Patient will toilet with Setup Assistance using AE/DME PRN. Goal met 5/16/25  LTG 6: Patient/caregiver will verbalize understanding of OT recommendations regarding ADLs, functional transfers, home safety, AE/DME, energy conservation, safety awareness, activity tolerance, and follow-up therapy to increase safety with functional tasks upon discharge.Goal met 5/16/25    Subjective: Patient agreeable to therapy. \"No I think that I'm good\" in regards to questions/concerns from OT standpoint for d/c home.   Pain: RN notified  and stomach pain .  Precautions: fall risk, posey alarm    FUNCTIONAL MOBILITY:        Bed Mobility NT    Sit to Stand Modified Independent    Transfer  Modified Independent  Transfer Type:  toilet/recliner chair, shower  Equipment: RW    Ambulation Supervision  Equipment: RW In room for ADL performance     ACTIVITIES OF DAILY LIVING:    Initial Score Current Score   Eating Setup or clean-up assistance  S/U A with containers d/t sensation deficits B hands Independent  Seated   Oral Hygiene Supervision or touching assistance  SPV seated washing face Setup or clean-up assistance  S/U A seated donning deoderant and creams   Shower/Bathe Partial/moderate assistance  Based on clinical judgement: seated with shower chair, A for LE's Supervision or touching assistance  SPV to ensure pt safety- S/U A with use of tub t/f bench/HHSH/GB   Upper Body  Dressing Partial/moderate  IADL performance, see above for details. Patient continues to require skilled OT services to address deficits and maximize pt safety and independence.     Summary of Session:   Focus of session was on morning ADL routine and discharge assessment.   Education: Adaptive ADL Techniques, AE/DME Training, Benefits of OT, Energy Conservation, Pacing, Functional Transfer Training, Rolling Walker Management, Safety Awareness, and home safety/IADL modifications and RUE shoulder management.    Collaborated with RN regarding pt's progress toward goals.  Plan: Continue OT POC to address remaining deficits and maximize safety and independence.    Patient ended session seated in recliner with call bell and needs within reach and with chair/bed alarm activated.     ARAVIND HAGER, OT  5/16/2025

## 2025-05-16 NOTE — PLAN OF CARE
Problem: Safety - Adult  Goal: Free from fall injury  5/10/2025 2226 by Noe Tijerina RN  Outcome: Progressing  5/10/2025 1424 by Maribell Berrios RN  Outcome: Progressing     Problem: Skin/Tissue Integrity  Goal: Skin integrity remains intact  Description: 1.  Monitor for areas of redness and/or skin breakdown2.  Assess vascular access sites hourly3.  Every 4-6 hours minimum:  Change oxygen saturation probe site4.  Every 4-6 hours:  If on nasal continuous positive airway pressure, respiratory therapy assess nares and determine need for appliance change or resting period  5/10/2025 2226 by Noe Tijerina, RN  Outcome: Progressing  5/10/2025 1424 by Maribell Berrios RN  Outcome: Progressing  Flowsheets (Taken 5/10/2025 0830)  Skin Integrity Remains Intact: Monitor for areas of redness and/or skin breakdown     Problem: Chronic Conditions and Co-morbidities  Goal: Patient's chronic conditions and co-morbidity symptoms are monitored and maintained or improved  5/10/2025 2226 by Noe Tijerina RN  Outcome: Progressing  5/10/2025 1424 by Maribell Berrios RN  Outcome: Progressing  Flowsheets (Taken 5/10/2025 0830)  Care Plan - Patient's Chronic Conditions and Co-Morbidity Symptoms are Monitored and Maintained or Improved: Monitor and assess patient's chronic conditions and comorbid symptoms for stability, deterioration, or improvement     
  Problem: Safety - Adult  Goal: Free from fall injury  5/11/2025 2118 by Noe Tijerina RN  Outcome: Progressing  5/11/2025 0958 by Maribell Berrios RN  Outcome: Progressing     Problem: Skin/Tissue Integrity  Goal: Skin integrity remains intact  Description: 1.  Monitor for areas of redness and/or skin breakdown2.  Assess vascular access sites hourly3.  Every 4-6 hours minimum:  Change oxygen saturation probe site4.  Every 4-6 hours:  If on nasal continuous positive airway pressure, respiratory therapy assess nares and determine need for appliance change or resting period  5/11/2025 2118 by Noe Tijerina, RN  Outcome: Progressing  5/11/2025 0958 by Maribell Berrios RN  Outcome: Progressing     Problem: Chronic Conditions and Co-morbidities  Goal: Patient's chronic conditions and co-morbidity symptoms are monitored and maintained or improved  5/11/2025 2118 by Noe Tijerina, RN  Outcome: Progressing  5/11/2025 0958 by Maribell Berrios RN  Outcome: Progressing  Flowsheets (Taken 5/11/2025 0730)  Care Plan - Patient's Chronic Conditions and Co-Morbidity Symptoms are Monitored and Maintained or Improved: Monitor and assess patient's chronic conditions and comorbid symptoms for stability, deterioration, or improvement     
  Problem: Safety - Adult  Goal: Free from fall injury  5/12/2025 0955 by Elena Laboy RN  Outcome: Progressing  5/11/2025 2118 by Noe Tijerina RN  Outcome: Progressing     Problem: Skin/Tissue Integrity  Goal: Skin integrity remains intact  Description: 1.  Monitor for areas of redness and/or skin breakdown2.  Assess vascular access sites hourly3.  Every 4-6 hours minimum:  Change oxygen saturation probe site4.  Every 4-6 hours:  If on nasal continuous positive airway pressure, respiratory therapy assess nares and determine need for appliance change or resting period  5/12/2025 0955 by Elena Laboy, RN  Outcome: Progressing  5/11/2025 2118 by Noe Tijerina, RN  Outcome: Progressing     Problem: Chronic Conditions and Co-morbidities  Goal: Patient's chronic conditions and co-morbidity symptoms are monitored and maintained or improved  5/12/2025 0955 by Elena Laboy RN  Outcome: Progressing  5/11/2025 2118 by Noe Tijerina, RN  Outcome: Progressing     
  Problem: Safety - Adult  Goal: Free from fall injury  5/12/2025 2146 by Rebekah Varela RN  Outcome: Progressing  5/12/2025 0955 by Elena Laboy RN  Outcome: Progressing     Problem: Skin/Tissue Integrity  Goal: Skin integrity remains intact  Description: 1.  Monitor for areas of redness and/or skin breakdown2.  Assess vascular access sites hourly3.  Every 4-6 hours minimum:  Change oxygen saturation probe site4.  Every 4-6 hours:  If on nasal continuous positive airway pressure, respiratory therapy assess nares and determine need for appliance change or resting period  5/12/2025 2146 by Rebekah Varela RN  Outcome: Progressing  Flowsheets (Taken 5/12/2025 2000)  Skin Integrity Remains Intact: Monitor for areas of redness and/or skin breakdown  5/12/2025 0955 by Elena Laboy RN  Outcome: Progressing     Problem: Chronic Conditions and Co-morbidities  Goal: Patient's chronic conditions and co-morbidity symptoms are monitored and maintained or improved  5/12/2025 2146 by Rebekah Varela RN  Outcome: Progressing  Flowsheets (Taken 5/12/2025 2000)  Care Plan - Patient's Chronic Conditions and Co-Morbidity Symptoms are Monitored and Maintained or Improved: Monitor and assess patient's chronic conditions and comorbid symptoms for stability, deterioration, or improvement  5/12/2025 0955 by Elena Laboy RN  Outcome: Progressing     
  Problem: Safety - Adult  Goal: Free from fall injury  5/14/2025 1257 by Susan Hernandez RN  Outcome: Progressing  5/13/2025 2306 by Karen Howell RN  Outcome: Progressing     Problem: Skin/Tissue Integrity  Goal: Skin integrity remains intact  Description: 1.  Monitor for areas of redness and/or skin breakdown2.  Assess vascular access sites hourly3.  Every 4-6 hours minimum:  Change oxygen saturation probe site4.  Every 4-6 hours:  If on nasal continuous positive airway pressure, respiratory therapy assess nares and determine need for appliance change or resting period  5/14/2025 1257 by Susan Hernandez RN  Outcome: Progressing  5/13/2025 2306 by Karen Howell RN  Outcome: Progressing  Flowsheets (Taken 5/13/2025 2010)  Skin Integrity Remains Intact: Monitor for areas of redness and/or skin breakdown     Problem: Chronic Conditions and Co-morbidities  Goal: Patient's chronic conditions and co-morbidity symptoms are monitored and maintained or improved  5/14/2025 1257 by Susan Hernandez RN  Outcome: Progressing  5/13/2025 2306 by Karen Howell RN  Outcome: Progressing  Flowsheets (Taken 5/13/2025 2010)  Care Plan - Patient's Chronic Conditions and Co-Morbidity Symptoms are Monitored and Maintained or Improved: Monitor and assess patient's chronic conditions and comorbid symptoms for stability, deterioration, or improvement     
  Problem: Safety - Adult  Goal: Free from fall injury  5/14/2025 2133 by Karen Howell RN  Outcome: Progressing  5/14/2025 1257 by Susan Hernandez RN  Outcome: Progressing     Problem: Skin/Tissue Integrity  Goal: Skin integrity remains intact  Description: 1.  Monitor for areas of redness and/or skin breakdown2.  Assess vascular access sites hourly3.  Every 4-6 hours minimum:  Change oxygen saturation probe site4.  Every 4-6 hours:  If on nasal continuous positive airway pressure, respiratory therapy assess nares and determine need for appliance change or resting period  5/14/2025 2133 by Karen Howell RN  Outcome: Progressing  Flowsheets (Taken 5/14/2025 2131)  Skin Integrity Remains Intact: Monitor for areas of redness and/or skin breakdown  5/14/2025 1257 by Susan Hernandez RN  Outcome: Progressing     Problem: Chronic Conditions and Co-morbidities  Goal: Patient's chronic conditions and co-morbidity symptoms are monitored and maintained or improved  5/14/2025 2133 by Karen Howell RN  Outcome: Progressing  Flowsheets (Taken 5/14/2025 2131)  Care Plan - Patient's Chronic Conditions and Co-Morbidity Symptoms are Monitored and Maintained or Improved: Monitor and assess patient's chronic conditions and comorbid symptoms for stability, deterioration, or improvement  5/14/2025 1257 by Susan Hernandez RN  Outcome: Progressing     
  Problem: Safety - Adult  Goal: Free from fall injury  5/15/2025 1005 by Isha Borrero RN  Outcome: Progressing  5/15/2025 1004 by Isha Borrero RN  Outcome: Progressing  5/14/2025 2133 by Karen Howell RN  Outcome: Progressing     Problem: Skin/Tissue Integrity  Goal: Skin integrity remains intact  Description: 1.  Monitor for areas of redness and/or skin breakdown2.  Assess vascular access sites hourly3.  Every 4-6 hours minimum:  Change oxygen saturation probe site4.  Every 4-6 hours:  If on nasal continuous positive airway pressure, respiratory therapy assess nares and determine need for appliance change or resting period  5/15/2025 1005 by Isha Borrero RN  Outcome: Progressing  5/14/2025 2133 by Karen Howell RN  Outcome: Progressing  Flowsheets (Taken 5/14/2025 2131)  Skin Integrity Remains Intact: Monitor for areas of redness and/or skin breakdown     Problem: Chronic Conditions and Co-morbidities  Goal: Patient's chronic conditions and co-morbidity symptoms are monitored and maintained or improved  5/15/2025 1005 by Isha Borrero RN  Outcome: Progressing  5/14/2025 2133 by Karen Howell RN  Outcome: Progressing  Flowsheets (Taken 5/14/2025 2131)  Care Plan - Patient's Chronic Conditions and Co-Morbidity Symptoms are Monitored and Maintained or Improved: Monitor and assess patient's chronic conditions and comorbid symptoms for stability, deterioration, or improvement     
  Problem: Safety - Adult  Goal: Free from fall injury  5/15/2025 1940 by Isha Borrero RN  Outcome: Progressing  5/15/2025 1005 by Isha Borrero RN  Outcome: Progressing  5/15/2025 1004 by Isha Borrero RN  Outcome: Progressing     Problem: Skin/Tissue Integrity  Goal: Skin integrity remains intact  Description: 1.  Monitor for areas of redness and/or skin breakdown2.  Assess vascular access sites hourly3.  Every 4-6 hours minimum:  Change oxygen saturation probe site4.  Every 4-6 hours:  If on nasal continuous positive airway pressure, respiratory therapy assess nares and determine need for appliance change or resting period  5/15/2025 1940 by Isha Borrero RN  Outcome: Progressing  5/15/2025 1005 by Isha Borrero RN  Outcome: Progressing     Problem: Chronic Conditions and Co-morbidities  Goal: Patient's chronic conditions and co-morbidity symptoms are monitored and maintained or improved  5/15/2025 1940 by Isha Borrero RN  Outcome: Progressing  5/15/2025 1005 by Isha Borrero RN  Outcome: Progressing     
  Problem: Safety - Adult  Goal: Free from fall injury  5/9/2025 2342 by Tayler Garibay RN  Outcome: Progressing  Flowsheets (Taken 5/9/2025 1450 by Elizabeth Garcia RN)  Free From Fall Injury:   Instruct family/caregiver on patient safety   Based on caregiver fall risk screen, instruct family/caregiver to ask for assistance with transferring infant if caregiver noted to have fall risk factors  5/9/2025 1419 by Elizabeth Garcia RN  Outcome: Progressing     Problem: Skin/Tissue Integrity  Goal: Skin integrity remains intact  Description: 1.  Monitor for areas of redness and/or skin breakdown2.  Assess vascular access sites hourly3.  Every 4-6 hours minimum:  Change oxygen saturation probe site4.  Every 4-6 hours:  If on nasal continuous positive airway pressure, respiratory therapy assess nares and determine need for appliance change or resting period  Outcome: Progressing  Flowsheets  Taken 5/9/2025 1450 by Elizabeth Garcia RN  Skin Integrity Remains Intact:   Monitor for areas of redness and/or skin breakdown   Assess vascular access sites hourly  Taken 5/9/2025 1336 by Elizabeth Garcia RN  Skin Integrity Remains Intact: Monitor for areas of redness and/or skin breakdown     Problem: Chronic Conditions and Co-morbidities  Goal: Patient's chronic conditions and co-morbidity symptoms are monitored and maintained or improved  Outcome: Progressing     
  Problem: Safety - Adult  Goal: Free from fall injury  Outcome: Progressing     
  Problem: Safety - Adult  Goal: Free from fall injury  Outcome: Progressing     Problem: Skin/Tissue Integrity  Goal: Skin integrity remains intact  Description: 1.  Monitor for areas of redness and/or skin breakdown2.  Assess vascular access sites hourly3.  Every 4-6 hours minimum:  Change oxygen saturation probe site4.  Every 4-6 hours:  If on nasal continuous positive airway pressure, respiratory therapy assess nares and determine need for appliance change or resting period  Outcome: Progressing  Flowsheets (Taken 5/13/2025 2010)  Skin Integrity Remains Intact: Monitor for areas of redness and/or skin breakdown     Problem: Chronic Conditions and Co-morbidities  Goal: Patient's chronic conditions and co-morbidity symptoms are monitored and maintained or improved  Outcome: Progressing  Flowsheets (Taken 5/13/2025 2010)  Care Plan - Patient's Chronic Conditions and Co-Morbidity Symptoms are Monitored and Maintained or Improved: Monitor and assess patient's chronic conditions and comorbid symptoms for stability, deterioration, or improvement     
Pt discharge today  Problem: Safety - Adult  Goal: Free from fall injury  5/16/2025 0437 by Bernarda Paz RN  Outcome: Progressing  5/15/2025 1940 by Isha Borrero RN  Outcome: Progressing     Problem: Skin/Tissue Integrity  Goal: Skin integrity remains intact  Description: 1.  Monitor for areas of redness and/or skin breakdown2.  Assess vascular access sites hourly3.  Every 4-6 hours minimum:  Change oxygen saturation probe site4.  Every 4-6 hours:  If on nasal continuous positive airway pressure, respiratory therapy assess nares and determine need for appliance change or resting period  5/16/2025 0437 by Bernarda Paz RN  Outcome: Progressing  5/15/2025 1940 by Isha Borrero RN  Outcome: Progressing     Problem: Chronic Conditions and Co-morbidities  Goal: Patient's chronic conditions and co-morbidity symptoms are monitored and maintained or improved  5/16/2025 0437 by Bernarda Paz RN  Outcome: Progressing  5/15/2025 1940 by Isha Borrero RN  Outcome: Progressing     
Type:  (sit<>Stand with RW)  Level of Assistance for Pressure Relief Activities: Contact guard assistance  Wheelchair Parts Management: No  Propulsion: No          Occupational therapy functional outcome/goal:   Eating  Assistance Needed: Independent  Comment: MOD I seated  CARE Score: 6   Oral Hygiene  Assistance Needed: Setup or clean-up assistance  Comment: S/U A seated to don deoderant  CARE Score: 5  Shower/Bathe Self  Assistance Needed: Supervision or touching assistance  Comment: Pt participated in partial sponge bath this date seated: UB SPV/ anterior/posterior cares CGA standing  CARE Score: 4  Upper Body Dressing  Assistance Needed: Partial/moderate assistance  Comment: MIN A to don bra while seated, to don/doff overhead shirt SPV seated  CARE Score: 3  Lower Body Dressing  Assistance Needed: Partial/moderate assistance  Comment: MOD A to thread/unthread BLE's d/t leaning forward c/o dizziniess/prevention; benefit from AE training for LB; pt standing CGA FWW for pants management over hips  CARE Score: 3  Putting On/Taking Off Footwear  Assistance Needed: Dependent  Comment: TD to don compression socks this day  CARE Score: 1  Toileting Hygiene  Assistance needed: Supervision or touching assistance  Comment: CGA FWW BSC for pants management/anterior/posterior cares  CARE Score: 4  Toilet Transfer  Assistance needed: Supervision or touching assistance  Comment: CGA FWW to BSC for urgency and limited by HTN  CARE Score: 4         Rehab Nursing: bowel and bladder program, routine skin care and prevention of pressure sores, education on medications effects and side effects, pain control as appropriate, DVT prevention, TEDs/SCDs as appropriate, mobilize, carry over learned skills in therapies, counseling.            Physician's anticipated interventions and functional outcomes: Patient requires continued close monitoring of the following systems: CV: Monitor blood pressure and heart rate.  Adjust medications as

## 2025-05-16 NOTE — CARE COORDINATION
Patient with DC orders today. Her grandchild will be coming at aprox 4pm to pick her up and take her home. She is aware of [facility], agency she chose from  options list. Rolling walker has been delivered to room. States no issues with follow ups or picking up medications at discharge. No additional needs made known to RN BETTE. Patient and family with no questions or concerns. Patient has met all treatment goals and milestones for discharge. Patient to contact CM if any new needs arise.       05/16/25 0846   Services At/After Discharge   Transition of Care Consult (CM Consult) Home Health;Discharge Planning   Internal Home Health No   Reason Outside Agency Chosen Script used patient chose alternate agency   Services At/After Discharge DME;Home Health    Resource Information Provided? No   Mode of Transport at Discharge Self   Confirm Follow Up Transport Family   Condition of Participation: Discharge Planning   The Plan for Transition of Care is related to the following treatment goals: return to baseline with assistance of Cole  and family   The Patient and/or Patient Representative was provided with a Choice of Provider? Patient   The Patient and/Or Patient Representative agree with the Discharge Plan? Yes   Freedom of Choice list was provided with basic dialogue that supports the patient's individualized plan of care/goals, treatment preferences, and shares the quality data associated with the providers?  Yes

## 2025-05-19 ENCOUNTER — TELEPHONE (OUTPATIENT)
Dept: INTERNAL MEDICINE CLINIC | Facility: CLINIC | Age: 80
End: 2025-05-19

## 2025-05-19 NOTE — TELEPHONE ENCOUNTER
Care Transitions Initial Follow Up Call    Outreach made within 2 business days of discharge: Yes    Patient: Aurora Sanchez Patient : 1945   MRN: [id number]  Reason for Admission: vertido  Discharge Date: 25       Spoke with: patient    Discharge department/facility[facility]    TCM Interactive Patient Contact:  Was patient able to fill all prescriptions: Yes  Was patient instructed to bring all medications to the follow-up visit: Yes  Is patient taking all medications as directed in the discharge summary? Yes  Does patient understand their discharge instructions: Yes  Does patient have questions or concerns that need addressed prior to 7-14 day follow up office visit: no    Additional needs identified to be addressed with provider  No needs identified             Scheduled appointment with PCP within 7-14 days - Patient cancelled appointment for this afternoon, will let us know when she will be able to come in within the next week or so.    Follow Up  Future Appointments   Date Time Provider Department Center   2025  1:00 PM Betty Allen APRN - CNP [facility] DEP   2025  1:30 PM Betty Allen APRN - CNP [facility]   2025  2:30 PM Xavier Bowen MD [facility]       Nona Llanos MA

## 2025-06-23 ENCOUNTER — TELEPHONE (OUTPATIENT)
Dept: INTERNAL MEDICINE CLINIC | Facility: CLINIC | Age: 80
End: 2025-06-23

## 2025-06-23 NOTE — TELEPHONE ENCOUNTER
Pt discharged from [facility] on 5/15/2025.     5/19/2025 TCM call done by NICOLASA Segundo.    Pt with canceled appt scheduled    for 5/19/2025 at 1:00 pm.    Pt with canceled appt 6/24/2025 for Medicare Wellness. Pt's [birth year].    Pt with canceled appt with [facility] scheduled for 6/26/2025.      Please call to follow up on pt and reschedule appt.  Pt due for office visit.  Fax received from [facility].  Re: OT eval week of 6/2/2025.

## 2025-06-24 ENCOUNTER — TELEPHONE (OUTPATIENT)
Dept: INTERNAL MEDICINE CLINIC | Facility: CLINIC | Age: 80
End: 2025-06-24

## 2025-06-24 NOTE — TELEPHONE ENCOUNTER
Called [facility], per Betty Allen CNP, she will not sign the home health orders.  Patient has has 2 appointments for follow up and has cancelled both of them.

## 2025-06-24 NOTE — TELEPHONE ENCOUNTER
Lm for Aurora to please return my call, I'd like to check on her and maybe reschedule her appointments.

## 2025-06-29 DIAGNOSIS — E78.5 DYSLIPIDEMIA: ICD-10-CM

## 2025-06-30 RX ORDER — ROSUVASTATIN CALCIUM 10 MG/1
10 TABLET, COATED ORAL DAILY
Qty: 90 TABLET | Refills: 3 | Status: SHIPPED | OUTPATIENT
Start: 2025-06-30

## (undated) DEVICE — Device

## (undated) DEVICE — DISPOSABLE PULLBACK SLED FOR MOTORDRIVE

## (undated) DEVICE — MICROPUNCTURE INTRODUCER SET SILHOUETTE TRANSITIONLESS WITH NITINOL WIRE GUIDE: Brand: MICROPUNCTURE

## (undated) DEVICE — CATH BLLN ANGIO 3.50X12MM NC EUPHORIA RX

## (undated) DEVICE — RADIFOCUS OPTITORQUE ANGIOGRAPHIC CATHETER: Brand: OPTITORQUE

## (undated) DEVICE — COPILOT BLEEDBACK CONTROL VALVE: Brand: COPILOT

## (undated) DEVICE — ANGIOGRAPHIC CATHETER: Brand: IMPULSE™

## (undated) DEVICE — GUIDE NDL ST W/ 14 X 147CM TELESCOPICALLY FLD CIV FLX CVR

## (undated) DEVICE — BAND COMPR L24CM REG CLR PLAS HEMSTAT EXT HK AND LOOP RETEN

## (undated) DEVICE — CORONARY IMAGING CATHETER: Brand: OPTICROSS™ HD

## (undated) DEVICE — GLIDESHEATH SLENDER STAINLESS STEEL KIT: Brand: GLIDESHEATH SLENDER

## (undated) DEVICE — CATH BLLN ANGIO 2.50X15MM SC EUPHORA RX

## (undated) DEVICE — CATHETER GUID 5FR GWIRE 0.058IN COR EXTRA BKUP SUPP 3.5 ACT

## (undated) DEVICE — SHEATH INTRO L12CM DIA6FR W/ LUERLOCK HUB HEMSTAS VLV

## (undated) DEVICE — GUIDEWIRE 035IN 210CM PTFE COAT FIX COR J TIP 15MM FIRM BODY

## (undated) DEVICE — 6 FOOT DISPOSABLE EXTENSION CABLE WITH SAFE CONNECT / SCREW-DOWN

## (undated) DEVICE — INFLATION DEVICE KIT: Brand: ENCORE™ 26 ADVANTAGE KIT

## (undated) DEVICE — RUNTHROUGH NS EXTRA FLOPPY PTCA GUIDEWIRE: Brand: RUNTHROUGH

## (undated) DEVICE — PERCLOSE™ PROSTYLE™ SUTURE-MEDIATED CLOSURE AND REPAIR SYSTEM: Brand: PERCLOSE™ PROSTYLE™